# Patient Record
Sex: MALE | Race: BLACK OR AFRICAN AMERICAN | NOT HISPANIC OR LATINO | Employment: OTHER | URBAN - METROPOLITAN AREA
[De-identification: names, ages, dates, MRNs, and addresses within clinical notes are randomized per-mention and may not be internally consistent; named-entity substitution may affect disease eponyms.]

---

## 2017-01-23 ENCOUNTER — DOCUMENTATION ONLY (OUTPATIENT)
Dept: NEPHROLOGY | Facility: CLINIC | Age: 37
End: 2017-01-23

## 2017-01-24 NOTE — PROGRESS NOTES
Home dialysis clinic note  1/10/17    HPI  Mr. Hidalgo has been recently started on PD. He has ESRD due to malignant hypertension and chronic NSAID use. He has hepatitis B carrier state per hepatology clinic. Pt is doing well so far on PD. Denies any cloudy fluid/ fever/ vomiting/ discharge from exit site/ abdominal pain/ dysuria.     Most recently his PD catheter has been working ok. He occasionally feels pressure in suprapubic area. He is not sure he has any dysuria.     ROS  As noted under HPI  Otherwise negative  No nausea/ vomiting/ cloudy PD fluid/ dysuria/ fever/ cough/ shortness of breath/ leg edema     Exam  Vitals noted, /98, HR 76, T 97.4, weight 92.5, dry weight 86 kg  Gen NAD  Well developed, well nourished   Respiration regular  Neck supple  Lungs no crackles/ wheezing  CV S1S2+ regular  abd soft, NT, exit site without any redness/ discharge/ crusting  extr edema trace       PD catheter inserted by Dr. Gillespie 6/15/16    Tidal 5 exchanges  First fill 2.5 L  Tidal fill 2 L  Tidal drain 2.3 L  Total volume 12.5 L    Dec KT/V 2.8  PD vol out 11,640  Urine 1,725     Aug KT/V 1.98  Urine 1 L    Labs   Hb 13.8  Iron sat 15  BUN 48  Creatinine 6.2  Na 141  K 5.0  Bicarbonate 21  Albumin 4.6  Ca CC 8.8  Phos 4.8      HbSAg positive   HBSAB Less than 10      A/P  ESRD  Hypertension  Metabolic acidosis, resolved   Proteinuria  Secondary hyperparathyroidism      - follow KT/V   - continue lasix to 40 twice daily, continue coreg, amlodipine, lisinopril, aldactone   - strict low salt diet  - iron series  - low phos diet  - continue calcitriol dose to 0.25 mcg daily given PTH trend  - SW is sending referral for mental health services through [facility] as he screened positive on depression screen. Pt has agreed to follow there. Still haven't actually visited the clinic.   - discussed with pt about good bowel regimen, will avoid daily use of Dulcolax and instead use Colace twice daily, Miralax and add  Benefiber to be added to at least 3 drinks per day. Counseled about increased fiber intake in his diet and encouraged to bring up any issues with constipation as his PD catheter is somewhat positional. He expressed understanding.   - sodium bicarbonate 325 mg TID  - start Renaplex D  - check urinalysis and urine culture

## 2017-02-03 ENCOUNTER — HOSPITAL ENCOUNTER (OUTPATIENT)
Dept: CARDIOLOGY | Facility: CLINIC | Age: 37
Discharge: HOME | End: 2017-02-03
Payer: MEDICARE

## 2017-02-03 ENCOUNTER — HOSPITAL ENCOUNTER (OUTPATIENT)
Dept: RADIOLOGY | Facility: HOSPITAL | Age: 37
Discharge: HOME | End: 2017-02-03
Attending: NURSE PRACTITIONER
Payer: MEDICARE

## 2017-02-03 DIAGNOSIS — Z76.82 ORGAN TRANSPLANT CANDIDATE: ICD-10-CM

## 2017-02-03 LAB — DIASTOLIC DYSFUNCTION: NO

## 2017-02-03 PROCEDURE — 93018 CV STRESS TEST I&R ONLY: CPT | Mod: S$PBB,,, | Performed by: INTERNAL MEDICINE

## 2017-02-03 PROCEDURE — 78452 HT MUSCLE IMAGE SPECT MULT: CPT | Mod: 26,,, | Performed by: RADIOLOGY

## 2017-02-03 PROCEDURE — 93016 CV STRESS TEST SUPVJ ONLY: CPT | Mod: S$PBB,,, | Performed by: INTERNAL MEDICINE

## 2017-02-13 ENCOUNTER — DOCUMENTATION ONLY (OUTPATIENT)
Dept: NEPHROLOGY | Facility: CLINIC | Age: 37
End: 2017-02-13

## 2017-02-13 NOTE — PROGRESS NOTES
Home dialysis clinic note  2/10/17    HPI  Mr. Hidalgo has been recently started on PD. He has ESRD due to malignant hypertension and chronic NSAID use. He has hepatitis B carrier state per hepatology clinic. Pt is doing well so far on PD. Denies any cloudy fluid/ fever/ vomiting/ discharge from exit site/ abdominal pain/ dysuria.     Most recently his PD catheter has been working ok. Denies constipation/ dysuria.     ROS  As noted under HPI  Otherwise negative  No nausea/ vomiting/ cloudy PD fluid/ dysuria/ fever/ cough/ shortness of breath/ leg edema     Exam  Vitals noted, /92, HR 71, T 96.5, weight 92.4, dry weight 86 kg  Gen NAD  Well developed, well nourished   Respiration regular  Neck supple  Lungs no crackles/ wheezing  CV S1S2+ regular  abd soft, NT, exit site without any redness/ discharge/ crusting  extr edema trace       PD catheter inserted by Dr. Gillespie 6/15/16    Tidal 5 exchanges  First fill 2.5 L  Tidal fill 2 L  Tidal drain 2.3 L  Total volume 12.5 L    Dec KT/V 2.8  PD vol out 11,640  Urine 1,725     Aug KT/V 1.98  Urine 1 L    Labs   Hb 13  Iron sat 18  BUN 55  Creatinine 7.1  Na 141  K 4.8  Bicarbonate 19  Albumin 4.4  Ca CC 9.0  Phos 4.0    KT/V not done for this month    HbSAg positive   HBSAB Less than 10      A/P  ESRD  Hypertension  Metabolic acidosis  Proteinuria  Secondary hyperparathyroidism      - follow KT/V   - continue lasix to 40 twice daily, continue coreg, amlodipine, lisinopril  - change EDW to 90 kg  - strict low salt diet  - iron series  - low phos diet  - continue calcitriol dose to 0.25 mcg daily given PTH trend  - he has an appointment to see psychiatry   - discussed with pt about good bowel regimen, will avoid daily use of Dulcolax and instead use Colace twice daily, Miralax and add Benefiber to be added to at least 3 drinks per day. Counseled about increased fiber intake in his diet and encouraged to bring up any issues with constipation as his PD catheter  is somewhat positional. He expressed understanding.   - continue sodium bicarbonate 325 mg TID  - continue Renaplex D

## 2017-03-09 ENCOUNTER — DOCUMENTATION ONLY (OUTPATIENT)
Dept: PSYCHIATRY | Facility: CLINIC | Age: 37
End: 2017-03-09

## 2017-03-09 ENCOUNTER — SOCIAL WORK (OUTPATIENT)
Dept: TRANSPLANT | Facility: CLINIC | Age: 37
End: 2017-03-09
Payer: MEDICARE

## 2017-03-09 ENCOUNTER — INITIAL CONSULT (OUTPATIENT)
Dept: PSYCHIATRY | Facility: CLINIC | Age: 37
End: 2017-03-09
Payer: MEDICARE

## 2017-03-09 VITALS — HEIGHT: 70 IN

## 2017-03-09 DIAGNOSIS — Z01.818 ENCOUNTER FOR PRE-TRANSPLANT EVALUATION FOR KIDNEY TRANSPLANT: Primary | ICD-10-CM

## 2017-03-09 PROCEDURE — 99212 OFFICE O/P EST SF 10 MIN: CPT | Mod: PBBFAC | Performed by: PSYCHIATRY & NEUROLOGY

## 2017-03-09 PROCEDURE — 99999 PR PBB SHADOW E&M-EST. PATIENT-LVL II: CPT | Mod: PBBFAC,,, | Performed by: PSYCHIATRY & NEUROLOGY

## 2017-03-09 PROCEDURE — 99499 UNLISTED E&M SERVICE: CPT | Mod: S$PBB,,, | Performed by: PSYCHIATRY & NEUROLOGY

## 2017-03-09 NOTE — PROGRESS NOTES
Pt scheduled for psych eval today.  Per note, he was 40 minutes late and had to reschedule for 5/16/17.

## 2017-03-20 DIAGNOSIS — N18.6 ESRD (END STAGE RENAL DISEASE): Primary | ICD-10-CM

## 2017-03-20 RX ORDER — FUROSEMIDE 40 MG/1
40 TABLET ORAL DAILY
Qty: 30 TABLET | Refills: 0
Start: 2017-03-20 | End: 2017-08-11 | Stop reason: SDUPTHER

## 2017-03-23 ENCOUNTER — TELEPHONE (OUTPATIENT)
Dept: TRANSPLANT | Facility: CLINIC | Age: 37
End: 2017-03-23

## 2017-03-23 NOTE — TELEPHONE ENCOUNTER
Called patient and left message. Appt with psych is 5/16/17. Needs to be rescheduled (due to no shows) for Echo and Hepatology. Will rescheduled after completion of psych appointment.

## 2017-04-07 DIAGNOSIS — Z76.82 AWAITING ORGAN TRANSPLANT STATUS: Primary | ICD-10-CM

## 2017-04-09 ENCOUNTER — DOCUMENTATION ONLY (OUTPATIENT)
Dept: NEPHROLOGY | Facility: CLINIC | Age: 37
End: 2017-04-09

## 2017-04-09 NOTE — PROGRESS NOTES
Peritoneal dialysis clinic note  4/6/17    HPI  Mr. Hidalgo has been recently started on PD. He has ESRD due to malignant hypertension and chronic NSAID use. He has hepatitis B carrier state per hepatology clinic. Pt is doing well so far on PD. Denies any cloudy fluid/ fever/ vomiting/ discharge from exit site/ abdominal pain/ dysuria.     Most recently his PD catheter has been working ok. Denies constipation/ dysuria.     ROS  As noted under HPI  Otherwise negative  No nausea/ vomiting/ cloudy PD fluid/ dysuria/ fever/ cough/ shortness of breath/ leg edema     Exam  Vitals noted, /100, HR 82, T 97.9, weight 92.4, dry weight 94.6kg  Gen NAD  Well developed, well nourished   Respiration regular  Neck supple  Lungs no crackles/ wheezing  CV S1S2+ regular  abd soft, NT, exit site without any redness/ discharge/ crusting  extr edema trace       PD catheter inserted by Dr. Gillespie 6/15/16    Tidal 5 exchanges  First fill 2.5 L  Tidal fill 2 L  Tidal drain 2.3 L  Total volume 12.5 L    Dec KT/V 2.8  PD vol out 11,640  Urine 1,725     Aug KT/V 1.98  Urine 1 L    Labs   Labs noted in e cube     HbSAg positive   HBSAB Less than 10      A/P  ESRD  Hypertension  Metabolic acidosis  Proteinuria  Secondary hyperparathyroidism      - follow KT/V   - continue lasix to 40 twice daily, continue coreg, amlodipine, lisinopril  - keep EDW to 90 kg  - strict low salt diet  - strict low K diet  - increase sodium bicarbonate to bid from current daily dose  - iron series  - low phos diet  - continue calcitriol dose to 0.25 mcg daily given PTH trend  - he has an appointment to see psychiatry   - discussed with pt about good bowel regimen, will avoid daily use of Dulcolax and instead use Colace twice daily, Miralax and add Benefiber to be added to at least 3 drinks per day. Counseled about increased fiber intake in his diet and encouraged to bring up any issues with constipation as his PD catheter is somewhat positional. He expressed  understanding.   - continue Renaplex D

## 2017-04-12 ENCOUNTER — TELEPHONE (OUTPATIENT)
Dept: OPTOMETRY | Facility: CLINIC | Age: 37
End: 2017-04-12

## 2017-04-12 NOTE — TELEPHONE ENCOUNTER
----- Message from Montserrat Bravo OD sent at 4/12/2017  9:21 AM CDT -----  Regarding: call pt to schedule  Hello,    This patient is due for his 6-month follow-up on hypertensive retinopathy. Please call him to offer to schedule.    Thank you,  Montserrat Bravo OD

## 2017-05-09 ENCOUNTER — DOCUMENTATION ONLY (OUTPATIENT)
Dept: NEPHROLOGY | Facility: CLINIC | Age: 37
End: 2017-05-09

## 2017-05-12 NOTE — PROGRESS NOTES
Peritoneal dialysis clinic note  5/9/17    HPI  Mr. Hidalgo has been recently started on PD. He has ESRD due to malignant hypertension and chronic NSAID use. He has hepatitis B carrier state. Denies any cloudy fluid/ fever/ vomiting/ discharge from exit site/ abdominal pain/ dysuria.     Most recently his PD catheter has been working ok. Denies constipation/ dysuria.     Last week when pt presented for labs to PD clinic, per staff, he was noted to be edematous. Per recent PET results (average low) and per his edema, he had changes to his PD Rx. Per his flow sheet he appears to have increased net UF from around 800's prior to this to around 1400 to 1700 cc. Pt reports he has been feeling tired as a result and has fatigue. His BP in the clinic was 107/81. Although still he appears to have weight gain above his EDW.     ROS  As noted under HPI  Otherwise negative  No nausea/ vomiting/ cloudy PD fluid/ dysuria/ fever/ cough  Positive for shortness of breath/ leg edema     Exam  Vitals noted, /87, HR 81, T 98.3, weight 97.2 with 500 ml of fluid in, dry weight 90 kg  Gen NAD  Well developed, well nourished   Respiration regular  Neck supple  Lungs no crackles/ wheezing  CV S1S2+ regular  abd soft, NT, exit site without any redness/ discharge/ crusting  extr edema 1+ to 2+      PD catheter inserted by Dr. Gillespie 6/15/16    Tidal 5 exchanges  First fill 3 L  Tidal fill 2.3 L  Tidal drain 2.6 L  Last fill 500 ml  Total volume 12.7 L    Dec KT/V 2.8  PD vol out 11,640  Urine 1,725     Aug KT/V 1.98  Urine 1 L    Labs   Labs noted in e cube     HbSAg positive   HBSAB Less than 10      A/P  ESRD  Hypertension  Metabolic acidosis  Proteinuria  Secondary hyperparathyroidism  Fluid overload, however intolerant of 4.5% solution      - follow KT/V   - increase lasix to 80 mg twice daily, continue coreg, amlodipine, lisinopril  - follow symptoms closely   - strict low salt diet  - strict low K diet  - continue sodium  bicarbonate to bid from current daily dose  - iron series  - low phos diet  - continue calcitriol dose to 0.25 mcg daily given PTH trend  - he has an appointment to see psychiatry   - discussed with pt about good bowel regimen, will avoid daily use of Dulcolax and instead use Colace twice daily, Miralax and add Benefiber to be added to at least 3 drinks per day. Counseled about increased fiber intake in his diet and encouraged to bring up any issues with constipation as his PD catheter is somewhat positional. He expressed understanding.   - continue Renaplex D

## 2017-05-16 ENCOUNTER — INITIAL CONSULT (OUTPATIENT)
Dept: PSYCHIATRY | Facility: CLINIC | Age: 37
End: 2017-05-16
Payer: MEDICARE

## 2017-05-16 VITALS
SYSTOLIC BLOOD PRESSURE: 118 MMHG | HEART RATE: 87 BPM | BODY MASS INDEX: 30.64 KG/M2 | WEIGHT: 214 LBS | HEIGHT: 70 IN | DIASTOLIC BLOOD PRESSURE: 72 MMHG

## 2017-05-16 DIAGNOSIS — F43.21 ADJUSTMENT DISORDER WITH DEPRESSED MOOD: Primary | ICD-10-CM

## 2017-05-16 PROCEDURE — 90792 PSYCH DIAG EVAL W/MED SRVCS: CPT | Mod: S$PBB,NTX,, | Performed by: PSYCHIATRY & NEUROLOGY

## 2017-05-16 PROCEDURE — 99212 OFFICE O/P EST SF 10 MIN: CPT | Mod: PBBFAC,TXP,25 | Performed by: PSYCHIATRY & NEUROLOGY

## 2017-05-16 PROCEDURE — 99999 PR PBB SHADOW E&M-EST. PATIENT-LVL II: CPT | Mod: PBBFAC,TXP,, | Performed by: PSYCHIATRY & NEUROLOGY

## 2017-05-16 PROCEDURE — 90792 PSYCH DIAG EVAL W/MED SRVCS: CPT | Mod: PBBFAC,TXP | Performed by: PSYCHIATRY & NEUROLOGY

## 2017-05-16 NOTE — PROGRESS NOTES
Outpatient Psychiatry Initial Visit (MD/NP)    5/16/2017    Maksim Hidalgo, a 37 y.o. male, presenting for initial evaluation visit. Met with patient.    Reason for Encounter: Consult from renal transplant. Patient complains of No chief complaint on file.  .    History of Present Illness: . The patient is a 37 year old gentleman who is being worked up for a transplant.  His history begins around Thanksgiving 2015 when he developed a severe headached.  He was found to have severe hypertension.  He began peritoneal dialysis in July 2016.  He has been a compliant patient with appointments and medications.  He gets fatigued and and occasionally discouraged because of limitations on his activities.  He is occasionally, transiently depressed about this.  The depression is not severe and has not interfered with compliance.  We discussed the advantages of counseling and he is interested in pursuing this.  There is no history of psychosis, confusion, or suicidal ideation.  There is no history of alcohol or substance abuse.      Review Of Systems:     GENERAL:  No weight gain or loss  SKIN:  No rashes or lacerations  HEAD:  No headaches  EYES:  No exophthalmos, jaundice or blindness  EARS:  No dizziness, tinnitus or hearing loss  NOSE:  No changes in smell  MOUTH & THROAT:  No dyskinetic movements or obvious goiter  CHEST:  No shortness of breath, hyperventilation or cough  CARDIOVASCULAR:  No tachycardia or chest pain  ABDOMEN:  No nausea, vomiting, pain, constipation or diarrhea  URINARY:  No frequency, dysuria or sexual dysfunction  ENDOCRINE:  No polydipsia, polyuria  MUSCULOSKELETAL:  No pain or stiffness of the joints  NEUROLOGIC:  No weakness, sensory changes, seizures, confusion, memory loss, tremor or other abnormal movements    Current Evaluation:     Nutritional Screening: Considering the patient's height and weight, medications, medical history and preferences, should a referral be made to the dietitian?  "no    Constitutional  Vitals:  Most recent vital signs, dated less than 90 days prior to this appointment, were reviewed.    Vitals:    05/16/17 0851   BP: 118/72   Pulse: 87   Weight: 97.1 kg (214 lb)   Height: 5' 10" (1.778 m)        General:  unremarkable, age appropriate     Musculoskeletal  Muscle Strength/Tone:  no tremor   Gait & Station:  non-ataxic     Psychiatric  Speech:  no latency; no press   Mood & Affect:  anxious  congruent and appropriate   Thought Process:  normal and logical   Associations:  intact   Thought Content:  normal, no suicidality, no homicidality, delusions, or paranoia   Insight:  intact   Judgement: behavior is adequate to circumstances   Orientation:  grossly intact   Memory: intact for content of interview   Language: grossly intact   Attention Span & Concentration:  able to focus   Fund of Knowledge:  intact and appropriate to age and level of education       Relevant Elements of Neurological Exam: normal gait    Functioning in Relationships:  Spouse/partner: Not   Peers: active in hid Buddhism  Employers: Worked for [employer] for 20 years until he started dialysis.    Laboratory Data  No visits with results within 1 Month(s) from this visit.  Latest known visit with results is:    Hospital Outpatient Visit on 02/03/2017   Component Date Value Ref Range Status    Diastolic Dysfunction 02/03/2017 No   Final         Medications  Outpatient Encounter Prescriptions as of 5/16/2017   Medication Sig Dispense Refill    amlodipine (NORVASC) 10 MG tablet Take 1 tablet (10 mg total) by mouth once daily. 90 tablet 3    calcitRIOL (ROCALTROL) 0.5 MCG Cap Take 1 capsule (0.5 mcg total) by mouth once daily. 30 capsule 11    carvedilol (COREG) 25 MG tablet Take 1 tablet (25 mg total) by mouth 2 (two) times daily with meals. 180 tablet 3    furosemide (LASIX) 40 MG tablet Take 1 tablet (40 mg total) by mouth once daily. 30 tablet 0    lisinopril (PRINIVIL,ZESTRIL) 40 MG tablet Take 1 " tablet (40 mg total) by mouth once daily. 90 tablet 3    oxycodone-acetaminophen (PERCOCET) 5-325 mg per tablet Take 1 tablet by mouth every 4 (four) hours as needed for Pain. 31 tablet 0    polyethylene glycol (GLYCOLAX) 17 gram/dose powder Take 17 g by mouth 3 (three) times daily.  0    senna-docusate 8.6-50 mg (PERICOLACE) 8.6-50 mg per tablet Take 2 tablets by mouth every evening.       No facility-administered encounter medications on file as of 5/16/2017.            Assessment - Diagnosis - Goals:     Impression: Adjustment Disorder                       Good candidate for transplant    No diagnosis found.    Strengths and Liabilities: Strength: Patient accepts guidance/feedback, Strength: Patient is expressive/articulate., Strength: Patient is intelligent., Strength: Patient has reasonable judgment.    Treatment Goals:  Specify outcomes written in observable, behavioral terms:   Counselling is optional    Treatment Plan/Recommendations:   · Counseliling is optional.  Psych meds not indicated at this time.      Return to Clinic: as needed    Counseling time: 20  Total time: 60  Consulting clinician was informed of the encounter and consult note.

## 2017-05-17 ENCOUNTER — LAB VISIT (OUTPATIENT)
Dept: LAB | Facility: HOSPITAL | Age: 37
End: 2017-05-17
Payer: MEDICARE

## 2017-05-17 DIAGNOSIS — Z76.82 AWAITING ORGAN TRANSPLANT STATUS: ICD-10-CM

## 2017-06-04 ENCOUNTER — OFFICE VISIT (OUTPATIENT)
Dept: INTERNAL MEDICINE | Facility: CLINIC | Age: 37
End: 2017-06-04
Payer: MEDICARE

## 2017-06-04 VITALS
DIASTOLIC BLOOD PRESSURE: 84 MMHG | WEIGHT: 218.69 LBS | HEIGHT: 70 IN | BODY MASS INDEX: 31.31 KG/M2 | HEART RATE: 78 BPM | TEMPERATURE: 98 F | SYSTOLIC BLOOD PRESSURE: 110 MMHG

## 2017-06-04 DIAGNOSIS — J01.90 ACUTE NON-RECURRENT SINUSITIS, UNSPECIFIED LOCATION: Primary | ICD-10-CM

## 2017-06-04 PROCEDURE — 99214 OFFICE O/P EST MOD 30 MIN: CPT | Mod: PBBFAC | Performed by: NURSE PRACTITIONER

## 2017-06-04 PROCEDURE — 99214 OFFICE O/P EST MOD 30 MIN: CPT | Mod: S$PBB,,, | Performed by: NURSE PRACTITIONER

## 2017-06-04 PROCEDURE — 99999 PR PBB SHADOW E&M-EST. PATIENT-LVL IV: CPT | Mod: PBBFAC,,, | Performed by: NURSE PRACTITIONER

## 2017-06-04 RX ORDER — ALBUTEROL SULFATE 90 UG/1
2 AEROSOL, METERED RESPIRATORY (INHALATION) EVERY 4 HOURS PRN
Qty: 1 INHALER | Refills: 0 | Status: SHIPPED | OUTPATIENT
Start: 2017-06-04

## 2017-06-04 RX ORDER — DOXYCYCLINE 100 MG/1
100 CAPSULE ORAL 2 TIMES DAILY
Qty: 14 CAPSULE | Refills: 0 | Status: ON HOLD | OUTPATIENT
Start: 2017-06-04 | End: 2017-06-23 | Stop reason: HOSPADM

## 2017-06-04 RX ORDER — BENZONATATE 200 MG/1
200 CAPSULE ORAL NIGHTLY
Qty: 30 CAPSULE | Refills: 0 | Status: SHIPPED | OUTPATIENT
Start: 2017-06-04 | End: 2017-10-13 | Stop reason: ALTCHOICE

## 2017-06-04 NOTE — PATIENT INSTRUCTIONS
Flonase 1 spray to both nares twice a day or 2 sprays once a day    Sinus wash with neti pot, using sterile water only.      Sinusitis (Antibiotic Treatment)    The sinuses are air-filled spaces within the bones of the face. They connect to the inside of the nose. Sinusitis is an inflammation of the tissue lining the sinus cavity. Sinus inflammation can occur during a cold. It can also be due to allergies to pollens and other particles in the air. Sinusitis can cause symptoms of sinus congestion and fullness. A sinus infection causes fever, headache and facial pain. There is often green or yellow drainage from the nose or into the back of the throat (post-nasal drip). You have been given antibiotics to treat this condition.  Home care:  · Take the full course of antibiotics as instructed. Do not stop taking them, even if you feel better.  · Drink plenty of water, hot tea, and other liquids. This may help thin mucus. It also may promote sinus drainage.  · Heat may help soothe painful areas of the face. Use a towel soaked in hot water. Or,  the shower and direct the hot spray onto your face. Using a vaporizer along with a menthol rub at night may also help.   · An expectorant containing guaifenesin may help thin the mucus and promote drainage from the sinuses.  · Over-the-counter decongestants may be used unless a similar medicine was prescribed. Nasal sprays work the fastest. Use one that contains phenylephrine or oxymetazoline. First blow the nose gently. Then use the spray. Do not use these medicines more often than directed on the label or symptoms may get worse. You may also use tablets containing pseudoephedrine. Avoid products that combine ingredients, because side effects may be increased. Read labels. You can also ask the pharmacist for help. (NOTE: Persons with high blood pressure should not use decongestants. They can raise blood pressure.)  · Over-the-counter antihistamines may help if allergies  contributed to your sinusitis.    · Do not use nasal rinses or irrigation during an acute sinus infection, unless told to by your health care provider. Rinsing may spread the infection to other sinuses.  · Use acetaminophen or ibuprofen to control pain, unless another pain medicine was prescribed. (If you have chronic liver or kidney disease or ever had a stomach ulcer, talk with your doctor before using these medicines. Aspirin should never be used in anyone under 18 years of age who is ill with a fever. It may cause severe liver damage.)  · Don't smoke. This can worsen symptoms.  Follow-up care  Follow up with your healthcare provider or our staff if you are not improving within the next week.  When to seek medical advice  Call your healthcare provider if any of these occur:  · Facial pain or headache becoming more severe  · Stiff neck  · Unusual drowsiness or confusion  · Swelling of the forehead or eyelids  · Vision problems, including blurred or double vision  · Fever of 100.4ºF (38ºC) or higher, or as directed by your healthcare provider  · Seizure  · Breathing problems  · Symptoms not resolving within 10 days  Date Last Reviewed: 4/13/2015  © 2463-1975 [employer] [address] [place] [id number]. All rights reserved. This information is not intended as a substitute for professional medical care. Always follow your healthcare professional's instructions.    Benzocaine/menthol lozenges for viral pharyngitis    Lots of warm fluids and warm salt water gargles
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
## 2017-06-08 ENCOUNTER — HOSPITAL ENCOUNTER (OUTPATIENT)
Dept: CARDIOLOGY | Facility: CLINIC | Age: 37
Discharge: HOME | End: 2017-06-08
Payer: MEDICARE

## 2017-06-08 DIAGNOSIS — N18.6 ESRD (END STAGE RENAL DISEASE): Primary | ICD-10-CM

## 2017-06-08 DIAGNOSIS — Z76.82 ORGAN TRANSPLANT CANDIDATE: ICD-10-CM

## 2017-06-08 DIAGNOSIS — I37.9 NONRHEUMATIC PULMONARY VALVE DISORDER: ICD-10-CM

## 2017-06-08 LAB
DIASTOLIC DYSFUNCTION: NO
ESTIMATED PA SYSTOLIC PRESSURE: 14.16
RETIRED EF AND QEF - SEE NOTES: 60 (ref 55–65)
TRICUSPID VALVE REGURGITATION: NORMAL

## 2017-06-08 PROCEDURE — 93306 TTE W/DOPPLER COMPLETE: CPT | Mod: PBBFAC,TXP | Performed by: INTERNAL MEDICINE

## 2017-06-09 ENCOUNTER — HOSPITAL ENCOUNTER (OUTPATIENT)
Dept: RADIOLOGY | Facility: HOSPITAL | Age: 37
Discharge: HOME | End: 2017-06-09
Attending: INTERNAL MEDICINE
Payer: MEDICARE

## 2017-06-09 DIAGNOSIS — N18.6 ESRD (END STAGE RENAL DISEASE): ICD-10-CM

## 2017-06-09 PROCEDURE — 74000 XR ABDOMEN AP 1 VIEW: CPT | Mod: 26,NTX,, | Performed by: RADIOLOGY

## 2017-06-09 PROCEDURE — 74000 XR ABDOMEN AP 1 VIEW: CPT | Mod: TC,TXP

## 2017-06-13 ENCOUNTER — OFFICE VISIT (OUTPATIENT)
Dept: SURGERY | Facility: CLINIC | Age: 37
DRG: 919 | End: 2017-06-13
Payer: MEDICARE

## 2017-06-13 ENCOUNTER — HOSPITAL ENCOUNTER (INPATIENT)
Facility: HOSPITAL | Age: 37
LOS: 10 days | Discharge: HOME | DRG: 919 | End: 2017-06-23
Attending: HOSPITALIST | Admitting: HOSPITALIST
Payer: MEDICARE

## 2017-06-13 VITALS
WEIGHT: 216 LBS | SYSTOLIC BLOOD PRESSURE: 128 MMHG | HEART RATE: 79 BPM | TEMPERATURE: 99 F | DIASTOLIC BLOOD PRESSURE: 89 MMHG | BODY MASS INDEX: 30.92 KG/M2 | HEIGHT: 70 IN

## 2017-06-13 DIAGNOSIS — K65.9 PERITONITIS: ICD-10-CM

## 2017-06-13 DIAGNOSIS — R10.84 GENERALIZED ABDOMINAL PAIN: ICD-10-CM

## 2017-06-13 DIAGNOSIS — N18.6 ESRD (END STAGE RENAL DISEASE): ICD-10-CM

## 2017-06-13 DIAGNOSIS — Z99.2 PERITONEAL DIALYSIS CATHETER IN PLACE: ICD-10-CM

## 2017-06-13 DIAGNOSIS — I51.89 DIASTOLIC DYSFUNCTION: ICD-10-CM

## 2017-06-13 DIAGNOSIS — B18.1 CHRONIC HEPATITIS B VIRUS INFECTION: ICD-10-CM

## 2017-06-13 DIAGNOSIS — R10.10 PAIN OF UPPER ABDOMEN: ICD-10-CM

## 2017-06-13 DIAGNOSIS — I95.9 HYPOTENSION, UNSPECIFIED HYPOTENSION TYPE: ICD-10-CM

## 2017-06-13 DIAGNOSIS — D63.1 ANEMIA IN ESRD (END-STAGE RENAL DISEASE): Chronic | ICD-10-CM

## 2017-06-13 DIAGNOSIS — N18.6 ANEMIA IN ESRD (END-STAGE RENAL DISEASE): Chronic | ICD-10-CM

## 2017-06-13 DIAGNOSIS — K59.09 CHRONIC CONSTIPATION: ICD-10-CM

## 2017-06-13 DIAGNOSIS — Z99.2 ESRD ON PERITONEAL DIALYSIS: Primary | ICD-10-CM

## 2017-06-13 DIAGNOSIS — Z99.2 DEPENDENCE ON PERITONEAL DIALYSIS: Chronic | ICD-10-CM

## 2017-06-13 DIAGNOSIS — N18.6 ESRD ON PERITONEAL DIALYSIS: Primary | ICD-10-CM

## 2017-06-13 DIAGNOSIS — N25.81 SECONDARY HYPERPARATHYROIDISM: ICD-10-CM

## 2017-06-13 DIAGNOSIS — K65.9: ICD-10-CM

## 2017-06-13 LAB
ALBUMIN SERPL BCP-MCNC: 3.4 G/DL
ALP SERPL-CCNC: 116 U/L
ALT SERPL W/O P-5'-P-CCNC: 14 U/L
ANION GAP SERPL CALC-SCNC: 11 MMOL/L
AST SERPL-CCNC: 13 U/L
BASOPHILS # BLD AUTO: 0.02 K/UL
BASOPHILS NFR BLD: 0.3 %
BILIRUB SERPL-MCNC: 0.3 MG/DL
BUN SERPL-MCNC: 44 MG/DL
CALCIUM SERPL-MCNC: 8.8 MG/DL
CHLORIDE SERPL-SCNC: 108 MMOL/L
CO2 SERPL-SCNC: 23 MMOL/L
CREAT SERPL-MCNC: 6.2 MG/DL
DIFFERENTIAL METHOD: ABNORMAL
EOSINOPHIL # BLD AUTO: 0.4 K/UL
EOSINOPHIL NFR BLD: 5.9 %
ERYTHROCYTE [DISTWIDTH] IN BLOOD BY AUTOMATED COUNT: 12.7 %
EST. GFR  (AFRICAN AMERICAN): 12 ML/MIN/1.73 M^2
EST. GFR  (NON AFRICAN AMERICAN): 10.4 ML/MIN/1.73 M^2
GLUCOSE SERPL-MCNC: 76 MG/DL
HCT VFR BLD AUTO: 37 %
HGB BLD-MCNC: 12.2 G/DL
LYMPHOCYTES # BLD AUTO: 2.2 K/UL
LYMPHOCYTES NFR BLD: 32.4 %
MCH RBC QN AUTO: 29.3 PG
MCHC RBC AUTO-ENTMCNC: 33 %
MCV RBC AUTO: 89 FL
MONOCYTES # BLD AUTO: 0.5 K/UL
MONOCYTES NFR BLD: 6.9 %
NEUTROPHILS # BLD AUTO: 3.8 K/UL
NEUTROPHILS NFR BLD: 54.4 %
PHOSPHATE SERPL-MCNC: 3.8 MG/DL
PLATELET # BLD AUTO: 235 K/UL
PMV BLD AUTO: 9.6 FL
POTASSIUM SERPL-SCNC: 4.4 MMOL/L
PROT SERPL-MCNC: 7.4 G/DL
RBC # BLD AUTO: 4.16 M/UL
SODIUM SERPL-SCNC: 142 MMOL/L
WBC # BLD AUTO: 6.92 K/UL

## 2017-06-13 PROCEDURE — 84100 ASSAY OF PHOSPHORUS: CPT | Mod: NTX

## 2017-06-13 PROCEDURE — 87077 CULTURE AEROBIC IDENTIFY: CPT | Mod: NTX

## 2017-06-13 PROCEDURE — 87205 SMEAR GRAM STAIN: CPT | Mod: NTX

## 2017-06-13 PROCEDURE — 87070 CULTURE OTHR SPECIMN AEROBIC: CPT | Mod: NTX

## 2017-06-13 PROCEDURE — 89051 BODY FLUID CELL COUNT: CPT | Mod: NTX

## 2017-06-13 PROCEDURE — 36415 COLL VENOUS BLD VENIPUNCTURE: CPT | Mod: NTX

## 2017-06-13 PROCEDURE — 80053 COMPREHEN METABOLIC PANEL: CPT | Mod: NTX

## 2017-06-13 PROCEDURE — 99999 PR PBB SHADOW E&M-EST. PATIENT-LVL III: CPT | Mod: PBBFAC,TXP,, | Performed by: SURGERY

## 2017-06-13 PROCEDURE — 90945 DIALYSIS ONE EVALUATION: CPT | Mod: NTX

## 2017-06-13 PROCEDURE — 25000003 PHARM REV CODE 250: Mod: NTX | Performed by: INTERNAL MEDICINE

## 2017-06-13 PROCEDURE — 27200950 HC CAPD SUPPORT: Mod: NTX

## 2017-06-13 PROCEDURE — 99222 1ST HOSP IP/OBS MODERATE 55: CPT | Mod: NTX,,, | Performed by: INTERNAL MEDICINE

## 2017-06-13 PROCEDURE — 99233 SBSQ HOSP IP/OBS HIGH 50: CPT | Mod: NTX,,, | Performed by: INTERNAL MEDICINE

## 2017-06-13 PROCEDURE — 85025 COMPLETE CBC W/AUTO DIFF WBC: CPT | Mod: NTX

## 2017-06-13 PROCEDURE — 99213 OFFICE O/P EST LOW 20 MIN: CPT | Mod: S$PBB,NTX,, | Performed by: SURGERY

## 2017-06-13 PROCEDURE — 87186 SC STD MICRODIL/AGAR DIL: CPT | Mod: NTX

## 2017-06-13 PROCEDURE — 11000001 HC ACUTE MED/SURG PRIVATE ROOM: Mod: NTX

## 2017-06-13 PROCEDURE — 63600175 PHARM REV CODE 636 W HCPCS: Mod: NTX | Performed by: INTERNAL MEDICINE

## 2017-06-13 PROCEDURE — 87070 CULTURE OTHR SPECIMN AEROBIC: CPT | Mod: 59,NTX

## 2017-06-13 PROCEDURE — 87040 BLOOD CULTURE FOR BACTERIA: CPT | Mod: NTX

## 2017-06-13 RX ORDER — FUROSEMIDE 40 MG/1
40 TABLET ORAL DAILY
Status: DISCONTINUED | OUTPATIENT
Start: 2017-06-14 | End: 2017-06-16

## 2017-06-13 RX ORDER — OXYCODONE HYDROCHLORIDE 5 MG/1
5 TABLET ORAL EVERY 6 HOURS PRN
Status: DISCONTINUED | OUTPATIENT
Start: 2017-06-13 | End: 2017-06-23 | Stop reason: HOSPADM

## 2017-06-13 RX ORDER — POLYETHYLENE GLYCOL 3350 17 G/17G
17 POWDER, FOR SOLUTION ORAL 3 TIMES DAILY PRN
Status: DISCONTINUED | OUTPATIENT
Start: 2017-06-13 | End: 2017-06-14

## 2017-06-13 RX ORDER — ACETAMINOPHEN 500 MG
500 TABLET ORAL EVERY 6 HOURS PRN
Status: DISCONTINUED | OUTPATIENT
Start: 2017-06-13 | End: 2017-06-23 | Stop reason: HOSPADM

## 2017-06-13 RX ORDER — AMLODIPINE BESYLATE 10 MG/1
10 TABLET ORAL DAILY
Status: DISCONTINUED | OUTPATIENT
Start: 2017-06-14 | End: 2017-06-16

## 2017-06-13 RX ORDER — ALBUTEROL SULFATE 90 UG/1
2 AEROSOL, METERED RESPIRATORY (INHALATION) EVERY 4 HOURS PRN
Status: DISCONTINUED | OUTPATIENT
Start: 2017-06-13 | End: 2017-06-23 | Stop reason: HOSPADM

## 2017-06-13 RX ORDER — LISINOPRIL 20 MG/1
40 TABLET ORAL DAILY
Status: DISCONTINUED | OUTPATIENT
Start: 2017-06-14 | End: 2017-06-16

## 2017-06-13 RX ORDER — CARVEDILOL 25 MG/1
25 TABLET ORAL 2 TIMES DAILY WITH MEALS
Status: DISCONTINUED | OUTPATIENT
Start: 2017-06-13 | End: 2017-06-19

## 2017-06-13 RX ORDER — AMOXICILLIN 250 MG
2 CAPSULE ORAL NIGHTLY
Status: DISCONTINUED | OUTPATIENT
Start: 2017-06-13 | End: 2017-06-14

## 2017-06-13 RX ORDER — LACTULOSE 10 G/15ML
30 SOLUTION ORAL 3 TIMES DAILY
Status: DISCONTINUED | OUTPATIENT
Start: 2017-06-13 | End: 2017-06-19

## 2017-06-13 RX ORDER — ONDANSETRON 8 MG/1
8 TABLET, ORALLY DISINTEGRATING ORAL EVERY 8 HOURS PRN
Status: DISCONTINUED | OUTPATIENT
Start: 2017-06-13 | End: 2017-06-23 | Stop reason: HOSPADM

## 2017-06-13 RX ORDER — CALCITRIOL 0.25 UG/1
0.5 CAPSULE ORAL DAILY
Status: DISCONTINUED | OUTPATIENT
Start: 2017-06-13 | End: 2017-06-23 | Stop reason: HOSPADM

## 2017-06-13 RX ADMIN — CALCITRIOL 0.5 MCG: 0.25 CAPSULE, LIQUID FILLED ORAL at 02:06

## 2017-06-13 RX ADMIN — LACTULOSE 30 G: 20 SOLUTION ORAL at 09:06

## 2017-06-13 RX ADMIN — CARVEDILOL 25 MG: 25 TABLET, FILM COATED ORAL at 05:06

## 2017-06-13 RX ADMIN — STANDARDIZED SENNA CONCENTRATE AND DOCUSATE SODIUM 2 TABLET: 8.6; 5 TABLET, FILM COATED ORAL at 09:06

## 2017-06-13 RX ADMIN — ACETAMINOPHEN 500 MG: 500 TABLET ORAL at 02:06

## 2017-06-13 RX ADMIN — CEFTAZIDIME: 100 INJECTION, POWDER, FOR SOLUTION INTRAMUSCULAR; INTRAVENOUS at 10:06

## 2017-06-13 NOTE — HPI
37 y.o. male presented from the Surgery clinic with past medical history of ESRD on PD, hypertension, LVH/diastolic dysfunction, Hep B.  He was in his usual state of good health until the acute onset of generalized abdominal pain beginning 3 days prior to admission with gradually worsening course. Pertinent associated symptoms include difficulties with draining dialysate for several days, and recent sinusitis treated with oral doxycycline started 3 days ago. Patient presented to Dr. Gillespie's clinic today with abdominal pain, dysuria, and inability to completely drain dialysate.
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
## 2017-06-13 NOTE — PLAN OF CARE
Direct Admit from Clinic Acceptance Note    Clinic Physician or Mid-Level provider/Clinic giving report: Dr. Isai Gillespie from General Surgery clinic    Accepting Physician for admission to hospital: Stefanie Collins     Date of acceptance: 06/13/2017     Reason for direct admission from clinic or home: PERITONITIS 2/2 PERTIONEAL DIALYSIS     Report from Clinic Physician/Mid-Level Provider: 37 y.o. male with a PMH of HTN and ESRD on PD who presented to gen surg clinic today. He is s/p pd 6/15/16 with repositioning 11/16/16.  He was doing well until 2 days ago and now can only get fluid in but not drain.  He has developed pain with drainage.  He also has pain in lower abdomen and with urination.    Please call extension [phone] upon patient arrival to floor for Hospital Medicine admit team assignment and for additional admit orders for the patient.
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
## 2017-06-13 NOTE — HPI
Mr. Hidalgo is a 36 yo AAM with HTN and ESRD who was directly admitted from surgery clinic today for concern of peritonitis. Patient describes generalized abdominal pain which is exacerbated by urination. He also reports new onset drain pain. He denies any fevers or chills. He admits to constipation and only has BMs a few times per week. He feels he has gained a lot of weight over the last few days 2/2 inefficient dialysis. Otherwise he is without complaint.   Patient performs PD nightly at home; nephrologist is Dr. Garcia.   Home PD prescription:   3L first fill. Tidal fill 2300cc. Tidal drain 2600cc. No last fill.  Dwell time: 1 hour. Drain time: 30 minutes.   Total volume 12.24L. Total time: 8h 20m.

## 2017-06-13 NOTE — LETTER
[facility] - General Surgery  [address]  [place] [address]  Phone: [phone] June 13, 2017      Saurabh Zuleta MD  [address]  [place] [address]    Patient: Maksim Hidalgo   MR Number: [id number]   YOB: 1980   Date of Visit: 6/13/2017     Dear Dr. Zuleta:    Thank you for referring Maksim Hidalgo to me for evaluation. Below are the relevant portions of my assessment and plan of care.    Patient presents with PD with likely peritonitis.     PLAN:   - Admit     If you have questions, please do not hesitate to call me. I look forward to following Maksim along with you.    Sincerely,      Isai Gillespie MD   Section Head - General, Laparoscopic, Bariatric  Acute Care and Oncologic Surgery   - Surgical Weight Loss Program  [facility]    WSR/allison    CC  Juventino Garcia MD

## 2017-06-14 LAB
ALBUMIN SERPL BCP-MCNC: 3.6 G/DL
ANION GAP SERPL CALC-SCNC: 10 MMOL/L
APPEARANCE FLD: CLEAR
BASOPHILS # BLD AUTO: 0.02 K/UL
BASOPHILS NFR BLD: 0.3 %
BODY FLD TYPE: NORMAL
BUN SERPL-MCNC: 42 MG/DL
CALCIUM SERPL-MCNC: 9.2 MG/DL
CHLORIDE SERPL-SCNC: 108 MMOL/L
CO2 SERPL-SCNC: 23 MMOL/L
COLOR FLD: COLORLESS
CREAT SERPL-MCNC: 6 MG/DL
DIFFERENTIAL METHOD: ABNORMAL
EOSINOPHIL # BLD AUTO: 0.4 K/UL
EOSINOPHIL NFR BLD: 6 %
EOSINOPHIL NFR FLD MANUAL: 1 %
ERYTHROCYTE [DISTWIDTH] IN BLOOD BY AUTOMATED COUNT: 12.7 %
EST. GFR  (AFRICAN AMERICAN): 12.5 ML/MIN/1.73 M^2
EST. GFR  (NON AFRICAN AMERICAN): 10.8 ML/MIN/1.73 M^2
GLUCOSE SERPL-MCNC: 92 MG/DL
GRAM STN SPEC: NORMAL
GRAM STN SPEC: NORMAL
HCT VFR BLD AUTO: 39.1 %
HGB BLD-MCNC: 13.1 G/DL
LYMPHOCYTES # BLD AUTO: 2.8 K/UL
LYMPHOCYTES NFR BLD: 39.4 %
LYMPHOCYTES NFR FLD MANUAL: 5 %
MAGNESIUM SERPL-MCNC: 1.9 MG/DL
MCH RBC QN AUTO: 29.5 PG
MCHC RBC AUTO-ENTMCNC: 33.5 %
MCV RBC AUTO: 88 FL
MESOTHL CELL NFR FLD MANUAL: 1 %
MONOCYTES # BLD AUTO: 0.7 K/UL
MONOCYTES NFR BLD: 9.2 %
MONOS+MACROS NFR FLD MANUAL: 51 %
NEUTROPHILS # BLD AUTO: 3.2 K/UL
NEUTROPHILS NFR BLD: 45 %
NEUTROPHILS NFR FLD MANUAL: 42 %
PHOSPHATE SERPL-MCNC: 5 MG/DL
PLATELET # BLD AUTO: 270 K/UL
PMV BLD AUTO: 9.8 FL
POTASSIUM SERPL-SCNC: 3.9 MMOL/L
RBC # BLD AUTO: 4.44 M/UL
SODIUM SERPL-SCNC: 141 MMOL/L
VANCOMYCIN SERPL-MCNC: <1.1 UG/ML
WBC # BLD AUTO: 7.03 K/UL
WBC # FLD: 43 /CU MM

## 2017-06-14 PROCEDURE — 25000003 PHARM REV CODE 250: Mod: NTX | Performed by: INTERNAL MEDICINE

## 2017-06-14 PROCEDURE — 80069 RENAL FUNCTION PANEL: CPT | Mod: NTX

## 2017-06-14 PROCEDURE — 85025 COMPLETE CBC W/AUTO DIFF WBC: CPT | Mod: NTX

## 2017-06-14 PROCEDURE — 90945 DIALYSIS ONE EVALUATION: CPT | Mod: NTX

## 2017-06-14 PROCEDURE — 99231 SBSQ HOSP IP/OBS SF/LOW 25: CPT | Mod: NTX,,, | Performed by: INTERNAL MEDICINE

## 2017-06-14 PROCEDURE — 99232 SBSQ HOSP IP/OBS MODERATE 35: CPT | Mod: GC,NTX,, | Performed by: INTERNAL MEDICINE

## 2017-06-14 PROCEDURE — 80202 ASSAY OF VANCOMYCIN: CPT | Mod: NTX

## 2017-06-14 PROCEDURE — 83735 ASSAY OF MAGNESIUM: CPT | Mod: NTX

## 2017-06-14 PROCEDURE — 11000001 HC ACUTE MED/SURG PRIVATE ROOM: Mod: NTX

## 2017-06-14 RX ORDER — BISACODYL 5 MG
10 TABLET, DELAYED RELEASE (ENTERIC COATED) ORAL 2 TIMES DAILY
Status: DISCONTINUED | OUTPATIENT
Start: 2017-06-14 | End: 2017-06-19

## 2017-06-14 RX ORDER — BISACODYL 5 MG
10 TABLET, DELAYED RELEASE (ENTERIC COATED) ORAL 2 TIMES DAILY
Status: DISCONTINUED | OUTPATIENT
Start: 2017-06-14 | End: 2017-06-14

## 2017-06-14 RX ORDER — AMOXICILLIN 250 MG
2 CAPSULE ORAL 2 TIMES DAILY
Status: DISCONTINUED | OUTPATIENT
Start: 2017-06-14 | End: 2017-06-23 | Stop reason: HOSPADM

## 2017-06-14 RX ORDER — POLYETHYLENE GLYCOL 3350 17 G/17G
17 POWDER, FOR SOLUTION ORAL 3 TIMES DAILY
Status: DISCONTINUED | OUTPATIENT
Start: 2017-06-14 | End: 2017-06-23 | Stop reason: HOSPADM

## 2017-06-14 RX ADMIN — POLYETHYLENE GLYCOL 3350 17 G: 17 POWDER, FOR SOLUTION ORAL at 09:06

## 2017-06-14 RX ADMIN — BISACODYL 10 MG: 5 TABLET, COATED ORAL at 05:06

## 2017-06-14 RX ADMIN — CALCITRIOL 0.5 MCG: 0.25 CAPSULE, LIQUID FILLED ORAL at 10:06

## 2017-06-14 RX ADMIN — LISINOPRIL 40 MG: 20 TABLET ORAL at 10:06

## 2017-06-14 RX ADMIN — LACTULOSE 30 G: 20 SOLUTION ORAL at 09:06

## 2017-06-14 RX ADMIN — AMLODIPINE BESYLATE 10 MG: 10 TABLET ORAL at 10:06

## 2017-06-14 RX ADMIN — LACTULOSE 30 G: 20 SOLUTION ORAL at 02:06

## 2017-06-14 RX ADMIN — CARVEDILOL 25 MG: 25 TABLET, FILM COATED ORAL at 05:06

## 2017-06-14 RX ADMIN — LACTULOSE 30 G: 20 SOLUTION ORAL at 05:06

## 2017-06-14 RX ADMIN — FUROSEMIDE 40 MG: 40 TABLET ORAL at 10:06

## 2017-06-14 RX ADMIN — CARVEDILOL 25 MG: 25 TABLET, FILM COATED ORAL at 10:06

## 2017-06-14 RX ADMIN — STANDARDIZED SENNA CONCENTRATE AND DOCUSATE SODIUM 2 TABLET: 8.6; 5 TABLET, FILM COATED ORAL at 04:06

## 2017-06-14 NOTE — PROGRESS NOTES
CCPD started per orders. 928 ml clear effluent noted in initial drain. Effluent labs drawn and sent to lab.

## 2017-06-14 NOTE — SUBJECTIVE & OBJECTIVE
Interval History:   Last BM two days ago; reports it was small w/pellets. Continues to have abdominal pain. TTP. Tolerated PD last night; UF 1.2L. No BM despite 3 doses of lactulose this admission. KUB revealed appropriate position of PD catheter; however significant amount of stool.     Review of patient's allergies indicates:  No Known Allergies  Current Facility-Administered Medications   Medication Frequency    acetaminophen tablet 500 mg Q6H PRN    albuterol inhaler 2 puff Q4H PRN    amlodipine tablet 10 mg Daily    bisacodyl EC tablet 10 mg BID    calcitRIOL capsule 0.5 mcg Daily    carvedilol tablet 25 mg BID WM    furosemide tablet 40 mg Daily    lactulose 20 gram/30 mL solution Soln 30 g TID    lisinopril tablet 40 mg Daily    ondansetron disintegrating tablet 8 mg Q8H PRN    oxycodone immediate release tablet 5 mg Q6H PRN    polyethylene glycol packet 17 g TID    senna-docusate 8.6-50 mg per tablet 2 tablet BID    vancomycin 1,000 mg, ceftAZIDime 1,000 mg in Soln Dianeal PD fluid 2,000 mL Q24H       Objective:     Vital Signs (Most Recent):  Temp: 98.6 °F (37 °C) (06/14/17 1108)  Pulse: 71 (06/14/17 1108)  Resp: 18 (06/14/17 1108)  BP: 129/60 (06/14/17 1154)  SpO2: 99 % (06/14/17 1108)  O2 Device (Oxygen Therapy): room air (06/14/17 1108) Vital Signs (24h Range):  Temp:  [98 °F (36.7 °C)-98.6 °F (37 °C)] 98.6 °F (37 °C)  Pulse:  [66-82] 71  Resp:  [16-18] 18  SpO2:  [96 %-100 %] 99 %  BP: ()/(55-93) 129/60     Weight: 98 kg (216 lb) (06/13/17 1122)  Body mass index is 30.99 kg/m².  Body surface area is 2.2 meters squared.    I/O last 3 completed shifts:  In: 360 [P.O.:360]  Out: 1204 [Other:1204]    Physical Exam   Constitutional: He is oriented to person, place, and time. He appears well-developed and well-nourished. No distress.   HENT:   Head: Normocephalic and atraumatic.   Eyes: Conjunctivae and EOM are normal.   Cardiovascular: Normal rate and regular rhythm.    Pulmonary/Chest:  Effort normal and breath sounds normal.   Abdominal: Soft. He exhibits distension. There is tenderness.   Musculoskeletal: He exhibits edema. He exhibits no tenderness.   Neurological: He is alert and oriented to person, place, and time.       Significant Labs:  CBC:   Recent Labs  Lab 06/14/17  0513   WBC 7.03   RBC 4.44*   HGB 13.1*   HCT 39.1*      MCV 88   MCH 29.5   MCHC 33.5     CMP:   Recent Labs  Lab 06/13/17  1248 06/14/17  0513   GLU 76 92   CALCIUM 8.8 9.2   ALBUMIN 3.4* 3.6   PROT 7.4  --     141   K 4.4 3.9   CO2 23 23    108   BUN 44* 42*   CREATININE 6.2* 6.0*   ALKPHOS 116  --    ALT 14  --    AST 13  --    BILITOT 0.3  --      All labs within the past 24 hours have been reviewed.     Significant Imaging:  Labs: Reviewed  X-Ray: Reviewed

## 2017-06-14 NOTE — UM SECONDARY REVIEW
Physician Advisor External    Level of Care Issue    Approved Inpatient     Per Dr. Fredo Lassiter @ EHR, agrees with Inpatient.

## 2017-06-14 NOTE — ASSESSMENT & PLAN NOTE
- fluid studies currently negative for peritonitis  - however given degree of abdominal pain and constipation, will continue IP abx to cover for any bacterial translocation until constipation resolved  - will repeat cell counts tomorrow  - discussed laxative regimen with primary team; will continue to follow. Low threshold to advance to GoLytely if needed.

## 2017-06-14 NOTE — PLAN OF CARE
CM met with patient for discharge planning.  Patient states he lives with his cousin and can return home with no problem.  No needs identified at this time.    Pharmacy:    [id number] - [address] [place] AT [facility] & [address] [place] [address]  Phone: [phone] Fax: [phone]    PCP:  Saurabh Zuleta MD    Payor: MEDICARE / Plan: MEDICARE PART A & B / Product Type: [facility] /        06/14/17 1030   Discharge Assessment   Assessment Type Discharge Planning Assessment   Confirmed/corrected address and phone number on facesheet? Yes   Assessment information obtained from? Patient   Expected Length of Stay (days) 3   If Healthcare Directive is received, is it scanned into Epic? yes   Prior to hospitilization cognitive status: Alert/Oriented   Prior to hospitalization functional status: Independent   Current cognitive status: Alert/Oriented   Current Functional Status: Independent   Arrived From home or self-care   Lives With other relative(s)   Able to Return to Prior Arrangements yes   Is patient able to care for self after discharge? Yes   How many people do you have in your home that can help with your care after discharge? 1   Who are your caregiver(s) and their phone number(s)? Pari Butler - relative [phone] or Arelis Kingston - relative [phone]   Patient's perception of discharge disposition home or selfcare   Readmission Within The Last 30 Days no previous admission in last 30 days   Patient currently being followed by outpatient case management? No   Patient currently receives home health services? No   Does the patient currently use HME? Yes   Patient currently receives private duty nursing? No   Patient currently receives any other outside agency services? No   Equipment Currently Used at Home cane, straight;other (see comments)  (Peritoneal Dialysis machine)   Do you have any problems affording any of your prescribed medications? No    Is the patient taking medications as prescribed? yes   Do you have any financial concerns preventing you from receiving the healthcare you need? No   Does the patient have transportation to healthcare appointments? Yes   Transportation Available family or friend will provide   On Dialysis? Yes   If yes, what is the name of the dialysis unit? peritoneal dialysis   Does the patient receive outpatient dialysis? No   Does the patient receive services at the Coumadin Clinic? No   Discharge Plan A Home with family   Discharge Plan B Home Health   Patient/Family In Agreement With Plan yes

## 2017-06-14 NOTE — PROGRESS NOTES
[facility]  Nephrology  Progress Note    Patient Name: Maksim Hidalgo  MRN: [id number]  Admission Date: 6/13/2017  Hospital Length of Stay: 1 days  Attending Provider: Angelic Swanson MD   Primary Care Physician: Saurabh Zuleta MD  Principal Problem:Peritonitis    Subjective:     HPI: Mr. Hidalgo is a 38 yo AAM with HTN and ESRD who was directly admitted from surgery clinic today for concern of peritonitis. Patient describes generalized abdominal pain which is exacerbated by urination. He also reports new onset drain pain. He denies any fevers or chills. He admits to constipation and only has BMs a few times per week. He feels he has gained a lot of weight over the last few days 2/2 inefficient dialysis. Otherwise he is without complaint.   Patient performs PD nightly at home; nephrologist is Dr. Garcia.   Home PD prescription:   3L first fill. Tidal fill 2300cc. Tidal drain 2600cc. No last fill.  Dwell time: 1 hour. Drain time: 30 minutes.   Total volume 12.24L. Total time: 8h 20m.     Interval History:   Last BM two days ago; reports it was small w/pellets. Continues to have abdominal pain. TTP. Tolerated PD last night; UF 1.2L. No BM despite 3 doses of lactulose this admission. KUB revealed appropriate position of PD catheter; however significant amount of stool.     Review of patient's allergies indicates:  No Known Allergies  Current Facility-Administered Medications   Medication Frequency    acetaminophen tablet 500 mg Q6H PRN    albuterol inhaler 2 puff Q4H PRN    amlodipine tablet 10 mg Daily    bisacodyl EC tablet 10 mg BID    calcitRIOL capsule 0.5 mcg Daily    carvedilol tablet 25 mg BID WM    furosemide tablet 40 mg Daily    lactulose 20 gram/30 mL solution Soln 30 g TID    lisinopril tablet 40 mg Daily    ondansetron disintegrating tablet 8 mg Q8H PRN    oxycodone immediate release tablet 5 mg Q6H PRN    polyethylene glycol packet 17 g TID    senna-docusate 8.6-50 mg  per tablet 2 tablet BID    vancomycin 1,000 mg, ceftAZIDime 1,000 mg in Soln Dianeal PD fluid 2,000 mL Q24H       Objective:     Vital Signs (Most Recent):  Temp: 98.6 °F (37 °C) (06/14/17 1108)  Pulse: 71 (06/14/17 1108)  Resp: 18 (06/14/17 1108)  BP: 129/60 (06/14/17 1154)  SpO2: 99 % (06/14/17 1108)  O2 Device (Oxygen Therapy): room air (06/14/17 1108) Vital Signs (24h Range):  Temp:  [98 °F (36.7 °C)-98.6 °F (37 °C)] 98.6 °F (37 °C)  Pulse:  [66-82] 71  Resp:  [16-18] 18  SpO2:  [96 %-100 %] 99 %  BP: ()/(55-93) 129/60     Weight: 98 kg (216 lb) (06/13/17 1122)  Body mass index is 30.99 kg/m².  Body surface area is 2.2 meters squared.    I/O last 3 completed shifts:  In: 360 [P.O.:360]  Out: 1204 [Other:1204]    Physical Exam   Constitutional: He is oriented to person, place, and time. He appears well-developed and well-nourished. No distress.   HENT:   Head: Normocephalic and atraumatic.   Eyes: Conjunctivae and EOM are normal.   Cardiovascular: Normal rate and regular rhythm.    Pulmonary/Chest: Effort normal and breath sounds normal.   Abdominal: Soft. He exhibits distension. There is tenderness.   Musculoskeletal: He exhibits edema. He exhibits no tenderness.   Neurological: He is alert and oriented to person, place, and time.       Significant Labs:  CBC:   Recent Labs  Lab 06/14/17  0513   WBC 7.03   RBC 4.44*   HGB 13.1*   HCT 39.1*      MCV 88   MCH 29.5   MCHC 33.5     CMP:   Recent Labs  Lab 06/13/17  1248 06/14/17  0513   GLU 76 92   CALCIUM 8.8 9.2   ALBUMIN 3.4* 3.6   PROT 7.4  --     141   K 4.4 3.9   CO2 23 23    108   BUN 44* 42*   CREATININE 6.2* 6.0*   ALKPHOS 116  --    ALT 14  --    AST 13  --    BILITOT 0.3  --      All labs within the past 24 hours have been reviewed.     Significant Imaging:  Labs: Reviewed  X-Ray: Reviewed    Assessment/Plan:     ESRD (end stage renal disease)    - will continue home PD prescription while admitted. See HPI        Abdominal pain     - fluid studies currently negative for peritonitis  - however given degree of abdominal pain and constipation, will continue IP abx to cover for any bacterial translocation until constipation resolved  - will repeat cell counts tomorrow  - discussed laxative regimen with primary team; will continue to follow. Low threshold to advance to GoLytely if needed.         Anemia in ESRD (end-stage renal disease)    - hgb at goal; no need for SKY        Secondary hyperparathyroidism    - corrected Ca normal  - if phos remains elevated tomorrow; will start phos binder            Nely Cunningham, PGY-4  Nephrology Fellow  [facility]  Pager: [phone]

## 2017-06-15 LAB
ALBUMIN SERPL BCP-MCNC: 3.5 G/DL
ANION GAP SERPL CALC-SCNC: 12 MMOL/L
APPEARANCE FLD: CLEAR
BASOPHILS # BLD AUTO: 0.01 K/UL
BASOPHILS NFR BLD: 0.1 %
BODY FLD TYPE: NORMAL
BUN SERPL-MCNC: 45 MG/DL
CALCIUM SERPL-MCNC: 9.2 MG/DL
CHLORIDE SERPL-SCNC: 107 MMOL/L
CO2 SERPL-SCNC: 22 MMOL/L
COLOR FLD: COLORLESS
CREAT SERPL-MCNC: 6.5 MG/DL
DIFFERENTIAL METHOD: ABNORMAL
EOSINOPHIL # BLD AUTO: 0.4 K/UL
EOSINOPHIL NFR BLD: 4.5 %
ERYTHROCYTE [DISTWIDTH] IN BLOOD BY AUTOMATED COUNT: 12.7 %
EST. GFR  (AFRICAN AMERICAN): 11.4 ML/MIN/1.73 M^2
EST. GFR  (NON AFRICAN AMERICAN): 9.8 ML/MIN/1.73 M^2
GLUCOSE SERPL-MCNC: 88 MG/DL
GRAM STN SPEC: NORMAL
HCT VFR BLD AUTO: 41.4 %
HGB BLD-MCNC: 13.9 G/DL
LYMPHOCYTES # BLD AUTO: 2.7 K/UL
LYMPHOCYTES NFR BLD: 34.6 %
LYMPHOCYTES NFR FLD MANUAL: 20 %
MAGNESIUM SERPL-MCNC: 2 MG/DL
MCH RBC QN AUTO: 29.8 PG
MCHC RBC AUTO-ENTMCNC: 33.6 %
MCV RBC AUTO: 89 FL
MONOCYTES # BLD AUTO: 0.8 K/UL
MONOCYTES NFR BLD: 10.7 %
MONOS+MACROS NFR FLD MANUAL: 75 %
NEUTROPHILS # BLD AUTO: 3.9 K/UL
NEUTROPHILS NFR BLD: 50 %
NEUTROPHILS NFR FLD MANUAL: 5 %
PHOSPHATE SERPL-MCNC: 4.7 MG/DL
PLATELET # BLD AUTO: 282 K/UL
PMV BLD AUTO: 10.2 FL
POTASSIUM SERPL-SCNC: 4 MMOL/L
RBC # BLD AUTO: 4.66 M/UL
SODIUM SERPL-SCNC: 141 MMOL/L
VANCOMYCIN SERPL-MCNC: 13.1 UG/ML
WBC # BLD AUTO: 7.83 K/UL
WBC # FLD: 16 /CU MM

## 2017-06-15 PROCEDURE — 87070 CULTURE OTHR SPECIMN AEROBIC: CPT | Mod: NTX

## 2017-06-15 PROCEDURE — 90945 DIALYSIS ONE EVALUATION: CPT | Mod: NTX

## 2017-06-15 PROCEDURE — 85025 COMPLETE CBC W/AUTO DIFF WBC: CPT | Mod: NTX

## 2017-06-15 PROCEDURE — 90945 DIALYSIS ONE EVALUATION: CPT | Mod: GC,NTX,, | Performed by: INTERNAL MEDICINE

## 2017-06-15 PROCEDURE — 11000001 HC ACUTE MED/SURG PRIVATE ROOM: Mod: NTX

## 2017-06-15 PROCEDURE — 99231 SBSQ HOSP IP/OBS SF/LOW 25: CPT | Mod: NTX,,, | Performed by: INTERNAL MEDICINE

## 2017-06-15 PROCEDURE — 27200950 HC CAPD SUPPORT: Mod: NTX

## 2017-06-15 PROCEDURE — 83735 ASSAY OF MAGNESIUM: CPT | Mod: NTX

## 2017-06-15 PROCEDURE — 36415 COLL VENOUS BLD VENIPUNCTURE: CPT | Mod: NTX

## 2017-06-15 PROCEDURE — 87205 SMEAR GRAM STAIN: CPT | Mod: NTX

## 2017-06-15 PROCEDURE — 63600175 PHARM REV CODE 636 W HCPCS: Mod: NTX | Performed by: INTERNAL MEDICINE

## 2017-06-15 PROCEDURE — 25000003 PHARM REV CODE 250: Mod: NTX | Performed by: INTERNAL MEDICINE

## 2017-06-15 PROCEDURE — 80069 RENAL FUNCTION PANEL: CPT | Mod: NTX

## 2017-06-15 PROCEDURE — 80202 ASSAY OF VANCOMYCIN: CPT | Mod: NTX

## 2017-06-15 PROCEDURE — 89051 BODY FLUID CELL COUNT: CPT | Mod: NTX

## 2017-06-15 RX ADMIN — CEFTAZIDIME: 100 INJECTION, POWDER, FOR SOLUTION INTRAMUSCULAR; INTRAVENOUS at 06:06

## 2017-06-15 RX ADMIN — STANDARDIZED SENNA CONCENTRATE AND DOCUSATE SODIUM 2 TABLET: 8.6; 5 TABLET, FILM COATED ORAL at 08:06

## 2017-06-15 RX ADMIN — FUROSEMIDE 40 MG: 40 TABLET ORAL at 08:06

## 2017-06-15 RX ADMIN — BISACODYL 10 MG: 5 TABLET, COATED ORAL at 08:06

## 2017-06-15 RX ADMIN — CALCITRIOL 0.5 MCG: 0.25 CAPSULE, LIQUID FILLED ORAL at 08:06

## 2017-06-15 RX ADMIN — LISINOPRIL 40 MG: 20 TABLET ORAL at 08:06

## 2017-06-15 RX ADMIN — POLYETHYLENE GLYCOL 3350 17 G: 17 POWDER, FOR SOLUTION ORAL at 02:06

## 2017-06-15 RX ADMIN — CEFTAZIDIME: 100 INJECTION, POWDER, FOR SOLUTION INTRAMUSCULAR; INTRAVENOUS at 12:06

## 2017-06-15 RX ADMIN — POLYETHYLENE GLYCOL 3350 17 G: 17 POWDER, FOR SOLUTION ORAL at 06:06

## 2017-06-15 RX ADMIN — LACTULOSE 30 G: 20 SOLUTION ORAL at 06:06

## 2017-06-15 RX ADMIN — LACTULOSE 30 G: 20 SOLUTION ORAL at 02:06

## 2017-06-15 RX ADMIN — AMLODIPINE BESYLATE 10 MG: 10 TABLET ORAL at 08:06

## 2017-06-15 RX ADMIN — CARVEDILOL 25 MG: 25 TABLET, FILM COATED ORAL at 08:06

## 2017-06-15 RX ADMIN — CARVEDILOL 25 MG: 25 TABLET, FILM COATED ORAL at 05:06

## 2017-06-15 RX ADMIN — ONDANSETRON 8 MG: 8 TABLET, ORALLY DISINTEGRATING ORAL at 10:06

## 2017-06-15 NOTE — ASSESSMENT & PLAN NOTE
- fluid studies with WBC < 100 however gram stain with possible GPC. Culture NGTD  - abdominal pain improved with relief of constipation  - continue laxatives  - continues to have localized pain surrounding PD insertion site; will need to discuss with Dr. Gillespie now that generalized abdominal pain has resolved.   - will repeat cell counts and cultures today; will continue IP abx for now

## 2017-06-15 NOTE — PROGRESS NOTES
[facility] Hospital Medicine  Progress Note    Patient Name: Maksim Hidalgo  MRN: [id number]  Patient Class: IP- Inpatient   Admission Date: 6/13/2017  Length of Stay: 1 days  Attending Physician: Angelic Swanson MD  Primary Care Provider: Saurabh Zuleta MD    [facility] Medicine Team: [facility] G Angelic Swanson MD    Subjective:     Principal Problem:Peritonitis    HPI:  37 y.o. male presented from the Surgery clinic with past medical history of ESRD on PD, hypertension, LVH/diastolic dysfunction, Hep B.  He was in his usual state of good health until the acute onset of generalized abdominal pain beginning 3 days prior to admission with gradually worsening course. Pertinent associated symptoms include difficulties with draining dialysate for several days, and recent sinusitis treated with oral doxycycline started 3 days ago. Patient presented to Dr. Gillespie's clinic today with abdominal pain, dysuria, and inability to completely drain dialysate.    Hospital Course:  No notes on file    Interval History: Patient with persistent constipation on home regimen of Senna-S, Miralax, and additional lactulose.  Data Review: Results recorded below were reviewed 6/14/2017.      Review of Systems   Constitutional: Negative for fever.   Respiratory: Negative for shortness of breath.    Gastrointestinal: Positive for abdominal distention, abdominal pain and constipation.     Objective:     Vital Signs (Most Recent):  Temp: 98.6 °F (37 °C) (06/14/17 1946)  Pulse: 75 (06/14/17 1946)  Resp: 18 (06/14/17 1946)  BP: 112/68 (06/14/17 1946)  SpO2: 95 % (06/14/17 1946) Vital Signs (24h Range):  Temp:  [98 °F (36.7 °C)-98.6 °F (37 °C)] 98.6 °F (37 °C)  Pulse:  [69-82] 75  Resp:  [16-18] 18  SpO2:  [95 %-99 %] 95 %  BP: ()/(55-93) 112/68     Weight: 98 kg (216 lb)  Body mass index is 30.99 kg/m².    Intake/Output Summary (Last 24 hours) at 06/14/17 7335  Last data filed at 06/14/17 1800   Gross per  24 hour   Intake              800 ml   Output             1204 ml   Net             -404 ml      Physical Exam   Constitutional: He appears well-developed.   HENT:   Head: Normocephalic.   Mouth/Throat: Mucous membranes are not pale and not cyanotic.   Eyes: Conjunctivae and lids are normal. Pupils are equal.   Neck: Neck supple.   Cardiovascular: Normal rate, regular rhythm, S1 normal and S2 normal.    Pulmonary/Chest: Effort normal and breath sounds normal.   Abdominal: Soft. Bowel sounds are increased. There is generalized tenderness.   Musculoskeletal: He exhibits no edema.   Neurological: He is alert. He is not disoriented.   Skin: Skin is warm and dry. No cyanosis. Nails show no clubbing.   Psychiatric: He has a normal mood and affect.       Significant Labs:     CBC:   Recent Labs  Lab 06/13/17  1248 06/14/17  0513   WBC 6.92 7.03   HGB 12.2* 13.1*   HCT 37.0* 39.1*    270     CMP:   Recent Labs  Lab 06/13/17  1248 06/14/17  0513    141   K 4.4 3.9    108   CO2 23 23   GLU 76 92   BUN 44* 42*   CREATININE 6.2* 6.0*   CALCIUM 8.8 9.2   PROT 7.4  --    ALBUMIN 3.4* 3.6   BILITOT 0.3  --    ALKPHOS 116  --    AST 13  --    ALT 14  --    ANIONGAP 11 10   EGFRNONAA 10.4* 10.8*     Magnesium:    Recent Labs  Lab 06/14/17  0513   MG 1.9     Assessment/Plan:      Current Hospital Problem List:    Active Hospital Problems    Diagnosis  POA    *Peritonitis [K65.9]  Yes     Priority: 1 - High    Peritoneal dialysis catheter dysfunction [T85.611A]  Yes     Priority: 1 - High    Chronic constipation [K59.09]  Yes     Priority: 2     Dependence on peritoneal dialysis [Z99.2]  Not Applicable     Priority: 2      Chronic    ESRD (end stage renal disease) [N18.6]  Yes     Priority: 2     Diastolic dysfunction [I51.9]  Yes     Priority: 3     LVH (left ventricular hypertrophy) due to hypertensive disease [I11.9]  Yes     Priority: 3     Chronic hepatitis B virus infection [B18.1]  Yes     Priority: 4      Secondary hyperparathyroidism [N25.81]  Yes     Priority: 5     Anemia in ESRD (end-stage renal disease) [N18.6, D63.1]  Yes     Chronic    Abdominal pain [R10.9]  Yes      Resolved Hospital Problems    Diagnosis Date Resolved POA   No resolved problems to display.       Ordered Medications for management of current problems:    amlodipine  10 mg Oral Daily    bisacodyl  10 mg Oral BID    calcitRIOL  0.5 mcg Oral Daily    carvedilol  25 mg Oral BID WM    furosemide  40 mg Oral Daily    lactulose  30 g Oral TID    lisinopril  40 mg Oral Daily    polyethylene glycol  17 g Oral TID    senna-docusate 8.6-50 mg  2 tablet Oral BID    Dianeal PD with additives   Intraperitoneal Q24H       IV Fluids: IV push only, no IV fluids    Anticipated Disposition: Home or Self Care    Assessment and Plan by Problem:    Peritoneal dialysis catheter dysfunction    Recent positional dysfunction.        * Peritonitis    Likely, cell count and cultures ordered. Nephrology consulted for PD and antibiotics.  Fluid cell count does not confirm peritonitis; cultures are pending. Continuing IP antibiotics for now.        Chronic constipation    Continued home medications.  Lactulose added.  Stimulants increased 6/14/2017; patient had large BM.        Dependence on peritoneal dialysis              ESRD (end stage renal disease)    On PD.        Diastolic dysfunction              LVH (left ventricular hypertrophy) due to hypertensive disease    Continuing home medications.        Chronic hepatitis B virus infection              Secondary hyperparathyroidism              Anemia in ESRD (end-stage renal disease)              Abdominal pain    Likely due to peritonitis or constipation.          VTE Risk Mitigation         Ordered     Low Risk of VTE  Once      06/13/17 1229          Angelic Swanson MD  Department of Hospital Medicine   [facility]

## 2017-06-15 NOTE — PROGRESS NOTES
Patients' peritoneal catheter accessed for labs utilizing strict aseptic technique. 50 ml clear effluent returned and catheter capped with betadine cap. Labs put into appropriate containers and labelled in pt's room. Transported to lab  area in [facility] and  called for .

## 2017-06-15 NOTE — SUBJECTIVE & OBJECTIVE
Interval History: Patient with persistent constipation on home regimen of Senna-S, Miralax, and additional lactulose.  Data Review: Results recorded below were reviewed 6/14/2017.      Review of Systems   Constitutional: Negative for fever.   Respiratory: Negative for shortness of breath.    Gastrointestinal: Positive for abdominal distention, abdominal pain and constipation.     Objective:     Vital Signs (Most Recent):  Temp: 98.6 °F (37 °C) (06/14/17 1946)  Pulse: 75 (06/14/17 1946)  Resp: 18 (06/14/17 1946)  BP: 112/68 (06/14/17 1946)  SpO2: 95 % (06/14/17 1946) Vital Signs (24h Range):  Temp:  [98 °F (36.7 °C)-98.6 °F (37 °C)] 98.6 °F (37 °C)  Pulse:  [69-82] 75  Resp:  [16-18] 18  SpO2:  [95 %-99 %] 95 %  BP: ()/(55-93) 112/68     Weight: 98 kg (216 lb)  Body mass index is 30.99 kg/m².    Intake/Output Summary (Last 24 hours) at 06/14/17 2227  Last data filed at 06/14/17 1800   Gross per 24 hour   Intake              800 ml   Output             1204 ml   Net             -404 ml      Physical Exam   Constitutional: He appears well-developed.   HENT:   Head: Normocephalic.   Mouth/Throat: Mucous membranes are not pale and not cyanotic.   Eyes: Conjunctivae and lids are normal. Pupils are equal.   Neck: Neck supple.   Cardiovascular: Normal rate, regular rhythm, S1 normal and S2 normal.    Pulmonary/Chest: Effort normal and breath sounds normal.   Abdominal: Soft. Bowel sounds are increased. There is generalized tenderness.   Musculoskeletal: He exhibits no edema.   Neurological: He is alert. He is not disoriented.   Skin: Skin is warm and dry. No cyanosis. Nails show no clubbing.   Psychiatric: He has a normal mood and affect.       Significant Labs:     CBC:   Recent Labs  Lab 06/13/17  1248 06/14/17  0513   WBC 6.92 7.03   HGB 12.2* 13.1*   HCT 37.0* 39.1*    270     CMP:   Recent Labs  Lab 06/13/17  1248 06/14/17  0513    141   K 4.4 3.9    108   CO2 23 23   GLU 76 92   BUN 44* 42*    CREATININE 6.2* 6.0*   CALCIUM 8.8 9.2   PROT 7.4  --    ALBUMIN 3.4* 3.6   BILITOT 0.3  --    ALKPHOS 116  --    AST 13  --    ALT 14  --    ANIONGAP 11 10   EGFRNONAA 10.4* 10.8*     Magnesium:    Recent Labs  Lab 06/14/17  0513   MG 1.9

## 2017-06-15 NOTE — ASSESSMENT & PLAN NOTE
Continued home medications.  Lactulose added.  Stimulants increased 6/14/2017; patient had large BM.

## 2017-06-15 NOTE — ASSESSMENT & PLAN NOTE
Likely, cell count and cultures ordered. Nephrology consulted for PD and antibiotics.  Fluid cell count does not confirm peritonitis; cultures are pending. Continuing IP antibiotics for now.

## 2017-06-15 NOTE — SUBJECTIVE & OBJECTIVE
Interval History:   Tolerated PD well. Still with drain pain. Effluent clear.   Large BM around 7pm. Continued to have BMs overnight/this AM. Denies nausea.   Reports abdominal pain is improved. Still with localized tenderness around PD insertion site. Rates pain as 8/10. Pain worse with sitting up.     Review of patient's allergies indicates:  No Known Allergies  Current Facility-Administered Medications   Medication Frequency    acetaminophen tablet 500 mg Q6H PRN    albuterol inhaler 2 puff Q4H PRN    amlodipine tablet 10 mg Daily    bisacodyl EC tablet 10 mg BID    calcitRIOL capsule 0.5 mcg Daily    carvedilol tablet 25 mg BID WM    furosemide tablet 40 mg Daily    lactulose 20 gram/30 mL solution Soln 30 g TID    lisinopril tablet 40 mg Daily    ondansetron disintegrating tablet 8 mg Q8H PRN    oxycodone immediate release tablet 5 mg Q6H PRN    polyethylene glycol packet 17 g TID    senna-docusate 8.6-50 mg per tablet 2 tablet BID    vancomycin 1,000 mg, ceftAZIDime 1,000 mg in Soln Dianeal PD fluid 2,000 mL Q24H       Objective:     Vital Signs (Most Recent):  Temp: 98.3 °F (36.8 °C) (06/15/17 1720)  Pulse: 110 (06/15/17 1720)  Resp: 18 (06/15/17 1720)  BP: 109/66 (06/15/17 1720)  SpO2: 97 % (06/15/17 1720)  O2 Device (Oxygen Therapy): room air (06/15/17 1225) Vital Signs (24h Range):  Temp:  [97.7 °F (36.5 °C)-98.9 °F (37.2 °C)] 98.3 °F (36.8 °C)  Pulse:  [] 110  Resp:  [16-18] 18  SpO2:  [95 %-99 %] 97 %  BP: ()/(60-72) 109/66     Weight: 98 kg (216 lb) (06/13/17 1122)  Body mass index is 30.99 kg/m².  Body surface area is 2.2 meters squared.    I/O last 3 completed shifts:  In: 1280 [P.O.:1280]  Out: 1204 [Other:1204]    Physical Exam   Constitutional: He is oriented to person, place, and time. He appears well-developed and well-nourished. No distress.   HENT:   Head: Normocephalic and atraumatic.   Eyes: Conjunctivae and EOM are normal.   Cardiovascular: Normal rate and regular  rhythm.    Pulmonary/Chest: Effort normal and breath sounds normal.   Abdominal: Soft. He exhibits distension. There is tenderness. There is guarding.   Neurological: He is alert and oriented to person, place, and time.   Skin: Skin is warm and dry.       Significant Labs:  CBC:   Recent Labs  Lab 06/15/17  0354   WBC 7.83   RBC 4.66   HGB 13.9*   HCT 41.4      MCV 89   MCH 29.8   MCHC 33.6     CMP:   Recent Labs  Lab 06/13/17  1248  06/15/17  0354   GLU 76  < > 88   CALCIUM 8.8  < > 9.2   ALBUMIN 3.4*  < > 3.5   PROT 7.4  --   --      < > 141   K 4.4  < > 4.0   CO2 23  < > 22*     < > 107   BUN 44*  < > 45*   CREATININE 6.2*  < > 6.5*   ALKPHOS 116  --   --    ALT 14  --   --    AST 13  --   --    BILITOT 0.3  --   --    < > = values in this interval not displayed.  All labs within the past 24 hours have been reviewed.     Significant Imaging:  Labs: Reviewed

## 2017-06-15 NOTE — PROGRESS NOTES
CCPD initiated as ordered utilizing strict aseptic technique. with last fill of antibiotics. See   Flow sheet for details. Site care with antiseptic scrub of area and new sterile guaze dressing applied. No edema, erythema or drainage noted.

## 2017-06-15 NOTE — PROGRESS NOTES
[facility]  Nephrology  Progress Note    Patient Name: Maksim Hidalgo  MRN: [id number]  Admission Date: 6/13/2017  Hospital Length of Stay: 2 days  Attending Provider: Angelic Swanson MD   Primary Care Physician: Saurabh Zuleta MD  Principal Problem:Peritonitis    Subjective:     HPI: Mr. Hidalgo is a 38 yo AAM with HTN and ESRD who was directly admitted from surgery clinic today for concern of peritonitis. Patient describes generalized abdominal pain which is exacerbated by urination. He also reports new onset drain pain. He denies any fevers or chills. He admits to constipation and only has BMs a few times per week. He feels he has gained a lot of weight over the last few days 2/2 inefficient dialysis. Otherwise he is without complaint.   Patient performs PD nightly at home; nephrologist is Dr. Garcia.   Home PD prescription:   3L first fill. Tidal fill 2300cc. Tidal drain 2600cc. No last fill.  Dwell time: 1 hour. Drain time: 30 minutes.   Total volume 12.24L. Total time: 8h 20m.     Interval History:   Tolerated PD well. Still with drain pain. Effluent clear.   Large BM around 7pm. Continued to have BMs overnight/this AM. Denies nausea.   Reports abdominal pain is improved. Still with localized tenderness around PD insertion site. Rates pain as 8/10. Pain worse with sitting up.     Review of patient's allergies indicates:  No Known Allergies  Current Facility-Administered Medications   Medication Frequency    acetaminophen tablet 500 mg Q6H PRN    albuterol inhaler 2 puff Q4H PRN    amlodipine tablet 10 mg Daily    bisacodyl EC tablet 10 mg BID    calcitRIOL capsule 0.5 mcg Daily    carvedilol tablet 25 mg BID WM    furosemide tablet 40 mg Daily    lactulose 20 gram/30 mL solution Soln 30 g TID    lisinopril tablet 40 mg Daily    ondansetron disintegrating tablet 8 mg Q8H PRN    oxycodone immediate release tablet 5 mg Q6H PRN    polyethylene glycol packet 17 g TID     senna-docusate 8.6-50 mg per tablet 2 tablet BID    vancomycin 1,000 mg, ceftAZIDime 1,000 mg in Soln Dianeal PD fluid 2,000 mL Q24H       Objective:     Vital Signs (Most Recent):  Temp: 98.3 °F (36.8 °C) (06/15/17 1720)  Pulse: 110 (06/15/17 1720)  Resp: 18 (06/15/17 1720)  BP: 109/66 (06/15/17 1720)  SpO2: 97 % (06/15/17 1720)  O2 Device (Oxygen Therapy): room air (06/15/17 1225) Vital Signs (24h Range):  Temp:  [97.7 °F (36.5 °C)-98.9 °F (37.2 °C)] 98.3 °F (36.8 °C)  Pulse:  [] 110  Resp:  [16-18] 18  SpO2:  [95 %-99 %] 97 %  BP: ()/(60-72) 109/66     Weight: 98 kg (216 lb) (06/13/17 1122)  Body mass index is 30.99 kg/m².  Body surface area is 2.2 meters squared.    I/O last 3 completed shifts:  In: 1280 [P.O.:1280]  Out: 1204 [Other:1204]    Physical Exam   Constitutional: He is oriented to person, place, and time. He appears well-developed and well-nourished. No distress.   HENT:   Head: Normocephalic and atraumatic.   Eyes: Conjunctivae and EOM are normal.   Cardiovascular: Normal rate and regular rhythm.    Pulmonary/Chest: Effort normal and breath sounds normal.   Abdominal: Soft. He exhibits distension. There is tenderness. There is guarding.   Neurological: He is alert and oriented to person, place, and time.   Skin: Skin is warm and dry.       Significant Labs:  CBC:   Recent Labs  Lab 06/15/17  0354   WBC 7.83   RBC 4.66   HGB 13.9*   HCT 41.4      MCV 89   MCH 29.8   MCHC 33.6     CMP:   Recent Labs  Lab 06/13/17  1248  06/15/17  0354   GLU 76  < > 88   CALCIUM 8.8  < > 9.2   ALBUMIN 3.4*  < > 3.5   PROT 7.4  --   --      < > 141   K 4.4  < > 4.0   CO2 23  < > 22*     < > 107   BUN 44*  < > 45*   CREATININE 6.2*  < > 6.5*   ALKPHOS 116  --   --    ALT 14  --   --    AST 13  --   --    BILITOT 0.3  --   --    < > = values in this interval not displayed.  All labs within the past 24 hours have been reviewed.     Significant Imaging:  Labs: Reviewed    Assessment/Plan:      ESRD (end stage renal disease)    - will continue home PD prescription while admitted. See HPI        Abdominal pain    - fluid studies with WBC < 100 however gram stain with possible GPC. Culture NGTD  - abdominal pain improved with relief of constipation  - continue laxatives  - continues to have localized pain surrounding PD insertion site; will need to discuss with Dr. Gillespie now that generalized abdominal pain has resolved.   - will repeat cell counts and cultures today; will continue IP abx for now        Anemia in ESRD (end-stage renal disease)    - hgb at goal; no need for SKY        Secondary hyperparathyroidism    - corrected Ca normal  - if phos elevated but still within goal            Nely Cunningham, PGY-4  Nephrology Fellow  [facility]  Pager: [phone]

## 2017-06-15 NOTE — PLAN OF CARE
Problem: Patient Care Overview  Goal: Plan of Care Review  Pt unable to have BM most of this shift after several doses of lactulose and stool softener.  Given laxative and went to administer enema, but pt ended up having large BM prior to enema.  Abd cramping started near end of shift.  AQA&Ox4 denies nausea or vomiting,  ABD soft BS present in all for quadrants.  Updated on plan of care.  No significant events this shift.

## 2017-06-15 NOTE — PROGRESS NOTES
Pt seen and examined this morning.  Sleeping comfortably and didn't want to wake up but reports he's still having abdominal pain but that it's a little better than on admit.      PD was currently on-going, seemingly without issues     VS reviewed, remains afebrile   Labs reviewed, WBC normal   No growth on cultures of peritoneal fluid and no orgs on gram stain   Abdomen is soft but slightly tender throughout; no rebound or guarding     A/P: 36 yo male w PD catheter in place, possible peritonitis   -Abx being managed by primary team, to be tailored based on culture results   -Agree w trying to treat through presumed PD cath related peritonitis if possible to salvage the catheter   -Please call surgery team if you think catheter removal is warranted, otherwise we will sign off

## 2017-06-16 LAB
ALBUMIN SERPL BCP-MCNC: 3.6 G/DL
ALBUMIN SERPL BCP-MCNC: 4.2 G/DL
ALP SERPL-CCNC: 94 U/L
ALT SERPL W/O P-5'-P-CCNC: 17 U/L
ANION GAP SERPL CALC-SCNC: 17 MMOL/L
ANION GAP SERPL CALC-SCNC: 18 MMOL/L
AST SERPL-CCNC: 15 U/L
BASOPHILS # BLD AUTO: 0.01 K/UL
BASOPHILS # BLD AUTO: 0.02 K/UL
BASOPHILS NFR BLD: 0.1 %
BASOPHILS NFR BLD: 0.2 %
BILIRUB SERPL-MCNC: 0.2 MG/DL
BUN SERPL-MCNC: 45 MG/DL
BUN SERPL-MCNC: 47 MG/DL
CALCIUM SERPL-MCNC: 10.1 MG/DL
CALCIUM SERPL-MCNC: 9.4 MG/DL
CHLORIDE SERPL-SCNC: 105 MMOL/L
CHLORIDE SERPL-SCNC: 107 MMOL/L
CO2 SERPL-SCNC: 17 MMOL/L
CO2 SERPL-SCNC: 19 MMOL/L
CREAT SERPL-MCNC: 8.3 MG/DL
CREAT SERPL-MCNC: 9.4 MG/DL
DIFFERENTIAL METHOD: NORMAL
DIFFERENTIAL METHOD: NORMAL
EOSINOPHIL # BLD AUTO: 0.2 K/UL
EOSINOPHIL # BLD AUTO: 0.3 K/UL
EOSINOPHIL NFR BLD: 1.8 %
EOSINOPHIL NFR BLD: 2.7 %
ERYTHROCYTE [DISTWIDTH] IN BLOOD BY AUTOMATED COUNT: 12.7 %
ERYTHROCYTE [DISTWIDTH] IN BLOOD BY AUTOMATED COUNT: 12.8 %
EST. GFR  (AFRICAN AMERICAN): 7.3 ML/MIN/1.73 M^2
EST. GFR  (AFRICAN AMERICAN): 8.4 ML/MIN/1.73 M^2
EST. GFR  (NON AFRICAN AMERICAN): 6.3 ML/MIN/1.73 M^2
EST. GFR  (NON AFRICAN AMERICAN): 7.3 ML/MIN/1.73 M^2
GLUCOSE SERPL-MCNC: 101 MG/DL
GLUCOSE SERPL-MCNC: 76 MG/DL
HCT VFR BLD AUTO: 44.8 %
HCT VFR BLD AUTO: 47.2 %
HGB BLD-MCNC: 14.9 G/DL
HGB BLD-MCNC: 15.9 G/DL
LYMPHOCYTES # BLD AUTO: 2.3 K/UL
LYMPHOCYTES # BLD AUTO: 2.4 K/UL
LYMPHOCYTES NFR BLD: 23.4 %
LYMPHOCYTES NFR BLD: 24.9 %
MAGNESIUM SERPL-MCNC: 2 MG/DL
MAGNESIUM SERPL-MCNC: 2.3 MG/DL
MCH RBC QN AUTO: 29.7 PG
MCH RBC QN AUTO: 29.7 PG
MCHC RBC AUTO-ENTMCNC: 33.3 %
MCHC RBC AUTO-ENTMCNC: 33.7 %
MCV RBC AUTO: 88 FL
MCV RBC AUTO: 89 FL
MONOCYTES # BLD AUTO: 0.9 K/UL
MONOCYTES # BLD AUTO: 0.9 K/UL
MONOCYTES NFR BLD: 9 %
MONOCYTES NFR BLD: 9.2 %
NEUTROPHILS # BLD AUTO: 6 K/UL
NEUTROPHILS # BLD AUTO: 6.4 K/UL
NEUTROPHILS NFR BLD: 62.8 %
NEUTROPHILS NFR BLD: 65.5 %
PHOSPHATE SERPL-MCNC: 5 MG/DL
PHOSPHATE SERPL-MCNC: 5.7 MG/DL
PLATELET # BLD AUTO: 269 K/UL
PLATELET # BLD AUTO: 340 K/UL
PMV BLD AUTO: 10.2 FL
PMV BLD AUTO: 10.3 FL
POTASSIUM SERPL-SCNC: 3.7 MMOL/L
POTASSIUM SERPL-SCNC: 4.1 MMOL/L
PROT SERPL-MCNC: 8.1 G/DL
RBC # BLD AUTO: 5.02 M/UL
RBC # BLD AUTO: 5.35 M/UL
SODIUM SERPL-SCNC: 141 MMOL/L
SODIUM SERPL-SCNC: 142 MMOL/L
TROPONIN I SERPL DL<=0.01 NG/ML-MCNC: 0.01 NG/ML
VANCOMYCIN TROUGH SERPL-MCNC: 30.3 UG/ML
WBC # BLD AUTO: 9.58 K/UL
WBC # BLD AUTO: 9.81 K/UL

## 2017-06-16 PROCEDURE — 11000001 HC ACUTE MED/SURG PRIVATE ROOM: Mod: NTX

## 2017-06-16 PROCEDURE — 80053 COMPREHEN METABOLIC PANEL: CPT | Mod: NTX

## 2017-06-16 PROCEDURE — 80069 RENAL FUNCTION PANEL: CPT | Mod: NTX

## 2017-06-16 PROCEDURE — 25000003 PHARM REV CODE 250: Mod: NTX | Performed by: INTERNAL MEDICINE

## 2017-06-16 PROCEDURE — 90945 DIALYSIS ONE EVALUATION: CPT | Mod: NTX

## 2017-06-16 PROCEDURE — 93010 ELECTROCARDIOGRAM REPORT: CPT | Mod: NTX,,, | Performed by: INTERNAL MEDICINE

## 2017-06-16 PROCEDURE — 85025 COMPLETE CBC W/AUTO DIFF WBC: CPT | Mod: NTX

## 2017-06-16 PROCEDURE — 27200950 HC CAPD SUPPORT: Mod: NTX

## 2017-06-16 PROCEDURE — 83735 ASSAY OF MAGNESIUM: CPT | Mod: NTX

## 2017-06-16 PROCEDURE — 84484 ASSAY OF TROPONIN QUANT: CPT | Mod: NTX

## 2017-06-16 PROCEDURE — 36415 COLL VENOUS BLD VENIPUNCTURE: CPT | Mod: NTX

## 2017-06-16 PROCEDURE — 93005 ELECTROCARDIOGRAM TRACING: CPT | Mod: NTX

## 2017-06-16 PROCEDURE — 84100 ASSAY OF PHOSPHORUS: CPT | Mod: NTX

## 2017-06-16 PROCEDURE — 99232 SBSQ HOSP IP/OBS MODERATE 35: CPT | Mod: NTX,,, | Performed by: INTERNAL MEDICINE

## 2017-06-16 PROCEDURE — 80202 ASSAY OF VANCOMYCIN: CPT | Mod: NTX

## 2017-06-16 PROCEDURE — 99232 SBSQ HOSP IP/OBS MODERATE 35: CPT | Mod: GC,NTX,, | Performed by: INTERNAL MEDICINE

## 2017-06-16 RX ORDER — LISINOPRIL 20 MG/1
40 TABLET ORAL
Status: DISCONTINUED | OUTPATIENT
Start: 2017-06-17 | End: 2017-06-19

## 2017-06-16 RX ORDER — FUROSEMIDE 40 MG/1
40 TABLET ORAL DAILY
Status: DISCONTINUED | OUTPATIENT
Start: 2017-06-18 | End: 2017-06-23 | Stop reason: HOSPADM

## 2017-06-16 RX ORDER — SEVELAMER CARBONATE 800 MG/1
800 TABLET, FILM COATED ORAL
Status: DISCONTINUED | OUTPATIENT
Start: 2017-06-16 | End: 2017-06-23 | Stop reason: HOSPADM

## 2017-06-16 RX ADMIN — SEVELAMER CARBONATE 800 MG: 800 TABLET, FILM COATED ORAL at 05:06

## 2017-06-16 RX ADMIN — BISACODYL 10 MG: 5 TABLET, COATED ORAL at 09:06

## 2017-06-16 RX ADMIN — CARVEDILOL 25 MG: 25 TABLET, FILM COATED ORAL at 08:06

## 2017-06-16 RX ADMIN — POLYETHYLENE GLYCOL 3350 17 G: 17 POWDER, FOR SOLUTION ORAL at 09:06

## 2017-06-16 RX ADMIN — SODIUM CHLORIDE 500 ML: 0.9 INJECTION, SOLUTION INTRAVENOUS at 03:06

## 2017-06-16 RX ADMIN — STANDARDIZED SENNA CONCENTRATE AND DOCUSATE SODIUM 2 TABLET: 8.6; 5 TABLET, FILM COATED ORAL at 09:06

## 2017-06-16 RX ADMIN — LACTULOSE 30 G: 20 SOLUTION ORAL at 09:06

## 2017-06-16 RX ADMIN — LISINOPRIL 40 MG: 20 TABLET ORAL at 09:06

## 2017-06-16 RX ADMIN — CALCITRIOL 0.5 MCG: 0.25 CAPSULE, LIQUID FILLED ORAL at 09:06

## 2017-06-16 RX ADMIN — ONDANSETRON 8 MG: 8 TABLET, ORALLY DISINTEGRATING ORAL at 05:06

## 2017-06-16 RX ADMIN — AMLODIPINE BESYLATE 10 MG: 10 TABLET ORAL at 09:06

## 2017-06-16 RX ADMIN — FUROSEMIDE 40 MG: 40 TABLET ORAL at 09:06

## 2017-06-16 NOTE — PROGRESS NOTES
[facility] Hospital Medicine  Progress Note    Patient Name: Maksim Hidalgo  MRN: [id number]  Patient Class: IP- Inpatient   Admission Date: 6/13/2017  Length of Stay: 2 days  Attending Physician: Angelic Swanson MD  Primary Care Provider: Saurabh Zuleta MD    [facility] Medicine Team: [facility] HOSP MED G Angelic Swanson MD    Subjective:     Principal Problem:Peritonitis    HPI:  37 y.o. male presented from the Surgery clinic with past medical history of ESRD on PD, hypertension, LVH/diastolic dysfunction, Hep B.  He was in his usual state of good health until the acute onset of generalized abdominal pain beginning 3 days prior to admission with gradually worsening course. Pertinent associated symptoms include difficulties with draining dialysate for several days, and recent sinusitis treated with oral doxycycline started 3 days ago. Patient presented to Dr. Gillespie's clinic today with abdominal pain, dysuria, and inability to completely drain dialysate.    Hospital Course:  No notes on file    Interval History: Patient with persistent constipation on home regimen of Senna-S, Miralax, and additional lactulose. BM x 2 with increased stimulant laxatives. Decreased abdominal pain.  Data Review: Results recorded below were reviewed 6/15/2017.      Review of Systems   Constitutional: Negative for fever.   Respiratory: Negative for shortness of breath.    Gastrointestinal: Positive for abdominal distention and abdominal pain.     Objective:     Vital Signs (Most Recent):  Temp: 98.7 °F (37.1 °C) (06/15/17 2002)  Pulse: 81 (06/15/17 2002)  Resp: 16 (06/15/17 2002)  BP: 120/72 (06/15/17 2002)  SpO2: 95 % (06/15/17 2002) Vital Signs (24h Range):  Temp:  [97.7 °F (36.5 °C)-98.9 °F (37.2 °C)] 98.7 °F (37.1 °C)  Pulse:  [] 81  Resp:  [16-18] 16  SpO2:  [95 %-99 %] 95 %  BP: ()/(60-72) 120/72     Weight: 98 kg (216 lb)  Body mass index is 30.99 kg/m².    Intake/Output Summary (Last 24 hours)  at 06/15/17 2011  Last data filed at 06/15/17 0815   Gross per 24 hour   Intake              240 ml   Output             1352 ml   Net            -1112 ml      Physical Exam   Constitutional: He appears well-developed.   HENT:   Head: Normocephalic.   Mouth/Throat: Mucous membranes are not pale and not cyanotic.   Eyes: Conjunctivae and lids are normal. Pupils are equal.   Neck: Neck supple.   Cardiovascular: Normal rate, regular rhythm, S1 normal and S2 normal.    Pulmonary/Chest: Effort normal and breath sounds normal.   Abdominal: Soft. Bowel sounds are increased. There is generalized tenderness.   Musculoskeletal: He exhibits no edema.   Neurological: He is alert. He is not disoriented.   Skin: Skin is warm and dry. No cyanosis. Nails show no clubbing.   Psychiatric: He has a normal mood and affect.       Significant Labs:     CBC:     Recent Labs  Lab 06/14/17  0513 06/15/17  0354   WBC 7.03 7.83   HGB 13.1* 13.9*   HCT 39.1* 41.4    282     CMP:     Recent Labs  Lab 06/14/17  0513 06/15/17  0354    141   K 3.9 4.0    107   CO2 23 22*   GLU 92 88   BUN 42* 45*   CREATININE 6.0* 6.5*   CALCIUM 9.2 9.2   ALBUMIN 3.6 3.5   ANIONGAP 10 12   EGFRNONAA 10.8* 9.8*     Magnesium:    Recent Labs  Lab 06/14/17  0513 06/15/17  0354   MG 1.9 2.0     Assessment/Plan:      Current Hospital Problem List:    Active Hospital Problems    Diagnosis  POA    *Peritonitis [K65.9]  Yes     Priority: 1 - High    Peritoneal dialysis catheter dysfunction [T85.611A]  Yes     Priority: 1 - High    Chronic constipation [K59.09]  Yes     Priority: 2     Dependence on peritoneal dialysis [Z99.2]  Not Applicable     Priority: 2      Chronic    ESRD (end stage renal disease) [N18.6]  Yes     Priority: 2     Diastolic dysfunction [I51.9]  Yes     Priority: 3     LVH (left ventricular hypertrophy) due to hypertensive disease [I11.9]  Yes     Priority: 3     Chronic hepatitis B virus infection [B18.1]  Yes     Priority:  4     Secondary hyperparathyroidism [N25.81]  Yes     Priority: 5     Anemia in ESRD (end-stage renal disease) [N18.6, D63.1]  Yes     Chronic    Abdominal pain [R10.9]  Yes      Resolved Hospital Problems    Diagnosis Date Resolved POA   No resolved problems to display.       Ordered Medications for management of current problems:    amlodipine  10 mg Oral Daily    bisacodyl  10 mg Oral BID    calcitRIOL  0.5 mcg Oral Daily    carvedilol  25 mg Oral BID WM    furosemide  40 mg Oral Daily    lactulose  30 g Oral TID    lisinopril  40 mg Oral Daily    polyethylene glycol  17 g Oral TID    senna-docusate 8.6-50 mg  2 tablet Oral BID    Dianeal PD with additives   Intraperitoneal Q24H       IV Fluids: IV push only, no IV fluids    Anticipated Disposition: Home or Self Care    Assessment and Plan by Problem:    Peritoneal dialysis catheter dysfunction    Recent positional dysfunction. Some persistent pain at PD site per nephrology.        * Peritonitis    Considered likely on admission, cell count and cultures ordered. Nephrology consulted for PD and antibiotics.  Fluid cell counts do not confirm peritonitis; cultures are pending. Continuing IP antibiotics for now.        Chronic constipation    Continued home medications.  Lactulose added.  Stimulants increased 6/14/2017; patient had large BM.  Reports BM x 1 6/15/2017.        ESRD (end stage renal disease),   Dependence on peritoneal dialysis         LVH (left ventricular hypertrophy) due to hypertensive disease, Diastolic dysfunction    Continuing home medications.        Abdominal pain    Likely due to peritonitis or constipation.          VTE Risk Mitigation         Ordered     Low Risk of VTE  Once      06/13/17 1229          Angelic Swanson MD  Department of Hospital Medicine   [facility]

## 2017-06-16 NOTE — SUBJECTIVE & OBJECTIVE
Interval History: Patient with persistent constipation on home regimen of Senna-S, Miralax, and additional lactulose. BM x 2 with increased stimulant laxatives. Decreased abdominal pain.  Data Review: Results recorded below were reviewed 6/15/2017.      Review of Systems   Constitutional: Negative for fever.   Respiratory: Negative for shortness of breath.    Gastrointestinal: Positive for abdominal distention and abdominal pain.     Objective:     Vital Signs (Most Recent):  Temp: 98.7 °F (37.1 °C) (06/15/17 2002)  Pulse: 81 (06/15/17 2002)  Resp: 16 (06/15/17 2002)  BP: 120/72 (06/15/17 2002)  SpO2: 95 % (06/15/17 2002) Vital Signs (24h Range):  Temp:  [97.7 °F (36.5 °C)-98.9 °F (37.2 °C)] 98.7 °F (37.1 °C)  Pulse:  [] 81  Resp:  [16-18] 16  SpO2:  [95 %-99 %] 95 %  BP: ()/(60-72) 120/72     Weight: 98 kg (216 lb)  Body mass index is 30.99 kg/m².    Intake/Output Summary (Last 24 hours) at 06/15/17 2011  Last data filed at 06/15/17 0815   Gross per 24 hour   Intake              240 ml   Output             1352 ml   Net            -1112 ml      Physical Exam   Constitutional: He appears well-developed.   HENT:   Head: Normocephalic.   Mouth/Throat: Mucous membranes are not pale and not cyanotic.   Eyes: Conjunctivae and lids are normal. Pupils are equal.   Neck: Neck supple.   Cardiovascular: Normal rate, regular rhythm, S1 normal and S2 normal.    Pulmonary/Chest: Effort normal and breath sounds normal.   Abdominal: Soft. Bowel sounds are increased. There is generalized tenderness.   Musculoskeletal: He exhibits no edema.   Neurological: He is alert. He is not disoriented.   Skin: Skin is warm and dry. No cyanosis. Nails show no clubbing.   Psychiatric: He has a normal mood and affect.       Significant Labs:     CBC:     Recent Labs  Lab 06/14/17  0513 06/15/17  0354   WBC 7.03 7.83   HGB 13.1* 13.9*   HCT 39.1* 41.4    282     CMP:     Recent Labs  Lab 06/14/17  0513 06/15/17  0354     141   K 3.9 4.0    107   CO2 23 22*   GLU 92 88   BUN 42* 45*   CREATININE 6.0* 6.5*   CALCIUM 9.2 9.2   ALBUMIN 3.6 3.5   ANIONGAP 10 12   EGFRNONAA 10.8* 9.8*     Magnesium:    Recent Labs  Lab 06/14/17  0513 06/15/17  0354   MG 1.9 2.0

## 2017-06-16 NOTE — PROGRESS NOTES
Notified Dr. Swanson BP 91/54. Pt asymptomatic. New orders received to give 250ml NS bolus. Will continue to monitor pt.       BP 98/57 after 250ml NS bolus. Notified Dr. Swanson and she stated we are waiting to hear from nephrology. Will continue to monitor pt.

## 2017-06-16 NOTE — ASSESSMENT & PLAN NOTE
- fluid studies with WBC < 100 however gram stain with possible GPC. Culture NGTD. Repeat fluid studies with WBC < 100; culture results pending  - IP vanc and ceftazidine started 6/13.   - will discontinue ceftazidine today  - continue empiric vancomycin until cultures finalize  - will hold IP vancomycin tonight given elevated vanc trough this AM. Will repeat trough in AM. If subtherapeutic, recommend IV dosing over the weekend.   - continue laxatives

## 2017-06-16 NOTE — PROGRESS NOTES
"Called by Dr. Swanson pt more lethargic. BP 79/47. New orders received to bolus 500cc NS. Pt states" I am tired and weak" Will continue to monitor pt.         1610: /68 after 500cc blous of NS. Will continue to monitor pt           "

## 2017-06-16 NOTE — SUBJECTIVE & OBJECTIVE
Interval History:   No events overnight. Minimal UF with PD (only 284cc). Stool x3 yesterday; none since yesterday morning. Some vomiting late last night; now resolved. Reports improvement in drain pain; still present but mild. No abdominal pain. Denies SOB.     Later this morning patient became hypotensive despite 250cc NS bolus. Reports N/V/somnolence. Receiving another bolus.     Review of patient's allergies indicates:  No Known Allergies  Current Facility-Administered Medications   Medication Frequency    acetaminophen tablet 500 mg Q6H PRN    albuterol inhaler 2 puff Q4H PRN    bisacodyl EC tablet 10 mg BID    calcitRIOL capsule 0.5 mcg Daily    carvedilol tablet 25 mg BID WM    [START ON 6/18/2017] furosemide tablet 40 mg Daily    lactulose 20 gram/30 mL solution Soln 30 g TID    [START ON 6/17/2017] lisinopril tablet 40 mg with lunch    ondansetron disintegrating tablet 8 mg Q8H PRN    oxycodone immediate release tablet 5 mg Q6H PRN    polyethylene glycol packet 17 g TID    senna-docusate 8.6-50 mg per tablet 2 tablet BID    sodium chloride 0.9% bolus 500 mL Once       Objective:     Vital Signs (Most Recent):  Temp: 97.9 °F (36.6 °C) (06/16/17 1520)  Pulse: 75 (06/16/17 1550)  Resp: 18 (06/16/17 1520)  BP: 100/61 (06/16/17 1550)  SpO2: 99 % (06/16/17 1520)  O2 Device (Oxygen Therapy): room air (06/16/17 1520) Vital Signs (24h Range):  Temp:  [97.4 °F (36.3 °C)-98.7 °F (37.1 °C)] 97.9 °F (36.6 °C)  Pulse:  [] 75  Resp:  [16-18] 18  SpO2:  [95 %-99 %] 99 %  BP: ()/(47-72) 100/61     Weight: 98 kg (216 lb) (06/13/17 1122)  Body mass index is 30.99 kg/m².  Body surface area is 2.2 meters squared.    I/O last 3 completed shifts:  In: 420 [P.O.:420]  Out: 1352 [Other:1352]    Physical Exam   Constitutional: He appears well-developed and well-nourished. No distress.   HENT:   Head: Normocephalic and atraumatic.   Eyes: Conjunctivae are normal.   Cardiovascular: Normal rate and regular  rhythm.    Pulmonary/Chest: Effort normal and breath sounds normal.   Abdominal: Soft. Bowel sounds are normal. He exhibits distension.   Musculoskeletal: He exhibits no edema or tenderness.   Skin: Skin is warm and dry.       Significant Labs:  CBC:   Recent Labs  Lab 06/16/17  0509   WBC 9.58   RBC 5.35   HGB 15.9   HCT 47.2      MCV 88   MCH 29.7   MCHC 33.7     CMP:   Recent Labs  Lab 06/13/17  1248  06/16/17  0509   GLU 76  < > 101   CALCIUM 8.8  < > 10.1   ALBUMIN 3.4*  < > 4.2   PROT 7.4  --   --      < > 142   K 4.4  < > 4.1   CO2 23  < > 19*     < > 105   BUN 44*  < > 45*   CREATININE 6.2*  < > 8.3*   ALKPHOS 116  --   --    ALT 14  --   --    AST 13  --   --    BILITOT 0.3  --   --    < > = values in this interval not displayed.  All labs within the past 24 hours have been reviewed.     Significant Imaging:  Labs: Reviewed

## 2017-06-16 NOTE — PROGRESS NOTES
[facility]  Nephrology  Progress Note    Patient Name: Maksim Hidalgo  MRN: [id number]  Admission Date: 6/13/2017  Hospital Length of Stay: 3 days  Attending Provider: Angelic Swanson MD   Primary Care Physician: Saurabh Zuleta MD  Principal Problem:Peritonitis    Subjective:     HPI: Mr. Hidalgo is a 38 yo AAM with HTN and ESRD who was directly admitted from surgery clinic today for concern of peritonitis. Patient describes generalized abdominal pain which is exacerbated by urination. He also reports new onset drain pain. He denies any fevers or chills. He admits to constipation and only has BMs a few times per week. He feels he has gained a lot of weight over the last few days 2/2 inefficient dialysis. Otherwise he is without complaint.   Patient performs PD nightly at home; nephrologist is Dr. Garcia.   Home PD prescription:   3L first fill. Tidal fill 2300cc. Tidal drain 2600cc. No last fill.  Dwell time: 1 hour. Drain time: 30 minutes.   Total volume 12.24L. Total time: 8h 20m.     Interval History:   No events overnight. Minimal UF with PD (only 284cc). Stool x3 yesterday; none since yesterday morning. Some vomiting late last night; now resolved. Reports improvement in drain pain; still present but mild. No abdominal pain. Denies SOB.     Later this morning patient became hypotensive despite 250cc NS bolus. Reports N/V/somnolence. Receiving another bolus.     Review of patient's allergies indicates:  No Known Allergies  Current Facility-Administered Medications   Medication Frequency    acetaminophen tablet 500 mg Q6H PRN    albuterol inhaler 2 puff Q4H PRN    bisacodyl EC tablet 10 mg BID    calcitRIOL capsule 0.5 mcg Daily    carvedilol tablet 25 mg BID WM    [START ON 6/18/2017] furosemide tablet 40 mg Daily    lactulose 20 gram/30 mL solution Soln 30 g TID    [START ON 6/17/2017] lisinopril tablet 40 mg with lunch    ondansetron disintegrating tablet 8 mg Q8H PRN     oxycodone immediate release tablet 5 mg Q6H PRN    polyethylene glycol packet 17 g TID    senna-docusate 8.6-50 mg per tablet 2 tablet BID    sodium chloride 0.9% bolus 500 mL Once       Objective:     Vital Signs (Most Recent):  Temp: 97.9 °F (36.6 °C) (06/16/17 1520)  Pulse: 75 (06/16/17 1550)  Resp: 18 (06/16/17 1520)  BP: 100/61 (06/16/17 1550)  SpO2: 99 % (06/16/17 1520)  O2 Device (Oxygen Therapy): room air (06/16/17 1520) Vital Signs (24h Range):  Temp:  [97.4 °F (36.3 °C)-98.7 °F (37.1 °C)] 97.9 °F (36.6 °C)  Pulse:  [] 75  Resp:  [16-18] 18  SpO2:  [95 %-99 %] 99 %  BP: ()/(47-72) 100/61     Weight: 98 kg (216 lb) (06/13/17 1122)  Body mass index is 30.99 kg/m².  Body surface area is 2.2 meters squared.    I/O last 3 completed shifts:  In: 420 [P.O.:420]  Out: 1352 [Other:1352]    Physical Exam   Constitutional: He appears well-developed and well-nourished. No distress.   HENT:   Head: Normocephalic and atraumatic.   Eyes: Conjunctivae are normal.   Cardiovascular: Normal rate and regular rhythm.    Pulmonary/Chest: Effort normal and breath sounds normal.   Abdominal: Soft. Bowel sounds are normal. He exhibits distension.   Musculoskeletal: He exhibits no edema or tenderness.   Skin: Skin is warm and dry.       Significant Labs:  CBC:   Recent Labs  Lab 06/16/17  0509   WBC 9.58   RBC 5.35   HGB 15.9   HCT 47.2      MCV 88   MCH 29.7   MCHC 33.7     CMP:   Recent Labs  Lab 06/13/17  1248  06/16/17  0509   GLU 76  < > 101   CALCIUM 8.8  < > 10.1   ALBUMIN 3.4*  < > 4.2   PROT 7.4  --   --      < > 142   K 4.4  < > 4.1   CO2 23  < > 19*     < > 105   BUN 44*  < > 45*   CREATININE 6.2*  < > 8.3*   ALKPHOS 116  --   --    ALT 14  --   --    AST 13  --   --    BILITOT 0.3  --   --    < > = values in this interval not displayed.  All labs within the past 24 hours have been reviewed.     Significant Imaging:  Labs: Reviewed    Assessment/Plan:     ESRD (end stage renal disease)     - hypotensive and hemoconcentrated today; possibly related to decreased PO intake +/- BMs/vomiting last night. Afebrile, no leukocytosis.   - agree with fluid resuscitation  - discontinued amlodipine. Recommend hold parameters on BP medications  - recommend repeating CBC, RFP, and ordering EKG this afternoon  - will continue home PD prescription; however will decrease glucose solution to 1.5% tonight to avoid further volume removal        Abdominal pain    - fluid studies with WBC < 100 however gram stain with possible GPC. Culture NGTD. Repeat fluid studies with WBC < 100; culture results pending  - IP vanc and ceftazidine started 6/13.   - will discontinue ceftazidine today  - continue empiric vancomycin until cultures finalize  - will hold IP vancomycin tonight given elevated vanc trough this AM. Will repeat trough in AM. If subtherapeutic, recommend IV dosing over the weekend.   - continue laxatives        Anemia in ESRD (end-stage renal disease)    - hgb at goal; no need for SKY        Secondary hyperparathyroidism    - corrected Ca normal  - phos above goal; will start sevelamer 800mg TID w/meals            Nely Cunningham, PGY-4  Nephrology Fellow  [facility]  Pager: [phone]

## 2017-06-16 NOTE — ASSESSMENT & PLAN NOTE
- hypotensive and hemoconcentrated today; possibly related to decreased PO intake +/- BMs/vomiting last night. Afebrile, no leukocytosis.   - agree with fluid resuscitation  - discontinued amlodipine. Recommend hold parameters on BP medications  - recommend repeating CBC, RFP, and ordering EKG this afternoon  - will continue home PD prescription; however will decrease glucose solution to 1.5% tonight to avoid further volume removal

## 2017-06-16 NOTE — ASSESSMENT & PLAN NOTE
Considered likely on admission, cell count and cultures ordered. Nephrology consulted for PD and antibiotics.  Fluid cell counts do not confirm peritonitis; cultures are pending. Continuing IP antibiotics for now.

## 2017-06-16 NOTE — ASSESSMENT & PLAN NOTE
Continued home medications.  Lactulose added.  Stimulants increased 6/14/2017; patient had large BM.  Reports BM x 1 6/15/2017.

## 2017-06-16 NOTE — PROGRESS NOTES
Vancomycin lab trough resulted as elevated so patients' peritoneal catheter was accessed per aseptic technique to drain last fill from previous CCPD (was dwelling with added vancomycin). Returned 2400 ml clear effluent and catheter capped with sterile betadine cap. UF of 400 ml as last fill from CCPD was 2000 ml and drained 2400ml.

## 2017-06-17 LAB
ALBUMIN SERPL BCP-MCNC: 3.3 G/DL
ANION GAP SERPL CALC-SCNC: 14 MMOL/L
BACTERIA SPEC AEROBE CULT: NO GROWTH
BASOPHILS # BLD AUTO: 0.02 K/UL
BASOPHILS NFR BLD: 0.2 %
BUN SERPL-MCNC: 46 MG/DL
CALCIUM SERPL-MCNC: 9.2 MG/DL
CHLORIDE SERPL-SCNC: 105 MMOL/L
CO2 SERPL-SCNC: 22 MMOL/L
CREAT SERPL-MCNC: 9.6 MG/DL
DIFFERENTIAL METHOD: NORMAL
EOSINOPHIL # BLD AUTO: 0.3 K/UL
EOSINOPHIL NFR BLD: 2.7 %
ERYTHROCYTE [DISTWIDTH] IN BLOOD BY AUTOMATED COUNT: 12.6 %
EST. GFR  (AFRICAN AMERICAN): 7.1 ML/MIN/1.73 M^2
EST. GFR  (NON AFRICAN AMERICAN): 6.1 ML/MIN/1.73 M^2
GLUCOSE SERPL-MCNC: 94 MG/DL
HCT VFR BLD AUTO: 42.4 %
HGB BLD-MCNC: 14.5 G/DL
LYMPHOCYTES # BLD AUTO: 3.4 K/UL
LYMPHOCYTES NFR BLD: 34.2 %
MAGNESIUM SERPL-MCNC: 1.9 MG/DL
MCH RBC QN AUTO: 29.9 PG
MCHC RBC AUTO-ENTMCNC: 34.2 %
MCV RBC AUTO: 87 FL
MONOCYTES # BLD AUTO: 0.9 K/UL
MONOCYTES NFR BLD: 8.8 %
NEUTROPHILS # BLD AUTO: 5.4 K/UL
NEUTROPHILS NFR BLD: 53.9 %
PHOSPHATE SERPL-MCNC: 5.7 MG/DL
PLATELET # BLD AUTO: 291 K/UL
PMV BLD AUTO: 9.8 FL
POTASSIUM SERPL-SCNC: 3.5 MMOL/L
RBC # BLD AUTO: 4.85 M/UL
SODIUM SERPL-SCNC: 141 MMOL/L
VANCOMYCIN SERPL-MCNC: 27.3 UG/ML
WBC # BLD AUTO: 9.98 K/UL

## 2017-06-17 PROCEDURE — 90945 DIALYSIS ONE EVALUATION: CPT | Mod: NTX

## 2017-06-17 PROCEDURE — 25000003 PHARM REV CODE 250: Mod: NTX | Performed by: INTERNAL MEDICINE

## 2017-06-17 PROCEDURE — 85025 COMPLETE CBC W/AUTO DIFF WBC: CPT | Mod: NTX

## 2017-06-17 PROCEDURE — 99231 SBSQ HOSP IP/OBS SF/LOW 25: CPT | Mod: NTX,,, | Performed by: INTERNAL MEDICINE

## 2017-06-17 PROCEDURE — 36415 COLL VENOUS BLD VENIPUNCTURE: CPT | Mod: NTX

## 2017-06-17 PROCEDURE — 83735 ASSAY OF MAGNESIUM: CPT | Mod: NTX

## 2017-06-17 PROCEDURE — 80069 RENAL FUNCTION PANEL: CPT | Mod: NTX

## 2017-06-17 PROCEDURE — 80202 ASSAY OF VANCOMYCIN: CPT | Mod: NTX

## 2017-06-17 PROCEDURE — 11000001 HC ACUTE MED/SURG PRIVATE ROOM: Mod: NTX

## 2017-06-17 RX ADMIN — SEVELAMER CARBONATE 800 MG: 800 TABLET, FILM COATED ORAL at 05:06

## 2017-06-17 RX ADMIN — SEVELAMER CARBONATE 800 MG: 800 TABLET, FILM COATED ORAL at 09:06

## 2017-06-17 RX ADMIN — STANDARDIZED SENNA CONCENTRATE AND DOCUSATE SODIUM 2 TABLET: 8.6; 5 TABLET, FILM COATED ORAL at 09:06

## 2017-06-17 RX ADMIN — BISACODYL 10 MG: 5 TABLET, COATED ORAL at 09:06

## 2017-06-17 RX ADMIN — POLYETHYLENE GLYCOL 3350 17 G: 17 POWDER, FOR SOLUTION ORAL at 05:06

## 2017-06-17 RX ADMIN — CALCITRIOL 0.5 MCG: 0.25 CAPSULE, LIQUID FILLED ORAL at 09:06

## 2017-06-17 NOTE — ASSESSMENT & PLAN NOTE
Considered likely on admission, cell count and cultures ordered. Nephrology consulted for PD and antibiotics.  Fluid cell counts do not confirm peritonitis; cultures are pending. Continuing IP antibiotics for now.  Vancomycin level elevated; dosing monitored by Nephrology and doses adjusted.

## 2017-06-17 NOTE — PROGRESS NOTES
Dialysis    PD completed tx d/c'ed. Pt had multiple machine alarms during tx., all check pt line. 2 hours of lost dwell and 386 ml positive fluid balance for tx.

## 2017-06-17 NOTE — PROGRESS NOTES
CCPD initiated as ordered with Tidal program. See flow sheet for details. Patient did not have initial drain as he had been manually  drained earlier this morning. Site care done with antiseptic swab of catheter insertion site and new sterile guaze dressing applied. There was no edema, erythema or drainage noted.

## 2017-06-17 NOTE — PROGRESS NOTES
[facility] Hospital Medicine  Progress Note    Patient Name: Maksim Hidalgo  MRN: [id number]  Patient Class: IP- Inpatient   Admission Date: 6/13/2017  Length of Stay: 3 days  Attending Physician: Angelic Swanson MD  Primary Care Provider: Saurabh Zuleta MD    [facility] Medicine Team: [facility] MED G Angelic Swanson MD    Subjective:     Principal Problem:Peritonitis    HPI:  37 y.o. male presented from the Surgery clinic with past medical history of ESRD on PD, hypertension, LVH/diastolic dysfunction, Hep B.  He was in his usual state of good health until the acute onset of generalized abdominal pain beginning 3 days prior to admission with gradually worsening course. Pertinent associated symptoms include difficulties with draining dialysate for several days, and recent sinusitis treated with oral doxycycline started 3 days ago. Patient presented to Dr. Gillespie's clinic today with abdominal pain, dysuria, and inability to completely drain dialysate.    Hospital Course:  No notes on file    Interval History: Patient with persistent abdominal pain at PD catheter site. Became hypotensive today.  Data Review: Results recorded below were reviewed 6/16/2017.    Review of Systems   Constitutional: Negative for fever.   Respiratory: Negative for shortness of breath.    Gastrointestinal: Positive for abdominal distention and abdominal pain.     Objective:     Vital Signs (Most Recent):  Temp: 98.9 °F (37.2 °C) (06/16/17 1936)  Pulse: 90 (06/16/17 1936)  Resp: 16 (06/16/17 1936)  BP: 120/66 (06/16/17 1936)  SpO2: 98 % (06/16/17 1936) Vital Signs (24h Range):  Temp:  [97.4 °F (36.3 °C)-98.9 °F (37.2 °C)] 98.9 °F (37.2 °C)  Pulse:  [75-93] 90  Resp:  [16-18] 16  SpO2:  [96 %-99 %] 98 %  BP: ()/(47-68) 120/66     Weight: 98 kg (216 lb)  Body mass index is 30.99 kg/m².    Intake/Output Summary (Last 24 hours) at 06/16/17 2123  Last data filed at 06/16/17 0800   Gross per 24 hour   Intake                 0 ml   Output              284 ml   Net             -284 ml      Physical Exam   Constitutional: He appears well-developed. He appears listless.   HENT:   Head: Normocephalic.   Mouth/Throat: Mucous membranes are not pale and not cyanotic.   Eyes: Conjunctivae and lids are normal. Pupils are equal.   Neck: Neck supple.   Cardiovascular: Normal rate, regular rhythm, S1 normal and S2 normal.    Pulmonary/Chest: Effort normal and breath sounds normal.   Abdominal: Soft. Bowel sounds are increased. There is generalized tenderness.   Musculoskeletal: He exhibits no edema.   Neurological: He appears listless. He is not disoriented.   Skin: Skin is warm and dry. No cyanosis. Nails show no clubbing.   Psychiatric: His affect is blunt. He is slowed.       Significant Labs:     CBC:     Recent Labs  Lab 06/15/17  0354 06/16/17  0509 06/16/17  1621   WBC 7.83 9.58 9.81   HGB 13.9* 15.9 14.9   HCT 41.4 47.2 44.8    340 269     CMP:     Recent Labs  Lab 06/15/17  0354 06/16/17  0509 06/16/17  1621    142 141   K 4.0 4.1 3.7    105 107   CO2 22* 19* 17*   GLU 88 101 76   BUN 45* 45* 47*   CREATININE 6.5* 8.3* 9.4*   CALCIUM 9.2 10.1 9.4   PROT  --   --  8.1   ALBUMIN 3.5 4.2 3.6   BILITOT  --   --  0.2   ALKPHOS  --   --  94   AST  --   --  15   ALT  --   --  17   ANIONGAP 12 18* 17*   EGFRNONAA 9.8* 7.3* 6.3*     Magnesium:    Recent Labs  Lab 06/15/17  0354 06/16/17  0509 06/16/17  1621   MG 2.0 2.3 2.0     EKG 6/16/2017: NSR, non-ichemic    Assessment/Plan:      Current Hospital Problem List:    Active Hospital Problems    Diagnosis  POA    *Peritonitis [K65.9]  Yes     Priority: 1 - High    Peritoneal dialysis catheter dysfunction [T85.611A]  Yes     Priority: 1 - High    Chronic constipation [K59.09]  Yes     Priority: 2     Dependence on peritoneal dialysis [Z99.2]  Not Applicable     Priority: 2      Chronic    ESRD (end stage renal disease) [N18.6]  Yes     Priority: 2     Diastolic  dysfunction [I51.9]  Yes     Priority: 3     LVH (left ventricular hypertrophy) due to hypertensive disease [I11.9]  Yes     Priority: 3     Chronic hepatitis B virus infection [B18.1]  Yes     Priority: 4     Secondary hyperparathyroidism [N25.81]  Yes     Priority: 5     Anemia in ESRD (end-stage renal disease) [N18.6, D63.1]  Yes     Chronic    Abdominal pain [R10.9]  Yes      Resolved Hospital Problems    Diagnosis Date Resolved POA   No resolved problems to display.       Ordered Medications for management of current problems:    bisacodyl  10 mg Oral BID    calcitRIOL  0.5 mcg Oral Daily    carvedilol  25 mg Oral BID WM    [START ON 6/18/2017] furosemide  40 mg Oral Daily    lactulose  30 g Oral TID    [START ON 6/17/2017] lisinopril  40 mg Oral with lunch    polyethylene glycol  17 g Oral TID    senna-docusate 8.6-50 mg  2 tablet Oral BID    sevelamer carbonate  800 mg Oral TID WM       IV Fluids: 0.9  mL    Risk  Patient is currently on drug therapy requiring intensive monitoring for toxicity: Vancomycin    Anticipated Disposition: Home or Self Care    Assessment and Plan by Problem:    Peritoneal dialysis catheter dysfunction    Recent positional dysfunction. Some persistent pain at PD site.        * Peritonitis    Considered likely on admission, cell count and cultures ordered. Nephrology consulted for PD and antibiotics.  Fluid cell counts do not confirm peritonitis; cultures are pending. Continuing IP antibiotics for now.  Vancomycin level elevated; dosing monitored by Nephrology and doses adjusted.        Chronic constipation    Continued home medications.  Lactulose added.  Stimulants increased 6/14/2017; patient had large BM.  Reports BM x 1 6/15.        Dependence on peritoneal dialysis    Patient with hypotension 6/16/2017. Received  mL with resolution.        ESRD (end stage renal disease)    On PD.        Diastolic dysfunction              LVH (left ventricular  hypertrophy) due to hypertensive disease    Continuing home medications.        Chronic hepatitis B virus infection              Secondary hyperparathyroidism              Anemia in ESRD (end-stage renal disease)              Abdominal pain    Likely due to peritonitis or constipation.          VTE Risk Mitigation         Ordered     Low Risk of VTE  Once      06/13/17 4913          Angelic Swanson MD  Department of Hospital Medicine   [facility]

## 2017-06-17 NOTE — SUBJECTIVE & OBJECTIVE
Interval History: Patient with persistent abdominal pain at PD catheter site. Became hypotensive today.  Data Review: Results recorded below were reviewed 6/16/2017.    Review of Systems   Constitutional: Negative for fever.   Respiratory: Negative for shortness of breath.    Gastrointestinal: Positive for abdominal distention and abdominal pain.     Objective:     Vital Signs (Most Recent):  Temp: 98.9 °F (37.2 °C) (06/16/17 1936)  Pulse: 90 (06/16/17 1936)  Resp: 16 (06/16/17 1936)  BP: 120/66 (06/16/17 1936)  SpO2: 98 % (06/16/17 1936) Vital Signs (24h Range):  Temp:  [97.4 °F (36.3 °C)-98.9 °F (37.2 °C)] 98.9 °F (37.2 °C)  Pulse:  [75-93] 90  Resp:  [16-18] 16  SpO2:  [96 %-99 %] 98 %  BP: ()/(47-68) 120/66     Weight: 98 kg (216 lb)  Body mass index is 30.99 kg/m².    Intake/Output Summary (Last 24 hours) at 06/16/17 2129  Last data filed at 06/16/17 0800   Gross per 24 hour   Intake                0 ml   Output              284 ml   Net             -284 ml      Physical Exam   Constitutional: He appears well-developed. He appears listless.   HENT:   Head: Normocephalic.   Mouth/Throat: Mucous membranes are not pale and not cyanotic.   Eyes: Conjunctivae and lids are normal. Pupils are equal.   Neck: Neck supple.   Cardiovascular: Normal rate, regular rhythm, S1 normal and S2 normal.    Pulmonary/Chest: Effort normal and breath sounds normal.   Abdominal: Soft. Bowel sounds are increased. There is generalized tenderness.   Musculoskeletal: He exhibits no edema.   Neurological: He appears listless. He is not disoriented.   Skin: Skin is warm and dry. No cyanosis. Nails show no clubbing.   Psychiatric: His affect is blunt. He is slowed.       Significant Labs:     CBC:     Recent Labs  Lab 06/15/17  0354 06/16/17  0509 06/16/17  1621   WBC 7.83 9.58 9.81   HGB 13.9* 15.9 14.9   HCT 41.4 47.2 44.8    340 269     CMP:     Recent Labs  Lab 06/15/17  0354 06/16/17  0509 06/16/17  1621    142 141    K 4.0 4.1 3.7    105 107   CO2 22* 19* 17*   GLU 88 101 76   BUN 45* 45* 47*   CREATININE 6.5* 8.3* 9.4*   CALCIUM 9.2 10.1 9.4   PROT  --   --  8.1   ALBUMIN 3.5 4.2 3.6   BILITOT  --   --  0.2   ALKPHOS  --   --  94   AST  --   --  15   ALT  --   --  17   ANIONGAP 12 18* 17*   EGFRNONAA 9.8* 7.3* 6.3*     Magnesium:    Recent Labs  Lab 06/15/17  0354 06/16/17  0509 06/16/17  1621   MG 2.0 2.3 2.0     EKG 6/16/2017: NSR, non-ichemic

## 2017-06-17 NOTE — PROGRESS NOTES
Spoke with dialysis nurse in regards to peritoneal dialysis machine. Will come look to make sure it is well connected.

## 2017-06-17 NOTE — ASSESSMENT & PLAN NOTE
Continued home medications.  Lactulose added.  Stimulants increased 6/14/2017; patient had large BM.  Reports BM x 1 6/15.

## 2017-06-17 NOTE — PLAN OF CARE
Vanco trouph still elevated at >20. Will not redose abx today. Will continue CCPD tonight with 1.5% dialysate.     Ayala Winchester MD  Nephrology Fellow  Pager [phone]

## 2017-06-18 LAB
ALBUMIN SERPL BCP-MCNC: 3.6 G/DL
ALLENS TEST: ABNORMAL
ANION GAP SERPL CALC-SCNC: 19 MMOL/L
BACTERIA BLD CULT: NORMAL
BASOPHILS # BLD AUTO: 0.01 K/UL
BASOPHILS NFR BLD: 0.1 %
BUN SERPL-MCNC: 49 MG/DL
CALCIUM SERPL-MCNC: 9.5 MG/DL
CHLORIDE SERPL-SCNC: 106 MMOL/L
CO2 SERPL-SCNC: 14 MMOL/L
CREAT SERPL-MCNC: 9.6 MG/DL
DELSYS: ABNORMAL
DIFFERENTIAL METHOD: ABNORMAL
EOSINOPHIL # BLD AUTO: 0.4 K/UL
EOSINOPHIL NFR BLD: 5 %
ERYTHROCYTE [DISTWIDTH] IN BLOOD BY AUTOMATED COUNT: 12.4 %
EST. GFR  (AFRICAN AMERICAN): 7.1 ML/MIN/1.73 M^2
EST. GFR  (NON AFRICAN AMERICAN): 6.1 ML/MIN/1.73 M^2
FIO2: 21
GLUCOSE SERPL-MCNC: 86 MG/DL
HCO3 UR-SCNC: 20.5 MMOL/L (ref 24–28)
HCT VFR BLD AUTO: 40 %
HGB BLD-MCNC: 13.8 G/DL
LACTATE SERPL-SCNC: 0.9 MMOL/L
LDH SERPL L TO P-CCNC: 0.67 MMOL/L (ref 0.36–1.25)
LYMPHOCYTES # BLD AUTO: 2 K/UL
LYMPHOCYTES NFR BLD: 23.4 %
MAGNESIUM SERPL-MCNC: 2.1 MG/DL
MCH RBC QN AUTO: 30.1 PG
MCHC RBC AUTO-ENTMCNC: 34.5 %
MCV RBC AUTO: 87 FL
MODE: ABNORMAL
MONOCYTES # BLD AUTO: 1 K/UL
MONOCYTES NFR BLD: 11.7 %
NEUTROPHILS # BLD AUTO: 5 K/UL
NEUTROPHILS NFR BLD: 59.7 %
PCO2 BLDA: 38.9 MMHG (ref 35–45)
PH SMN: 7.33 [PH] (ref 7.35–7.45)
PHOSPHATE SERPL-MCNC: 5.7 MG/DL
PLATELET # BLD AUTO: 281 K/UL
PMV BLD AUTO: 10.2 FL
PO2 BLDA: 87 MMHG (ref 80–100)
POC BE: -5 MMOL/L
POC SATURATED O2: 96 % (ref 95–100)
POC TCO2: 22 MMOL/L (ref 23–27)
POTASSIUM SERPL-SCNC: 3.7 MMOL/L
RBC # BLD AUTO: 4.59 M/UL
SAMPLE: ABNORMAL
SITE: ABNORMAL
SODIUM SERPL-SCNC: 139 MMOL/L
SP02: 98
VANCOMYCIN SERPL-MCNC: 21.9 UG/ML
WBC # BLD AUTO: 8.45 K/UL

## 2017-06-18 PROCEDURE — 83735 ASSAY OF MAGNESIUM: CPT | Mod: NTX

## 2017-06-18 PROCEDURE — 82803 BLOOD GASES ANY COMBINATION: CPT | Mod: NTX

## 2017-06-18 PROCEDURE — 36600 WITHDRAWAL OF ARTERIAL BLOOD: CPT | Mod: NTX

## 2017-06-18 PROCEDURE — 99231 SBSQ HOSP IP/OBS SF/LOW 25: CPT | Mod: NTX,,, | Performed by: INTERNAL MEDICINE

## 2017-06-18 PROCEDURE — 63600175 PHARM REV CODE 636 W HCPCS: Mod: NTX | Performed by: NURSE PRACTITIONER

## 2017-06-18 PROCEDURE — 80069 RENAL FUNCTION PANEL: CPT | Mod: NTX

## 2017-06-18 PROCEDURE — 90945 DIALYSIS ONE EVALUATION: CPT | Mod: NTX

## 2017-06-18 PROCEDURE — 25000003 PHARM REV CODE 250: Mod: NTX | Performed by: INTERNAL MEDICINE

## 2017-06-18 PROCEDURE — 83605 ASSAY OF LACTIC ACID: CPT | Mod: NTX

## 2017-06-18 PROCEDURE — 11000001 HC ACUTE MED/SURG PRIVATE ROOM: Mod: NTX

## 2017-06-18 PROCEDURE — 80202 ASSAY OF VANCOMYCIN: CPT | Mod: NTX

## 2017-06-18 PROCEDURE — 36415 COLL VENOUS BLD VENIPUNCTURE: CPT | Mod: NTX

## 2017-06-18 PROCEDURE — 85025 COMPLETE CBC W/AUTO DIFF WBC: CPT | Mod: NTX

## 2017-06-18 RX ORDER — ONDANSETRON 2 MG/ML
4 INJECTION INTRAMUSCULAR; INTRAVENOUS EVERY 6 HOURS PRN
Status: DISCONTINUED | OUTPATIENT
Start: 2017-06-18 | End: 2017-06-23 | Stop reason: HOSPADM

## 2017-06-18 RX ADMIN — POLYETHYLENE GLYCOL 3350 17 G: 17 POWDER, FOR SOLUTION ORAL at 08:06

## 2017-06-18 RX ADMIN — SEVELAMER CARBONATE 800 MG: 800 TABLET, FILM COATED ORAL at 08:06

## 2017-06-18 RX ADMIN — ONDANSETRON 8 MG: 8 TABLET, ORALLY DISINTEGRATING ORAL at 09:06

## 2017-06-18 RX ADMIN — ONDANSETRON 4 MG: 2 INJECTION INTRAMUSCULAR; INTRAVENOUS at 11:06

## 2017-06-18 RX ADMIN — POLYETHYLENE GLYCOL 3350 17 G: 17 POWDER, FOR SOLUTION ORAL at 05:06

## 2017-06-18 RX ADMIN — STANDARDIZED SENNA CONCENTRATE AND DOCUSATE SODIUM 2 TABLET: 8.6; 5 TABLET, FILM COATED ORAL at 08:06

## 2017-06-18 RX ADMIN — BISACODYL 10 MG: 5 TABLET, COATED ORAL at 08:06

## 2017-06-18 RX ADMIN — SEVELAMER CARBONATE 800 MG: 800 TABLET, FILM COATED ORAL at 12:06

## 2017-06-18 RX ADMIN — FUROSEMIDE 40 MG: 40 TABLET ORAL at 08:06

## 2017-06-18 RX ADMIN — CALCITRIOL 0.5 MCG: 0.25 CAPSULE, LIQUID FILLED ORAL at 08:06

## 2017-06-18 RX ADMIN — LACTULOSE 30 G: 20 SOLUTION ORAL at 05:06

## 2017-06-18 RX ADMIN — SEVELAMER CARBONATE 800 MG: 800 TABLET, FILM COATED ORAL at 05:06

## 2017-06-18 NOTE — SUBJECTIVE & OBJECTIVE
Interval History: Patient feels better; BP and pain improved.  Data Review: Results recorded below were reviewed 6/18/2017.    Review of Systems   Constitutional: Negative for fever.   Respiratory: Negative for shortness of breath.      Objective:     Vital Signs (Most Recent):  Temp: 98.5 °F (36.9 °C) (06/18/17 1600)  Pulse: 97 (06/18/17 1600)  Resp: 16 (06/18/17 1600)  BP: 112/71 (06/18/17 1600)  SpO2: 97 % (06/18/17 1600) Vital Signs (24h Range):  Temp:  [97 °F (36.1 °C)-98.9 °F (37.2 °C)] 98.5 °F (36.9 °C)  Pulse:  [85-97] 97  Resp:  [16-17] 16  SpO2:  [95 %-100 %] 97 %  BP: ()/(63-71) 112/71     Weight: 98 kg (216 lb)  Body mass index is 30.99 kg/m².    Intake/Output Summary (Last 24 hours) at 06/18/17 1810  Last data filed at 06/18/17 1348   Gross per 24 hour   Intake              580 ml   Output                5 ml   Net              575 ml      Physical Exam   Constitutional: He appears well-developed.   HENT:   Head: Normocephalic.   Mouth/Throat: Mucous membranes are not pale and not cyanotic.   Eyes: Conjunctivae and lids are normal. Pupils are equal.   Neck: Neck supple.   Cardiovascular: Normal rate, regular rhythm, S1 normal and S2 normal.    Pulmonary/Chest: Effort normal and breath sounds normal.   Abdominal: Soft. Bowel sounds are normal. There is generalized tenderness.   Musculoskeletal: He exhibits no edema.   Neurological: He is alert. He is not disoriented.   Skin: Skin is warm and dry. No cyanosis. Nails show no clubbing.       Significant Labs:     CBC:     Recent Labs  Lab 06/17/17  0412 06/18/17 0416   WBC 9.98 8.45   HGB 14.5 13.8*   HCT 42.4 40.0    281     CMP:     Recent Labs  Lab 06/17/17  0412 06/18/17 0416    139   K 3.5 3.7    106   CO2 22* 14*   GLU 94 86   BUN 46* 49*   CREATININE 9.6* 9.6*   CALCIUM 9.2 9.5   ALBUMIN 3.3* 3.6   ANIONGAP 14 19*   EGFRNONAA 6.1* 6.1*     Magnesium:    Recent Labs  Lab 06/17/17  0412 06/18/17  0416   MG 1.9 2.1

## 2017-06-18 NOTE — SUBJECTIVE & OBJECTIVE
Interval History: Patient feels better; BP improved.  Data Review: Results recorded below were reviewed 6/17/2017.    Review of Systems   Constitutional: Negative for fever.   Respiratory: Negative for shortness of breath.    Gastrointestinal: Positive for abdominal pain.     Objective:     Vital Signs (Most Recent):  Temp: 99 °F (37.2 °C) (06/17/17 1600)  Pulse: 77 (06/17/17 1600)  Resp: 16 (06/17/17 1600)  BP: 104/66 (06/17/17 1600)  SpO2: 97 % (06/17/17 1600) Vital Signs (24h Range):  Temp:  [97.9 °F (36.6 °C)-99 °F (37.2 °C)] 99 °F (37.2 °C)  Pulse:  [76-86] 77  Resp:  [16] 16  SpO2:  [95 %-99 %] 97 %  BP: ()/(59-67) 104/66     Weight: 98 kg (216 lb)  Body mass index is 30.99 kg/m².    Intake/Output Summary (Last 24 hours) at 06/17/17 2052  Last data filed at 06/17/17 1759   Gross per 24 hour   Intake              480 ml   Output                0 ml   Net              480 ml      Physical Exam   Constitutional: He appears well-developed.   HENT:   Head: Normocephalic.   Mouth/Throat: Mucous membranes are not pale and not cyanotic.   Eyes: Conjunctivae and lids are normal. Pupils are equal.   Neck: Neck supple.   Cardiovascular: Normal rate, regular rhythm, S1 normal and S2 normal.    Pulmonary/Chest: Effort normal and breath sounds normal.   Abdominal: Soft. Bowel sounds are normal. There is generalized tenderness.   Musculoskeletal: He exhibits no edema.   Neurological: He is alert. He is not disoriented.   Skin: Skin is warm and dry. No cyanosis. Nails show no clubbing.       Significant Labs:     CBC:     Recent Labs  Lab 06/16/17  0509 06/16/17  1621 06/17/17  0412   WBC 9.58 9.81 9.98   HGB 15.9 14.9 14.5   HCT 47.2 44.8 42.4    269 291     CMP:     Recent Labs  Lab 06/16/17  0509 06/16/17  1621 06/17/17  0412    141 141   K 4.1 3.7 3.5    107 105   CO2 19* 17* 22*    76 94   BUN 45* 47* 46*   CREATININE 8.3* 9.4* 9.6*   CALCIUM 10.1 9.4 9.2   PROT  --  8.1  --    ALBUMIN 4.2  3.6 3.3*   BILITOT  --  0.2  --    ALKPHOS  --  94  --    AST  --  15  --    ALT  --  17  --    ANIONGAP 18* 17* 14   EGFRNONAA 7.3* 6.3* 6.1*     Magnesium:    Recent Labs  Lab 06/16/17  0509 06/16/17  1621 06/17/17  0412   MG 2.3 2.0 1.9

## 2017-06-18 NOTE — ASSESSMENT & PLAN NOTE
Considered likely on admission, cell count and cultures ordered. Nephrology consulted for PD and antibiotics.  Fluid cell counts do not confirm peritonitis; cultures are pending. Continuing IP antibiotics for now.  Vancomycin level elevated; dosing monitored by Nephrology and doses adjusted as needed.

## 2017-06-18 NOTE — ASSESSMENT & PLAN NOTE
Continued home medications.  Lactulose added.  Stimulants increased 6/14/2017; patient had large BM.  Reports regular BMs.

## 2017-06-18 NOTE — PROGRESS NOTES
[facility] Hospital Medicine  Progress Note    Patient Name: Maksim Hidalgo  MRN: [id number]  Patient Class: IP- Inpatient   Admission Date: 6/13/2017  Length of Stay: 4 days  Attending Physician: Angelic Swanson MD  Primary Care Provider: Saurabh Zuleta MD    [facility] Medicine Team: [facility] HOSP MED G Angelic Swanson MD    Subjective:     Principal Problem:Peritonitis    HPI:  37 y.o. male presented from the Surgery clinic with past medical history of ESRD on PD, hypertension, LVH/diastolic dysfunction, Hep B.  He was in his usual state of good health until the acute onset of generalized abdominal pain beginning 3 days prior to admission with gradually worsening course. Pertinent associated symptoms include difficulties with draining dialysate for several days, and recent sinusitis treated with oral doxycycline started 3 days ago. Patient presented to Dr. Gillespie's clinic today with abdominal pain, dysuria, and inability to completely drain dialysate.    Hospital Course:  No notes on file    Interval History: Patient feels better; BP improved.  Data Review: Results recorded below were reviewed 6/17/2017.    Review of Systems   Constitutional: Negative for fever.   Respiratory: Negative for shortness of breath.    Gastrointestinal: Positive for abdominal pain.     Objective:     Vital Signs (Most Recent):  Temp: 99 °F (37.2 °C) (06/17/17 1600)  Pulse: 77 (06/17/17 1600)  Resp: 16 (06/17/17 1600)  BP: 104/66 (06/17/17 1600)  SpO2: 97 % (06/17/17 1600) Vital Signs (24h Range):  Temp:  [97.9 °F (36.6 °C)-99 °F (37.2 °C)] 99 °F (37.2 °C)  Pulse:  [76-86] 77  Resp:  [16] 16  SpO2:  [95 %-99 %] 97 %  BP: ()/(59-67) 104/66     Weight: 98 kg (216 lb)  Body mass index is 30.99 kg/m².    Intake/Output Summary (Last 24 hours) at 06/17/17 2052  Last data filed at 06/17/17 5499   Gross per 24 hour   Intake              480 ml   Output                0 ml   Net              480 ml      Physical  Exam   Constitutional: He appears well-developed.   HENT:   Head: Normocephalic.   Mouth/Throat: Mucous membranes are not pale and not cyanotic.   Eyes: Conjunctivae and lids are normal. Pupils are equal.   Neck: Neck supple.   Cardiovascular: Normal rate, regular rhythm, S1 normal and S2 normal.    Pulmonary/Chest: Effort normal and breath sounds normal.   Abdominal: Soft. Bowel sounds are normal. There is generalized tenderness.   Musculoskeletal: He exhibits no edema.   Neurological: He is alert. He is not disoriented.   Skin: Skin is warm and dry. No cyanosis. Nails show no clubbing.       Significant Labs:     CBC:     Recent Labs  Lab 06/16/17  0509 06/16/17  1621 06/17/17  0412   WBC 9.58 9.81 9.98   HGB 15.9 14.9 14.5   HCT 47.2 44.8 42.4    269 291     CMP:     Recent Labs  Lab 06/16/17  0509 06/16/17  1621 06/17/17  0412    141 141   K 4.1 3.7 3.5    107 105   CO2 19* 17* 22*    76 94   BUN 45* 47* 46*   CREATININE 8.3* 9.4* 9.6*   CALCIUM 10.1 9.4 9.2   PROT  --  8.1  --    ALBUMIN 4.2 3.6 3.3*   BILITOT  --  0.2  --    ALKPHOS  --  94  --    AST  --  15  --    ALT  --  17  --    ANIONGAP 18* 17* 14   EGFRNONAA 7.3* 6.3* 6.1*     Magnesium:    Recent Labs  Lab 06/16/17  0509 06/16/17  1621 06/17/17  0412   MG 2.3 2.0 1.9     Assessment/Plan:      Current Hospital Problem List:    Active Hospital Problems    Diagnosis  POA    *Peritonitis [K65.9]  Yes     Priority: 1 - High    Peritoneal dialysis catheter dysfunction [T85.611A]  Yes     Priority: 1 - High    Chronic constipation [K59.09]  Yes     Priority: 2     Dependence on peritoneal dialysis [Z99.2]  Not Applicable     Priority: 2      Chronic    ESRD (end stage renal disease) [N18.6]  Yes     Priority: 2     Diastolic dysfunction [I51.9]  Yes     Priority: 3     LVH (left ventricular hypertrophy) due to hypertensive disease [I11.9]  Yes     Priority: 3     Chronic hepatitis B virus infection [B18.1]  Yes     Priority:  4     Secondary hyperparathyroidism [N25.81]  Yes     Priority: 5     Anemia in ESRD (end-stage renal disease) [N18.6, D63.1]  Yes     Chronic    Abdominal pain [R10.9]  Yes      Resolved Hospital Problems    Diagnosis Date Resolved POA   No resolved problems to display.       Ordered Medications for management of current problems:    bisacodyl  10 mg Oral BID    calcitRIOL  0.5 mcg Oral Daily    carvedilol  25 mg Oral BID WM    [START ON 6/18/2017] furosemide  40 mg Oral Daily    lactulose  30 g Oral TID    lisinopril  40 mg Oral with lunch    polyethylene glycol  17 g Oral TID    senna-docusate 8.6-50 mg  2 tablet Oral BID    sevelamer carbonate  800 mg Oral TID WM       IV Fluids: IV push only, no IV fluids    Anticipated Disposition: Home or Self Care    Assessment and Plan by Problem:      Peritoneal dialysis catheter dysfunction    Recent positional dysfunction. Some persistent pain at PD site.        * Peritonitis    Considered likely on admission, cell count and cultures ordered. Nephrology consulted for PD and antibiotics.  Fluid cell counts do not confirm peritonitis; cultures are pending. Continuing IP antibiotics for now.  Vancomycin level elevated; dosing monitored by Nephrology and doses adjusted as needed.        Chronic constipation    Continued home medications.  Lactulose added.  Stimulants increased 6/14/2017; patient had large BM.  Reports regular BMs.        Dependence on peritoneal dialysis    Patient with hypotension 6/16/2017. Received  mL with resolution.        ESRD (end stage renal disease)    On PD.        Diastolic dysfunction              LVH (left ventricular hypertrophy) due to hypertensive disease    Continuing home medications.        Chronic hepatitis B virus infection              Secondary hyperparathyroidism              Anemia in ESRD (end-stage renal disease)              Abdominal pain    Likely due to peritonitis or constipation.          VTE Risk  Mitigation         Ordered     Low Risk of VTE  Once      06/13/17 1229          Angelic Swanson MD  Department of Hospital Medicine   [facility]

## 2017-06-18 NOTE — PROGRESS NOTES
CCPD completed. 5 ml total UF. Clear yellow effluent noted. PD cath capped. Patient tolerated treatment.

## 2017-06-18 NOTE — PROGRESS NOTES
CCPD started per orders. 24 ml clear effluent initial drain noted. Patient had some discomfort during initial drain.

## 2017-06-18 NOTE — ASSESSMENT & PLAN NOTE
Considered likely on admission, cell count and cultures ordered. Nephrology consulted for PD and antibiotics.  Fluid cell counts do not confirm peritonitis; cultures are pending. Continuing IP antibiotics for now.  Vancomycin level > 20; dosing monitored by Nephrology and doses adjusted as needed.

## 2017-06-18 NOTE — PROGRESS NOTES
[facility] Hospital Medicine  Progress Note    Patient Name: Maksim Hidalgo  MRN: [id number]  Patient Class: IP- Inpatient   Admission Date: 6/13/2017  Length of Stay: 5 days  Attending Physician: Angelic Swanson MD  Primary Care Provider: Saurabh Zuleta MD    [facility] Medicine Team: [facility] MED G Angelic Swanson MD    Subjective:     Principal Problem:Peritonitis    HPI:  37 y.o. male presented from the Surgery clinic with past medical history of ESRD on PD, hypertension, LVH/diastolic dysfunction, Hep B.  He was in his usual state of good health until the acute onset of generalized abdominal pain beginning 3 days prior to admission with gradually worsening course. Pertinent associated symptoms include difficulties with draining dialysate for several days, and recent sinusitis treated with oral doxycycline started 3 days ago. Patient presented to Dr. Gillespie's clinic today with abdominal pain, dysuria, and inability to completely drain dialysate.    Hospital Course:  No notes on file    Interval History: Patient feels better; BP and pain improved.  Data Review: Results recorded below were reviewed 6/18/2017.    Review of Systems   Constitutional: Negative for fever.   Respiratory: Negative for shortness of breath.      Objective:     Vital Signs (Most Recent):  Temp: 98.5 °F (36.9 °C) (06/18/17 1600)  Pulse: 97 (06/18/17 1600)  Resp: 16 (06/18/17 1600)  BP: 112/71 (06/18/17 1600)  SpO2: 97 % (06/18/17 1600) Vital Signs (24h Range):  Temp:  [97 °F (36.1 °C)-98.9 °F (37.2 °C)] 98.5 °F (36.9 °C)  Pulse:  [85-97] 97  Resp:  [16-17] 16  SpO2:  [95 %-100 %] 97 %  BP: ()/(63-71) 112/71     Weight: 98 kg (216 lb)  Body mass index is 30.99 kg/m².    Intake/Output Summary (Last 24 hours) at 06/18/17 1810  Last data filed at 06/18/17 1348   Gross per 24 hour   Intake              580 ml   Output                5 ml   Net              575 ml      Physical Exam   Constitutional: He appears  well-developed.   HENT:   Head: Normocephalic.   Mouth/Throat: Mucous membranes are not pale and not cyanotic.   Eyes: Conjunctivae and lids are normal. Pupils are equal.   Neck: Neck supple.   Cardiovascular: Normal rate, regular rhythm, S1 normal and S2 normal.    Pulmonary/Chest: Effort normal and breath sounds normal.   Abdominal: Soft. Bowel sounds are normal. There is generalized tenderness.   Musculoskeletal: He exhibits no edema.   Neurological: He is alert. He is not disoriented.   Skin: Skin is warm and dry. No cyanosis. Nails show no clubbing.       Significant Labs:     CBC:     Recent Labs  Lab 06/17/17  0412 06/18/17 0416   WBC 9.98 8.45   HGB 14.5 13.8*   HCT 42.4 40.0    281     CMP:     Recent Labs  Lab 06/17/17  0412 06/18/17 0416    139   K 3.5 3.7    106   CO2 22* 14*   GLU 94 86   BUN 46* 49*   CREATININE 9.6* 9.6*   CALCIUM 9.2 9.5   ALBUMIN 3.3* 3.6   ANIONGAP 14 19*   EGFRNONAA 6.1* 6.1*     Magnesium:    Recent Labs  Lab 06/17/17  0412 06/18/17 0416   MG 1.9 2.1     Assessment/Plan:      Current Hospital Problem List:    Active Hospital Problems    Diagnosis  POA    *Peritonitis [K65.9]  Yes     Priority: 1 - High    Peritoneal dialysis catheter dysfunction [T85.611A]  Yes     Priority: 1 - High    Chronic constipation [K59.09]  Yes     Priority: 2     Dependence on peritoneal dialysis [Z99.2]  Not Applicable     Priority: 2      Chronic    ESRD (end stage renal disease) [N18.6]  Yes     Priority: 2     Diastolic dysfunction [I51.9]  Yes     Priority: 3     LVH (left ventricular hypertrophy) due to hypertensive disease [I11.9]  Yes     Priority: 3     Chronic hepatitis B virus infection [B18.1]  Yes     Priority: 4     Secondary hyperparathyroidism [N25.81]  Yes     Priority: 5     Anemia in ESRD (end-stage renal disease) [N18.6, D63.1]  Yes     Chronic    Abdominal pain [R10.9]  Yes      Resolved Hospital Problems    Diagnosis Date Resolved POA   No resolved  problems to display.       Ordered Medications for management of current problems:    bisacodyl  10 mg Oral BID    calcitRIOL  0.5 mcg Oral Daily    carvedilol  25 mg Oral BID WM    furosemide  40 mg Oral Daily    lactulose  30 g Oral TID    lisinopril  40 mg Oral with lunch    polyethylene glycol  17 g Oral TID    senna-docusate 8.6-50 mg  2 tablet Oral BID    sevelamer carbonate  800 mg Oral TID WM       IV Fluids: IV push only, no IV fluids    Risk  Patient is currently on drug therapy requiring intensive monitoring for toxicity: Vancomycin    Anticipated Disposition: Home or Self Care    Assessment and Plan by Problem:    Peritoneal dialysis catheter dysfunction    Recent positional dysfunction. Some persistent pain at PD site.        * Peritonitis    Considered likely on admission, cell count and cultures ordered. Nephrology consulted for PD and antibiotics.  Fluid cell counts do not confirm peritonitis; cultures are pending. Continuing IP antibiotics for now.  Vancomycin level > 20; dosing monitored by Nephrology and doses adjusted as needed.        Chronic constipation    Continued home medications.  Lactulose added.  Stimulants increased 6/14/2017; patient had large BM.  Reports regular BMs.        Dependence on peritoneal dialysis    Patient with hypotension 6/16/2017. Received  mL with resolution.        ESRD (end stage renal disease)    On PD.        Diastolic dysfunction              LVH (left ventricular hypertrophy) due to hypertensive disease    Continuing home medications.        Chronic hepatitis B virus infection              Secondary hyperparathyroidism              Anemia in ESRD (end-stage renal disease)              Abdominal pain    Likely due to peritonitis or constipation.          VTE Risk Mitigation         Ordered     Low Risk of VTE  Once      06/13/17 1229          Angelic Swanson MD  Department of Hospital Medicine   [facility]

## 2017-06-19 LAB
ALBUMIN SERPL BCP-MCNC: 3.7 G/DL
AMYLASE SERPL-CCNC: 136 U/L
ANION GAP SERPL CALC-SCNC: 17 MMOL/L
BACTERIA SPEC AEROBE CULT: NO GROWTH
BASOPHILS # BLD AUTO: 0.02 K/UL
BASOPHILS NFR BLD: 0.2 %
BUN SERPL-MCNC: 50 MG/DL
CALCIUM SERPL-MCNC: 9.5 MG/DL
CHLORIDE SERPL-SCNC: 106 MMOL/L
CO2 SERPL-SCNC: 16 MMOL/L
CREAT SERPL-MCNC: 9.2 MG/DL
DIFFERENTIAL METHOD: ABNORMAL
EOSINOPHIL # BLD AUTO: 0.3 K/UL
EOSINOPHIL NFR BLD: 3.4 %
ERYTHROCYTE [DISTWIDTH] IN BLOOD BY AUTOMATED COUNT: 12.3 %
EST. GFR  (AFRICAN AMERICAN): 7.5 ML/MIN/1.73 M^2
EST. GFR  (NON AFRICAN AMERICAN): 6.5 ML/MIN/1.73 M^2
GLUCOSE SERPL-MCNC: 106 MG/DL
HCT VFR BLD AUTO: 40.1 %
HGB BLD-MCNC: 13.8 G/DL
LIPASE SERPL-CCNC: 43 U/L
LYMPHOCYTES # BLD AUTO: 2.3 K/UL
LYMPHOCYTES NFR BLD: 25.2 %
MAGNESIUM SERPL-MCNC: 1.9 MG/DL
MCH RBC QN AUTO: 29.6 PG
MCHC RBC AUTO-ENTMCNC: 34.4 %
MCV RBC AUTO: 86 FL
MONOCYTES # BLD AUTO: 0.9 K/UL
MONOCYTES NFR BLD: 9.8 %
NEUTROPHILS # BLD AUTO: 5.7 K/UL
NEUTROPHILS NFR BLD: 61.2 %
PHOSPHATE SERPL-MCNC: 5.3 MG/DL
PLATELET # BLD AUTO: 270 K/UL
PMV BLD AUTO: 10.3 FL
POTASSIUM SERPL-SCNC: 3.4 MMOL/L
RBC # BLD AUTO: 4.66 M/UL
SODIUM SERPL-SCNC: 139 MMOL/L
VANCOMYCIN SERPL-MCNC: 16.5 UG/ML
WBC # BLD AUTO: 9.24 K/UL

## 2017-06-19 PROCEDURE — 85025 COMPLETE CBC W/AUTO DIFF WBC: CPT | Mod: NTX

## 2017-06-19 PROCEDURE — 90945 DIALYSIS ONE EVALUATION: CPT | Mod: GC,NTX,, | Performed by: INTERNAL MEDICINE

## 2017-06-19 PROCEDURE — 25000003 PHARM REV CODE 250: Mod: NTX | Performed by: INTERNAL MEDICINE

## 2017-06-19 PROCEDURE — 63600175 PHARM REV CODE 636 W HCPCS: Mod: NTX | Performed by: NURSE PRACTITIONER

## 2017-06-19 PROCEDURE — 80069 RENAL FUNCTION PANEL: CPT | Mod: NTX

## 2017-06-19 PROCEDURE — 83735 ASSAY OF MAGNESIUM: CPT | Mod: NTX

## 2017-06-19 PROCEDURE — 63600175 PHARM REV CODE 636 W HCPCS: Mod: NTX | Performed by: INTERNAL MEDICINE

## 2017-06-19 PROCEDURE — 82150 ASSAY OF AMYLASE: CPT | Mod: NTX

## 2017-06-19 PROCEDURE — 36415 COLL VENOUS BLD VENIPUNCTURE: CPT | Mod: NTX

## 2017-06-19 PROCEDURE — 99232 SBSQ HOSP IP/OBS MODERATE 35: CPT | Mod: NTX,,, | Performed by: INTERNAL MEDICINE

## 2017-06-19 PROCEDURE — 11000001 HC ACUTE MED/SURG PRIVATE ROOM: Mod: NTX

## 2017-06-19 PROCEDURE — 83690 ASSAY OF LIPASE: CPT | Mod: NTX

## 2017-06-19 PROCEDURE — 80202 ASSAY OF VANCOMYCIN: CPT | Mod: NTX

## 2017-06-19 PROCEDURE — 90945 DIALYSIS ONE EVALUATION: CPT | Mod: NTX

## 2017-06-19 RX ORDER — LISINOPRIL 10 MG/1
10 TABLET ORAL
Status: DISCONTINUED | OUTPATIENT
Start: 2017-06-20 | End: 2017-06-23 | Stop reason: HOSPADM

## 2017-06-19 RX ORDER — BISACODYL 5 MG
10 TABLET, DELAYED RELEASE (ENTERIC COATED) ORAL DAILY
Status: DISCONTINUED | OUTPATIENT
Start: 2017-06-20 | End: 2017-06-23 | Stop reason: HOSPADM

## 2017-06-19 RX ADMIN — ONDANSETRON 4 MG: 2 INJECTION INTRAMUSCULAR; INTRAVENOUS at 11:06

## 2017-06-19 RX ADMIN — POLYETHYLENE GLYCOL 3350 17 G: 17 POWDER, FOR SOLUTION ORAL at 02:06

## 2017-06-19 RX ADMIN — FUROSEMIDE 40 MG: 40 TABLET ORAL at 08:06

## 2017-06-19 RX ADMIN — VANCOMYCIN HYDROCHLORIDE: 1 INJECTION, POWDER, LYOPHILIZED, FOR SOLUTION INTRAVENOUS at 09:06

## 2017-06-19 RX ADMIN — POLYETHYLENE GLYCOL 3350 17 G: 17 POWDER, FOR SOLUTION ORAL at 05:06

## 2017-06-19 RX ADMIN — ONDANSETRON 4 MG: 2 INJECTION INTRAMUSCULAR; INTRAVENOUS at 07:06

## 2017-06-19 RX ADMIN — ONDANSETRON 4 MG: 2 INJECTION INTRAMUSCULAR; INTRAVENOUS at 05:06

## 2017-06-19 RX ADMIN — BISACODYL 10 MG: 5 TABLET, COATED ORAL at 08:06

## 2017-06-19 RX ADMIN — CALCITRIOL 0.5 MCG: 0.25 CAPSULE, LIQUID FILLED ORAL at 08:06

## 2017-06-19 RX ADMIN — SEVELAMER CARBONATE 800 MG: 800 TABLET, FILM COATED ORAL at 05:06

## 2017-06-19 RX ADMIN — STANDARDIZED SENNA CONCENTRATE AND DOCUSATE SODIUM 2 TABLET: 8.6; 5 TABLET, FILM COATED ORAL at 08:06

## 2017-06-19 RX ADMIN — SEVELAMER CARBONATE 800 MG: 800 TABLET, FILM COATED ORAL at 11:06

## 2017-06-19 RX ADMIN — SEVELAMER CARBONATE 800 MG: 800 TABLET, FILM COATED ORAL at 08:06

## 2017-06-19 RX ADMIN — ONDANSETRON 8 MG: 8 TABLET, ORALLY DISINTEGRATING ORAL at 10:06

## 2017-06-19 NOTE — PLAN OF CARE
Problem: Patient Care Overview  Goal: Plan of Care Review  Outcome: Ongoing (interventions implemented as appropriate)  Pt c/o nausea and 5/10 L abdominal pain throughout shift.  Emesis x 2 during shift. PRN zofran given.  Pt refused PRN pain meds. BM x 1.  Large, watery BM per pt. Peritoneal dialysis maintained.  Drainage clear yellow.  No cloudiness, red color.    VS stable on room air. Pt up ad jesenia. A&O x 4.  Will continue to monitor pt.    Problem: Fall Risk (Adult)  Goal: Identify Related Risk Factors and Signs and Symptoms  Related risk factors and signs and symptoms are identified upon initiation of Human [employer] Clinical Practice Guideline (CPG)   Outcome: Ongoing (interventions implemented as appropriate)  Non slip socks when OOB.  Personal items and call bell within reach.  Freq checks maintained. Fall risk reviewed with pt.

## 2017-06-19 NOTE — PROGRESS NOTES
[facility]  Nephrology  Progress Note    Patient Name: Maksim Hidalgo  MRN: [id number]  Admission Date: 6/13/2017  Hospital Length of Stay: 6 days  Attending Provider: Angelic Swanson MD   Primary Care Physician: Saurabh Zuleta MD  Principal Problem:Peritonitis    Subjective:     HPI: Mr. Hidalgo is a 36 yo AAM with HTN and ESRD who was directly admitted from surgery clinic today for concern of peritonitis. Patient describes generalized abdominal pain which is exacerbated by urination. He also reports new onset drain pain. He denies any fevers or chills. He admits to constipation and only has BMs a few times per week. He feels he has gained a lot of weight over the last few days 2/2 inefficient dialysis. Otherwise he is without complaint.   Patient performs PD nightly at home; nephrologist is Dr. Garcia.   Home PD prescription:   3L first fill. Tidal fill 2300cc. Tidal drain 2600cc. No last fill.  Dwell time: 1 hour. Drain time: 30 minutes.   Total volume 12.24L. Total time: 8h 20m.     Interval History:   Received 750cc NS on Friday. BP subsequently improved. UF 5cc on Saturday and 262cc on Sunday. Last night had a few episodes of vomiting. Patient admits to feeling poorly this AM. Hasn't been eating 2/2 N/V. Also having watery BMs. No SOB; mild light-headedness. Upon rising to sit on side of bed, patient grimaced from pain and grabbed his abdomen. Area TTP. Fluid cultures from 6/13 with staph species.     Review of patient's allergies indicates:  No Known Allergies  Current Facility-Administered Medications   Medication Frequency    acetaminophen tablet 500 mg Q6H PRN    albuterol inhaler 2 puff Q4H PRN    bisacodyl EC tablet 10 mg BID    calcitRIOL capsule 0.5 mcg Daily    carvedilol tablet 25 mg BID WM    furosemide tablet 40 mg Daily    lisinopril tablet 40 mg with lunch    ondansetron disintegrating tablet 8 mg Q8H PRN    ondansetron injection 4 mg Q6H PRN    oxycodone  immediate release tablet 5 mg Q6H PRN    polyethylene glycol packet 17 g TID    senna-docusate 8.6-50 mg per tablet 2 tablet BID    sevelamer carbonate tablet 800 mg TID WM       Objective:     Vital Signs (Most Recent):  Temp: 97.9 °F (36.6 °C) (06/19/17 1213)  Pulse: 80 (06/19/17 1213)  Resp: 18 (06/19/17 1213)  BP: 114/71 (06/19/17 1213)  SpO2: 100 % (06/19/17 1213)  O2 Device (Oxygen Therapy): room air (06/19/17 1213) Vital Signs (24h Range):  Temp:  [97.9 °F (36.6 °C)-99.1 °F (37.3 °C)] 97.9 °F (36.6 °C)  Pulse:  [80-99] 80  Resp:  [16-20] 18  SpO2:  [93 %-100 %] 100 %  BP: ()/(55-77) 114/71     Weight: 98 kg (216 lb) (06/13/17 1122)  Body mass index is 30.99 kg/m².  Body surface area is 2.2 meters squared.    I/O last 3 completed shifts:  In: 820 [P.O.:820]  Out: 667 [Emesis/NG output:400; Other:267]    Physical Exam   Constitutional: He appears well-developed and well-nourished. No distress.   HENT:   Head: Normocephalic and atraumatic.   Eyes: Conjunctivae and EOM are normal.   Cardiovascular: Normal rate and regular rhythm.    Pulmonary/Chest: Effort normal and breath sounds normal.   Abdominal: Soft. There is tenderness. There is guarding.   Musculoskeletal: He exhibits no edema or tenderness.   Skin: Skin is warm and dry.       Significant Labs:  CBC:   Recent Labs  Lab 06/19/17  0509   WBC 9.24   RBC 4.66   HGB 13.8*   HCT 40.1      MCV 86   MCH 29.6   MCHC 34.4     CMP:   Recent Labs  Lab 06/16/17  1621  06/19/17  0509   GLU 76  < > 106   CALCIUM 9.4  < > 9.5   ALBUMIN 3.6  < > 3.7   PROT 8.1  --   --      < > 139   K 3.7  < > 3.4*   CO2 17*  < > 16*     < > 106   BUN 47*  < > 50*   CREATININE 9.4*  < > 9.2*   ALKPHOS 94  --   --    ALT 17  --   --    AST 15  --   --    BILITOT 0.2  --   --    < > = values in this interval not displayed.  All labs within the past 24 hours have been reviewed.     Significant Imaging:  Labs: Reviewed  X-Ray: Reviewed    Assessment/Plan:  "    ESRD (end stage renal disease)    - continue home CCPD prescription; will continue using 1.5% solution in setting of lower BPs and euvolemia  - patient hasn't received doses of coreg or lisinopril in 3 days; consider discontinuing coreg and decreasing dose of lisinopril to 10mg daily (ACEi prevents peritoneal sclerosis). Continue holding amlodipine.          Abdominal pain    - fluid studies on 6/13 with WBC < 100 however culture with "staph species". Speciation/sensitivities pending  - repeat fluid studies on 6/15 without leukocytosis; negative cultures  - IP vanc and ceftazidine started 6/13; vanc held since 6/17 due to supratherapeutic levels  - will redose IP vancomycin tonight  - patient continues to have severe abdominal pain; unable to ambulate. Associated with nausea/vomiting and inability to tolerate food. Recommend surgical or GI consult for further evaluation. Consider ordering amylase and lipase.           Anemia in ESRD (end-stage renal disease)    - hgb at goal; no need for SKY        Secondary hyperparathyroidism    - corrected Ca normal  - hyperphosphatemia improving with sevelamer            Nely Cunningham, PGY-4  Nephrology Fellow  [facility]  Pager: [phone]      "

## 2017-06-19 NOTE — PROGRESS NOTES
CCPD inititiated as ordered with Tidal program. Patient did not have any initital drain. Site care done using aseptic technique. No evidence of edema, erythema or drainage noted. Report given to primary RN.

## 2017-06-19 NOTE — SUBJECTIVE & OBJECTIVE
Interval History: Patient with vomiting overnight, worsening abdominal pain. Patient attributes nausea and vomiting to Lactulose.  Data Review: Results recorded below were reviewed 6/19/2017.    Review of Systems   Constitutional: Negative for fever.   Respiratory: Negative for shortness of breath.    Gastrointestinal: Positive for abdominal pain, diarrhea, nausea and vomiting.     Objective:     Vital Signs (Most Recent):  Temp: 97.9 °F (36.6 °C) (06/19/17 1213)  Pulse: 80 (06/19/17 1213)  Resp: 18 (06/19/17 1213)  BP: 114/71 (06/19/17 1213)  SpO2: 100 % (06/19/17 1213) Vital Signs (24h Range):  Temp:  [97.9 °F (36.6 °C)-99.1 °F (37.3 °C)] 97.9 °F (36.6 °C)  Pulse:  [80-99] 80  Resp:  [16-20] 18  SpO2:  [93 %-100 %] 100 %  BP: ()/(55-77) 114/71     Weight: 98 kg (216 lb)  Body mass index is 30.99 kg/m².    Intake/Output Summary (Last 24 hours) at 06/19/17 1707  Last data filed at 06/19/17 0642   Gross per 24 hour   Intake              240 ml   Output              662 ml   Net             -422 ml      Physical Exam   Constitutional: He appears well-developed.   HENT:   Head: Normocephalic.   Mouth/Throat: Mucous membranes are not pale and not cyanotic.   Eyes: Conjunctivae and lids are normal. Pupils are equal.   Neck: Neck supple.   Cardiovascular: Normal rate, regular rhythm, S1 normal and S2 normal.    Pulmonary/Chest: Effort normal and breath sounds normal.   Abdominal: Soft. Bowel sounds are normal. There is generalized tenderness.   Musculoskeletal: He exhibits no edema.   Neurological: He is alert. He is not disoriented.   Skin: Skin is warm and dry. No cyanosis. Nails show no clubbing.       Significant Labs:     CBC:     Recent Labs  Lab 06/18/17  0416 06/19/17  0509   WBC 8.45 9.24   HGB 13.8* 13.8*   HCT 40.0 40.1    270     CMP:     Recent Labs  Lab 06/18/17  0416 06/19/17  0509    139   K 3.7 3.4*    106   CO2 14* 16*   GLU 86 106   BUN 49* 50*   CREATININE 9.6* 9.2*   CALCIUM  9.5 9.5   ALBUMIN 3.6 3.7   ANIONGAP 19* 17*   EGFRNONAA 6.1* 6.5*     Magnesium:    Recent Labs  Lab 06/18/17  0416 06/19/17  0509   MG 2.1 1.9

## 2017-06-19 NOTE — SUBJECTIVE & OBJECTIVE
Interval History:   Received 750cc NS on Friday. BP subsequently improved. UF 5cc on Saturday and 262cc on Sunday. Last night had a few episodes of vomiting. Patient admits to feeling poorly this AM. Hasn't been eating 2/2 N/V. Also having watery BMs. No SOB; mild light-headedness. Upon rising to sit on side of bed, patient grimaced from pain and grabbed his abdomen. Area TTP. Fluid cultures from 6/13 with staph species.     Review of patient's allergies indicates:  No Known Allergies  Current Facility-Administered Medications   Medication Frequency    acetaminophen tablet 500 mg Q6H PRN    albuterol inhaler 2 puff Q4H PRN    bisacodyl EC tablet 10 mg BID    calcitRIOL capsule 0.5 mcg Daily    carvedilol tablet 25 mg BID WM    furosemide tablet 40 mg Daily    lisinopril tablet 40 mg with lunch    ondansetron disintegrating tablet 8 mg Q8H PRN    ondansetron injection 4 mg Q6H PRN    oxycodone immediate release tablet 5 mg Q6H PRN    polyethylene glycol packet 17 g TID    senna-docusate 8.6-50 mg per tablet 2 tablet BID    sevelamer carbonate tablet 800 mg TID WM       Objective:     Vital Signs (Most Recent):  Temp: 97.9 °F (36.6 °C) (06/19/17 1213)  Pulse: 80 (06/19/17 1213)  Resp: 18 (06/19/17 1213)  BP: 114/71 (06/19/17 1213)  SpO2: 100 % (06/19/17 1213)  O2 Device (Oxygen Therapy): room air (06/19/17 1213) Vital Signs (24h Range):  Temp:  [97.9 °F (36.6 °C)-99.1 °F (37.3 °C)] 97.9 °F (36.6 °C)  Pulse:  [80-99] 80  Resp:  [16-20] 18  SpO2:  [93 %-100 %] 100 %  BP: ()/(55-77) 114/71     Weight: 98 kg (216 lb) (06/13/17 1122)  Body mass index is 30.99 kg/m².  Body surface area is 2.2 meters squared.    I/O last 3 completed shifts:  In: 820 [P.O.:820]  Out: 667 [Emesis/NG output:400; Other:267]    Physical Exam   Constitutional: He appears well-developed and well-nourished. No distress.   HENT:   Head: Normocephalic and atraumatic.   Eyes: Conjunctivae and EOM are normal.   Cardiovascular: Normal  rate and regular rhythm.    Pulmonary/Chest: Effort normal and breath sounds normal.   Abdominal: Soft. There is tenderness. There is guarding.   Musculoskeletal: He exhibits no edema or tenderness.   Skin: Skin is warm and dry.       Significant Labs:  CBC:   Recent Labs  Lab 06/19/17  0509   WBC 9.24   RBC 4.66   HGB 13.8*   HCT 40.1      MCV 86   MCH 29.6   MCHC 34.4     CMP:   Recent Labs  Lab 06/16/17  1621  06/19/17  0509   GLU 76  < > 106   CALCIUM 9.4  < > 9.5   ALBUMIN 3.6  < > 3.7   PROT 8.1  --   --      < > 139   K 3.7  < > 3.4*   CO2 17*  < > 16*     < > 106   BUN 47*  < > 50*   CREATININE 9.4*  < > 9.2*   ALKPHOS 94  --   --    ALT 17  --   --    AST 15  --   --    BILITOT 0.2  --   --    < > = values in this interval not displayed.  All labs within the past 24 hours have been reviewed.     Significant Imaging:  Labs: Reviewed  X-Ray: Reviewed

## 2017-06-19 NOTE — ASSESSMENT & PLAN NOTE
"- fluid studies on 6/13 with WBC < 100 however culture with "staph species". Speciation/sensitivities pending  - repeat fluid studies on 6/15 without leukocytosis; negative cultures  - IP vanc and ceftazidine started 6/13; vanc held since 6/17 due to supratherapeutic levels  - will redose IP vancomycin tonight  - patient continues to have severe abdominal pain; unable to ambulate. Associated with nausea/vomiting and inability to tolerate food. Recommend surgical or GI consult for further evaluation. Consider ordering amylase and lipase.     "

## 2017-06-19 NOTE — ASSESSMENT & PLAN NOTE
Recent positional dysfunction. Some persistent pain at PD site.  Appears to be draining adequately now.

## 2017-06-19 NOTE — ASSESSMENT & PLAN NOTE
Patient with hypotension 6/16/2017. Received  mL with resolution.  BP remains soft. Home amlodipine and carvedilol have been discontinued and lisinopril dose decreased.

## 2017-06-19 NOTE — ASSESSMENT & PLAN NOTE
- continue home CCPD prescription; will continue using 1.5% solution in setting of lower BPs and euvolemia  - patient hasn't received doses of coreg or lisinopril in 3 days; consider discontinuing coreg and decreasing dose of lisinopril to 10mg daily (ACEi prevents peritoneal sclerosis). Continue holding amlodipine.

## 2017-06-19 NOTE — ASSESSMENT & PLAN NOTE
Continued home medications as tolerated.  Amlodipine and Coreg have been discontinued due to hypotension.

## 2017-06-19 NOTE — ASSESSMENT & PLAN NOTE
Considered likely on admission, cell count and cultures ordered. Nephrology consulted for PD and antibiotics.  Fluid cell counts do not confirm peritonitis; cultures are growing Staph sp. Continuing IP antibiotics for now per Nephrology.  Vancomycin level > 20; dosing monitored by Nephrology and doses adjusted as needed.  6/19/2017 -- vancomycin level 16.5.

## 2017-06-19 NOTE — ASSESSMENT & PLAN NOTE
Continued home medications.  Lactulose added.  Stimulants increased 6/14/2017; patient had large BM.  Reports regular BMs.  Now reports liquid BMs.

## 2017-06-19 NOTE — ASSESSMENT & PLAN NOTE
Initially presumed due to peritonitis or constipation.  Pain has worsened despite adequate treatment for peritonitis for several days.  Consulted Gen Surgery.

## 2017-06-19 NOTE — NURSING
Contacted IM-G re: pt's abdominal pain to LUQ and LLQ, 5/10. Pt c/o nausea.  No emesis.  Next dose PRN zofran due 05:40.    Pt refused provider's offer promethazine rectal or morphine IV.  Pt chose to wait for next dose of PRN zofran.

## 2017-06-19 NOTE — PROGRESS NOTES
9-hr CCPD treatment completed. 262 total UF with clear effluent. PD catheter capped using aseptic technique. Treatment tolerated.

## 2017-06-19 NOTE — NURSING
Contacted IM-G re: pt's episode of emesis.  Vomited x 1.  Clear, undigested food pieces.  First episode since admission.  Pt denied abdominal pain.  Pt had 3 BM today.  Alerted provider.  Will give sublingual ondansetron per pt request.

## 2017-06-19 NOTE — PROGRESS NOTES
[facility] Hospital Medicine  Progress Note    Patient Name: Maksim Hidalgo  MRN: [id number]  Patient Class: IP- Inpatient   Admission Date: 6/13/2017  Length of Stay: 6 days  Attending Physician: Angelic Swanson MD  Primary Care Provider: Saurabh Zuleta MD    [facility] Hospital Medicine Team: [facility] HOSP MED  Angelic Swanson MD    Subjective:     Principal Problem:Peritonitis    HPI:  37 y.o. male presented from the Surgery clinic with past medical history of ESRD on PD, hypertension, LVH/diastolic dysfunction, Hep B.  He was in his usual state of good health until the acute onset of generalized abdominal pain beginning 3 days prior to admission with gradually worsening course. Pertinent associated symptoms include difficulties with draining dialysate for several days, and recent sinusitis treated with oral doxycycline started 3 days ago. Patient presented to Dr. Gillespie's clinic today with abdominal pain, dysuria, and inability to completely drain dialysate.    Hospital Course:  Patient presented with probable PD-associated peritonitis and constipation. Cell counts did not confirm peritonitis but dialysate culture is growing Staph sp. Patient has been adequately treated with IP vancomycin but abdominal pain has not resolved or improved. Patient having loose stools with laxatives. Patient's BP has been too low for him to tolerate his home antihypertensive regimen.    Interval History: Patient with vomiting overnight, worsening abdominal pain. Patient attributes nausea and vomiting to Lactulose.  Data Review: Results recorded below were reviewed 6/19/2017.    Review of Systems   Constitutional: Negative for fever.   Respiratory: Negative for shortness of breath.    Gastrointestinal: Positive for abdominal pain, diarrhea, nausea and vomiting.     Objective:     Vital Signs (Most Recent):  Temp: 97.9 °F (36.6 °C) (06/19/17 1213)  Pulse: 80 (06/19/17 1213)  Resp: 18 (06/19/17 1213)  BP: 114/71  (06/19/17 1213)  SpO2: 100 % (06/19/17 1213) Vital Signs (24h Range):  Temp:  [97.9 °F (36.6 °C)-99.1 °F (37.3 °C)] 97.9 °F (36.6 °C)  Pulse:  [80-99] 80  Resp:  [16-20] 18  SpO2:  [93 %-100 %] 100 %  BP: ()/(55-77) 114/71     Weight: 98 kg (216 lb)  Body mass index is 30.99 kg/m².    Intake/Output Summary (Last 24 hours) at 06/19/17 1707  Last data filed at 06/19/17 0642   Gross per 24 hour   Intake              240 ml   Output              662 ml   Net             -422 ml      Physical Exam   Constitutional: He appears well-developed.   HENT:   Head: Normocephalic.   Mouth/Throat: Mucous membranes are not pale and not cyanotic.   Eyes: Conjunctivae and lids are normal. Pupils are equal.   Neck: Neck supple.   Cardiovascular: Normal rate, regular rhythm, S1 normal and S2 normal.    Pulmonary/Chest: Effort normal and breath sounds normal.   Abdominal: Soft. Bowel sounds are normal. There is generalized tenderness.   Musculoskeletal: He exhibits no edema.   Neurological: He is alert. He is not disoriented.   Skin: Skin is warm and dry. No cyanosis. Nails show no clubbing.       Significant Labs:     CBC:     Recent Labs  Lab 06/18/17  0416 06/19/17  0509   WBC 8.45 9.24   HGB 13.8* 13.8*   HCT 40.0 40.1    270     CMP:     Recent Labs  Lab 06/18/17  0416 06/19/17  0509    139   K 3.7 3.4*    106   CO2 14* 16*   GLU 86 106   BUN 49* 50*   CREATININE 9.6* 9.2*   CALCIUM 9.5 9.5   ALBUMIN 3.6 3.7   ANIONGAP 19* 17*   EGFRNONAA 6.1* 6.5*     Magnesium:    Recent Labs  Lab 06/18/17  0416 06/19/17  0509   MG 2.1 1.9     Assessment/Plan:      Current Hospital Problem List:    Active Hospital Problems    Diagnosis  POA    *Peritonitis [K65.9]  Yes     Priority: 1 - High    Peritoneal dialysis catheter dysfunction [T85.611A]  Yes     Priority: 1 - High    Chronic constipation [K59.09]  Yes     Priority: 2     Dependence on peritoneal dialysis [Z99.2]  Not Applicable     Priority: 2      Chronic     Abdominal pain [R10.9]  Yes     Priority: 2     ESRD (end stage renal disease) [N18.6]  Yes     Priority: 2     Diastolic dysfunction [I51.9]  Yes     Priority: 3     LVH (left ventricular hypertrophy) due to hypertensive disease [I11.9]  Yes     Priority: 3     Chronic hepatitis B virus infection [B18.1]  Yes     Priority: 4     Secondary hyperparathyroidism [N25.81]  Yes     Priority: 5     Anemia in ESRD (end-stage renal disease) [N18.6, D63.1]  Yes     Chronic      Resolved Hospital Problems    Diagnosis Date Resolved POA   No resolved problems to display.       Ordered Medications for management of current problems:    [START ON 6/20/2017] bisacodyl  10 mg Oral Daily    calcitRIOL  0.5 mcg Oral Daily    furosemide  40 mg Oral Daily    [START ON 6/20/2017] lisinopril  10 mg Oral with lunch    polyethylene glycol  17 g Oral TID    senna-docusate 8.6-50 mg  2 tablet Oral BID    sevelamer carbonate  800 mg Oral TID WM    Dianeal PD with additives   Intraperitoneal Once       IV Fluids: IV push only, no IV fluids    Risk  Patient is currently on drug therapy requiring intensive monitoring for toxicity: Vancomycin    Anticipated Disposition: Home or Self Care    Assessment and Plan by Problem:    Peritoneal dialysis catheter dysfunction    Recent positional dysfunction. Some persistent pain at PD site.  Appears to be draining adequately now.        * Peritonitis    Considered likely on admission, cell count and cultures ordered. Nephrology consulted for PD and antibiotics.  Fluid cell counts do not confirm peritonitis; cultures are growing Staph sp. Continuing IP antibiotics for now per Nephrology.  Vancomycin level > 20; dosing monitored by Nephrology and doses adjusted as needed.  6/19/2017 -- vancomycin level 16.5.        Chronic constipation    Continued home medications.  Lactulose added.  Stimulants increased 6/14/2017; patient had large BM.  Reports regular BMs.  Now reports liquid BMs.         Dependence on peritoneal dialysis    Patient with hypotension 6/16/2017. Received  mL with resolution.  BP remains soft. Home amlodipine and carvedilol have been discontinued and lisinopril dose decreased.        Abdominal pain    Initially presumed due to peritonitis or constipation.  Pain has worsened despite adequate treatment for peritonitis for several days.  Consulted Gen Surgery. Abdominal CT ordered.        ESRD (end stage renal disease)    On PD. Nephrology following.        Diastolic dysfunction              LVH (left ventricular hypertrophy) due to hypertensive disease    Continued home medications as tolerated.  Amlodipine and Coreg have been discontinued due to hypotension.        Chronic hepatitis B virus infection              Secondary hyperparathyroidism              Anemia in ESRD (end-stage renal disease)                VTE Risk Mitigation         Ordered     Low Risk of VTE  Once      06/13/17 1229          Angelic Swanson MD  Department of Hospital Medicine   [facility]

## 2017-06-20 PROBLEM — I95.9 HYPOTENSION: Status: ACTIVE | Noted: 2017-06-20

## 2017-06-20 LAB
ALBUMIN SERPL BCP-MCNC: 3.4 G/DL
ANION GAP SERPL CALC-SCNC: 16 MMOL/L
BACTERIA FLD CULT: NORMAL
BASOPHILS # BLD AUTO: 0.03 K/UL
BASOPHILS NFR BLD: 0.4 %
BUN SERPL-MCNC: 48 MG/DL
CALCIUM SERPL-MCNC: 9.1 MG/DL
CHLORIDE SERPL-SCNC: 107 MMOL/L
CO2 SERPL-SCNC: 17 MMOL/L
CREAT SERPL-MCNC: 9.2 MG/DL
DIFFERENTIAL METHOD: ABNORMAL
EOSINOPHIL # BLD AUTO: 0.3 K/UL
EOSINOPHIL NFR BLD: 3.8 %
ERYTHROCYTE [DISTWIDTH] IN BLOOD BY AUTOMATED COUNT: 12.2 %
EST. GFR  (AFRICAN AMERICAN): 7.5 ML/MIN/1.73 M^2
EST. GFR  (NON AFRICAN AMERICAN): 6.5 ML/MIN/1.73 M^2
GLUCOSE SERPL-MCNC: 101 MG/DL
HCT VFR BLD AUTO: 38.3 %
HGB BLD-MCNC: 13.3 G/DL
LYMPHOCYTES # BLD AUTO: 2.4 K/UL
LYMPHOCYTES NFR BLD: 29.7 %
MAGNESIUM SERPL-MCNC: 1.7 MG/DL
MCH RBC QN AUTO: 29.7 PG
MCHC RBC AUTO-ENTMCNC: 34.7 %
MCV RBC AUTO: 86 FL
MONOCYTES # BLD AUTO: 1 K/UL
MONOCYTES NFR BLD: 11.9 %
NEUTROPHILS # BLD AUTO: 4.4 K/UL
NEUTROPHILS NFR BLD: 54.1 %
PHOSPHATE SERPL-MCNC: 5.3 MG/DL
PLATELET # BLD AUTO: 277 K/UL
PMV BLD AUTO: 10 FL
POTASSIUM SERPL-SCNC: 3.2 MMOL/L
RBC # BLD AUTO: 4.48 M/UL
SODIUM SERPL-SCNC: 140 MMOL/L
WBC # BLD AUTO: 8.18 K/UL

## 2017-06-20 PROCEDURE — 90945 DIALYSIS ONE EVALUATION: CPT | Mod: NTX

## 2017-06-20 PROCEDURE — 25000003 PHARM REV CODE 250: Mod: NTX | Performed by: INTERNAL MEDICINE

## 2017-06-20 PROCEDURE — 80069 RENAL FUNCTION PANEL: CPT | Mod: NTX

## 2017-06-20 PROCEDURE — 85025 COMPLETE CBC W/AUTO DIFF WBC: CPT | Mod: NTX

## 2017-06-20 PROCEDURE — 11000001 HC ACUTE MED/SURG PRIVATE ROOM: Mod: NTX

## 2017-06-20 PROCEDURE — 99232 SBSQ HOSP IP/OBS MODERATE 35: CPT | Mod: GC,NTX,, | Performed by: INTERNAL MEDICINE

## 2017-06-20 PROCEDURE — 25500020 PHARM REV CODE 255: Mod: NTX | Performed by: STUDENT IN AN ORGANIZED HEALTH CARE EDUCATION/TRAINING PROGRAM

## 2017-06-20 PROCEDURE — 63600175 PHARM REV CODE 636 W HCPCS: Mod: NTX | Performed by: NURSE PRACTITIONER

## 2017-06-20 PROCEDURE — 36415 COLL VENOUS BLD VENIPUNCTURE: CPT | Mod: NTX

## 2017-06-20 PROCEDURE — 25500020 PHARM REV CODE 255: Mod: NTX | Performed by: INTERNAL MEDICINE

## 2017-06-20 PROCEDURE — 99232 SBSQ HOSP IP/OBS MODERATE 35: CPT | Mod: NTX,,, | Performed by: INTERNAL MEDICINE

## 2017-06-20 PROCEDURE — 83735 ASSAY OF MAGNESIUM: CPT | Mod: NTX

## 2017-06-20 RX ADMIN — IOHEXOL 15 ML: 350 INJECTION, SOLUTION INTRAVENOUS at 07:06

## 2017-06-20 RX ADMIN — POLYETHYLENE GLYCOL 3350 17 G: 17 POWDER, FOR SOLUTION ORAL at 04:06

## 2017-06-20 RX ADMIN — FUROSEMIDE 40 MG: 40 TABLET ORAL at 12:06

## 2017-06-20 RX ADMIN — STANDARDIZED SENNA CONCENTRATE AND DOCUSATE SODIUM 2 TABLET: 8.6; 5 TABLET, FILM COATED ORAL at 12:06

## 2017-06-20 RX ADMIN — BISACODYL 10 MG: 5 TABLET, COATED ORAL at 06:06

## 2017-06-20 RX ADMIN — SEVELAMER CARBONATE 800 MG: 800 TABLET, FILM COATED ORAL at 12:06

## 2017-06-20 RX ADMIN — SEVELAMER CARBONATE 800 MG: 800 TABLET, FILM COATED ORAL at 04:06

## 2017-06-20 RX ADMIN — CALCITRIOL 0.5 MCG: 0.25 CAPSULE, LIQUID FILLED ORAL at 12:06

## 2017-06-20 RX ADMIN — STANDARDIZED SENNA CONCENTRATE AND DOCUSATE SODIUM 2 TABLET: 8.6; 5 TABLET, FILM COATED ORAL at 08:06

## 2017-06-20 RX ADMIN — ONDANSETRON 4 MG: 2 INJECTION INTRAMUSCULAR; INTRAVENOUS at 03:06

## 2017-06-20 RX ADMIN — IOHEXOL 15 ML: 350 INJECTION, SOLUTION INTRAVENOUS at 08:06

## 2017-06-20 RX ADMIN — POLYETHYLENE GLYCOL 3350 17 G: 17 POWDER, FOR SOLUTION ORAL at 08:06

## 2017-06-20 RX ADMIN — POLYETHYLENE GLYCOL 3350 17 G: 17 POWDER, FOR SOLUTION ORAL at 06:06

## 2017-06-20 RX ADMIN — ONDANSETRON 8 MG: 8 TABLET, ORALLY DISINTEGRATING ORAL at 07:06

## 2017-06-20 RX ADMIN — LISINOPRIL 10 MG: 10 TABLET ORAL at 12:06

## 2017-06-20 NOTE — HOSPITAL COURSE
Patient presented with probable PD-associated peritonitis and constipation. Cell counts did not confirm peritonitis but dialysate culture is growing Staph sp. Patient has been adequately treated with IP vancomycin but abdominal pain has not resolved or improved. Patient having loose stools with laxatives. Patient's BP has been too low for him to tolerate his home antihypertensive regimen.

## 2017-06-20 NOTE — ASSESSMENT & PLAN NOTE
- continue home CCPD prescription. Used 1.5% solution the last few days given hypotension.   - hypotension resolved. Continue lisinopril 10mg (to prevent peritoneal sclerosis) and lasix daily. Will increase solution back to 2.5% tonight.

## 2017-06-20 NOTE — SUBJECTIVE & OBJECTIVE
Interval History:   Didn't tolerate PO contrast last night 2/2 nausea/vomiting. Underwent CT today. Patient seen this afternoon; reports feeling better than yesterday. Continues to grimace upon sitting up. Pain in same location. No appetite; not eating or drinking. Last TTE was two days prior to admission for part of restrictive cardiomyopathy evaluation. Received IP vanc with last fill this AM.     Review of patient's allergies indicates:  No Known Allergies  Current Facility-Administered Medications   Medication Frequency    acetaminophen tablet 500 mg Q6H PRN    albuterol inhaler 2 puff Q4H PRN    bisacodyl EC tablet 10 mg Daily    calcitRIOL capsule 0.5 mcg Daily    furosemide tablet 40 mg Daily    lisinopril tablet 10 mg with lunch    omnipaque oral solution 15 mL PRN    ondansetron disintegrating tablet 8 mg Q8H PRN    ondansetron injection 4 mg Q6H PRN    oxycodone immediate release tablet 5 mg Q6H PRN    polyethylene glycol packet 17 g TID    senna-docusate 8.6-50 mg per tablet 2 tablet BID    sevelamer carbonate tablet 800 mg TID WM       Objective:     Vital Signs (Most Recent):  Temp: 98.8 °F (37.1 °C) (06/20/17 1616)  Pulse: 95 (06/20/17 1616)  Resp: 18 (06/20/17 1616)  BP: 108/61 (06/20/17 1616)  SpO2: (!) 94 % (06/20/17 1616)  O2 Device (Oxygen Therapy): room air (06/20/17 0739) Vital Signs (24h Range):  Temp:  [98.2 °F (36.8 °C)-98.8 °F (37.1 °C)] 98.8 °F (37.1 °C)  Pulse:  [] 95  Resp:  [16-20] 18  SpO2:  [94 %-95 %] 94 %  BP: (108-126)/(61-79) 108/61     Weight: 98 kg (216 lb) (06/13/17 1122)  Body mass index is 30.99 kg/m².  Body surface area is 2.2 meters squared.    I/O last 3 completed shifts:  In: 360 [P.O.:360]  Out: 495 [Emesis/NG output:400; Other:95]    Physical Exam   Constitutional: He appears well-developed and well-nourished. No distress.   HENT:   Head: Normocephalic and atraumatic.   Eyes: Conjunctivae and EOM are normal.   Cardiovascular: Normal rate and regular  rhythm.    Pulmonary/Chest: Effort normal and breath sounds normal.   Musculoskeletal: He exhibits no edema or tenderness.       Significant Labs:  CBC:   Recent Labs  Lab 06/20/17  0420   WBC 8.18   RBC 4.48*   HGB 13.3*   HCT 38.3*      MCV 86   MCH 29.7   MCHC 34.7     CMP:   Recent Labs  Lab 06/16/17  1621  06/20/17  0420   GLU 76  < > 101   CALCIUM 9.4  < > 9.1   ALBUMIN 3.6  < > 3.4*   PROT 8.1  --   --      < > 140   K 3.7  < > 3.2*   CO2 17*  < > 17*     < > 107   BUN 47*  < > 48*   CREATININE 9.4*  < > 9.2*   ALKPHOS 94  --   --    ALT 17  --   --    AST 15  --   --    BILITOT 0.2  --   --    < > = values in this interval not displayed.  All labs within the past 24 hours have been reviewed.     Significant Imaging:  Labs: Reviewed  Echo: Reviewed

## 2017-06-20 NOTE — PROGRESS NOTES
"Patient seen and examined again at request of primary for continued abdominal pain.    Catheter-drawn peritoneal fluid cell counts have not been consistent w bacterial peritonitis but he has one culture growing staph.  May be contaminant.    Continues on vancomycin     This morning on rounds patient reports pain is a little better than yesterday.  He threw up clear spit once yesterday morning but nothing since; no current nausea.  Has been having multiple loose "watery" BMs over the last three days but less in frequency yesterday than previous days.      Temp:  [97.9 °F (36.6 °C)-98.8 °F (37.1 °C)] 98.2 °F (36.8 °C)  Pulse:  [] 97  Resp:  [16-20] 18  SpO2:  [94 %-100 %] 94 %  BP: (103-118)/(64-78) 118/77    I/O last 3 completed shifts:  In: 240 [P.O.:240]  Out: 495 [Emesis/NG output:400; Other:95]    NAD, sleepy and answering questions slowly but appropriately   Resp nonlabored   RRR  Abdomen soft and only tender around catheter site.  No rebound or guarding     Lab Results   Component Value Date    WBC 8.18 06/20/2017    HGB 13.3 (L) 06/20/2017    HCT 38.3 (L) 06/20/2017    MCV 86 06/20/2017     06/20/2017       A/p: 36 yo male w ESRD on PD with possible catheter infection     -Given the multiple loose watery stools would send stool studies, including C diff since he has been on antibiotics recently.   -Will Fu CT scan results with you. Still would like to salvage catheter if possible     "

## 2017-06-20 NOTE — PLAN OF CARE
Problem: Fall Risk (Adult)  Goal: Absence of Falls  Patient will demonstrate the desired outcomes by discharge/transition of care.   Outcome: Ongoing (interventions implemented as appropriate)  Fall reduction program maintained, no signs or reports of falls this shift.

## 2017-06-20 NOTE — PROGRESS NOTES
[facility] Hospital Medicine  Progress Note    Patient Name: Maksim Hidalgo  MRN: [id number]  Patient Class: IP- Inpatient   Admission Date: 6/13/2017  Length of Stay: 7 days  Attending Physician: Saurabh Maldonado MD  Primary Care Provider: Saurabh Zuleta MD    [place] [facility] Medicine Team: [facility] HOSP MED G Saurabh Maldonado MD    Subjective:     Principal Problem:Peritonitis of undetermined cause    HPI:  37 y.o. male presented from the Surgery clinic with past medical history of ESRD on PD, hypertension, LVH/diastolic dysfunction, Hep B.  He was in his usual state of good health until the acute onset of generalized abdominal pain beginning 3 days prior to admission with gradually worsening course. Pertinent associated symptoms include difficulties with draining dialysate for several days, and recent sinusitis treated with oral doxycycline started 3 days ago. Patient presented to Dr. Gillespie's clinic today with abdominal pain, dysuria, and inability to completely drain dialysate.    Hospital Course:  Patient presented with probable PD-associated peritonitis and constipation. Cell counts did not confirm peritonitis but dialysate culture is growing Staph sp. Patient has been adequately treated with IP vancomycin but abdominal pain has not resolved or improved. Patient having loose stools with laxatives. Patient's BP has been too low for him to tolerate his home antihypertensive regimen.    Interval History:     Thinks pain is a little better.  Awaiting CT and general surgery consult.      Review of Systems   Gastrointestinal: Positive for abdominal distention and abdominal pain.   All other systems reviewed and are negative.    Objective:     Vital Signs (Most Recent):  Temp: 98.2 °F (36.8 °C) (06/20/17 0739)  Pulse: 97 (06/20/17 0739)  Resp: 18 (06/20/17 0739)  BP: 118/77 (06/20/17 0739)  SpO2: (!) 94 % (06/20/17 0739) Vital Signs (24h Range):  Temp:  [98.2 °F (36.8 °C)-98.8 °F (37.1 °C)] 98.2 °F  (36.8 °C)  Pulse:  [] 97  Resp:  [16-20] 18  SpO2:  [94 %-96 %] 94 %  BP: (110-118)/(67-78) 118/77     Weight: 98 kg (216 lb)  Body mass index is 30.99 kg/m².    Intake/Output Summary (Last 24 hours) at 06/20/17 1230  Last data filed at 06/20/17 0650   Gross per 24 hour   Intake              120 ml   Output             -167 ml   Net              287 ml      Physical Exam   Constitutional: He appears well-nourished.   Cardiovascular: Normal rate.    Pulmonary/Chest: Effort normal.   Abdominal: Soft. He exhibits distension. There is no tenderness.   Neurological: He is alert.   Skin: Skin is warm.   Psychiatric: He has a normal mood and affect.   Nursing note and vitals reviewed.      Significant Labs:   BMP:   Recent Labs  Lab 06/20/17  0420         K 3.2*      CO2 17*   BUN 48*   CREATININE 9.2*   CALCIUM 9.1   MG 1.7     CBC:   Recent Labs  Lab 06/19/17  0509 06/20/17  0420   WBC 9.24 8.18   HGB 13.8* 13.3*   HCT 40.1 38.3*    277       Significant Imaging: None    Assessment/Plan:      Anemia in ESRD (end-stage renal disease)              Peritoneal dialysis catheter dysfunction    Recent positional dysfunction. Some persistent pain at PD site.  Appears to be draining adequately now.        Chronic constipation    Continued home medications.  Lactulose added.  Stimulants increased 6/14/2017; patient had large BM.  Reports regular BMs.  Now reports liquid BMs.        Dependence on peritoneal dialysis    Patient with hypotension 6/16/2017. Received  mL with resolution.  BP remains soft. Home amlodipine and carvedilol have been discontinued and lisinopril dose decreased.        Pain of upper abdomen    Initially presumed due to peritonitis or constipation.  Pain has worsened despite adequate treatment for peritonitis for several days.  Consulted Gen Surgery.        Secondary hyperparathyroidism              Diastolic dysfunction              ESRD (end stage renal disease)    On  PD. Nephrology following.        Chronic hepatitis B virus infection              LVH (left ventricular hypertrophy) due to hypertensive disease    Continued home medications as tolerated.  Amlodipine and Coreg have been discontinued due to hypotension.        * Peritonitis of undetermined cause    Considered likely on admission, cell count and cultures ordered. Nephrology consulted for PD and antibiotics.  Fluid cell counts do not confirm peritonitis; cultures are growing Staph sp. Continuing IP antibiotics for now per Nephrology.  Vancomycin level > 20; dosing monitored by Nephrology and doses adjusted as needed.  6/19/2017 -- vancomycin level 16.5.  CT A/P pending  General Surgery consult pending          VTE Risk Mitigation         Ordered     Low Risk of VTE  Once      06/13/17 3669          Saurabh Maldonado MD  Department of Hospital Medicine   [facility]

## 2017-06-20 NOTE — PROGRESS NOTES
CCPD treatment completed. -167 total UF. Cycler disconnected using aseptic technique. Treatment tolerated.

## 2017-06-20 NOTE — ASSESSMENT & PLAN NOTE
Considered likely on admission, cell count and cultures ordered. Nephrology consulted for PD and antibiotics.  Fluid cell counts do not confirm peritonitis; cultures are growing Staph sp. Continuing IP antibiotics for now per Nephrology.  Vancomycin level > 20; dosing monitored by Nephrology and doses adjusted as needed.  6/19/2017 -- vancomycin level 16.5.  CT A/P pending  General Surgery consult pending

## 2017-06-20 NOTE — NURSING
Contacted Dr. Swanson re: pt's CT ab w/o IV contrast.  Pt c/o nausea.  Given PRN ondansetron at 19:45.  Pt unable to drink oral contrast.  Peritoneal dialysis started at 21:00.  Pt unable to have CT this evening.  Will call CT to reschedule imaging for tomorrow when PD is complete.

## 2017-06-20 NOTE — PLAN OF CARE
Problem: Patient Care Overview  Goal: Plan of Care Review  Outcome: Ongoing (interventions implemented as appropriate)  Pt c/o nausea and abdominal pain 3-5/10 throughout shift.  No emesis this shift. PRN ondansetron IV and SL provided.  Mild relief of symptoms.  Pt declined PRN pain meds.  Pt refused evening polyethylene glycol and senna meds r/t nausea.  CT abdomen postponed to 08:00 after PD is complete.    Pt A&O x 4. VS stable on room air.  Dialysate output clear yellow, no cloudy or red color.  Pt oliguric, no urinary output.  No BM this shift.  Will continue to monitor pt.    Problem: Fall Risk (Adult)  Goal: Identify Related Risk Factors and Signs and Symptoms  Related risk factors and signs and symptoms are identified upon initiation of Human Response Clinical Practice Guideline (CPG)   Outcome: Ongoing (interventions implemented as appropriate)  Personal items and call bell within reach. Non slip socks on. Frequent checks maintained.  Goal: Absence of Falls  Patient will demonstrate the desired outcomes by discharge/transition of care.   Outcome: Ongoing (interventions implemented as appropriate)  No pt falls this shift.

## 2017-06-20 NOTE — PROGRESS NOTES
[facility]  Nephrology  Progress Note    Patient Name: Maksim Hidalgo  MRN: [id number]  Admission Date: 6/13/2017  Hospital Length of Stay: 7 days  Attending Provider: Saurabh Maldonado MD   Primary Care Physician: Saurabh Zuleta MD  Principal Problem:Peritonitis of undetermined cause    Subjective:     HPI: Mr. Hidalgo is a 38 yo AAM with HTN and ESRD who was directly admitted from surgery clinic today for concern of peritonitis. Patient describes generalized abdominal pain which is exacerbated by urination. He also reports new onset drain pain. He denies any fevers or chills. He admits to constipation and only has BMs a few times per week. He feels he has gained a lot of weight over the last few days 2/2 inefficient dialysis. Otherwise he is without complaint.   Patient performs PD nightly at home; nephrologist is Dr. Garcia.   Home PD prescription:   3L first fill. Tidal fill 2300cc. Tidal drain 2600cc. No last fill.  Dwell time: 1 hour. Drain time: 30 minutes.   Total volume 12.24L. Total time: 8h 20m.     Interval History:   Didn't tolerate PO contrast last night 2/2 nausea/vomiting. Underwent CT today. Patient seen this afternoon; reports feeling better than yesterday. Continues to grimace upon sitting up. Pain in same location. No appetite; not eating or drinking. Last TTE was two days prior to admission for part of restrictive cardiomyopathy evaluation. Received IP vanc with last fill this AM.     Review of patient's allergies indicates:  No Known Allergies  Current Facility-Administered Medications   Medication Frequency    acetaminophen tablet 500 mg Q6H PRN    albuterol inhaler 2 puff Q4H PRN    bisacodyl EC tablet 10 mg Daily    calcitRIOL capsule 0.5 mcg Daily    furosemide tablet 40 mg Daily    lisinopril tablet 10 mg with lunch    omnipaque oral solution 15 mL PRN    ondansetron disintegrating tablet 8 mg Q8H PRN    ondansetron injection 4 mg Q6H PRN    oxycodone  immediate release tablet 5 mg Q6H PRN    polyethylene glycol packet 17 g TID    senna-docusate 8.6-50 mg per tablet 2 tablet BID    sevelamer carbonate tablet 800 mg TID WM       Objective:     Vital Signs (Most Recent):  Temp: 98.8 °F (37.1 °C) (06/20/17 1616)  Pulse: 95 (06/20/17 1616)  Resp: 18 (06/20/17 1616)  BP: 108/61 (06/20/17 1616)  SpO2: (!) 94 % (06/20/17 1616)  O2 Device (Oxygen Therapy): room air (06/20/17 0739) Vital Signs (24h Range):  Temp:  [98.2 °F (36.8 °C)-98.8 °F (37.1 °C)] 98.8 °F (37.1 °C)  Pulse:  [] 95  Resp:  [16-20] 18  SpO2:  [94 %-95 %] 94 %  BP: (108-126)/(61-79) 108/61     Weight: 98 kg (216 lb) (06/13/17 1122)  Body mass index is 30.99 kg/m².  Body surface area is 2.2 meters squared.    I/O last 3 completed shifts:  In: 360 [P.O.:360]  Out: 495 [Emesis/NG output:400; Other:95]    Physical Exam   Constitutional: He appears well-developed and well-nourished. No distress.   HENT:   Head: Normocephalic and atraumatic.   Eyes: Conjunctivae and EOM are normal.   Cardiovascular: Normal rate and regular rhythm.    Pulmonary/Chest: Effort normal and breath sounds normal.   Musculoskeletal: He exhibits no edema or tenderness.       Significant Labs:  CBC:   Recent Labs  Lab 06/20/17  0420   WBC 8.18   RBC 4.48*   HGB 13.3*   HCT 38.3*      MCV 86   MCH 29.7   MCHC 34.7     CMP:   Recent Labs  Lab 06/16/17  1621  06/20/17  0420   GLU 76  < > 101   CALCIUM 9.4  < > 9.1   ALBUMIN 3.6  < > 3.4*   PROT 8.1  --   --      < > 140   K 3.7  < > 3.2*   CO2 17*  < > 17*     < > 107   BUN 47*  < > 48*   CREATININE 9.4*  < > 9.2*   ALKPHOS 94  --   --    ALT 17  --   --    AST 15  --   --    BILITOT 0.2  --   --    < > = values in this interval not displayed.  All labs within the past 24 hours have been reviewed.     Significant Imaging:  Labs: Reviewed  Echo: Reviewed    Assessment/Plan:     ESRD (end stage renal disease)    - continue home CCPD prescription. Used 1.5% solution  "the last few days given hypotension.   - hypotension resolved. Continue lisinopril 10mg (to prevent peritoneal sclerosis) and lasix daily. Will increase solution back to 2.5% tonight.           Pain of upper abdomen    - fluid studies on 6/13 with WBC < 100 however culture with "staph species". Speciation/sensitivities pending  - repeat fluid studies on 6/15 without leukocytosis; negative cultures  - IP vanc and ceftazidine started 6/13; remains on IP vanc to complete a 10 day course  - last dose of IP vanc on 6/20; will f/u trough in AM  - agree with surgical consult given severe pain with mobility and inability to tolerate PO intake 2/2 N/V  - amylase and lipase ordered but not collected; consider repeating  - consider starting daily antacid?        Anemia in ESRD (end-stage renal disease)    - hgb at goal; no need for SKY        Secondary hyperparathyroidism    - corrected Ca normal  - hyperphosphatemia improving with sevelamer            Nely Cunningham, PGY-4  Nephrology Fellow  [facility]  Pager: [phone]      "

## 2017-06-20 NOTE — PROGRESS NOTES
CCPD started per orders. No initial drain noted. 2000 ml last fill with Vancomycin added to system.

## 2017-06-20 NOTE — PLAN OF CARE
"Problem: Pain, Acute (Adult)  Intervention: Mutually Develop/Implement Acute Pain Management Plan  Pt reports abdominal pain, pain medication offered but pt refused because " he doesn't feel like they work."      Problem: Nausea/Vomiting (Adult)  Goal: Identify Related Risk Factors and Signs and Symptoms  Related risk factors and signs and symptoms are identified upon initiation of Human Response Clinical Practice Guideline (CPG)  Outcome: Ongoing (interventions implemented as appropriate)    Pt reports persistent nausea, suffers from chronic constipation and given laxatives to treat. Prn nausea medicine administered as ordered, pt reports some relief. WCTM.       "

## 2017-06-20 NOTE — SUBJECTIVE & OBJECTIVE
Interval History:     Thinks pain is a little better.  Awaiting CT and general surgery consult.      Review of Systems   Gastrointestinal: Positive for abdominal distention and abdominal pain.   All other systems reviewed and are negative.    Objective:     Vital Signs (Most Recent):  Temp: 98.2 °F (36.8 °C) (06/20/17 0739)  Pulse: 97 (06/20/17 0739)  Resp: 18 (06/20/17 0739)  BP: 118/77 (06/20/17 0739)  SpO2: (!) 94 % (06/20/17 0739) Vital Signs (24h Range):  Temp:  [98.2 °F (36.8 °C)-98.8 °F (37.1 °C)] 98.2 °F (36.8 °C)  Pulse:  [] 97  Resp:  [16-20] 18  SpO2:  [94 %-96 %] 94 %  BP: (110-118)/(67-78) 118/77     Weight: 98 kg (216 lb)  Body mass index is 30.99 kg/m².    Intake/Output Summary (Last 24 hours) at 06/20/17 1230  Last data filed at 06/20/17 0650   Gross per 24 hour   Intake              120 ml   Output             -167 ml   Net              287 ml      Physical Exam   Constitutional: He appears well-nourished.   Cardiovascular: Normal rate.    Pulmonary/Chest: Effort normal.   Abdominal: Soft. He exhibits distension. There is no tenderness.   Neurological: He is alert.   Skin: Skin is warm.   Psychiatric: He has a normal mood and affect.   Nursing note and vitals reviewed.      Significant Labs:   BMP:   Recent Labs  Lab 06/20/17  0420         K 3.2*      CO2 17*   BUN 48*   CREATININE 9.2*   CALCIUM 9.1   MG 1.7     CBC:   Recent Labs  Lab 06/19/17  0509 06/20/17  0420   WBC 9.24 8.18   HGB 13.8* 13.3*   HCT 40.1 38.3*    277       Significant Imaging: None

## 2017-06-20 NOTE — ASSESSMENT & PLAN NOTE
"- fluid studies on 6/13 with WBC < 100 however culture with "staph species". Speciation/sensitivities pending  - repeat fluid studies on 6/15 without leukocytosis; negative cultures  - IP vanc and ceftazidine started 6/13; remains on IP vanc to complete a 10 day course  - last dose of IP vanc on 6/20; will f/u trough in AM  - agree with surgical consult given severe pain with mobility and inability to tolerate PO intake 2/2 N/V  - amylase and lipase ordered but not collected; consider repeating  - consider starting daily antacid?  "

## 2017-06-21 LAB
ALBUMIN SERPL BCP-MCNC: 3.3 G/DL
ANION GAP SERPL CALC-SCNC: 14 MMOL/L
BASOPHILS # BLD AUTO: 0.03 K/UL
BASOPHILS NFR BLD: 0.4 %
BUN SERPL-MCNC: 48 MG/DL
C DIFF GDH STL QL: NEGATIVE
C DIFF TOX A+B STL QL IA: NEGATIVE
CALCIUM SERPL-MCNC: 9 MG/DL
CHLORIDE SERPL-SCNC: 104 MMOL/L
CO2 SERPL-SCNC: 21 MMOL/L
CREAT SERPL-MCNC: 9.4 MG/DL
DIFFERENTIAL METHOD: ABNORMAL
EOSINOPHIL # BLD AUTO: 0.3 K/UL
EOSINOPHIL NFR BLD: 4.4 %
ERYTHROCYTE [DISTWIDTH] IN BLOOD BY AUTOMATED COUNT: 12.2 %
EST. GFR  (AFRICAN AMERICAN): 7.3 ML/MIN/1.73 M^2
EST. GFR  (NON AFRICAN AMERICAN): 6.3 ML/MIN/1.73 M^2
GLUCOSE SERPL-MCNC: 95 MG/DL
HCT VFR BLD AUTO: 37.6 %
HGB BLD-MCNC: 12.8 G/DL
LYMPHOCYTES # BLD AUTO: 2.3 K/UL
LYMPHOCYTES NFR BLD: 31.2 %
MAGNESIUM SERPL-MCNC: 1.9 MG/DL
MCH RBC QN AUTO: 29.1 PG
MCHC RBC AUTO-ENTMCNC: 34 %
MCV RBC AUTO: 86 FL
MONOCYTES # BLD AUTO: 0.9 K/UL
MONOCYTES NFR BLD: 11.8 %
NEUTROPHILS # BLD AUTO: 3.8 K/UL
NEUTROPHILS NFR BLD: 51.9 %
PHOSPHATE SERPL-MCNC: 5.1 MG/DL
PLATELET # BLD AUTO: 258 K/UL
PMV BLD AUTO: 9.9 FL
POTASSIUM SERPL-SCNC: 3.2 MMOL/L
RBC # BLD AUTO: 4.4 M/UL
SODIUM SERPL-SCNC: 139 MMOL/L
VANCOMYCIN SERPL-MCNC: 22.5 UG/ML
WBC # BLD AUTO: 7.27 K/UL

## 2017-06-21 PROCEDURE — 80202 ASSAY OF VANCOMYCIN: CPT | Mod: NTX

## 2017-06-21 PROCEDURE — 99232 SBSQ HOSP IP/OBS MODERATE 35: CPT | Mod: NTX,,, | Performed by: INTERNAL MEDICINE

## 2017-06-21 PROCEDURE — 80069 RENAL FUNCTION PANEL: CPT | Mod: NTX

## 2017-06-21 PROCEDURE — 25000003 PHARM REV CODE 250: Mod: NTX | Performed by: INTERNAL MEDICINE

## 2017-06-21 PROCEDURE — 99232 SBSQ HOSP IP/OBS MODERATE 35: CPT | Mod: GC,NTX,, | Performed by: SURGERY

## 2017-06-21 PROCEDURE — 83735 ASSAY OF MAGNESIUM: CPT | Mod: NTX

## 2017-06-21 PROCEDURE — 99232 SBSQ HOSP IP/OBS MODERATE 35: CPT | Mod: GC,NTX,, | Performed by: INTERNAL MEDICINE

## 2017-06-21 PROCEDURE — 90945 DIALYSIS ONE EVALUATION: CPT | Mod: NTX

## 2017-06-21 PROCEDURE — 11000001 HC ACUTE MED/SURG PRIVATE ROOM: Mod: NTX

## 2017-06-21 PROCEDURE — 87449 NOS EACH ORGANISM AG IA: CPT | Mod: NTX

## 2017-06-21 PROCEDURE — 85025 COMPLETE CBC W/AUTO DIFF WBC: CPT | Mod: NTX

## 2017-06-21 RX ORDER — POTASSIUM CHLORIDE 20 MEQ/15ML
20 SOLUTION ORAL ONCE
Status: COMPLETED | OUTPATIENT
Start: 2017-06-21 | End: 2017-06-21

## 2017-06-21 RX ORDER — FLUCONAZOLE 200 MG/1
200 TABLET ORAL EVERY OTHER DAY
Status: DISCONTINUED | OUTPATIENT
Start: 2017-06-21 | End: 2017-06-23 | Stop reason: HOSPADM

## 2017-06-21 RX ORDER — HEPARIN SODIUM 5000 [USP'U]/ML
5000 INJECTION, SOLUTION INTRAVENOUS; SUBCUTANEOUS EVERY 12 HOURS
Status: DISCONTINUED | OUTPATIENT
Start: 2017-06-21 | End: 2017-06-23 | Stop reason: HOSPADM

## 2017-06-21 RX ORDER — PANTOPRAZOLE SODIUM 40 MG/1
40 TABLET, DELAYED RELEASE ORAL DAILY
Status: DISCONTINUED | OUTPATIENT
Start: 2017-06-21 | End: 2017-06-23 | Stop reason: HOSPADM

## 2017-06-21 RX ADMIN — FLUCONAZOLE 200 MG: 200 TABLET ORAL at 08:06

## 2017-06-21 RX ADMIN — HEPARIN SODIUM 5000 UNITS: 5000 INJECTION, SOLUTION INTRAVENOUS; SUBCUTANEOUS at 01:06

## 2017-06-21 RX ADMIN — SEVELAMER CARBONATE 800 MG: 800 TABLET, FILM COATED ORAL at 08:06

## 2017-06-21 RX ADMIN — FUROSEMIDE 40 MG: 40 TABLET ORAL at 08:06

## 2017-06-21 RX ADMIN — SEVELAMER CARBONATE 800 MG: 800 TABLET, FILM COATED ORAL at 11:06

## 2017-06-21 RX ADMIN — BISACODYL 10 MG: 5 TABLET, COATED ORAL at 05:06

## 2017-06-21 RX ADMIN — POLYETHYLENE GLYCOL 3350 17 G: 17 POWDER, FOR SOLUTION ORAL at 05:06

## 2017-06-21 RX ADMIN — CALCITRIOL 0.5 MCG: 0.25 CAPSULE, LIQUID FILLED ORAL at 08:06

## 2017-06-21 RX ADMIN — PANTOPRAZOLE SODIUM 40 MG: 40 TABLET, DELAYED RELEASE ORAL at 08:06

## 2017-06-21 RX ADMIN — POTASSIUM CHLORIDE 20 MEQ: 20 SOLUTION ORAL at 01:06

## 2017-06-21 RX ADMIN — SEVELAMER CARBONATE 800 MG: 800 TABLET, FILM COATED ORAL at 05:06

## 2017-06-21 RX ADMIN — STANDARDIZED SENNA CONCENTRATE AND DOCUSATE SODIUM 2 TABLET: 8.6; 5 TABLET, FILM COATED ORAL at 08:06

## 2017-06-21 RX ADMIN — HEPARIN SODIUM 5000 UNITS: 5000 INJECTION, SOLUTION INTRAVENOUS; SUBCUTANEOUS at 08:06

## 2017-06-21 NOTE — PROGRESS NOTES
[facility]  General Surgery  Progress Note    Subjective:     History of Present Illness:  No notes on file    Post-Op Info:  * No surgery found *         Interval History: No acute events overnight.  Afebrile and HD stable.  Reports pain improved.  No nausea or vomiting.  Little appetite.  Still with watery stools.    Medications:  Continuous Infusions:   Scheduled Meds:   bisacodyl  10 mg Oral Daily    calcitRIOL  0.5 mcg Oral Daily    furosemide  40 mg Oral Daily    heparin (porcine)  5,000 Units Subcutaneous Q12H    lisinopril  10 mg Oral with lunch    polyethylene glycol  17 g Oral TID    senna-docusate 8.6-50 mg  2 tablet Oral BID    sevelamer carbonate  800 mg Oral TID WM     PRN Meds:acetaminophen, albuterol, omnipaque, ondansetron, ondansetron, oxycodone     Review of patient's allergies indicates:  No Known Allergies  Objective:     Vital Signs (Most Recent):  Temp: 98.7 °F (37.1 °C) (06/21/17 0800)  Pulse: 99 (06/21/17 0800)  Resp: 18 (06/21/17 0800)  BP: 106/62 (06/21/17 0800)  SpO2: 97 % (06/21/17 0405) Vital Signs (24h Range):  Temp:  [98.3 °F (36.8 °C)-98.8 °F (37.1 °C)] 98.7 °F (37.1 °C)  Pulse:  [90-99] 99  Resp:  [18-19] 18  SpO2:  [94 %-98 %] 97 %  BP: ()/(55-79) 106/62     Weight: 95.6 kg (210 lb 12.2 oz)  Body mass index is 30.24 kg/m².    Intake/Output - Last 3 Shifts       06/19 0700 - 06/20 0659 06/20 0700 - 06/21 0659 06/21 0700 - 06/22 0659    P.O. 120 180     Total Intake(mL/kg) 120 (1.2) 180 (1.9)     Urine (mL/kg/hr) 0 (0)      Emesis/NG output       Other -167 (-0.1)      Stool 0 (0)      Total Output -167        Net +287 +180             Urine Occurrence 0 x 0 x     Stool Occurrence 1 x 1 x           Physical Exam   NAD, sleepy and answering questions slowly but appropriately   Resp nonlabored   RRR  Abdomen soft and only tender around catheter site without drainage, erythema, induration.  No rebound or guarding     Significant Labs:  CBC:   Recent  Labs  Lab 06/21/17  0402   WBC 7.27   RBC 4.40*   HGB 12.8*   HCT 37.6*      MCV 86   MCH 29.1   MCHC 34.0     CMP:   Recent Labs  Lab 06/16/17  1621  06/21/17  0402   GLU 76  < > 95   CALCIUM 9.4  < > 9.0   ALBUMIN 3.6  < > 3.3*   PROT 8.1  --   --      < > 139   K 3.7  < > 3.2*   CO2 17*  < > 21*     < > 104   BUN 47*  < > 48*   CREATININE 9.4*  < > 9.4*   ALKPHOS 94  --   --    ALT 17  --   --    AST 15  --   --    BILITOT 0.2  --   --    < > = values in this interval not displayed.    Significant Diagnostics:  CT reviewed: unremarkable for source     Assessment/Plan:     * Peritonitis of undetermined cause    -WBC count/diff of peritoneal fluid on 6/13 and 6/15 non-consistent with bacterial peritonitis, though culture from 6/13 did grow Staph pettenkoferi  -CT abdomen/pelvis unremarkable for cause of abdominal complaints  -Will discuss with staff and primary team today.            Ulysses Benitez Jr., MD  General Surgery  [facility]

## 2017-06-21 NOTE — PROGRESS NOTES
[facility] Hospital Medicine  Progress Note    Patient Name: Maksim Hidalgo  MRN: [id number]  Patient Class: IP- Inpatient   Admission Date: 6/13/2017  Length of Stay: 8 days  Attending Physician: Saurabh Maldonado MD  Primary Care Provider: Saurabh Zuleta MD    [facility] Medicine Team: [facility] Saurabh Maldonado MD    Subjective:     Principal Problem:Peritonitis of undetermined cause    HPI:  37 y.o. male presented from the Surgery clinic with past medical history of ESRD on PD, hypertension, LVH/diastolic dysfunction, Hep B.  He was in his usual state of good health until the acute onset of generalized abdominal pain beginning 3 days prior to admission with gradually worsening course. Pertinent associated symptoms include difficulties with draining dialysate for several days, and recent sinusitis treated with oral doxycycline started 3 days ago. Patient presented to Dr. Gillespie's clinic today with abdominal pain, dysuria, and inability to completely drain dialysate.    Hospital Course:  Patient presented with probable PD-associated peritonitis and constipation. Cell counts did not confirm peritonitis but dialysate culture is growing Staph sp. Patient has been adequately treated with IP vancomycin but abdominal pain has not resolved or improved. Patient having loose stools with laxatives. Patient's BP has been too low for him to tolerate his home antihypertensive regimen.    Interval History:     Continues to complain of pain and decreased PO intake.  Has some shortness of breath getting to the couch this am.  Otherwise doing well but not eating a lot.    Review of Systems   Gastrointestinal: Positive for abdominal pain.   All other systems reviewed and are negative.    Objective:     Vital Signs (Most Recent):  Temp: 98 °F (36.7 °C) (06/21/17 1208)  Pulse: 97 (06/21/17 1208)  Resp: 18 (06/21/17 1208)  BP: 106/61 (06/21/17 1208)  SpO2: 97 % (06/21/17 0405) Vital Signs (24h  Range):  Temp:  [98 °F (36.7 °C)-98.8 °F (37.1 °C)] 98 °F (36.7 °C)  Pulse:  [90-99] 97  Resp:  [18-19] 18  SpO2:  [94 %-98 %] 97 %  BP: ()/(55-73) 106/61     Weight: 95.6 kg (210 lb 12.2 oz)  Body mass index is 30.24 kg/m².    Intake/Output Summary (Last 24 hours) at 06/21/17 1501  Last data filed at 06/21/17 0400   Gross per 24 hour   Intake              180 ml   Output                0 ml   Net              180 ml      Physical Exam   Constitutional: He appears well-developed and well-nourished.   HENT:   Head: Atraumatic.   Cardiovascular: Normal rate.    Pulmonary/Chest: Effort normal.   Abdominal: Soft.   Neurological: He is alert.   Skin: Skin is warm.   Psychiatric: He has a normal mood and affect.   Nursing note and vitals reviewed.      Significant Labs:   BMP:   Recent Labs  Lab 06/21/17  0402   GLU 95      K 3.2*      CO2 21*   BUN 48*   CREATININE 9.4*   CALCIUM 9.0   MG 1.9     CBC:   Recent Labs  Lab 06/20/17  0420 06/21/17  0402   WBC 8.18 7.27   HGB 13.3* 12.8*   HCT 38.3* 37.6*    258       Significant Imaging: None    Assessment/Plan:      Anemia in ESRD (end-stage renal disease)              Peritoneal dialysis catheter dysfunction    Recent positional dysfunction. Some persistent pain at PD site.  Appears to be draining adequately now.        Chronic constipation    Continued home medications.  Lactulose added.  Stimulants increased 6/14/2017; patient had large BM.  Reports regular BMs.  Now reports liquid BMs.        Dependence on peritoneal dialysis    Patient with hypotension 6/16/2017. Received  mL with resolution.  BP remains soft. Home amlodipine and carvedilol have been discontinued and lisinopril dose decreased.        Pain of upper abdomen    Initially presumed due to peritonitis or constipation.  Pain has worsened despite adequate treatment for peritonitis for several days.  Consulted Gen Surgery.        Secondary hyperparathyroidism               Diastolic dysfunction              ESRD (end stage renal disease)    On PD. Nephrology following.        Chronic hepatitis B virus infection              LVH (left ventricular hypertrophy) due to hypertensive disease    Continued home medications as tolerated.  Amlodipine and Coreg have been discontinued due to hypotension.        * Peritonitis of undetermined cause    Considered likely on admission, cell count and cultures ordered. Nephrology consulted for PD and antibiotics.  Fluid cell counts do not confirm peritonitis; cultures are growing Staph sp. Continuing IP antibiotics for now per Nephrology.  Vancomycin level > 20; dosing monitored by Nephrology and doses adjusted as needed.  6/19/2017 -- vancomycin level 16.5.  CT Negative  Plan to treat for a course of abx and leave catheter and monitor clinically          VTE Risk Mitigation         Ordered     heparin (porcine) injection 5,000 Units  Every 12 hours     Route:  Subcutaneous        06/21/17 0850     Low Risk of VTE  Once      06/13/17 1229          Saurabh Maldonado MD  Department of Hospital Medicine   [facility]

## 2017-06-21 NOTE — SUBJECTIVE & OBJECTIVE
Interval History: No acute events overnight.  Afebrile and HD stable.  Reports pain improved.  No nausea or vomiting.  Little appetite.  Still with watery stools.    Medications:  Continuous Infusions:   Scheduled Meds:   bisacodyl  10 mg Oral Daily    calcitRIOL  0.5 mcg Oral Daily    furosemide  40 mg Oral Daily    heparin (porcine)  5,000 Units Subcutaneous Q12H    lisinopril  10 mg Oral with lunch    polyethylene glycol  17 g Oral TID    senna-docusate 8.6-50 mg  2 tablet Oral BID    sevelamer carbonate  800 mg Oral TID WM     PRN Meds:acetaminophen, albuterol, omnipaque, ondansetron, ondansetron, oxycodone     Review of patient's allergies indicates:  No Known Allergies  Objective:     Vital Signs (Most Recent):  Temp: 98.7 °F (37.1 °C) (06/21/17 0800)  Pulse: 99 (06/21/17 0800)  Resp: 18 (06/21/17 0800)  BP: 106/62 (06/21/17 0800)  SpO2: 97 % (06/21/17 0405) Vital Signs (24h Range):  Temp:  [98.3 °F (36.8 °C)-98.8 °F (37.1 °C)] 98.7 °F (37.1 °C)  Pulse:  [90-99] 99  Resp:  [18-19] 18  SpO2:  [94 %-98 %] 97 %  BP: ()/(55-79) 106/62     Weight: 95.6 kg (210 lb 12.2 oz)  Body mass index is 30.24 kg/m².    Intake/Output - Last 3 Shifts       06/19 0700 - 06/20 0659 06/20 0700 - 06/21 0659 06/21 0700 - 06/22 0659    P.O. 120 180     Total Intake(mL/kg) 120 (1.2) 180 (1.9)     Urine (mL/kg/hr) 0 (0)      Emesis/NG output       Other -167 (-0.1)      Stool 0 (0)      Total Output -167        Net +287 +180             Urine Occurrence 0 x 0 x     Stool Occurrence 1 x 1 x           Physical Exam   NAD, sleepy and answering questions slowly but appropriately   Resp nonlabored   RRR  Abdomen soft and only tender around catheter site without drainage, erythema, induration.  No rebound or guarding     Significant Labs:  CBC:   Recent Labs  Lab 06/21/17  0402   WBC 7.27   RBC 4.40*   HGB 12.8*   HCT 37.6*      MCV 86   MCH 29.1   MCHC 34.0     CMP:   Recent Labs  Lab 06/16/17  1621  06/21/17  0402   GLU  76  < > 95   CALCIUM 9.4  < > 9.0   ALBUMIN 3.6  < > 3.3*   PROT 8.1  --   --      < > 139   K 3.7  < > 3.2*   CO2 17*  < > 21*     < > 104   BUN 47*  < > 48*   CREATININE 9.4*  < > 9.4*   ALKPHOS 94  --   --    ALT 17  --   --    AST 15  --   --    BILITOT 0.2  --   --    < > = values in this interval not displayed.    Significant Diagnostics:  CT reviewed: unremarkable for source

## 2017-06-21 NOTE — ASSESSMENT & PLAN NOTE
-WBC count/diff of peritoneal fluid on 6/13 and 6/15 non-consistent with bacterial peritonitis, though culture from 6/13 did grow Staph pettenkoferi  -CT abdomen/pelvis unremarkable for cause of abdominal complaints  -Will discuss with staff and primary team today.

## 2017-06-21 NOTE — SUBJECTIVE & OBJECTIVE
Interval History:   No events overnight. PD last night with ~1L UF. Patient appears improved this morning. Reports abdominal pain is still present. No appetite; says this started prior to admit. Just the smell of most foods makes him nauseous. Tolerated soup for lunch, so far without nausea or vomiting. Doesn't quite feel ready go home yet because he knows he won't be able to eat.     Review of patient's allergies indicates:  No Known Allergies  Current Facility-Administered Medications   Medication Frequency    acetaminophen tablet 500 mg Q6H PRN    albuterol inhaler 2 puff Q4H PRN    bisacodyl EC tablet 10 mg Daily    calcitRIOL capsule 0.5 mcg Daily    furosemide tablet 40 mg Daily    heparin (porcine) injection 5,000 Units Q12H    lisinopril tablet 10 mg with lunch    omnipaque oral solution 15 mL PRN    ondansetron disintegrating tablet 8 mg Q8H PRN    ondansetron injection 4 mg Q6H PRN    oxycodone immediate release tablet 5 mg Q6H PRN    polyethylene glycol packet 17 g TID    senna-docusate 8.6-50 mg per tablet 2 tablet BID    sevelamer carbonate tablet 800 mg TID WM       Objective:     Vital Signs (Most Recent):  Temp: 98 °F (36.7 °C) (06/21/17 1208)  Pulse: 97 (06/21/17 1208)  Resp: 18 (06/21/17 1208)  BP: 106/61 (06/21/17 1208)  SpO2: 97 % (06/21/17 0405)  O2 Device (Oxygen Therapy): room air (06/21/17 0405) Vital Signs (24h Range):  Temp:  [98 °F (36.7 °C)-98.8 °F (37.1 °C)] 98 °F (36.7 °C)  Pulse:  [90-99] 97  Resp:  [18-19] 18  SpO2:  [97 %-98 %] 97 %  BP: ()/(55-73) 106/61     Weight: 95.6 kg (210 lb 12.2 oz) (06/21/17 0415)  Body mass index is 30.24 kg/m².  Body surface area is 2.17 meters squared.    I/O last 3 completed shifts:  In: 300 [P.O.:300]  Out: -167     Physical Exam   Constitutional: He appears well-developed and well-nourished. No distress.   HENT:   Head: Normocephalic and atraumatic.   Eyes: Conjunctivae and EOM are normal.   Cardiovascular: Normal rate and regular  rhythm.    Pulmonary/Chest: Effort normal and breath sounds normal.   Musculoskeletal: He exhibits no edema or tenderness.       Significant Labs:  CBC:   Recent Labs  Lab 06/21/17  0402   WBC 7.27   RBC 4.40*   HGB 12.8*   HCT 37.6*      MCV 86   MCH 29.1   MCHC 34.0     CMP:   Recent Labs  Lab 06/16/17  1621  06/21/17  0402   GLU 76  < > 95   CALCIUM 9.4  < > 9.0   ALBUMIN 3.6  < > 3.3*   PROT 8.1  --   --      < > 139   K 3.7  < > 3.2*   CO2 17*  < > 21*     < > 104   BUN 47*  < > 48*   CREATININE 9.4*  < > 9.4*   ALKPHOS 94  --   --    ALT 17  --   --    AST 15  --   --    BILITOT 0.2  --   --    < > = values in this interval not displayed.  All labs within the past 24 hours have been reviewed.     Significant Imaging:  Labs: Reviewed

## 2017-06-21 NOTE — PLAN OF CARE
06/21/17 1528   Discharge Reassessment   Assessment Type Discharge Planning Reassessment   Can the patient answer the patient profile reliably? Yes, cognitively intact   How does the patient rate their overall health at the present time? Fair   Describe the patient's ability to walk at the present time. No restrictions   How often would a person be available to care for the patient? Whenever needed   Number of comorbid conditions (as recorded on the chart) Four   During the past month, has the patient often been bothered by feeling down, depressed or hopeless? No   During the past month, has the patient often been bothered by little interest or pleasure in doing things? No   Discharge plan remains the same: Yes   Provided patient/caregiver education on the expected discharge date and the discharge plan Yes   Discharge Plan A Home with family   Discharge Plan B Home Health   Change in patient condition or support system No   Patient choice form signed by patient/caregiver N/A

## 2017-06-21 NOTE — PLAN OF CARE
Problem: Fall Risk (Adult)  Goal: Identify Related Risk Factors and Signs and Symptoms  Related risk factors and signs and symptoms are identified upon initiation of Human Response Clinical Practice Guideline (CPG)   Outcome: Ongoing (interventions implemented as appropriate)  Pt remained free from injury this shift. Hourly rounds completed to ensure safet. Pt personal belongings within reach.    Problem: Pain, Acute (Adult)  Goal: Identify Related Risk Factors and Signs and Symptoms  Related risk factors and signs and symptoms are identified upon initiation of Human Response Clinical Practice Guideline (CPG)   Pain assessed hourly. Pt denies any discomfort at this time.

## 2017-06-21 NOTE — PLAN OF CARE
Problem: Patient Care Overview  Goal: Plan of Care Review  Outcome: Ongoing (interventions implemented as appropriate)  Pt aao x 4. VSS. Bed in low locked pos, call light within reach. Peritoneal Dialysis done at bedside. Cdiff cx pending. Pt ambulates and voids independently. No c/o pain. Pt has AM labs ordered.

## 2017-06-21 NOTE — SUBJECTIVE & OBJECTIVE
Interval History:     Continues to complain of pain and decreased PO intake.  Has some shortness of breath getting to the couch this am.  Otherwise doing well but not eating a lot.    Review of Systems   Gastrointestinal: Positive for abdominal pain.   All other systems reviewed and are negative.    Objective:     Vital Signs (Most Recent):  Temp: 98 °F (36.7 °C) (06/21/17 1208)  Pulse: 97 (06/21/17 1208)  Resp: 18 (06/21/17 1208)  BP: 106/61 (06/21/17 1208)  SpO2: 97 % (06/21/17 0405) Vital Signs (24h Range):  Temp:  [98 °F (36.7 °C)-98.8 °F (37.1 °C)] 98 °F (36.7 °C)  Pulse:  [90-99] 97  Resp:  [18-19] 18  SpO2:  [94 %-98 %] 97 %  BP: ()/(55-73) 106/61     Weight: 95.6 kg (210 lb 12.2 oz)  Body mass index is 30.24 kg/m².    Intake/Output Summary (Last 24 hours) at 06/21/17 1501  Last data filed at 06/21/17 0400   Gross per 24 hour   Intake              180 ml   Output                0 ml   Net              180 ml      Physical Exam   Constitutional: He appears well-developed and well-nourished.   HENT:   Head: Atraumatic.   Cardiovascular: Normal rate.    Pulmonary/Chest: Effort normal.   Abdominal: Soft.   Neurological: He is alert.   Skin: Skin is warm.   Psychiatric: He has a normal mood and affect.   Nursing note and vitals reviewed.      Significant Labs:   BMP:   Recent Labs  Lab 06/21/17  0402   GLU 95      K 3.2*      CO2 21*   BUN 48*   CREATININE 9.4*   CALCIUM 9.0   MG 1.9     CBC:   Recent Labs  Lab 06/20/17  0420 06/21/17  0402   WBC 8.18 7.27   HGB 13.3* 12.8*   HCT 38.3* 37.6*    258       Significant Imaging: None

## 2017-06-21 NOTE — ASSESSMENT & PLAN NOTE
Considered likely on admission, cell count and cultures ordered. Nephrology consulted for PD and antibiotics.  Fluid cell counts do not confirm peritonitis; cultures are growing Staph sp. Continuing IP antibiotics for now per Nephrology.  Vancomycin level > 20; dosing monitored by Nephrology and doses adjusted as needed.  6/19/2017 -- vancomycin level 16.5.  CT Negative  Plan to treat for a course of abx and leave catheter and monitor clinically

## 2017-06-22 PROBLEM — R10.84 GENERALIZED ABDOMINAL PAIN: Status: ACTIVE | Noted: 2017-06-22

## 2017-06-22 LAB
ALBUMIN SERPL BCP-MCNC: 3.3 G/DL
AMYLASE SERPL-CCNC: 147 U/L
ANION GAP SERPL CALC-SCNC: 16 MMOL/L
BASOPHILS # BLD AUTO: 0.03 K/UL
BASOPHILS NFR BLD: 0.4 %
BUN SERPL-MCNC: 47 MG/DL
CALCIUM SERPL-MCNC: 9 MG/DL
CHLORIDE SERPL-SCNC: 103 MMOL/L
CO2 SERPL-SCNC: 20 MMOL/L
CREAT SERPL-MCNC: 9 MG/DL
DIFFERENTIAL METHOD: ABNORMAL
EOSINOPHIL # BLD AUTO: 0.4 K/UL
EOSINOPHIL NFR BLD: 4.7 %
ERYTHROCYTE [DISTWIDTH] IN BLOOD BY AUTOMATED COUNT: 12.2 %
EST. GFR  (AFRICAN AMERICAN): 7.7 ML/MIN/1.73 M^2
EST. GFR  (NON AFRICAN AMERICAN): 6.6 ML/MIN/1.73 M^2
GLUCOSE SERPL-MCNC: 103 MG/DL
HCT VFR BLD AUTO: 37.6 %
HGB BLD-MCNC: 12.6 G/DL
LIPASE SERPL-CCNC: 63 U/L
LYMPHOCYTES # BLD AUTO: 2.9 K/UL
LYMPHOCYTES NFR BLD: 38.5 %
MAGNESIUM SERPL-MCNC: 1.8 MG/DL
MCH RBC QN AUTO: 28.6 PG
MCHC RBC AUTO-ENTMCNC: 33.5 %
MCV RBC AUTO: 86 FL
MONOCYTES # BLD AUTO: 0.7 K/UL
MONOCYTES NFR BLD: 9.8 %
NEUTROPHILS # BLD AUTO: 3.4 K/UL
NEUTROPHILS NFR BLD: 46.3 %
PHOSPHATE SERPL-MCNC: 4.8 MG/DL
PLATELET # BLD AUTO: 268 K/UL
PMV BLD AUTO: 10.1 FL
POTASSIUM SERPL-SCNC: 2.9 MMOL/L
RBC # BLD AUTO: 4.4 M/UL
SODIUM SERPL-SCNC: 139 MMOL/L
VANCOMYCIN SERPL-MCNC: 17.2 UG/ML
WBC # BLD AUTO: 7.43 K/UL

## 2017-06-22 PROCEDURE — 80202 ASSAY OF VANCOMYCIN: CPT | Mod: NTX

## 2017-06-22 PROCEDURE — 99232 SBSQ HOSP IP/OBS MODERATE 35: CPT | Mod: NTX,,, | Performed by: INTERNAL MEDICINE

## 2017-06-22 PROCEDURE — 25000003 PHARM REV CODE 250: Mod: NTX | Performed by: INTERNAL MEDICINE

## 2017-06-22 PROCEDURE — 86677 HELICOBACTER PYLORI ANTIBODY: CPT | Mod: NTX

## 2017-06-22 PROCEDURE — 90945 DIALYSIS ONE EVALUATION: CPT | Mod: NTX

## 2017-06-22 PROCEDURE — 11000001 HC ACUTE MED/SURG PRIVATE ROOM: Mod: NTX

## 2017-06-22 PROCEDURE — 80069 RENAL FUNCTION PANEL: CPT | Mod: NTX

## 2017-06-22 PROCEDURE — 82150 ASSAY OF AMYLASE: CPT | Mod: NTX

## 2017-06-22 PROCEDURE — 36415 COLL VENOUS BLD VENIPUNCTURE: CPT | Mod: NTX

## 2017-06-22 PROCEDURE — 83690 ASSAY OF LIPASE: CPT | Mod: NTX

## 2017-06-22 PROCEDURE — 90945 DIALYSIS ONE EVALUATION: CPT | Mod: NTX,,, | Performed by: INTERNAL MEDICINE

## 2017-06-22 PROCEDURE — 85025 COMPLETE CBC W/AUTO DIFF WBC: CPT | Mod: NTX

## 2017-06-22 PROCEDURE — 83735 ASSAY OF MAGNESIUM: CPT | Mod: NTX

## 2017-06-22 PROCEDURE — 63600175 PHARM REV CODE 636 W HCPCS: Mod: NTX | Performed by: INTERNAL MEDICINE

## 2017-06-22 RX ORDER — POTASSIUM CHLORIDE 20 MEQ/15ML
40 SOLUTION ORAL ONCE
Status: COMPLETED | OUTPATIENT
Start: 2017-06-22 | End: 2017-06-22

## 2017-06-22 RX ORDER — POTASSIUM CHLORIDE 20 MEQ/15ML
20 SOLUTION ORAL ONCE
Status: COMPLETED | OUTPATIENT
Start: 2017-06-22 | End: 2017-06-22

## 2017-06-22 RX ADMIN — SEVELAMER CARBONATE 800 MG: 800 TABLET, FILM COATED ORAL at 09:06

## 2017-06-22 RX ADMIN — POTASSIUM CHLORIDE 40 MEQ: 20 SOLUTION ORAL at 09:06

## 2017-06-22 RX ADMIN — PANTOPRAZOLE SODIUM 40 MG: 40 TABLET, DELAYED RELEASE ORAL at 09:06

## 2017-06-22 RX ADMIN — POTASSIUM CHLORIDE 20 MEQ: 20 SOLUTION ORAL at 02:06

## 2017-06-22 RX ADMIN — HEPARIN SODIUM 5000 UNITS: 5000 INJECTION, SOLUTION INTRAVENOUS; SUBCUTANEOUS at 09:06

## 2017-06-22 RX ADMIN — CALCITRIOL 0.5 MCG: 0.25 CAPSULE, LIQUID FILLED ORAL at 09:06

## 2017-06-22 RX ADMIN — SODIUM CHLORIDE, SODIUM LACTATE, CALCIUM CHLORIDE, MAGNESIUM CHLORIDE AND DEXTROSE: 2.5; 538; 448; 18.3; 5.08 INJECTION, SOLUTION INTRAPERITONEAL at 11:06

## 2017-06-22 RX ADMIN — LISINOPRIL 10 MG: 10 TABLET ORAL at 12:06

## 2017-06-22 RX ADMIN — FUROSEMIDE 40 MG: 40 TABLET ORAL at 09:06

## 2017-06-22 RX ADMIN — SEVELAMER CARBONATE 800 MG: 800 TABLET, FILM COATED ORAL at 05:06

## 2017-06-22 NOTE — HPI
Maksim Hidalgo is a 37 y.o. male with  ESRD on PD, hypertension, LVH/diastolic dysfunction, Hep B. He was admitted from Surgery clinic on 6/13/2017 with past medical history of  He was in his usual state of good health until the acute onset of generalized abdominal pain beginning 3 days prior to admission with gradually worsening course associated with difficulties with draining dialysate for several days dysuria. He had peritoneal fluid cell count and culture, cell count were unremarkable but culture grew staph pettenkoferi concerning for peritonitis. He was started on antibiotics. He also had CT abdomen pelvis on 6/19 which showed moderate amount of ascites, no other findings to explain his abdominal pain. Repeat cultures negative. Since initiation of antibiotics, he reports improvement of his abdominal pain (10/10 to 4/10 now) and appetite (only able to eat 25% of the meals). Denies nausea, vomiting, blood in the stool. No fever, chills. He was started on PPI as well which he reports helping his symptoms. No prior EGD/colonoscopy.

## 2017-06-22 NOTE — PROGRESS NOTES
[facility] Hospital Medicine  Progress Note    Patient Name: Maksim Hidalgo  MRN: [id number]  Patient Class: IP- Inpatient   Admission Date: 6/13/2017  Length of Stay: 9 days  Attending Physician: Saurabh Maldonado MD  Primary Care Provider: Saurabh Zuleta MD    [facility] Medicine Team: [facility]OSP MED G Saurabh Maldonado MD    Subjective:     Principal Problem:Peritonitis of undetermined cause    HPI:  37 y.o. male presented from the Surgery clinic with past medical history of ESRD on PD, hypertension, LVH/diastolic dysfunction, Hep B.  He was in his usual state of good health until the acute onset of generalized abdominal pain beginning 3 days prior to admission with gradually worsening course. Pertinent associated symptoms include difficulties with draining dialysate for several days, and recent sinusitis treated with oral doxycycline started 3 days ago. Patient presented to Dr. Gillespie's clinic today with abdominal pain, dysuria, and inability to completely drain dialysate.    Hospital Course:  Patient presented with probable PD-associated peritonitis and constipation. Cell counts did not confirm peritonitis but dialysate culture is growing Staph sp. Patient has been adequately treated with IP vancomycin but abdominal pain has not resolved or improved. Patient having loose stools with laxatives. Patient's BP has been too low for him to tolerate his home antihypertensive regimen.    Interval History:     Persistent discomfort says he ate very little.      Review of Systems   All other systems reviewed and are negative.    Objective:     Vital Signs (Most Recent):  Temp: 98.3 °F (36.8 °C) (06/22/17 1214)  Pulse: 83 (06/22/17 1214)  Resp: 18 (06/22/17 1214)  BP: 101/65 (06/22/17 1214)  SpO2: 99 % (06/22/17 1214) Vital Signs (24h Range):  Temp:  [98.2 °F (36.8 °C)-99.2 °F (37.3 °C)] 98.3 °F (36.8 °C)  Pulse:  [82-89] 83  Resp:  [16-18] 18  SpO2:  [94 %-99 %] 99 %  BP: (101-111)/(59-73) 101/65      Weight: 95.6 kg (210 lb 12.2 oz)  Body mass index is 30.24 kg/m².  No intake or output data in the 24 hours ending 06/22/17 1444   Physical Exam   Constitutional: He appears well-developed and well-nourished.   HENT:   Head: Atraumatic.   Cardiovascular: Normal rate.    Pulmonary/Chest: Effort normal.   Abdominal: Soft.   Neurological: He is alert.   Skin: Skin is warm.   Psychiatric: He has a normal mood and affect.   Nursing note and vitals reviewed.      Significant Labs:   BMP:   Recent Labs  Lab 06/22/17  0348         K 2.9*      CO2 20*   BUN 47*   CREATININE 9.0*   CALCIUM 9.0   MG 1.8     CBC:   Recent Labs  Lab 06/21/17  0402 06/22/17  0348   WBC 7.27 7.43   HGB 12.8* 12.6*   HCT 37.6* 37.6*    268       Significant Imaging: None    Assessment/Plan:      Anemia in ESRD (end-stage renal disease)              Peritoneal dialysis catheter dysfunction    Recent positional dysfunction. Some persistent pain at PD site.  Appears to be draining adequately now.        Chronic constipation    Continued home medications.  Lactulose added.  Stimulants increased 6/14/2017; patient had large BM.  Reports regular BMs.  Now reports liquid BMs.        Dependence on peritoneal dialysis    Patient with hypotension 6/16/2017. Received  mL with resolution.  BP remains soft. Home amlodipine and carvedilol have been discontinued and lisinopril dose decreased.        Pain of upper abdomen    Initially presumed due to peritonitis or constipation.  Pain has worsened despite adequate treatment for peritonitis for several days.  Consulted Gen Surgery.        Secondary hyperparathyroidism              Diastolic dysfunction              ESRD (end stage renal disease)    On PD. Nephrology following.        Chronic hepatitis B virus infection              LVH (left ventricular hypertrophy) due to hypertensive disease    Continued home medications as tolerated.  Amlodipine and Coreg have been  discontinued due to hypotension.        * Peritonitis of undetermined cause    Considered likely on admission, cell count and cultures ordered. Nephrology consulted for PD and antibiotics.  Fluid cell counts do not confirm peritonitis; cultures are growing Staph sp. Continuing IP antibiotics for now per Nephrology.  Vancomycin level > 20; dosing monitored by Nephrology and doses adjusted as needed.  6/19/2017 -- vancomycin level 16.5.  CT Negative  Plan to treat for a course of abx and leave catheter and monitor clinically          VTE Risk Mitigation         Ordered     heparin (porcine) injection 5,000 Units  Every 12 hours     Route:  Subcutaneous        06/21/17 0850     Low Risk of VTE  Once      06/13/17 1229          Saurabh Maldonado MD  Department of Hospital Medicine   [facility]

## 2017-06-22 NOTE — ASSESSMENT & PLAN NOTE
His pain is likely related to peritonitis which has improved after antibiotic therapy.  CT A/P is unremarkable.   No other risk factors for PUD.   No evidence of bleeding and Hgb stable.    Plan:  Recommend completion of antibiotics  Supportive care with PPI daily  Check H.pylori serology and treat if positive.   EGD is not indicated at this moment.

## 2017-06-22 NOTE — PROGRESS NOTES
[facility]  Nephrology  Progress Note    Patient Name: Maksim Hidalgo  MRN: [id number]  Admission Date: 6/13/2017  Hospital Length of Stay: 8 days  Attending Provider: Saurabh Maldonado MD   Primary Care Physician: Saurabh Zuleta MD  Principal Problem:Peritonitis of undetermined cause    Subjective:     HPI: Mr. Hidalgo is a 36 yo AAM with HTN and ESRD who was directly admitted from surgery clinic today for concern of peritonitis. Patient describes generalized abdominal pain which is exacerbated by urination. He also reports new onset drain pain. He denies any fevers or chills. He admits to constipation and only has BMs a few times per week. He feels he has gained a lot of weight over the last few days 2/2 inefficient dialysis. Otherwise he is without complaint.   Patient performs PD nightly at home; nephrologist is Dr. Garcia.   Home PD prescription:   3L first fill. Tidal fill 2300cc. Tidal drain 2600cc. No last fill.  Dwell time: 1 hour. Drain time: 30 minutes.   Total volume 12.24L. Total time: 8h 20m.     Interval History:   No events overnight. PD last night with ~1L UF. Patient appears improved this morning. Reports abdominal pain is still present. No appetite; says this started prior to admit. Just the smell of most foods makes him nauseous. Tolerated soup for lunch, so far without nausea or vomiting. Doesn't quite feel ready go home yet because he knows he won't be able to eat.     Review of patient's allergies indicates:  No Known Allergies  Current Facility-Administered Medications   Medication Frequency    acetaminophen tablet 500 mg Q6H PRN    albuterol inhaler 2 puff Q4H PRN    bisacodyl EC tablet 10 mg Daily    calcitRIOL capsule 0.5 mcg Daily    furosemide tablet 40 mg Daily    heparin (porcine) injection 5,000 Units Q12H    lisinopril tablet 10 mg with lunch    omnipaque oral solution 15 mL PRN    ondansetron disintegrating tablet 8 mg Q8H PRN    ondansetron injection  4 mg Q6H PRN    oxycodone immediate release tablet 5 mg Q6H PRN    polyethylene glycol packet 17 g TID    senna-docusate 8.6-50 mg per tablet 2 tablet BID    sevelamer carbonate tablet 800 mg TID WM       Objective:     Vital Signs (Most Recent):  Temp: 98 °F (36.7 °C) (06/21/17 1208)  Pulse: 97 (06/21/17 1208)  Resp: 18 (06/21/17 1208)  BP: 106/61 (06/21/17 1208)  SpO2: 97 % (06/21/17 0405)  O2 Device (Oxygen Therapy): room air (06/21/17 0405) Vital Signs (24h Range):  Temp:  [98 °F (36.7 °C)-98.8 °F (37.1 °C)] 98 °F (36.7 °C)  Pulse:  [90-99] 97  Resp:  [18-19] 18  SpO2:  [97 %-98 %] 97 %  BP: ()/(55-73) 106/61     Weight: 95.6 kg (210 lb 12.2 oz) (06/21/17 0415)  Body mass index is 30.24 kg/m².  Body surface area is 2.17 meters squared.    I/O last 3 completed shifts:  In: 300 [P.O.:300]  Out: -167     Physical Exam   Constitutional: He appears well-developed and well-nourished. No distress.   HENT:   Head: Normocephalic and atraumatic.   Eyes: Conjunctivae and EOM are normal.   Cardiovascular: Normal rate and regular rhythm.    Pulmonary/Chest: Effort normal and breath sounds normal.   Musculoskeletal: He exhibits no edema or tenderness.       Significant Labs:  CBC:   Recent Labs  Lab 06/21/17  0402   WBC 7.27   RBC 4.40*   HGB 12.8*   HCT 37.6*      MCV 86   MCH 29.1   MCHC 34.0     CMP:   Recent Labs  Lab 06/16/17  1621  06/21/17  0402   GLU 76  < > 95   CALCIUM 9.4  < > 9.0   ALBUMIN 3.6  < > 3.3*   PROT 8.1  --   --      < > 139   K 3.7  < > 3.2*   CO2 17*  < > 21*     < > 104   BUN 47*  < > 48*   CREATININE 9.4*  < > 9.4*   ALKPHOS 94  --   --    ALT 17  --   --    AST 15  --   --    BILITOT 0.2  --   --    < > = values in this interval not displayed.  All labs within the past 24 hours have been reviewed.     Significant Imaging:  Labs: Reviewed    Assessment/Plan:     ESRD (end stage renal disease)    - continue home CCPD prescription. 2.5% solution.   - hypotension improved.  Continue lisinopril 10mg (to prevent peritoneal sclerosis) and lasix daily.           Pain of upper abdomen    - IP antibiotics started 6/13 for peritonitis with staph pettenkoferi. Will need to complete a 14 day course. Will start PO diflucan for fungal peritonitis prophylaxis.   - vancomycin not listed in sensitivities. Will discuss with ID in AM.   - constipation alleviated  - no improvement in abdominal pain/N/V/inability to tolerate food despite alleviation of constipation and treatment of peritonitis. CT A/P negative. Appreciate surgery consult.  - if PO intake not improved by tomorrow, pt may need a GI evaluation?   - will order amylase and lipase with AM labs  - will start PPI today            Nely Cunningham, PGY-4  Nephrology Fellow  [facility]  Pager: [phone]
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
## 2017-06-22 NOTE — ASSESSMENT & PLAN NOTE
- continue home CCPD prescription. 2.5% solution.   - hypotension improved. Continue lisinopril 10mg (to prevent peritoneal sclerosis) and lasix daily.
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
## 2017-06-22 NOTE — ASSESSMENT & PLAN NOTE
- IP antibiotics started 6/13 for peritonitis with staph pettenkoferi. Will need to complete a 14 day course. Will start PO diflucan for fungal peritonitis prophylaxis.   - vancomycin not listed in sensitivities. Will discuss with ID in AM.   - constipation alleviated  - no improvement in abdominal pain/N/V/inability to tolerate food despite alleviation of constipation and treatment of peritonitis. CT A/P negative. Appreciate surgery consult.  - if PO intake not improved by tomorrow, pt may need a GI evaluation?   - will order amylase and lipase with AM labs  - will start PPI today

## 2017-06-22 NOTE — SUBJECTIVE & OBJECTIVE
Interval History:     Persistent discomfort says he ate very little.      Review of Systems   All other systems reviewed and are negative.    Objective:     Vital Signs (Most Recent):  Temp: 98.3 °F (36.8 °C) (06/22/17 1214)  Pulse: 83 (06/22/17 1214)  Resp: 18 (06/22/17 1214)  BP: 101/65 (06/22/17 1214)  SpO2: 99 % (06/22/17 1214) Vital Signs (24h Range):  Temp:  [98.2 °F (36.8 °C)-99.2 °F (37.3 °C)] 98.3 °F (36.8 °C)  Pulse:  [82-89] 83  Resp:  [16-18] 18  SpO2:  [94 %-99 %] 99 %  BP: (101-111)/(59-73) 101/65     Weight: 95.6 kg (210 lb 12.2 oz)  Body mass index is 30.24 kg/m².  No intake or output data in the 24 hours ending 06/22/17 1444   Physical Exam   Constitutional: He appears well-developed and well-nourished.   HENT:   Head: Atraumatic.   Cardiovascular: Normal rate.    Pulmonary/Chest: Effort normal.   Abdominal: Soft.   Neurological: He is alert.   Skin: Skin is warm.   Psychiatric: He has a normal mood and affect.   Nursing note and vitals reviewed.      Significant Labs:   BMP:   Recent Labs  Lab 06/22/17  0348         K 2.9*      CO2 20*   BUN 47*   CREATININE 9.0*   CALCIUM 9.0   MG 1.8     CBC:   Recent Labs  Lab 06/21/17  0402 06/22/17  0348   WBC 7.27 7.43   HGB 12.8* 12.6*   HCT 37.6* 37.6*    268       Significant Imaging: None

## 2017-06-23 ENCOUNTER — LAB VISIT (OUTPATIENT)
Dept: LAB | Facility: HOSPITAL | Age: 37
End: 2017-06-23
Attending: INTERNAL MEDICINE
Payer: MEDICARE

## 2017-06-23 ENCOUNTER — OFFICE VISIT (OUTPATIENT)
Dept: HEPATOLOGY | Facility: CLINIC | Age: 37
End: 2017-06-23
Payer: MEDICARE

## 2017-06-23 VITALS
WEIGHT: 210.75 LBS | HEIGHT: 70 IN | HEART RATE: 84 BPM | SYSTOLIC BLOOD PRESSURE: 118 MMHG | DIASTOLIC BLOOD PRESSURE: 80 MMHG | RESPIRATION RATE: 20 BRPM | OXYGEN SATURATION: 96 % | TEMPERATURE: 99 F | BODY MASS INDEX: 30.17 KG/M2

## 2017-06-23 VITALS
DIASTOLIC BLOOD PRESSURE: 75 MMHG | WEIGHT: 211.63 LBS | OXYGEN SATURATION: 98 % | HEART RATE: 91 BPM | SYSTOLIC BLOOD PRESSURE: 117 MMHG | HEIGHT: 70 IN | RESPIRATION RATE: 18 BRPM | TEMPERATURE: 98 F | BODY MASS INDEX: 30.3 KG/M2

## 2017-06-23 DIAGNOSIS — B18.1 CHRONIC HEPATITIS B VIRUS INFECTION: ICD-10-CM

## 2017-06-23 DIAGNOSIS — B19.10 HEPATITIS B VIRUS INFECTION, UNSPECIFIED CHRONICITY: Primary | ICD-10-CM

## 2017-06-23 DIAGNOSIS — B19.10 HEPATITIS B VIRUS INFECTION, UNSPECIFIED CHRONICITY: ICD-10-CM

## 2017-06-23 DIAGNOSIS — K76.9 LIVER LESION: ICD-10-CM

## 2017-06-23 LAB
ALBUMIN SERPL BCP-MCNC: 3.1 G/DL
ANION GAP SERPL CALC-SCNC: 11 MMOL/L
BASOPHILS # BLD AUTO: 0.03 K/UL
BASOPHILS NFR BLD: 0.4 %
BUN SERPL-MCNC: 45 MG/DL
CALCIUM SERPL-MCNC: 8.9 MG/DL
CHLORIDE SERPL-SCNC: 105 MMOL/L
CO2 SERPL-SCNC: 23 MMOL/L
CREAT SERPL-MCNC: 8.4 MG/DL
DIFFERENTIAL METHOD: ABNORMAL
EOSINOPHIL # BLD AUTO: 0.3 K/UL
EOSINOPHIL NFR BLD: 4.4 %
ERYTHROCYTE [DISTWIDTH] IN BLOOD BY AUTOMATED COUNT: 11.9 %
EST. GFR  (AFRICAN AMERICAN): 8.3 ML/MIN/1.73 M^2
EST. GFR  (NON AFRICAN AMERICAN): 7.2 ML/MIN/1.73 M^2
GLUCOSE SERPL-MCNC: 107 MG/DL
HCT VFR BLD AUTO: 34.7 %
HGB BLD-MCNC: 11.9 G/DL
LYMPHOCYTES # BLD AUTO: 2.8 K/UL
LYMPHOCYTES NFR BLD: 39.9 %
MAGNESIUM SERPL-MCNC: 1.8 MG/DL
MCH RBC QN AUTO: 29.2 PG
MCHC RBC AUTO-ENTMCNC: 34.3 %
MCV RBC AUTO: 85 FL
MONOCYTES # BLD AUTO: 0.5 K/UL
MONOCYTES NFR BLD: 7.3 %
NEUTROPHILS # BLD AUTO: 3.4 K/UL
NEUTROPHILS NFR BLD: 47.9 %
PHOSPHATE SERPL-MCNC: 3.7 MG/DL
PLATELET # BLD AUTO: 254 K/UL
PMV BLD AUTO: 10.2 FL
POTASSIUM SERPL-SCNC: 3 MMOL/L
RBC # BLD AUTO: 4.07 M/UL
SODIUM SERPL-SCNC: 139 MMOL/L
VANCOMYCIN SERPL-MCNC: 12.3 UG/ML
WBC # BLD AUTO: 7.01 K/UL

## 2017-06-23 PROCEDURE — 99214 OFFICE O/P EST MOD 30 MIN: CPT | Mod: S$PBB,TXP,, | Performed by: INTERNAL MEDICINE

## 2017-06-23 PROCEDURE — 87517 HEPATITIS B DNA QUANT: CPT | Mod: TXP

## 2017-06-23 PROCEDURE — 90945 DIALYSIS ONE EVALUATION: CPT | Mod: NTX,,, | Performed by: INTERNAL MEDICINE

## 2017-06-23 PROCEDURE — 85025 COMPLETE CBC W/AUTO DIFF WBC: CPT | Mod: NTX

## 2017-06-23 PROCEDURE — 99239 HOSP IP/OBS DSCHRG MGMT >30: CPT | Mod: NTX,,, | Performed by: INTERNAL MEDICINE

## 2017-06-23 PROCEDURE — 25000003 PHARM REV CODE 250: Mod: NTX | Performed by: INTERNAL MEDICINE

## 2017-06-23 PROCEDURE — 80202 ASSAY OF VANCOMYCIN: CPT | Mod: NTX

## 2017-06-23 PROCEDURE — 36415 COLL VENOUS BLD VENIPUNCTURE: CPT | Mod: NTX

## 2017-06-23 PROCEDURE — 83735 ASSAY OF MAGNESIUM: CPT | Mod: NTX

## 2017-06-23 PROCEDURE — 80069 RENAL FUNCTION PANEL: CPT | Mod: NTX

## 2017-06-23 PROCEDURE — 36415 COLL VENOUS BLD VENIPUNCTURE: CPT | Mod: TXP

## 2017-06-23 PROCEDURE — 99999 PR PBB SHADOW E&M-EST. PATIENT-LVL IV: CPT | Mod: PBBFAC,TXP,, | Performed by: INTERNAL MEDICINE

## 2017-06-23 RX ORDER — FLUCONAZOLE 200 MG/1
200 TABLET ORAL EVERY OTHER DAY
Qty: 3 TABLET | Refills: 0 | Status: ON HOLD | OUTPATIENT
Start: 2017-06-23 | End: 2017-10-26 | Stop reason: CLARIF

## 2017-06-23 RX ORDER — SEVELAMER CARBONATE 800 MG/1
800 TABLET, FILM COATED ORAL
Qty: 90 TABLET | Refills: 11 | Status: SHIPPED | OUTPATIENT
Start: 2017-06-23 | End: 2023-05-28

## 2017-06-23 RX ORDER — POTASSIUM CHLORIDE 20 MEQ/15ML
30 SOLUTION ORAL
Status: DISCONTINUED | OUTPATIENT
Start: 2017-06-23 | End: 2017-06-23

## 2017-06-23 RX ORDER — PANTOPRAZOLE SODIUM 40 MG/1
40 TABLET, DELAYED RELEASE ORAL DAILY
Qty: 30 TABLET | Refills: 11 | Status: SHIPPED | OUTPATIENT
Start: 2017-06-23 | End: 2023-05-28

## 2017-06-23 RX ADMIN — CALCITRIOL 0.5 MCG: 0.25 CAPSULE, LIQUID FILLED ORAL at 08:06

## 2017-06-23 RX ADMIN — POTASSIUM CHLORIDE 30 MEQ: 20 SOLUTION ORAL at 09:06

## 2017-06-23 RX ADMIN — SEVELAMER CARBONATE 800 MG: 800 TABLET, FILM COATED ORAL at 12:06

## 2017-06-23 RX ADMIN — FLUCONAZOLE 200 MG: 200 TABLET ORAL at 08:06

## 2017-06-23 RX ADMIN — SEVELAMER CARBONATE 800 MG: 800 TABLET, FILM COATED ORAL at 07:06

## 2017-06-23 RX ADMIN — HEPARIN SODIUM 5000 UNITS: 5000 INJECTION, SOLUTION INTRAVENOUS; SUBCUTANEOUS at 08:06

## 2017-06-23 RX ADMIN — FUROSEMIDE 40 MG: 40 TABLET ORAL at 08:06

## 2017-06-23 RX ADMIN — PANTOPRAZOLE SODIUM 40 MG: 40 TABLET, DELAYED RELEASE ORAL at 08:06

## 2017-06-23 RX ADMIN — LISINOPRIL 10 MG: 10 TABLET ORAL at 12:06
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
## 2017-06-23 NOTE — PROGRESS NOTES
Initiated PD per cycler, using strict aseptic technique. Site care given. Cath exit site without redness or drainage. Initial drain, zero.

## 2017-06-23 NOTE — SUBJECTIVE & OBJECTIVE
Interval History:   Patient still has some discomfort midepigastric but better, Serum k 3 this am. Vanco instilling now with day dwell.    Review of patient's allergies indicates:  No Known Allergies  Current Facility-Administered Medications   Medication Frequency    acetaminophen tablet 500 mg Q6H PRN    albuterol inhaler 2 puff Q4H PRN    bisacodyl EC tablet 10 mg Daily    calcitRIOL capsule 0.5 mcg Daily    fluconazole tablet 200 mg Every other day    furosemide tablet 40 mg Daily    heparin (porcine) injection 5,000 Units Q12H    lisinopril tablet 10 mg with lunch    omnipaque oral solution 15 mL PRN    ondansetron disintegrating tablet 8 mg Q8H PRN    ondansetron injection 4 mg Q6H PRN    oxycodone immediate release tablet 5 mg Q6H PRN    pantoprazole EC tablet 40 mg Daily    polyethylene glycol packet 17 g TID    senna-docusate 8.6-50 mg per tablet 2 tablet BID    sevelamer carbonate tablet 800 mg TID WM       Objective:     Vital Signs (Most Recent):  Temp: 98.5 °F (36.9 °C) (06/23/17 0758)  Pulse: 84 (06/23/17 0758)  Resp: 20 (06/23/17 0758)  BP: 118/80 (06/23/17 0758)  SpO2: 96 % (06/23/17 0758)  O2 Device (Oxygen Therapy): room air (06/23/17 0758) Vital Signs (24h Range):  Temp:  [98.3 °F (36.8 °C)-99 °F (37.2 °C)] 98.5 °F (36.9 °C)  Pulse:  [79-88] 84  Resp:  [18-20] 20  SpO2:  [96 %-99 %] 96 %  BP: (101-137)/(61-82) 118/80     Weight: 95.6 kg (210 lb 12.2 oz) (06/21/17 0415)  Body mass index is 30.24 kg/m².  Body surface area is 2.17 meters squared.    I/O last 3 completed shifts:  In: 240 [P.O.:240]  Out: -     Physical Exam    Significant Labs:  BMP:   Recent Labs  Lab 06/23/17  0400         CO2 23   BUN 45*   CREATININE 8.4*   CALCIUM 8.9   MG 1.8     CBC:   Recent Labs  Lab 06/23/17  0400   WBC 7.01   RBC 4.07*   HGB 11.9*   HCT 34.7*      MCV 85   MCH 29.2   MCHC 34.3     All labs within the past 24 hours have been reviewed.     Significant Imaging:  Labs:  Reviewed  a

## 2017-06-23 NOTE — SUBJECTIVE & OBJECTIVE
Interval History:Sridhar did uneventful PD overnight, UF not documented, BP improving off antihypertensives,Abdomianl discomfort midepigatric, nausea and decreased appetite persist.      Review of patient's allergies indicates:  No Known Allergies  Current Facility-Administered Medications   Medication Frequency    acetaminophen tablet 500 mg Q6H PRN    albuterol inhaler 2 puff Q4H PRN    bisacodyl EC tablet 10 mg Daily    calcitRIOL capsule 0.5 mcg Daily    fluconazole tablet 200 mg Every other day    furosemide tablet 40 mg Daily    heparin (porcine) injection 5,000 Units Q12H    lisinopril tablet 10 mg with lunch    omnipaque oral solution 15 mL PRN    ondansetron disintegrating tablet 8 mg Q8H PRN    ondansetron injection 4 mg Q6H PRN    oxycodone immediate release tablet 5 mg Q6H PRN    pantoprazole EC tablet 40 mg Daily    polyethylene glycol packet 17 g TID    senna-docusate 8.6-50 mg per tablet 2 tablet BID    sevelamer carbonate tablet 800 mg TID WM    vancomycin 1,000 mg in Soln Dianeal PD fluid 3,000 mL Once       Objective:     Vital Signs (Most Recent):  Temp: 99 °F (37.2 °C) (06/22/17 2007)  Pulse: 88 (06/22/17 2007)  Resp: 18 (06/22/17 2007)  BP: 113/70 (06/22/17 2007)  SpO2: 96 % (06/22/17 2007)  O2 Device (Oxygen Therapy): room air (06/22/17 2007) Vital Signs (24h Range):  Temp:  [98.2 °F (36.8 °C)-99 °F (37.2 °C)] 99 °F (37.2 °C)  Pulse:  [79-89] 88  Resp:  [16-18] 18  SpO2:  [95 %-99 %] 96 %  BP: (101-113)/(59-70) 113/70     Weight: 95.6 kg (210 lb 12.2 oz) (06/21/17 0415)  Body mass index is 30.24 kg/m².  Body surface area is 2.17 meters squared.    I/O last 3 completed shifts:  In: 240 [P.O.:240]  Out: -  UF not documented    Physical Exam   Constitutional: He is oriented to person, place, and time. He appears well-developed and well-nourished. No distress.   HENT:   Head: Normocephalic and atraumatic.   Mouth/Throat: Oropharynx is clear and moist. No oropharyngeal exudate.    Eyes: Conjunctivae and EOM are normal. Pupils are equal, round, and reactive to light. Right eye exhibits no discharge. Left eye exhibits no discharge. No scleral icterus.   Neck: Normal range of motion. Neck supple. No JVD present. No tracheal deviation present. No thyromegaly present.   Cardiovascular: Normal rate, regular rhythm and normal heart sounds.  Exam reveals no gallop and no friction rub.    No murmur heard.  No peiripheral edema   Pulmonary/Chest: Effort normal and breath sounds normal. No stridor. No respiratory distress. He has no wheezes. He has no rales. He exhibits no tenderness.   Abdominal: Soft. Bowel sounds are normal. He exhibits no distension and no mass. There is tenderness. There is no rebound and no guarding. No hernia.   Mid epigastric fullness and tender to moderate palpation   Musculoskeletal: Normal range of motion. He exhibits no edema or tenderness.   Lymphadenopathy:     He has no cervical adenopathy.   Neurological: He is alert and oriented to person, place, and time. No cranial nerve deficit. He exhibits normal muscle tone. Coordination normal.   Skin: Skin is warm and dry. No rash noted. He is not diaphoretic. No erythema. No pallor.   Psychiatric: He has a normal mood and affect. His behavior is normal. Judgment and thought content normal.   Nursing note and vitals reviewed.      Significant Labs:  CBC:   Recent Labs  Lab 06/22/17  0348   WBC 7.43   RBC 4.40*   HGB 12.6*   HCT 37.6*      MCV 86   MCH 28.6   MCHC 33.5     CMP:   Recent Labs  Lab 06/16/17  1621  06/22/17  0348   GLU 76  < > 103   CALCIUM 9.4  < > 9.0   ALBUMIN 3.6  < > 3.3*   PROT 8.1  --   --      < > 139   K 3.7  < > 2.9*   CO2 17*  < > 20*     < > 103   BUN 47*  < > 47*   CREATININE 9.4*  < > 9.0*   ALKPHOS 94  --   --    ALT 17  --   --    AST 15  --   --    BILITOT 0.2  --   --    < > = values in this interval not displayed.  All labs within the past 24 hours have been reviewed.      Significant Imaging:  Labs: Reviewed  aa

## 2017-06-23 NOTE — PATIENT INSTRUCTIONS
1. Do a hepatitis b test for me today and every 6 months  2. I will find out when you will get listed and how long you will wait for a kidney. This will determine when you should start hepatitis b medciine  3. Return 6 month  4. There is a small spot in the liver on your ct scan. This should be repeated with contrast (triphasic)

## 2017-06-23 NOTE — ASSESSMENT & PLAN NOTE
- continue home CCPD prescription. 2.5% solution.  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  Day # 9/14.    I spoke with ID( no consult) to verify that Vancomycin was appropriate for treatment.  He will next need a dose  Monday 6/26, as he will instill tomorrow with a day dwell of vanco  Outpatient vanco x1 dose will need to be set up with outpatient pd prior to d/c  Abdominal pain is troubling. It appears localized midepigastric, and unrelated to catheter. Lipase normal, amylase appropriate with ESRD.  Patient is on protonix, but would still have GI evaluate, If he appears stable may consider prompt outpatient evaluation..  Potassiumahs been low likely related to poor PO intake,Po replacements ordered  Repeat cell count and c/s ordered off tonight's first drain before antibiotics.     - hypotension improved. Continue lisinopril 10mg (to prevent peritoneal sclerosis) and lasix daily.

## 2017-06-23 NOTE — ASSESSMENT & PLAN NOTE
- continue home CCPD prescription. 2.5% solution.  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  Day # 10/14.    I spoke with ID( no consult) to verify that Vancomycin was appropriate for treatment.  He will next need a dose  Monday 6/26, as he will instill tomorrow with a day dwell of vanco  [facility] I discussed this with his Nephrologist and PD nurse is awre and will take care. Patient is aware that he is to contact his PD nurse today upon discharge and go for instillation of vanco at unit on Monday.  Patient should be discharged with 3 more dioses of fluconazole 200 mg every other day   Abdominal pain: Appreciate GI eval. Please make patient an outpatient GI followup appointment for H  Pylori test result and possible gastric emptying study.   Would continue protonix.    Potassium has been low likely related to poor PO intake,Po replacement  40 meq x 1 dose today and then e will have liberalized diet at discharge and followup serum K level  at PD next week  Repeat cell count and c/s ordered off tonight's first drain before antibiotics.     - hypotension improved. Continue lisinopril 10mg (to prevent peritoneal sclerosis) and lasix daily.     Summary: vanco  Outpatient Monday  Fluconazole 200 mg qod x 3 doses  protonix 40 qd  GI outpatient appointment  Continue lisinopril and lasix only for now for BP

## 2017-06-23 NOTE — LETTER
June 25, 2017      Symone Lester, NP  [address] [place] [address]           [facility] - Hepatology  [address]  [place] [address]  Phone: [phone]  Fax: [phone]          Patient: Maksim Hidalgo   MR Number: [id number]   YOB: 1980   Date of Visit: 6/23/2017       Dear Symone Lester:    Thank you for referring Maksim Hidalgo to me for evaluation. Attached you will find relevant portions of my assessment and plan of care.    If you have questions, please do not hesitate to call me. I look forward to following Maksim Hidalgo along with you.    Sincerely,    Betty Mckeon MD    Enclosure  CC:  No Recipients    If you would like to receive this communication electronically, please contact [email or web address] or [phone] to request more information on [employer] access.    For providers and/or their staff who would like to refer a patient to [facility], please contact us through our one-stop-shop provider referral line, [facility] [place], at [phone].    If you feel you have received this communication in error or would no longer like to receive these types of communications, please e-mail [email or web address]

## 2017-06-23 NOTE — PROGRESS NOTES
[facility]  Nephrology  Progress Note    Patient Name: Maksim Hidalgo  MRN: [id number]  Admission Date: 6/13/2017  Hospital Length of Stay: 10 days  Attending Provider: Saurabh Maldonado MD   Primary Care Physician: Saurabh Zuleta MD  Principal Problem:Peritonitis of undetermined cause    Subjective:     HPI: Mr. Hidalgo is a 38 yo AAM with HTN and ESRD who was directly admitted from surgery clinic today for concern of peritonitis. Patient describes generalized abdominal pain which is exacerbated by urination. He also reports new onset drain pain. He denies any fevers or chills. He admits to constipation and only has BMs a few times per week. He feels he has gained a lot of weight over the last few days 2/2 inefficient dialysis. Otherwise he is without complaint.   Patient performs PD nightly at home; nephrologist is Dr. Garcia.   Home PD prescription:   3L first fill. Tidal fill 2300cc. Tidal drain 2600cc. No last fill.  Dwell time: 1 hour. Drain time: 30 minutes.   Total volume 12.24L. Total time: 8h 20m.     Interval History:   Patient still has some discomfort midepigastric but better, Serum k 3 this am. Vanco instilling now with day dwell.    Review of patient's allergies indicates:  No Known Allergies  Current Facility-Administered Medications   Medication Frequency    acetaminophen tablet 500 mg Q6H PRN    albuterol inhaler 2 puff Q4H PRN    bisacodyl EC tablet 10 mg Daily    calcitRIOL capsule 0.5 mcg Daily    fluconazole tablet 200 mg Every other day    furosemide tablet 40 mg Daily    heparin (porcine) injection 5,000 Units Q12H    lisinopril tablet 10 mg with lunch    omnipaque oral solution 15 mL PRN    ondansetron disintegrating tablet 8 mg Q8H PRN    ondansetron injection 4 mg Q6H PRN    oxycodone immediate release tablet 5 mg Q6H PRN    pantoprazole EC tablet 40 mg Daily    polyethylene glycol packet 17 g TID    senna-docusate 8.6-50 mg per tablet 2 tablet BID     sevelamer carbonate tablet 800 mg TID WM       Objective:     Vital Signs (Most Recent):  Temp: 98.5 °F (36.9 °C) (06/23/17 0758)  Pulse: 84 (06/23/17 0758)  Resp: 20 (06/23/17 0758)  BP: 118/80 (06/23/17 0758)  SpO2: 96 % (06/23/17 0758)  O2 Device (Oxygen Therapy): room air (06/23/17 0758) Vital Signs (24h Range):  Temp:  [98.3 °F (36.8 °C)-99 °F (37.2 °C)] 98.5 °F (36.9 °C)  Pulse:  [79-88] 84  Resp:  [18-20] 20  SpO2:  [96 %-99 %] 96 %  BP: (101-137)/(61-82) 118/80     Weight: 95.6 kg (210 lb 12.2 oz) (06/21/17 0415)  Body mass index is 30.24 kg/m².  Body surface area is 2.17 meters squared.    I/O last 3 completed shifts:  In: 240 [P.O.:240]  Out: -     Physical Exam    Significant Labs:  BMP:   Recent Labs  Lab 06/23/17  0400         CO2 23   BUN 45*   CREATININE 8.4*   CALCIUM 8.9   MG 1.8     CBC:   Recent Labs  Lab 06/23/17  0400   WBC 7.01   RBC 4.07*   HGB 11.9*   HCT 34.7*      MCV 85   MCH 29.2   MCHC 34.3     All labs within the past 24 hours have been reviewed.     Significant Imaging:  Labs: Reviewed  a    Assessment/Plan:     Anemia in ESRD (end-stage renal disease)    - hgb at goal; no need for SKY        ESRD (end stage renal disease)    - continue home CCPD prescription. 2.5% solution.  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  Day # 10/14.    I spoke with ID( no consult) to verify that Vancomycin was appropriate for treatment.  He will next need a dose  Monday 6/26, as he will instill tomorrow with a day dwell of vanco  [facility] I discussed this with his Nephrologist and PD nurse is awre and will take care. Patient is aware that he is to contact his PD nurse today upon discharge and go for instillation of vanco at unit on Monday.  Patient should be discharged with 3 more dioses of fluconazole 200 mg every other day   Abdominal pain: Appreciate  GI eval. Please make patient an outpatient GI followup appointment for H  Pylori test result and possible gastric emptying study.   Would continue protonix.    Potassium has been low likely related to poor PO intake,Po replacement  40 meq x 1 dose today and then e will have liberalized diet at discharge and followup serum K level  at PD next week  Repeat cell count and c/s ordered off tonight's first drain before antibiotics.     - hypotension improved. Continue lisinopril 10mg (to prevent peritoneal sclerosis) and lasix daily.     Summary: vanco  Outpatient Monday  Fluconazole 200 mg qod x 3 doses  protonix 40 qd  GI outpatient appointment  Continue lisinopril and lasix only for now for BP               Blanca Guerrero MD  Nephrology  [facility]

## 2017-06-23 NOTE — PROGRESS NOTES
[facility]  Nephrology  Progress Note    Patient Name: Maksim Hidalgo  MRN: [id number]  Admission Date: 6/13/2017  Hospital Length of Stay: 9 days  Attending Provider: Saurabh Maldonado MD   Primary Care Physician: Saurabh Zuleta MD  Principal Problem:Peritonitis of undetermined cause    Subjective:     HPI: Mr. Hidalgo is a 36 yo AAM with HTN and ESRD who was directly admitted from surgery clinic today for concern of peritonitis. Patient describes generalized abdominal pain which is exacerbated by urination. He also reports new onset drain pain. He denies any fevers or chills. He admits to constipation and only has BMs a few times per week. He feels he has gained a lot of weight over the last few days 2/2 inefficient dialysis. Otherwise he is without complaint.   Patient performs PD nightly at home; nephrologist is Dr. Garcia.   Home PD prescription:   3L first fill. Tidal fill 2300cc. Tidal drain 2600cc. No last fill.  Dwell time: 1 hour. Drain time: 30 minutes.   Total volume 12.24L. Total time: 8h 20m.     Interval History:Pateitn did uneventful PD overnight, UF not documented, BP improving off antihypertensives,Abdomianl discomfort midepigatric, nausea and decreased appetite persist.      Review of patient's allergies indicates:  No Known Allergies  Current Facility-Administered Medications   Medication Frequency    acetaminophen tablet 500 mg Q6H PRN    albuterol inhaler 2 puff Q4H PRN    bisacodyl EC tablet 10 mg Daily    calcitRIOL capsule 0.5 mcg Daily    fluconazole tablet 200 mg Every other day    furosemide tablet 40 mg Daily    heparin (porcine) injection 5,000 Units Q12H    lisinopril tablet 10 mg with lunch    omnipaque oral solution 15 mL PRN    ondansetron disintegrating tablet 8 mg Q8H PRN    ondansetron injection 4 mg Q6H PRN    oxycodone immediate release tablet 5 mg Q6H PRN    pantoprazole EC tablet 40 mg Daily    polyethylene glycol packet 17 g TID     senna-docusate 8.6-50 mg per tablet 2 tablet BID    sevelamer carbonate tablet 800 mg TID WM    vancomycin 1,000 mg in Soln Dianeal PD fluid 3,000 mL Once       Objective:     Vital Signs (Most Recent):  Temp: 99 °F (37.2 °C) (06/22/17 2007)  Pulse: 88 (06/22/17 2007)  Resp: 18 (06/22/17 2007)  BP: 113/70 (06/22/17 2007)  SpO2: 96 % (06/22/17 2007)  O2 Device (Oxygen Therapy): room air (06/22/17 2007) Vital Signs (24h Range):  Temp:  [98.2 °F (36.8 °C)-99 °F (37.2 °C)] 99 °F (37.2 °C)  Pulse:  [79-89] 88  Resp:  [16-18] 18  SpO2:  [95 %-99 %] 96 %  BP: (101-113)/(59-70) 113/70     Weight: 95.6 kg (210 lb 12.2 oz) (06/21/17 0415)  Body mass index is 30.24 kg/m².  Body surface area is 2.17 meters squared.    I/O last 3 completed shifts:  In: 240 [P.O.:240]  Out: -  UF not documented    Physical Exam   Constitutional: He is oriented to person, place, and time. He appears well-developed and well-nourished. No distress.   HENT:   Head: Normocephalic and atraumatic.   Mouth/Throat: Oropharynx is clear and moist. No oropharyngeal exudate.   Eyes: Conjunctivae and EOM are normal. Pupils are equal, round, and reactive to light. Right eye exhibits no discharge. Left eye exhibits no discharge. No scleral icterus.   Neck: Normal range of motion. Neck supple. No JVD present. No tracheal deviation present. No thyromegaly present.   Cardiovascular: Normal rate, regular rhythm and normal heart sounds.  Exam reveals no gallop and no friction rub.    No murmur heard.  No peiripheral edema   Pulmonary/Chest: Effort normal and breath sounds normal. No stridor. No respiratory distress. He has no wheezes. He has no rales. He exhibits no tenderness.   Abdominal: Soft. Bowel sounds are normal. He exhibits no distension and no mass. There is tenderness. There is no rebound and no guarding. No hernia.   Mid epigastric fullness and tender to moderate palpation   Musculoskeletal: Normal range of motion. He exhibits no edema or tenderness.    Lymphadenopathy:     He has no cervical adenopathy.   Neurological: He is alert and oriented to person, place, and time. No cranial nerve deficit. He exhibits normal muscle tone. Coordination normal.   Skin: Skin is warm and dry. No rash noted. He is not diaphoretic. No erythema. No pallor.   Psychiatric: He has a normal mood and affect. His behavior is normal. Judgment and thought content normal.   Nursing note and vitals reviewed.      Significant Labs:  CBC:   Recent Labs  Lab 06/22/17  0348   WBC 7.43   RBC 4.40*   HGB 12.6*   HCT 37.6*      MCV 86   MCH 28.6   MCHC 33.5     CMP:   Recent Labs  Lab 06/16/17  1621  06/22/17  0348   GLU 76  < > 103   CALCIUM 9.4  < > 9.0   ALBUMIN 3.6  < > 3.3*   PROT 8.1  --   --      < > 139   K 3.7  < > 2.9*   CO2 17*  < > 20*     < > 103   BUN 47*  < > 47*   CREATININE 9.4*  < > 9.0*   ALKPHOS 94  --   --    ALT 17  --   --    AST 15  --   --    BILITOT 0.2  --   --    < > = values in this interval not displayed.  All labs within the past 24 hours have been reviewed.     Significant Imaging:  Labs: Reviewed  aa    Assessment/Plan:     Anemia in ESRD (end-stage renal disease)    - hgb at goal; no need for SKY        Secondary hyperparathyroidism    - corrected Ca normal  - hyperphosphatemia improving with sevelamer        ESRD (end stage renal disease)    - continue home CCPD prescription. 2.5% solution.  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  Day # 9/14.    I spoke with ID( no consult) to verify that Vancomycin was appropriate for treatment.  He will next need a dose  Monday 6/26, as he will instill tomorrow with a day dwell of vanco  Outpatient vanco x1 dose will need to be set up with outpatient pd prior to d/c  Abdominal pain is troubling. It appears localized midepigastric, and unrelated to catheter. Lipase normal, amylase appropriate with ESRD.  Patient is on protonix, but would still  have GI evaluate, If he appears stable may consider prompt outpatient evaluation..  Potassiumahs been low likely related to poor PO intake,Po replacements ordered  Repeat cell count and c/s ordered off tonight's first drain before antibiotics.     - hypotension improved. Continue lisinopril 10mg (to prevent peritoneal sclerosis) and lasix daily.                   Blanca Guerrero MD  Nephrology  [facility]

## 2017-06-24 ENCOUNTER — PATIENT OUTREACH (OUTPATIENT)
Dept: ADMINISTRATIVE | Facility: CLINIC | Age: 37
End: 2017-06-24

## 2017-06-24 NOTE — PATIENT INSTRUCTIONS
"  Peritoneal Dialysis (PD)  Peritoneal dialysis (PD) is a way to cleanse the blood to treat kidney failure. It uses a natural membrane inside your body and a special solution (dialysate). The solution needs to be changed several times a day. This can be done as part of your home or work routine. Or it can be done at night by a machine. Dialysate is put inside your belly (abdomen) through a plastic tube (catheter). This catheter is surgically placed into your abdomen. It takes the catheter about 3 to 4 weeks to "mature" and be available for use. Your doctor may tell you to have this catheter put in place before your kidneys fail completely.   How PD works  PD uses the natural lining inside your abdomen called the peritoneal membrane. The abdomen is filled with dialysate through the catheter and allowed to remain (dwell) for 3 to 5 hours. The membrane and dialysate then work to clean the blood. The dialysate needs to be changed every few hours. This is called an exchange.    Your experience  · PD is done at home.  · A nurse or technician will teach you how to do PD exchanges and care for the PD tube.  · Your risk for infection in your abdomen (peritonitis) is high with this treatment unless you take much care.   · You will need to weigh yourself every day.   · You will need to follow a special diet.   There are 2 ways to do exchanges:  · Continuous ambulatory peritoneal dialysis (CAPD). With this, you do your own exchanges 3 to 5 times per day , each with a dwell time of about 4 to 6 hours (you can have a longer dwell time overnight). Each exchange takes about 30 to 40 minutes each.  · Cyclic peritoneal dialysis (CCPD). This uses a machine called a cycler. The cycler does most of your exchanges at night while you sleep. You have a long dwell time during the day. You may also do manual daytime exchanges, which take 30 to 40 minutes each.  Call your health care provider   Contact your health care provider right away if " you have any of the following:  · Fever of 100.4°F (38°C) or higher  · Chills  · Dialysate that is cloudy or bloody when it drains from your body  · Pain in your abdomen or around your catheter  · Warm, red, or draining skin around your catheter  · Blocked flow into or out of your catheter  · Part of your belly appears to be bulging out. This is called a hernia.    Date Last Reviewed: 12/1/2014 © 2000-2016 [employer]. [address] [place] [address], [place] [address]. All rights reserved. This information is not intended as a substitute for professional medical care. Always follow your healthcare professional's instructions.

## 2017-06-25 PROBLEM — K76.9 LIVER LESION: Status: ACTIVE | Noted: 2017-06-25

## 2017-06-26 LAB — H PYLORI IGG SERPL QL IA: POSITIVE

## 2017-06-27 ENCOUNTER — OFFICE VISIT (OUTPATIENT)
Dept: PRIMARY CARE CLINIC | Facility: CLINIC | Age: 37
End: 2017-06-27
Payer: MEDICARE

## 2017-06-27 VITALS
HEART RATE: 78 BPM | HEIGHT: 70 IN | BODY MASS INDEX: 30.27 KG/M2 | OXYGEN SATURATION: 98 % | WEIGHT: 211.44 LBS | SYSTOLIC BLOOD PRESSURE: 114 MMHG | DIASTOLIC BLOOD PRESSURE: 64 MMHG

## 2017-06-27 DIAGNOSIS — B18.1 CHRONIC HEPATITIS B VIRUS INFECTION: ICD-10-CM

## 2017-06-27 DIAGNOSIS — F51.02 ADJUSTMENT INSOMNIA: ICD-10-CM

## 2017-06-27 DIAGNOSIS — Z99.2 ESRD (END STAGE RENAL DISEASE) ON DIALYSIS: ICD-10-CM

## 2017-06-27 DIAGNOSIS — I11.9 LVH (LEFT VENTRICULAR HYPERTROPHY) DUE TO HYPERTENSIVE DISEASE, WITHOUT HEART FAILURE: ICD-10-CM

## 2017-06-27 DIAGNOSIS — Z76.82 ORGAN TRANSPLANT CANDIDATE: ICD-10-CM

## 2017-06-27 DIAGNOSIS — Z99.2 PERITONEAL DIALYSIS CATHETER IN PLACE: ICD-10-CM

## 2017-06-27 DIAGNOSIS — N18.6 ANEMIA IN ESRD (END-STAGE RENAL DISEASE): Chronic | ICD-10-CM

## 2017-06-27 DIAGNOSIS — I15.0 RENOVASCULAR HYPERTENSION: Chronic | ICD-10-CM

## 2017-06-27 DIAGNOSIS — N18.6 ESRD (END STAGE RENAL DISEASE) ON DIALYSIS: ICD-10-CM

## 2017-06-27 DIAGNOSIS — K65.9: Primary | ICD-10-CM

## 2017-06-27 DIAGNOSIS — E55.9 VITAMIN D DEFICIENCY: ICD-10-CM

## 2017-06-27 DIAGNOSIS — D63.1 ANEMIA IN ESRD (END-STAGE RENAL DISEASE): Chronic | ICD-10-CM

## 2017-06-27 DIAGNOSIS — Z99.2 PERITONEAL DIALYSIS STATUS: ICD-10-CM

## 2017-06-27 DIAGNOSIS — K59.09 CHRONIC CONSTIPATION: ICD-10-CM

## 2017-06-27 DIAGNOSIS — N25.81 SECONDARY HYPERPARATHYROIDISM: ICD-10-CM

## 2017-06-27 PROBLEM — I95.9 HYPOTENSION: Status: RESOLVED | Noted: 2017-06-20 | Resolved: 2017-06-27

## 2017-06-27 PROBLEM — R10.84 GENERALIZED ABDOMINAL PAIN: Status: RESOLVED | Noted: 2017-06-22 | Resolved: 2017-06-27

## 2017-06-27 PROCEDURE — 99999 PR PBB SHADOW E&M-EST. PATIENT-LVL III: CPT | Mod: PBBFAC,,,

## 2017-06-27 PROCEDURE — 99496 TRANSJ CARE MGMT HIGH F2F 7D: CPT | Mod: S$PBB,,, | Performed by: INTERNAL MEDICINE

## 2017-06-27 PROCEDURE — 99213 OFFICE O/P EST LOW 20 MIN: CPT | Mod: PBBFAC

## 2017-06-27 PROCEDURE — 99496 TRANSJ CARE MGMT HIGH F2F 7D: CPT | Mod: PBBFAC

## 2017-06-27 RX ORDER — ACETAMINOPHEN 500 MG
1 TABLET ORAL NIGHTLY
COMMUNITY
Start: 2017-06-27 | End: 2018-09-11
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
## 2017-06-27 NOTE — Clinical Note
[facility] Visit (Post Discharge Follow-up) Today:  - Our clinic physicians and nurses plan to follow the patient up for any medical issues in the [facility] for 30 days post discharge.  Future Appointments: 8/24/2017  3:00 PM    Saurabh Zuleta MD          [facility]        [facility]

## 2017-06-28 LAB
HBV DNA SERPL NAA+PROBE-ACNC: 1.98 LOGIU/ML
HBV DNA SERPL NAA+PROBE-LOG IU: 96 IU/ML

## 2017-06-29 NOTE — DISCHARGE SUMMARY
[facility] Hospital Medicine  Discharge Summary      Patient Name: Maksim Hidalgo  MRN: [id number]  Admission Date: 6/13/2017  Hospital Length of Stay: 10 days  Discharge Date and Time: 6/23/2017 12:54 PM  Attending Physician: No att. providers found   Discharging Provider: Joya Maldonado MD  Primary Care Provider: Joya Zuleta MD    [facility] Medicine Team: [facility]  Joya Maldonado MD    HPI:     37 y.o. male presented from the Surgery clinic with past medical history of ESRD on PD, hypertension, LVH/diastolic dysfunction, Hep B.  He was in his usual state of good health until the acute onset of generalized abdominal pain beginning 3 days prior to admission with gradually worsening course. Pertinent associated symptoms include difficulties with draining dialysate for several days, and recent sinusitis treated with oral doxycycline started 3 days ago. Patient presented to Dr. Gillespie's clinic today with abdominal pain, dysuria, and inability to completely drain dialysate.    * No surgery found *      Indwelling Lines/Drains at time of discharge:   Lines/Drains/Airways          No matching active lines, drains, or airways        Hospital Course:     He was treated for peritonitis with vancomycin infusions during PD.  He was seen by general surgery, GI, and nephrology.  His cath was left in place and he was discharged home and will complete a course of abx with nephrology as an outpatient.  Serology for H pylori is pending at the time of discharge.  He will follow with his PCP.      Consults:   Consults         Status Ordering Provider     Inpatient consult to Gastroenterology  Once     Provider:  (Not yet assigned)    Completed JOYA MALDONADO     Inpatient consult to Nephrology  Once     Provider:  (Not yet assigned)    Completed LINSEY ENGLE            Pending Diagnostic Studies:     None        Final Active Diagnoses:    Diagnosis Date Noted POA    PRINCIPAL PROBLEM:   Chronic hepatitis B virus infection [B18.1] 12/03/2015 Yes    Peritoneal dialysis catheter in place [Z99.2]  Not Applicable    Anemia in ESRD (end-stage renal disease) [N18.6, D63.1]  Yes     Chronic    Chronic constipation [K59.09] 10/04/2016 Yes    Peritonitis of undetermined cause [K65.9] 09/20/2016 Yes    Secondary hyperparathyroidism [N25.81] 05/27/2016 Yes    LVH (left ventricular hypertrophy) due to hypertensive disease [I11.9] 11/30/2015 Yes      Problems Resolved During this Admission:    Diagnosis Date Noted Date Resolved POA    Generalized abdominal pain [R10.84] 06/22/2017 06/27/2017 Yes    Hypotension [I95.9] 06/20/2017 06/27/2017 Yes    Peritoneal dialysis catheter dysfunction [T85.611A]  06/27/2017 Yes    Pain of upper abdomen [R10.10] 09/19/2016 06/27/2017 Yes     Chronic    Diastolic dysfunction [I51.9] 02/25/2016 06/27/2017 Yes        Disposition: Home or Self Care    Follow Up:    Patient Instructions:     Diet general       Medications:  Reconciled Home Medications:   Discharge Medication List as of 6/23/2017 12:33 PM      START taking these medications    Details   fluconazole (DIFLUCAN) 200 MG Tab Take 1 tablet (200 mg total) by mouth every other day., Starting Fri 6/23/2017, Normal      pantoprazole (PROTONIX) 40 MG tablet Take 1 tablet (40 mg total) by mouth once daily., Starting Fri 6/23/2017, Until Sat 6/23/2018, Normal      sevelamer carbonate (RENVELA) 800 mg Tab Take 1 tablet (800 mg total) by mouth 3 (three) times daily with meals., Starting Fri 6/23/2017, Until Sat 6/23/2018, Normal         CONTINUE these medications which have CHANGED    Details   INV lisinopril 10 MG Tab Take 1 tablet (10 mg total) by mouth once daily., Starting Fri 6/23/2017, Until Sat 6/23/2018, Normal         CONTINUE these medications which have NOT CHANGED    Details   albuterol 90 mcg/actuation inhaler Inhale 2 puffs into the lungs every 4 (four) hours as needed for Wheezing., Starting Sun 6/4/2017,  Normal      calcitRIOL (ROCALTROL) 0.5 MCG Cap Take 1 capsule (0.5 mcg total) by mouth once daily., Starting 4/13/2016, Until Discontinued, Normal      furosemide (LASIX) 40 MG tablet Take 1 tablet (40 mg total) by mouth once daily., Starting 3/20/2017, Until Tue 3/20/18, No Print      oxycodone-acetaminophen (PERCOCET) 5-325 mg per tablet Take 1 tablet by mouth every 4 (four) hours as needed for Pain., Starting 6/15/2016, Until Discontinued, Print      senna-docusate 8.6-50 mg (PERICOLACE) 8.6-50 mg per tablet Take 2 tablets by mouth every evening., Starting 9/29/2016, Until Discontinued, OTC      polyethylene glycol (GLYCOLAX) 17 gram/dose powder Take 17 g by mouth 3 (three) times daily., Starting 11/23/2016, Until Discontinued, No Print      benzonatate (TESSALON) 200 MG capsule Take 1 capsule (200 mg total) by mouth every evening., Starting Sun 6/4/2017, Normal         STOP taking these medications       amlodipine (NORVASC) 10 MG tablet Comments:   Reason for Stopping:         carvedilol (COREG) 25 MG tablet Comments:   Reason for Stopping:         doxycycline (MONODOX) 100 MG capsule Comments:   Reason for Stopping:             Time spent on the discharge of patient: 35 minutes    Saurabh Maldonado MD  Department of Hospital Medicine  [facility]

## 2017-07-06 ENCOUNTER — TELEPHONE (OUTPATIENT)
Dept: SURGERY | Facility: CLINIC | Age: 37
End: 2017-07-06

## 2017-07-06 ENCOUNTER — ANESTHESIA EVENT (OUTPATIENT)
Dept: SURGERY | Facility: HOSPITAL | Age: 37
End: 2017-07-06
Payer: MEDICARE

## 2017-07-06 ENCOUNTER — HOSPITAL ENCOUNTER (OUTPATIENT)
Dept: RADIOLOGY | Facility: HOSPITAL | Age: 37
Discharge: HOME | End: 2017-07-06
Attending: SURGERY
Payer: MEDICARE

## 2017-07-06 ENCOUNTER — OFFICE VISIT (OUTPATIENT)
Dept: SURGERY | Facility: CLINIC | Age: 37
End: 2017-07-06
Payer: MEDICARE

## 2017-07-06 VITALS
HEIGHT: 70 IN | DIASTOLIC BLOOD PRESSURE: 107 MMHG | WEIGHT: 220 LBS | SYSTOLIC BLOOD PRESSURE: 155 MMHG | HEART RATE: 94 BPM | BODY MASS INDEX: 31.5 KG/M2

## 2017-07-06 DIAGNOSIS — Z99.2 ESRD (END STAGE RENAL DISEASE) ON DIALYSIS: Primary | ICD-10-CM

## 2017-07-06 DIAGNOSIS — T85.611A PERITONEAL DIALYSIS CATHETER DYSFUNCTION, INITIAL ENCOUNTER: ICD-10-CM

## 2017-07-06 DIAGNOSIS — N18.6 ESRD (END STAGE RENAL DISEASE) ON DIALYSIS: ICD-10-CM

## 2017-07-06 DIAGNOSIS — N18.6 ESRD (END STAGE RENAL DISEASE) ON DIALYSIS: Primary | ICD-10-CM

## 2017-07-06 DIAGNOSIS — R30.0 DYSURIA: ICD-10-CM

## 2017-07-06 DIAGNOSIS — Z99.2 ESRD (END STAGE RENAL DISEASE) ON DIALYSIS: ICD-10-CM

## 2017-07-06 PROCEDURE — 86829 HLA CLASS I/II ANTIBODY QUAL: CPT | Mod: 91,PO,TXP

## 2017-07-06 PROCEDURE — 74020 XR ABDOMEN FLAT AND ERECT: CPT | Mod: TC,TXP

## 2017-07-06 PROCEDURE — 74020 XR ABDOMEN FLAT AND ERECT: CPT | Mod: 26,NTX,, | Performed by: RADIOLOGY

## 2017-07-06 PROCEDURE — 99213 OFFICE O/P EST LOW 20 MIN: CPT | Mod: S$PBB,NTX,, | Performed by: SURGERY

## 2017-07-06 PROCEDURE — 99999 PR PBB SHADOW E&M-EST. PATIENT-LVL IV: CPT | Mod: PBBFAC,TXP,, | Performed by: SURGERY

## 2017-07-06 PROCEDURE — 86829 HLA CLASS I/II ANTIBODY QUAL: CPT | Mod: PO,TXP

## 2017-07-06 NOTE — PROGRESS NOTES
Subjective:       Patient ID: Maksim Hidalgo is a 37 y.o. male.    Chief Complaint: Pain (pd cath site)    HPI s/p pd for esrd 6/16.  He was hospitalized for pain last month and that resolved with antibiotics and was well until last week.  He developed pain in left groin and buttocks.  The pain is about every other day or so and is spontaneous but worsens with urinating.  He lays down on his side to try to improve the pain.  He has been doing pd without difficulty until yesterday when he was not able to drain.  Review of Systems   Constitutional: Negative for fever.        Gets sweats    Respiratory: Negative for chest tightness.    Cardiovascular: Negative for chest pain.   Gastrointestinal: Negative for constipation, diarrhea, nausea and vomiting.   Genitourinary: Negative for difficulty urinating.   Hematological: Does not bruise/bleed easily.       Objective:      Physical Exam   Constitutional: He is oriented to person, place, and time. He appears well-developed and well-nourished.   Cardiovascular: Normal rate, regular rhythm and normal heart sounds.    Pulmonary/Chest: Effort normal and breath sounds normal.   Abdominal: Soft. Bowel sounds are normal. He exhibits distension.   Neurological: He is alert and oriented to person, place, and time.   Skin: Skin is warm and dry.   Psychiatric: He has a normal mood and affect. His behavior is normal. Judgment and thought content normal.   Vitals reviewed.      Assessment:       1. ESRD (end stage renal disease) on dialysis      Possible uti  Plan:       Obtain ua, c and s.  Obtain kub.   Will take to the OR for revision, removal, replacement of pd.

## 2017-07-06 NOTE — LETTER
[facility] - General Surgery  [address]  [place] [address]  Phone: [phone] July 6, 2017      Saurabh Zuleta MD  [address]  [place] [address]    Patient: Maksim Hidalgo   MR Number: [id number]   YOB: 1980   Date of Visit: 7/6/2017     Dear Dr. Zuleta:    Thank you for referring Maksim Hidalgo to me for evaluation. Below are the relevant portions of my assessment and plan of care.    ASSESSMENT:  1. ESRD (end stage renal disease) on dialysis &  possible UTI.         PLAN:   - Obtain UA  - C & S  - Obtain KUB  - Will take to the OR for revision, removal, replacement of PD    If you have questions, please do not hesitate to call me. I look forward to following Maksim along with you.    Sincerely,      Isai Gillespie MD   Section Head - General, Laparoscopic, Bariatric  Acute Care and Oncologic Surgery   - Surgical Weight Loss Program  [facility]    WSR/allison Garcia MD
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
## 2017-07-07 ENCOUNTER — HOSPITAL ENCOUNTER (OUTPATIENT)
Facility: HOSPITAL | Age: 37
Discharge: HOME | End: 2017-07-08
Attending: SURGERY | Admitting: SURGERY
Payer: MEDICARE

## 2017-07-07 ENCOUNTER — ANESTHESIA (OUTPATIENT)
Dept: SURGERY | Facility: HOSPITAL | Age: 37
End: 2017-07-07
Payer: MEDICARE

## 2017-07-07 ENCOUNTER — SURGERY (OUTPATIENT)
Age: 37
End: 2017-07-07

## 2017-07-07 DIAGNOSIS — Z99.2 ESRD (END STAGE RENAL DISEASE) ON DIALYSIS: ICD-10-CM

## 2017-07-07 DIAGNOSIS — N18.6 ESRD (END STAGE RENAL DISEASE) ON DIALYSIS: ICD-10-CM

## 2017-07-07 DIAGNOSIS — N18.6 ESRD (END STAGE RENAL DISEASE): Primary | ICD-10-CM

## 2017-07-07 DIAGNOSIS — Z99.2 PERITONEAL DIALYSIS STATUS: ICD-10-CM

## 2017-07-07 LAB
ANION GAP SERPL CALC-SCNC: 13 MMOL/L
BUN SERPL-MCNC: 41 MG/DL
CALCIUM SERPL-MCNC: 8.5 MG/DL
CHLORIDE SERPL-SCNC: 109 MMOL/L
CO2 SERPL-SCNC: 21 MMOL/L
CREAT SERPL-MCNC: 6.9 MG/DL
EST. GFR  (AFRICAN AMERICAN): 10.6 ML/MIN/1.73 M^2
EST. GFR  (NON AFRICAN AMERICAN): 9.1 ML/MIN/1.73 M^2
GLUCOSE SERPL-MCNC: 85 MG/DL (ref 70–110)
GLUCOSE SERPL-MCNC: 94 MG/DL
HCO3 UR-SCNC: 25.2 MMOL/L (ref 24–28)
HCT VFR BLD CALC: 32 %PCV (ref 36–54)
HPRA INTERPRETATION: NORMAL
MAGNESIUM SERPL-MCNC: 1.7 MG/DL
PCO2 BLDA: 45.1 MMHG (ref 35–45)
PH SMN: 7.35 [PH] (ref 7.35–7.45)
PHOSPHATE SERPL-MCNC: 3.4 MG/DL
PO2 BLDA: 37 MMHG (ref 40–60)
POC BE: 0 MMOL/L
POC IONIZED CALCIUM: 1.15 MMOL/L (ref 1.06–1.42)
POC SATURATED O2: 67 % (ref 95–100)
POC TCO2: 27 MMOL/L (ref 24–29)
POTASSIUM BLD-SCNC: 3.7 MMOL/L (ref 3.5–5.1)
POTASSIUM SERPL-SCNC: 4 MMOL/L
SAMPLE: ABNORMAL
SODIUM BLD-SCNC: 143 MMOL/L (ref 136–145)
SODIUM SERPL-SCNC: 143 MMOL/L

## 2017-07-07 PROCEDURE — 27000221 HC OXYGEN, UP TO 24 HOURS: Mod: TXP

## 2017-07-07 PROCEDURE — 77001 FLUOROGUIDE FOR VEIN DEVICE: CPT | Mod: 26,NTX,, | Performed by: INTERNAL MEDICINE

## 2017-07-07 PROCEDURE — D9220A PRA ANESTHESIA: Mod: NTX,,, | Performed by: ANESTHESIOLOGY

## 2017-07-07 PROCEDURE — 63600175 PHARM REV CODE 636 W HCPCS: Mod: TXP | Performed by: SURGERY

## 2017-07-07 PROCEDURE — 94761 N-INVAS EAR/PLS OXIMETRY MLT: CPT | Mod: TXP

## 2017-07-07 PROCEDURE — 27200950 HC CAPD SUPPORT: Mod: NTX

## 2017-07-07 PROCEDURE — 49325 LAP REVISION PERM IP CATH: CPT | Mod: GC,NTX,, | Performed by: SURGERY

## 2017-07-07 PROCEDURE — 71000039 HC RECOVERY, EACH ADD'L HOUR: Mod: TXP | Performed by: SURGERY

## 2017-07-07 PROCEDURE — 99214 OFFICE O/P EST MOD 30 MIN: CPT | Mod: 24,NTX,, | Performed by: NURSE PRACTITIONER

## 2017-07-07 PROCEDURE — 63600175 PHARM REV CODE 636 W HCPCS: Mod: TXP | Performed by: NURSE PRACTITIONER

## 2017-07-07 PROCEDURE — 37000009 HC ANESTHESIA EA ADD 15 MINS: Mod: TXP | Performed by: SURGERY

## 2017-07-07 PROCEDURE — 25000003 PHARM REV CODE 250: Mod: TXP | Performed by: NURSE PRACTITIONER

## 2017-07-07 PROCEDURE — 25000003 PHARM REV CODE 250: Mod: TXP | Performed by: STUDENT IN AN ORGANIZED HEALTH CARE EDUCATION/TRAINING PROGRAM

## 2017-07-07 PROCEDURE — 80048 BASIC METABOLIC PNL TOTAL CA: CPT | Mod: TXP

## 2017-07-07 PROCEDURE — 25000003 PHARM REV CODE 250: Mod: TXP | Performed by: SURGERY

## 2017-07-07 PROCEDURE — 76937 US GUIDE VASCULAR ACCESS: CPT | Mod: 26,NTX,, | Performed by: INTERNAL MEDICINE

## 2017-07-07 PROCEDURE — 63600175 PHARM REV CODE 636 W HCPCS: Mod: TXP | Performed by: STUDENT IN AN ORGANIZED HEALTH CARE EDUCATION/TRAINING PROGRAM

## 2017-07-07 PROCEDURE — 84100 ASSAY OF PHOSPHORUS: CPT | Mod: TXP

## 2017-07-07 PROCEDURE — 83735 ASSAY OF MAGNESIUM: CPT | Mod: TXP

## 2017-07-07 PROCEDURE — 71000033 HC RECOVERY, INTIAL HOUR: Mod: NTX | Performed by: SURGERY

## 2017-07-07 PROCEDURE — 36558 INSERT TUNNELED CV CATH: CPT | Mod: NTX,,, | Performed by: INTERNAL MEDICINE

## 2017-07-07 PROCEDURE — 36000709 HC OR TIME LEV III EA ADD 15 MIN: Mod: TXP | Performed by: SURGERY

## 2017-07-07 PROCEDURE — 27201423 OPTIME MED/SURG SUP & DEVICES STERILE SUPPLY: Mod: TXP | Performed by: SURGERY

## 2017-07-07 PROCEDURE — 77001 FLUOROGUIDE FOR VEIN DEVICE: CPT | Mod: TXP

## 2017-07-07 PROCEDURE — 37000008 HC ANESTHESIA 1ST 15 MINUTES: Mod: TXP | Performed by: SURGERY

## 2017-07-07 PROCEDURE — 63600175 PHARM REV CODE 636 W HCPCS: Mod: TXP

## 2017-07-07 PROCEDURE — 25000003 PHARM REV CODE 250: Mod: TXP

## 2017-07-07 PROCEDURE — 36000708 HC OR TIME LEV III 1ST 15 MIN: Mod: TXP | Performed by: SURGERY

## 2017-07-07 PROCEDURE — C1769 GUIDE WIRE: HCPCS | Mod: TXP

## 2017-07-07 PROCEDURE — 94760 N-INVAS EAR/PLS OXIMETRY 1: CPT | Mod: TXP

## 2017-07-07 RX ORDER — HYDRALAZINE HYDROCHLORIDE 25 MG/1
25 TABLET, FILM COATED ORAL EVERY 8 HOURS PRN
Status: DISCONTINUED | OUTPATIENT
Start: 2017-07-07 | End: 2017-07-08 | Stop reason: HOSPADM

## 2017-07-07 RX ORDER — HEPARIN SODIUM 1000 [USP'U]/ML
INJECTION, SOLUTION INTRAVENOUS; SUBCUTANEOUS
Status: DISCONTINUED | OUTPATIENT
Start: 2017-07-07 | End: 2017-07-07 | Stop reason: HOSPADM

## 2017-07-07 RX ORDER — AMOXICILLIN 250 MG
2 CAPSULE ORAL NIGHTLY
Status: DISCONTINUED | OUTPATIENT
Start: 2017-07-07 | End: 2017-07-08 | Stop reason: HOSPADM

## 2017-07-07 RX ORDER — LIDOCAINE HCL/PF 100 MG/5ML
SYRINGE (ML) INTRAVENOUS
Status: DISCONTINUED | OUTPATIENT
Start: 2017-07-07 | End: 2017-07-07

## 2017-07-07 RX ORDER — ONDANSETRON 2 MG/ML
INJECTION INTRAMUSCULAR; INTRAVENOUS
Status: DISCONTINUED | OUTPATIENT
Start: 2017-07-07 | End: 2017-07-07

## 2017-07-07 RX ORDER — BUPIVACAINE HYDROCHLORIDE 2.5 MG/ML
INJECTION, SOLUTION EPIDURAL; INFILTRATION; INTRACAUDAL
Status: DISCONTINUED | OUTPATIENT
Start: 2017-07-07 | End: 2017-07-07 | Stop reason: HOSPADM

## 2017-07-07 RX ORDER — LISINOPRIL 10 MG/1
10 TABLET ORAL DAILY
Status: DISCONTINUED | OUTPATIENT
Start: 2017-07-08 | End: 2017-07-08 | Stop reason: HOSPADM

## 2017-07-07 RX ORDER — GLYCOPYRROLATE 0.2 MG/ML
INJECTION INTRAMUSCULAR; INTRAVENOUS
Status: DISCONTINUED | OUTPATIENT
Start: 2017-07-07 | End: 2017-07-07

## 2017-07-07 RX ORDER — SEVELAMER CARBONATE 800 MG/1
800 TABLET, FILM COATED ORAL
Status: DISCONTINUED | OUTPATIENT
Start: 2017-07-07 | End: 2017-07-08 | Stop reason: HOSPADM

## 2017-07-07 RX ORDER — SODIUM CHLORIDE 9 MG/ML
INJECTION, SOLUTION INTRAVENOUS CONTINUOUS
Status: DISCONTINUED | OUTPATIENT
Start: 2017-07-07 | End: 2017-07-07

## 2017-07-07 RX ORDER — ONDANSETRON 2 MG/ML
4 INJECTION INTRAMUSCULAR; INTRAVENOUS DAILY PRN
Status: DISCONTINUED | OUTPATIENT
Start: 2017-07-07 | End: 2017-07-07 | Stop reason: HOSPADM

## 2017-07-07 RX ORDER — LABETALOL HYDROCHLORIDE 5 MG/ML
INJECTION, SOLUTION INTRAVENOUS
Status: DISCONTINUED | OUTPATIENT
Start: 2017-07-07 | End: 2017-07-07

## 2017-07-07 RX ORDER — NEOSTIGMINE METHYLSULFATE 1 MG/ML
INJECTION, SOLUTION INTRAVENOUS
Status: DISCONTINUED | OUTPATIENT
Start: 2017-07-07 | End: 2017-07-07

## 2017-07-07 RX ORDER — PROPOFOL 10 MG/ML
VIAL (ML) INTRAVENOUS
Status: DISCONTINUED | OUTPATIENT
Start: 2017-07-07 | End: 2017-07-07

## 2017-07-07 RX ORDER — PANTOPRAZOLE SODIUM 40 MG/1
40 TABLET, DELAYED RELEASE ORAL DAILY
Status: DISCONTINUED | OUTPATIENT
Start: 2017-07-08 | End: 2017-07-08 | Stop reason: HOSPADM

## 2017-07-07 RX ORDER — LIDOCAINE HYDROCHLORIDE 10 MG/ML
1 INJECTION, SOLUTION EPIDURAL; INFILTRATION; INTRACAUDAL; PERINEURAL ONCE
Status: COMPLETED | OUTPATIENT
Start: 2017-07-07 | End: 2017-07-07

## 2017-07-07 RX ORDER — FENTANYL CITRATE 50 UG/ML
INJECTION, SOLUTION INTRAMUSCULAR; INTRAVENOUS
Status: DISCONTINUED | OUTPATIENT
Start: 2017-07-07 | End: 2017-07-07

## 2017-07-07 RX ORDER — CEFAZOLIN SODIUM 1 G/3ML
INJECTION, POWDER, FOR SOLUTION INTRAMUSCULAR; INTRAVENOUS
Status: DISCONTINUED | OUTPATIENT
Start: 2017-07-07 | End: 2017-07-07

## 2017-07-07 RX ORDER — MIDAZOLAM HYDROCHLORIDE 1 MG/ML
INJECTION, SOLUTION INTRAMUSCULAR; INTRAVENOUS
Status: DISCONTINUED | OUTPATIENT
Start: 2017-07-07 | End: 2017-07-07

## 2017-07-07 RX ORDER — CISATRACURIUM BESYLATE 10 MG/ML
INJECTION, SOLUTION INTRAVENOUS
Status: DISCONTINUED | OUTPATIENT
Start: 2017-07-07 | End: 2017-07-07

## 2017-07-07 RX ORDER — HYDROMORPHONE HYDROCHLORIDE 1 MG/ML
0.2 INJECTION, SOLUTION INTRAMUSCULAR; INTRAVENOUS; SUBCUTANEOUS EVERY 5 MIN PRN
Status: DISCONTINUED | OUTPATIENT
Start: 2017-07-07 | End: 2017-07-07 | Stop reason: HOSPADM

## 2017-07-07 RX ORDER — OXYCODONE AND ACETAMINOPHEN 5; 325 MG/1; MG/1
1 TABLET ORAL EVERY 4 HOURS PRN
Qty: 15 TABLET | Refills: 0 | Status: ON HOLD | OUTPATIENT
Start: 2017-07-07 | End: 2017-08-03 | Stop reason: HOSPADM

## 2017-07-07 RX ORDER — FUROSEMIDE 40 MG/1
40 TABLET ORAL DAILY
Status: DISCONTINUED | OUTPATIENT
Start: 2017-07-08 | End: 2017-07-08 | Stop reason: HOSPADM

## 2017-07-07 RX ORDER — CALCITRIOL 0.25 UG/1
0.5 CAPSULE ORAL DAILY
Status: DISCONTINUED | OUTPATIENT
Start: 2017-07-08 | End: 2017-07-08 | Stop reason: HOSPADM

## 2017-07-07 RX ORDER — OXYCODONE AND ACETAMINOPHEN 5; 325 MG/1; MG/1
1 TABLET ORAL EVERY 4 HOURS PRN
Status: DISCONTINUED | OUTPATIENT
Start: 2017-07-07 | End: 2017-07-08

## 2017-07-07 RX ORDER — ACETAMINOPHEN 10 MG/ML
INJECTION, SOLUTION INTRAVENOUS
Status: DISCONTINUED | OUTPATIENT
Start: 2017-07-07 | End: 2017-07-07

## 2017-07-07 RX ADMIN — BUPIVACAINE HYDROCHLORIDE 3 ML: 2.5 INJECTION, SOLUTION EPIDURAL; INFILTRATION; INTRACAUDAL; PERINEURAL at 10:07

## 2017-07-07 RX ADMIN — HYDROMORPHONE HYDROCHLORIDE 0.2 MG: 1 INJECTION, SOLUTION INTRAMUSCULAR; INTRAVENOUS; SUBCUTANEOUS at 12:07

## 2017-07-07 RX ADMIN — LABETALOL HYDROCHLORIDE 5 MG: 5 INJECTION, SOLUTION INTRAVENOUS at 10:07

## 2017-07-07 RX ADMIN — SODIUM CHLORIDE, SODIUM LACTATE, CALCIUM CHLORIDE, MAGNESIUM CHLORIDE AND DEXTROSE: 1.5; 538; 448; 18.3; 5.08 INJECTION, SOLUTION INTRAPERITONEAL at 06:07

## 2017-07-07 RX ADMIN — HYDRALAZINE HYDROCHLORIDE 25 MG: 25 TABLET, FILM COATED ORAL at 07:07

## 2017-07-07 RX ADMIN — MIDAZOLAM HYDROCHLORIDE 1 MG: 1 INJECTION, SOLUTION INTRAMUSCULAR; INTRAVENOUS at 09:07

## 2017-07-07 RX ADMIN — CEFAZOLIN 3 G: 1 INJECTION, POWDER, FOR SOLUTION INTRAVENOUS at 09:07

## 2017-07-07 RX ADMIN — CISATRACURIUM BESYLATE 14 MG: 10 INJECTION INTRAVENOUS at 09:07

## 2017-07-07 RX ADMIN — ONDANSETRON 4 MG: 2 INJECTION INTRAMUSCULAR; INTRAVENOUS at 10:07

## 2017-07-07 RX ADMIN — NEOSTIGMINE METHYLSULFATE 5 MG: 1 INJECTION INTRAVENOUS at 10:07

## 2017-07-07 RX ADMIN — SEVELAMER CARBONATE 800 MG: 800 TABLET, FILM COATED ORAL at 07:07

## 2017-07-07 RX ADMIN — LIDOCAINE HYDROCHLORIDE 10 MG: 10 INJECTION, SOLUTION EPIDURAL; INFILTRATION; INTRACAUDAL; PERINEURAL at 08:07

## 2017-07-07 RX ADMIN — ACETAMINOPHEN 1000 MG: 10 INJECTION, SOLUTION INTRAVENOUS at 10:07

## 2017-07-07 RX ADMIN — FENTANYL CITRATE 100 MCG: 50 INJECTION, SOLUTION INTRAMUSCULAR; INTRAVENOUS at 09:07

## 2017-07-07 RX ADMIN — PROPOFOL 100 MG: 10 INJECTION, EMULSION INTRAVENOUS at 09:07

## 2017-07-07 RX ADMIN — HEPARIN SODIUM 5000 UNITS: 1000 INJECTION, SOLUTION INTRAVENOUS; SUBCUTANEOUS at 10:07

## 2017-07-07 RX ADMIN — FENTANYL CITRATE 50 MCG: 50 INJECTION, SOLUTION INTRAMUSCULAR; INTRAVENOUS at 09:07

## 2017-07-07 RX ADMIN — SODIUM CHLORIDE: 0.9 INJECTION, SOLUTION INTRAVENOUS at 08:07

## 2017-07-07 RX ADMIN — LIDOCAINE HYDROCHLORIDE 80 MG: 20 INJECTION, SOLUTION INTRAVENOUS at 09:07

## 2017-07-07 RX ADMIN — STANDARDIZED SENNA CONCENTRATE AND DOCUSATE SODIUM 2 TABLET: 8.6; 5 TABLET, FILM COATED ORAL at 08:07

## 2017-07-07 RX ADMIN — GLYCOPYRROLATE 0.6 MG: 0.2 INJECTION, SOLUTION INTRAMUSCULAR; INTRAVENOUS at 10:07

## 2017-07-07 NOTE — TRANSFER OF CARE
"Anesthesia Transfer of Care Note    Patient: Maksim Hidalgo    Procedure(s) Performed: Procedure(s) (LRB):  REVISION-CATHETER-DIALYSIS-PERITONEAL-LAPAROSCOPIC (N/A)    Patient location: PACU    Anesthesia Type: general    Transport from OR: Transported from OR on 6-10 L/min O2 by face mask with adequate spontaneous ventilation    Post pain: adequate analgesia    Post assessment: no apparent anesthetic complications    Post vital signs: stable    Level of consciousness: awake and alert    Nausea/Vomiting: no nausea/vomiting    Complications: none    Transfer of care protocol was followed      Last vitals:   Visit Vitals  BP (!) 144/94 (BP Location: Right arm, Patient Position: Lying, BP Method: Automatic)   Pulse 74   Temp 36.7 °C (98 °F) (Oral)   Resp 18   Ht 5' 9" (1.753 m)   SpO2 100%   BMI 32.49 kg/m²     "

## 2017-07-07 NOTE — PROGRESS NOTES
Patient returned to OBS unit via stretcher. Vitals taken. Patient is AAO. Pain assessment performed. Pt states he is having pain but he refuses pain medication at this time. Dressing to right chest wall has a scant amount of drainage. Abdominal dressing CDI and steri strips are intact.

## 2017-07-07 NOTE — ANESTHESIA RELEASE NOTE
"Anesthesia Release from PACU Note    Patient: Maksim Hidalgo    Procedure(s) Performed: Procedure(s) (LRB):  REVISION-CATHETER-DIALYSIS-PERITONEAL-LAPAROSCOPIC (N/A)    Anesthesia type: general    Post pain: Adequate analgesia    Post assessment: no apparent anesthetic complications, tolerated procedure well and no evidence of recall    Last Vitals:   Visit Vitals  BP (!) 166/94   Pulse 60   Temp 36.8 °C (98.3 °F) (Temporal)   Resp 15   Ht 5' 9" (1.753 m)   SpO2 100%   BMI 32.49 kg/m²       Post vital signs: stable    Level of consciousness: awake    Nausea/Vomiting: no nausea/no vomiting    Complications: none    Airway Patency: patent    Respiratory: unassisted, spontaneous ventilation, room air    Cardiovascular: stable and blood pressure at baseline    Hydration: euvolemic  "

## 2017-07-07 NOTE — PROGRESS NOTES
Rec'd call from chem lab, green top tube is grossly hemolyzed. BMP, Mag, Phos to be re-ordered and re-drawn. Will carry out and cont. To monitor.

## 2017-07-07 NOTE — NURSING TRANSFER
Nursing Transfer Note      7/7/2017     Transfer OBS 5    Transfer via bed    Transfer with IV fluids TKO, belongings bag x1 and blue backpack    Transported by tech    Medicines sent: na    Chart send with patient: yes    Notified: Sharon in interventional nephrologist    Patient reassessed at: 7/7 @ 5533

## 2017-07-07 NOTE — PROGRESS NOTES
"Clarified with Britt MCCULLOUGH RN charge nurse if dialysis consent needed for peritoneal dialysis; no confirmation if patient being admitted currently. "No consent needed at this time" per Britt MCCULLOUGH charge nurse.  "
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
## 2017-07-07 NOTE — PROGRESS NOTES
"Sharon RN with IR department spoken with regarding next step for patients recovery. Sharon states "Patient will be seen down here for a catheter placement after admit orders are placed." Dr Parker paged for orders. Will continue to monitor patient in stable condition.      1245: Admit orders in. Attempted to reach Sharon at [phone]. Unable to reach. Will continue to monitor patient in stable condition  "

## 2017-07-07 NOTE — HPI
Mr. Hidalgo is a 38 yo AAM with HTN, Hep. B, ESRD on PD who was admitted to hospital today for revision of his peritoneal dialysis catheter.  Over the past month, patient has been having problems with draining of his effluent.  He has been able to instill fluid without problems, but unable to drain.  Decision was made by his outpatient Nephrologist and Dr. Gillespie to have PD catheter revised and to transition to iHD until peritoneum is healed.  Tunneled dialysis catheter was placed by ERIN after surgery.  We were consulted for initiation of HD.

## 2017-07-07 NOTE — PLAN OF CARE
Problem: Patient Care Overview  Goal: Plan of Care Review  Outcome: Ongoing (interventions implemented as appropriate)  Pt on fall precautions. Yellow fall risk band and socks on pt. Instructed pt to call for assistance as needed and pt voiced understanding. Complains of a 2/10 soreness to the abdomen. Afebrile.

## 2017-07-07 NOTE — PROGRESS NOTES
Rec'd call from [facility] [id number] for Dr. Calderón, in interventional. Kaitlynn is to place a dialysis access today. Pt is still in PACU, sedated from PD cath revision. Will give info to primary RN, Adam, and inform IR when pt is recovered. VSS.

## 2017-07-07 NOTE — BRIEF OP NOTE
Operative Note       Surgery Date: 7/7/2017     Surgeon(s) and Role:     * Isai Gillespie MD - Primary     * Shahbaz Parker MD - Resident - Assisting    Pre-op Diagnosis:  ESRD (end stage renal disease) on dialysis [N18.6, Z99.2]  Peritoneal dialysis catheter dysfunction, initial encounter [T85.171A]    Post-op Diagnosis:  ESRD (end stage renal disease) on dialysis [N18.6, Z99.2]  Peritoneal dialysis catheter dysfunction, initial encounter [T85.611A]    Procedure(s) (LRB):  REVISION-CATHETER-DIALYSIS-PERITONEAL-LAPAROSCOPIC (N/A)    Anesthesia: General    Procedure in Detail/Findings:  Adhesions around catheter in pelvis.  Repositioned cephalad and checked.    Estimated Blood Loss: Minimal           Specimens     None        Implants:   Implant Name Type Inv. Item Serial No.  Lot No. LRB No. Used                               Disposition: PACU - hemodynamically stable.           Condition: Good    Attestation:  I was present and scrubbed for the entire procedure.

## 2017-07-07 NOTE — PROGRESS NOTES
Received pt to floor from PACU via stretcher accompanied by escort. Sleepy but easily aroused. Oriented x 4. Complains of a 2/10 soreness to the abdomen. Respirations even and unlabored. Dressing to the abdomen clean, dry and intact. No distress noted. Pt stable. Pt oriented to room and call bell placed within reach.
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
## 2017-07-07 NOTE — ASSESSMENT & PLAN NOTE
ESRD on PD  -Difficulty with CCPD over the past month with effluent drain problems.  -had PD catheter revision done this morning.  Tunneled dialysis catheter placed this afternoon.  -Will transition to iHD until peritoneum is healed and OK to use given by surgery.  -will provide 3 hour HD treatment tomorrow for metabolic clearance/volume management.  -UF1-2L as tolerated.    -Surgery is requesting to have vancomycin instilled into peritoneum tonight.   -will instill Vancomycin 1 gm IP in 500 ml of dianeal fluid tonight.   (4 gm in 2000 ml bag)    -Patient has already been setup as outpatient at [facility] on MWF scheduled at 1700.  Ok from renal standpoint for discharge tomorrow after dialysis treatment.

## 2017-07-07 NOTE — PROGRESS NOTES
Dr. Siddiqui at bedside discussing anesthesia, consent with patient; notified pt's /102 on the left and 144/94 on the right. No further instructions given at this time.     Pt denied hemodialysis in the past when asked by Dr. Siddiqui.

## 2017-07-07 NOTE — INTERVAL H&P NOTE
The patient has been examined and the H&P has been reviewed:    I concur with the findings and no changes have occurred since H&P was written.   Potassium WNL will proceed with surgery    Anesthesia/Surgery risks, benefits and alternative options discussed and understood by patient/family.          Active Hospital Problems    Diagnosis  POA    ESRD (end stage renal disease) [N18.6]  Yes      Resolved Hospital Problems    Diagnosis Date Resolved POA   No resolved problems to display.

## 2017-07-07 NOTE — PROCEDURES
DATE OF PROCEDURE: 7/7/17    PROCEDURE TYPE: Placement of right internal jugular tunneled dialysis catheter.     INDICATIONS: Hemodialysis    PROCEDURE NOTE: After informed consent was obtained, the area of Mr. Hidalgo  right internal jugular vein and right chest was sterilely prepped and draped in   the usual fashion. Ultrasound was used to interrogate the right internal   jugular vein, which was found to be patent. Photodocumentation was obtained.   Anesthesia was then obtained with 2% lidocaine with epinephrine and using   ultrasound guidance, the right internal jugular vein was successfully   cannulated. A 4-Cypriot sheath was then established and a J-wire was advanced to   the inferior vena cava using fluoroscopic guidance. Manometry pressures were consistent with venous system. A suitable area on the right chest was then selected; anesthesia again obtained with 2% lidocaine with epinephrine and an exit site and tunnel was then constructed. A  23  cm glide path  catheter was then tunneled in place. The intravascular portion was placed via a tear-away sheath. The tip of the catheter was confirmed to be in the center of the right atrium via fluoroscopy. There was excellent aspiration and flush through both ports. The venotomy site was then closed with 3-0 Vicryl in a subcuticular stitch and the exit site was closed with 3-0 Prolene in a wrapped   stitch. He tolerated the procedure well. There were no immediate   complications. Estimated blood loss was less than 5 mL. Total sedation given   was 1mg of Versed and 50 mcg of fentanyl. He was then discharged   from the Nephrology Access Suite in stable condition.
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
## 2017-07-08 VITALS
RESPIRATION RATE: 18 BRPM | OXYGEN SATURATION: 95 % | HEART RATE: 74 BPM | TEMPERATURE: 99 F | WEIGHT: 206 LBS | DIASTOLIC BLOOD PRESSURE: 98 MMHG | BODY MASS INDEX: 29.49 KG/M2 | SYSTOLIC BLOOD PRESSURE: 144 MMHG | HEIGHT: 70 IN

## 2017-07-08 LAB
ANION GAP SERPL CALC-SCNC: 9 MMOL/L
BASOPHILS # BLD AUTO: 0.01 K/UL
BASOPHILS NFR BLD: 0.2 %
BUN SERPL-MCNC: 32 MG/DL
CALCIUM SERPL-MCNC: 8.1 MG/DL
CHLORIDE SERPL-SCNC: 108 MMOL/L
CO2 SERPL-SCNC: 25 MMOL/L
CREAT SERPL-MCNC: 5.3 MG/DL
DIFFERENTIAL METHOD: ABNORMAL
EOSINOPHIL # BLD AUTO: 0.3 K/UL
EOSINOPHIL NFR BLD: 4.4 %
ERYTHROCYTE [DISTWIDTH] IN BLOOD BY AUTOMATED COUNT: 12.3 %
EST. GFR  (AFRICAN AMERICAN): 14.5 ML/MIN/1.73 M^2
EST. GFR  (NON AFRICAN AMERICAN): 12.6 ML/MIN/1.73 M^2
GLUCOSE SERPL-MCNC: 97 MG/DL
HCT VFR BLD AUTO: 30.8 %
HGB BLD-MCNC: 10.5 G/DL
LYMPHOCYTES # BLD AUTO: 1.3 K/UL
LYMPHOCYTES NFR BLD: 20.2 %
MAGNESIUM SERPL-MCNC: 1.4 MG/DL
MCH RBC QN AUTO: 29.5 PG
MCHC RBC AUTO-ENTMCNC: 34.1 %
MCV RBC AUTO: 87 FL
MONOCYTES # BLD AUTO: 0.3 K/UL
MONOCYTES NFR BLD: 4.4 %
NEUTROPHILS # BLD AUTO: 4.5 K/UL
NEUTROPHILS NFR BLD: 70.6 %
PHOSPHATE SERPL-MCNC: 2 MG/DL
PLATELET # BLD AUTO: 229 K/UL
PMV BLD AUTO: 9.4 FL
POTASSIUM SERPL-SCNC: 3.7 MMOL/L
RBC # BLD AUTO: 3.56 M/UL
SODIUM SERPL-SCNC: 142 MMOL/L
WBC # BLD AUTO: 6.35 K/UL

## 2017-07-08 PROCEDURE — 80100016 HC MAINTENANCE HEMODIALYSIS: Mod: TXP

## 2017-07-08 PROCEDURE — 83735 ASSAY OF MAGNESIUM: CPT | Mod: TXP

## 2017-07-08 PROCEDURE — 25000003 PHARM REV CODE 250: Mod: TXP | Performed by: STUDENT IN AN ORGANIZED HEALTH CARE EDUCATION/TRAINING PROGRAM

## 2017-07-08 PROCEDURE — 25000003 PHARM REV CODE 250: Mod: TXP | Performed by: SURGERY

## 2017-07-08 PROCEDURE — 36415 COLL VENOUS BLD VENIPUNCTURE: CPT | Mod: TXP

## 2017-07-08 PROCEDURE — 84100 ASSAY OF PHOSPHORUS: CPT | Mod: TXP

## 2017-07-08 PROCEDURE — 80048 BASIC METABOLIC PNL TOTAL CA: CPT | Mod: TXP

## 2017-07-08 PROCEDURE — 27000207 HC ISOLATION: Mod: TXP

## 2017-07-08 PROCEDURE — G0257 UNSCHED DIALYSIS ESRD PT HOS: HCPCS | Mod: NTX

## 2017-07-08 PROCEDURE — 63600175 PHARM REV CODE 636 W HCPCS: Mod: TXP | Performed by: INTERNAL MEDICINE

## 2017-07-08 PROCEDURE — 85025 COMPLETE CBC W/AUTO DIFF WBC: CPT | Mod: TXP

## 2017-07-08 RX ORDER — GENTAMICIN SULFATE 40 MG/ML
80 INJECTION, SOLUTION INTRAMUSCULAR; INTRAVENOUS
Status: DISCONTINUED | OUTPATIENT
Start: 2017-07-08 | End: 2017-07-08 | Stop reason: HOSPADM

## 2017-07-08 RX ORDER — OXYCODONE AND ACETAMINOPHEN 5; 325 MG/1; MG/1
2 TABLET ORAL EVERY 4 HOURS PRN
Status: DISCONTINUED | OUTPATIENT
Start: 2017-07-08 | End: 2017-07-08 | Stop reason: HOSPADM

## 2017-07-08 RX ORDER — OXYCODONE AND ACETAMINOPHEN 5; 325 MG/1; MG/1
1 TABLET ORAL EVERY 4 HOURS PRN
Status: DISCONTINUED | OUTPATIENT
Start: 2017-07-08 | End: 2017-07-08 | Stop reason: HOSPADM

## 2017-07-08 RX ADMIN — OXYCODONE HYDROCHLORIDE AND ACETAMINOPHEN 1 TABLET: 5; 325 TABLET ORAL at 12:07

## 2017-07-08 RX ADMIN — HYDRALAZINE HYDROCHLORIDE 25 MG: 25 TABLET, FILM COATED ORAL at 04:07

## 2017-07-08 RX ADMIN — CALCITRIOL 0.5 MCG: 0.25 CAPSULE, LIQUID FILLED ORAL at 12:07

## 2017-07-08 RX ADMIN — FUROSEMIDE 40 MG: 40 TABLET ORAL at 12:07

## 2017-07-08 RX ADMIN — LISINOPRIL 10 MG: 10 TABLET ORAL at 12:07

## 2017-07-08 RX ADMIN — GENTAMICIN SULFATE 80 MG: 40 INJECTION, SOLUTION INTRAMUSCULAR; INTRAVENOUS at 10:07

## 2017-07-08 RX ADMIN — SEVELAMER CARBONATE 800 MG: 800 TABLET, FILM COATED ORAL at 12:07

## 2017-07-08 RX ADMIN — OXYCODONE HYDROCHLORIDE AND ACETAMINOPHEN 1 TABLET: 5; 325 TABLET ORAL at 07:07

## 2017-07-08 RX ADMIN — PANTOPRAZOLE SODIUM 40 MG: 40 TABLET, DELAYED RELEASE ORAL at 12:07

## 2017-07-08 NOTE — NURSING
Report given to Mireya CROOK in dialysis. Pt to dialysis via w/c. Pt accompanied by transport associate. No distress noted.

## 2017-07-08 NOTE — NURSING
Pt returned from dialysis via w/c. Pt accompanied by transport associate. No distress noted. Received report from Mireya CROOK.

## 2017-07-08 NOTE — NURSING
Order to d/c home today. Saline lock d/c. Discharge instructions given to pt. Pt verbalized understanding. Pt discharged from OBS unit ambulating accompanied by RN. All personal belongings taken home by pt.

## 2017-07-08 NOTE — DISCHARGE SUMMARY
[facility]  General Surgery  Discharge Summary      Patient Name: Maksim Hidalgo  MRN: [id number]  Admission Date: 7/7/2017  Hospital Length of Stay: 0 days  Discharge Date and Time:  07/08/2017 12:58 PM  Attending Physician: Isai Gillespie MD   Discharging Provider: Efe Mendieta MD  Primary Care Provider: Saurabh Zuleta MD    HPI:   HPI s/p pd for esrd 6/16.  He was hospitalized for pain last month and that resolved with antibiotics and was well until last week.  He developed pain in left groin and buttocks.  The pain is about every other day or so and is spontaneous but worsens with urinating.  He lays down on his side to try to improve the pain.  He has been doing pd without difficulty until recently when he was not able to drain. Elected to undergo revision of PD catheter.    Procedure(s) (LRB):  REVISION-CATHETER-DIALYSIS-PERITONEAL-LAPAROSCOPIC (N/A)      Indwelling Lines/Drains at time of discharge:   Lines/Drains/Airways     Central Venous Catheter Line                 Percutaneous Central Line Insertion/Assessment - double lumen  07/07/17 1600 right internal jugular less than 1 day              Hospital Course: PD cath revision 7/7/17  vancomycin instilled in pd cath on 7/7/17  Perm cath placement on 7/7/17  HD on 7/8/17    This patient was admitted to the hospital following a peritoneal dialysis catheter revision, and a perm cath placement. He tolerated this procedure with no apparent complications. He was transferred to the floor where he underwent a normal post operative recovery. He was advanced to a regular diet, and he tolerated this without any difficulties. His pain was controlled with po medications, he was tolerating ambulation, and he had regular bowel and bladder function. He was discharged home in good condition, with follow up in 2 weeks.       Consults:   Consults         Status Ordering Provider     Inpatient consult to Nephrology  Once     Provider:  Agustín RIVERA  MD Kaitlynn    Completed JOVANNI LYN          Significant Diagnostic Studies: see epic      Pending Diagnostic Studies:     Procedure Component Value Units Date/Time    WBC & Diff,Body Fluid Dialysate [[id number]] Collected:  07/07/17 1021    Order Status:  Sent Lab Status:  In process Updated:  07/07/17 1022    Specimen:  Body Fluid         Final Active Diagnoses:    Diagnosis Date Noted POA    PRINCIPAL PROBLEM:  ESRD (end stage renal disease) [N18.6] 07/07/2017 Yes      Problems Resolved During this Admission:    Diagnosis Date Noted Date Resolved POA      Discharged Condition: good    Disposition: Home or Self Care    Follow Up:  Follow-up Information     Isai Gillespie MD In 2 weeks.    Specialties:  General Surgery, Bariatrics  [facility] [address] [phone]                 Patient Instructions:     Diet general     Activity as tolerated     Lifting restrictions   Order Comments: Not more than 10 pounds for 6 weeks     Call MD for:  temperature >100.4     Call MD for:  persistent nausea and vomiting or diarrhea     Call MD for:  severe uncontrolled pain     Call MD for:  difficulty breathing or increased cough     Call MD for:  redness, tenderness, or signs of infection (pain, swelling, redness, odor or green/yellow discharge around incision site)     No dressing needed   Order Comments: Ok to shower in 24 hours, wash wound with soap and water, keep incision clean and dry at all times, no swimming, no tub bathing.   Steristrips will fall off on their own       Medications:  Reconciled Home Medications:   Current Discharge Medication List      CONTINUE these medications which have CHANGED    Details   oxycodone-acetaminophen (PERCOCET) 5-325 mg per tablet Take 1 tablet by mouth every 4 (four) hours as needed.  Qty: 15 tablet, Refills: 0         CONTINUE these medications which have NOT CHANGED    Details   calcitRIOL (ROCALTROL) 0.5 MCG Cap Take 1 capsule  (0.5 mcg total) by mouth once daily.  Qty: 30 capsule, Refills: 11      fluconazole (DIFLUCAN) 200 MG Tab Take 1 tablet (200 mg total) by mouth every other day.  Qty: 3 tablet, Refills: 0      furosemide (LASIX) 40 MG tablet Take 1 tablet (40 mg total) by mouth once daily.  Qty: 30 tablet, Refills: 0    Associated Diagnoses: ESRD (end stage renal disease)      INV lisinopril 10 MG Tab Take 1 tablet (10 mg total) by mouth once daily.  Qty: 30 tablet, Refills: 11      melatonin 5 mg Tab Take 1 tablet (5 mg total) by mouth every evening.    Associated Diagnoses: Adjustment insomnia      pantoprazole (PROTONIX) 40 MG tablet Take 1 tablet (40 mg total) by mouth once daily.  Qty: 30 tablet, Refills: 11      senna-docusate 8.6-50 mg (PERICOLACE) 8.6-50 mg per tablet Take 2 tablets by mouth every evening.      sevelamer carbonate (RENVELA) 800 mg Tab Take 1 tablet (800 mg total) by mouth 3 (three) times daily with meals.  Qty: 90 tablet, Refills: 11      albuterol 90 mcg/actuation inhaler Inhale 2 puffs into the lungs every 4 (four) hours as needed for Wheezing.  Qty: 1 Inhaler, Refills: 0    Associated Diagnoses: Acute non-recurrent sinusitis, unspecified location      benzonatate (TESSALON) 200 MG capsule Take 1 capsule (200 mg total) by mouth every evening.  Qty: 30 capsule, Refills: 0    Associated Diagnoses: Acute non-recurrent sinusitis, unspecified location           Time spent on the discharge of patient: 20 minutes    Efe Mendieta MD  General Surgery  [facility]

## 2017-07-08 NOTE — ASSESSMENT & PLAN NOTE
Now sp pd catheter revision, and sp HD catheter placement  Undergoing hd this am  Regular diet as tolerated  Increase pain control  encourage out of bed and ambulation   encourage IS  Will d/c home later today after dialysis

## 2017-07-08 NOTE — PLAN OF CARE
Problem: Patient Care Overview  Goal: Plan of Care Review  Pt reports 0 pain post pain med. Pt with no falls or injuries this hospital stay. Pt afebrile, surgical sites benign. No s/s infection. hemodialysis site intact to right chest. Tele monitoring, NSR.

## 2017-07-08 NOTE — PROGRESS NOTES
3hr HD treatment completed 2L of fluid removed pt tolerated well. Both lumens of a R chestwall permcath instilled with Gentamycin and NS to fill 1.8cc lumens, capped and taped. Report given to AMBREEN Reynolds.

## 2017-07-08 NOTE — HPI
HPI s/p pd for esrd 6/16.  He was hospitalized for pain last month and that resolved with antibiotics and was well until last week.  He developed pain in left groin and buttocks.  The pain is about every other day or so and is spontaneous but worsens with urinating.  He lays down on his side to try to improve the pain.  He has been doing pd without difficulty until recently when he was not able to drain. Elected to undergo revision of PD catheter.

## 2017-07-08 NOTE — HOSPITAL COURSE
PD cath revision 7/7/17  vancomycin instilled in pd cath on 7/7/17  Perm cath placement on 7/7/17  HD on 7/8/17    This patient was admitted to the hospital following a peritoneal dialysis catheter revision, and a perm cath placement. He tolerated this procedure with no apparent complications. He was transferred to the floor where he underwent a normal post operative recovery. He was advanced to a regular diet, and he tolerated this without any difficulties. His pain was controlled with po medications, he was tolerating ambulation, and he had regular bowel and bladder function. He was discharged home in good condition, with follow up in 2 weeks.

## 2017-07-08 NOTE — PROGRESS NOTES
[facility]    Maksim Hidalgo is a 37 y.o. male currently on hemodialysis for removal of uremic toxins and volume.    Pt was seen in [facility] during hemodialysis. He had PD catheter revision done by surgery. Per surgery he will need to transition to in center HD transiently until his PD catheter can be used again. He had a tunneled CVC placed yesterday.     Pt is tolerating HD well. He is drowsy due to the effect of pain medicines.    Labs have been reviewed and the dialysate bath has been adjusted.    There are no symptoms of hypotension, chest pain, dyspnea, nausea or vomiting.    Exam  Vitals noted  Wakes up easily  Respiration unlabored  Lungs no crackles  R chest wall tunneled CVC  CV S1S2+  abd tender due to recent procedure  extr edema+    Labs:        Recent Labs  Lab 07/07/17  1130 07/08/17  0730    142   K 4.0 3.7    108   CO2 21* 25   BUN 41* 32*   CREATININE 6.9* 5.3*   CALCIUM 8.5* 8.1*   PHOS 3.4 2.0*         Recent Labs  Lab 07/07/17  0746 07/08/17  0730   WBC  --  6.35   HGB  --  10.5*   HCT 32* 30.8*   PLT  --  229       Assessment/Plan:    ESRD  HD today for removal of uremic toxins and volume.    S/p revision of PD catheter  Continue to follow with surgery  Continue to follow with home therapy clinic for dressing changes and catheter flush when ok with surgery    Hypertension  Continue current regimen    Pt has chronically problems with constipation and in the past he has been counseled multiple times to use bowel regimen. I feel his lack of understanding it or not adhering to it has caused most of his PD catheter related problems historically.    Continue outpatient HD at [facility] in [place] on MWF until we get ok from surgery to use PD catheter. Continue tunneled catheter interim.

## 2017-07-08 NOTE — PROGRESS NOTES
Report received from AMBREEN Reynolds. Pt arrived from floor AAOx4. Maintenance dialysis initiated via R chestwall permcath without difficulty.

## 2017-07-08 NOTE — SUBJECTIVE & OBJECTIVE
Interval History: had some pain overnight, no nausea, did ambulate, catheter accessed last night, tolerating hd this am    Medications:  Continuous Infusions:   Scheduled Meds:   calcitRIOL  0.5 mcg Oral Daily    furosemide  40 mg Oral Daily    INV lisinopril  10 mg Oral Daily    pantoprazole  40 mg Oral Daily    senna-docusate 8.6-50 mg  2 tablet Oral QHS    sevelamer carbonate  800 mg Oral TID WM     PRN Meds:gentamicin, hydrALAZINE, oxycodone-acetaminophen, oxycodone-acetaminophen     Review of patient's allergies indicates:  No Known Allergies  Objective:     Vital Signs (Most Recent):  Temp: 98.9 °F (37.2 °C) (07/08/17 0740)  Pulse: 85 (07/08/17 1015)  Resp: 18 (07/08/17 0740)  BP: (!) 135/90 (07/08/17 1015)  SpO2: 97 % (07/08/17 0428) Vital Signs (24h Range):  Temp:  [97.5 °F (36.4 °C)-99 °F (37.2 °C)] 98.9 °F (37.2 °C)  Pulse:  [59-93] 85  Resp:  [10-20] 18  SpO2:  [94 %-100 %] 97 %  BP: (132-183)/() 135/90     Weight: 93.4 kg (206 lb)  Body mass index is 29.56 kg/m².    Intake/Output - Last 3 Shifts       07/06 0700 - 07/07 0659 07/07 0700 - 07/08 0659 07/08 0700 - 07/09 0659    P.O.  240     I.V. (mL/kg)  100 (1.1)     Total Intake(mL/kg)  340 (3.6)     Urine (mL/kg/hr)  225 (0.1)     Total Output   225      Net   +115                   Physical Exam   Constitutional: He is oriented to person, place, and time. He appears well-developed and well-nourished. No distress.   HENT:   Head: Normocephalic and atraumatic.   Eyes: Conjunctivae are normal. Right eye exhibits no discharge. Left eye exhibits no discharge. No scleral icterus.   Neck: Normal range of motion. Neck supple. No JVD present. No tracheal deviation present.   Cardiovascular: Normal rate and regular rhythm.    Pulmonary/Chest: Effort normal. No respiratory distress.   Abdominal: Soft. He exhibits no distension. There is tenderness (appropriate).   Neurological: He is alert and oriented to person, place, and time.   Skin: Skin is warm  and dry. No rash noted. He is not diaphoretic. No erythema.       Significant Labs:  CBC:   Recent Labs  Lab 07/08/17  0730   WBC 6.35   RBC 3.56*   HGB 10.5*   HCT 30.8*      MCV 87   MCH 29.5   MCHC 34.1     CMP:   Recent Labs  Lab 07/08/17  0730   GLU 97   CALCIUM 8.1*      K 3.7   CO2 25      BUN 32*   CREATININE 5.3*       Significant Diagnostics:  I have reviewed and interpreted all pertinent imaging results/findings within the past 24 hours.

## 2017-07-08 NOTE — PLAN OF CARE
Problem: Patient Care Overview  Goal: Plan of Care Review  Outcome: Ongoing (interventions implemented as appropriate)  Pt progressing towards goals.peritoneal dialysis cath intact and clamped. RIJ Permacath intact.schedulaed for dialysis this am.elevated bp responded to po hydralazine.reports adequate pain relief.remains on telemetry,NSR.safety precautions maintained.pt free of falls or injuries.continue plan of care.

## 2017-07-08 NOTE — PROGRESS NOTES
500 ml 1.5% dextrose with 1 gram vancomycin infused into patients peritoneal catheter per aseptic technique and catheter capped with betadine cap. Pt tolerated without c/o of pain.

## 2017-07-08 NOTE — PROGRESS NOTES
[facility]  General Surgery  Progress Note    Subjective:     History of Present Illness:  HPI s/p pd for esrd 6/16.  He was hospitalized for pain last month and that resolved with antibiotics and was well until last week.  He developed pain in left groin and buttocks.  The pain is about every other day or so and is spontaneous but worsens with urinating.  He lays down on his side to try to improve the pain.  He has been doing pd without difficulty until recently when he was not able to drain. Elected to undergo revision of PD catheter.    Post-Op Info:  Procedure(s) (LRB):  REVISION-CATHETER-DIALYSIS-PERITONEAL-LAPAROSCOPIC (N/A)   1 Day Post-Op     Interval History: had some pain overnight, no nausea, did ambulate, catheter accessed last night, tolerating hd this am    Medications:  Continuous Infusions:   Scheduled Meds:   calcitRIOL  0.5 mcg Oral Daily    furosemide  40 mg Oral Daily    INV lisinopril  10 mg Oral Daily    pantoprazole  40 mg Oral Daily    senna-docusate 8.6-50 mg  2 tablet Oral QHS    sevelamer carbonate  800 mg Oral TID WM     PRN Meds:gentamicin, hydrALAZINE, oxycodone-acetaminophen, oxycodone-acetaminophen     Review of patient's allergies indicates:  No Known Allergies  Objective:     Vital Signs (Most Recent):  Temp: 98.9 °F (37.2 °C) (07/08/17 0740)  Pulse: 85 (07/08/17 1015)  Resp: 18 (07/08/17 0740)  BP: (!) 135/90 (07/08/17 1015)  SpO2: 97 % (07/08/17 0428) Vital Signs (24h Range):  Temp:  [97.5 °F (36.4 °C)-99 °F (37.2 °C)] 98.9 °F (37.2 °C)  Pulse:  [59-93] 85  Resp:  [10-20] 18  SpO2:  [94 %-100 %] 97 %  BP: (132-183)/() 135/90     Weight: 93.4 kg (206 lb)  Body mass index is 29.56 kg/m².    Intake/Output - Last 3 Shifts       07/06 0700 - 07/07 0659 07/07 0700 - 07/08 0659 07/08 0700 - 07/09 0659    P.O.  240     I.V. (mL/kg)  100 (1.1)     Total Intake(mL/kg)  340 (3.6)     Urine (mL/kg/hr)  225 (0.1)     Total Output   225      Net   +115                    Physical Exam   Constitutional: He is oriented to person, place, and time. He appears well-developed and well-nourished. No distress.   HENT:   Head: Normocephalic and atraumatic.   Eyes: Conjunctivae are normal. Right eye exhibits no discharge. Left eye exhibits no discharge. No scleral icterus.   Neck: Normal range of motion. Neck supple. No JVD present. No tracheal deviation present.   Cardiovascular: Normal rate and regular rhythm.    Pulmonary/Chest: Effort normal. No respiratory distress.   Abdominal: Soft. He exhibits no distension. There is tenderness (appropriate).   Neurological: He is alert and oriented to person, place, and time.   Skin: Skin is warm and dry. No rash noted. He is not diaphoretic. No erythema.       Significant Labs:  CBC:   Recent Labs  Lab 07/08/17  0730   WBC 6.35   RBC 3.56*   HGB 10.5*   HCT 30.8*      MCV 87   MCH 29.5   MCHC 34.1     CMP:   Recent Labs  Lab 07/08/17  0730   GLU 97   CALCIUM 8.1*      K 3.7   CO2 25      BUN 32*   CREATININE 5.3*       Significant Diagnostics:  I have reviewed and interpreted all pertinent imaging results/findings within the past 24 hours.    Assessment/Plan:     ESRD (end stage renal disease)    Now sp pd catheter revision, and sp HD catheter placement  Undergoing hd this am  Regular diet as tolerated  Increase pain control  encourage out of bed and ambulation   encourage IS  Will d/c home later today after dialysis            Efe Mendieta MD  General Surgery  [facility]

## 2017-07-08 NOTE — OP NOTE
Surgery Date: 7/7/2017      Surgeon(s) and Role:     * Isai Gillespie MD - Primary     * Shahbaz Parker MD - Resident - Assisting     Pre-op Diagnosis:  ESRD (end stage renal disease) on dialysis [N18.6, Z99.2]  Peritoneal dialysis catheter dysfunction, initial encounter [T85.611A]     Post-op Diagnosis:  ESRD (end stage renal disease) on dialysis [N18.6, Z99.2]  Peritoneal dialysis catheter dysfunction, initial encounter [T85.611A]    Procedure: revision PD catheter    Procedure:  The abdomen was prepped and draped in the standard fashion.  An incision was made in the ruq and the abdomen entered with the 5mm optiview under direct vision.  Pneumoperitoneum to 15mm hg was attained.  A second 5mm trocar was placed in the rlq.  There were adhesions surrounding the catheter in the pelvis.  The catheter was pulled out of the adhesions and placed cephalad to the adhesions and tacked in place with a whip stitch.  The catheter irrigated easily and a heparin solution was instilled.  The trocars were removed under direct vision.  Prior to removing the last trocar pneumoperitoneum was release.  The skin incisions were infiltrated with marcaine, closed with 4-0 plain catgut and reinforced with mastisol, steri-strips and band aids.  The patient went to the recovery room in stable condition.  Sponge and needle counts were correct at the end of the case.  Blood loss was minimal, path none, comps none.

## 2017-07-11 ENCOUNTER — TELEPHONE (OUTPATIENT)
Dept: TRANSPLANT | Facility: CLINIC | Age: 37
End: 2017-07-11

## 2017-07-11 NOTE — TELEPHONE ENCOUNTER
----- Message from Jacqueline Gates RN sent at 7/10/2017  1:56 PM CDT -----  Ok. I've forwarded it to Hepatology.   Jacqueline  ----- Message -----  From: Mary Braxton  Sent: 7/10/2017  12:12 PM  To: Betty Mckeon MD, Jacqueline Gates, RN    I'm not sure why I'm getting this message back in pre-kidney but can someone from Dr. Mckeon staff please arrange since she is the ordering doctor?    Mary    ----- Message -----  From: Jacqueline Gates RN  Sent: 7/5/2017   3:45 PM  To: [facility] Pre-Kidney Transplant Clinical        ----- Message -----  From: Cristian Baig MA  Sent: 7/5/2017   3:41 PM  To: [facility] Pre-Liver Transplant Clinical        ----- Message -----  From: Mary Braxton  Sent: 7/5/2017   3:33 PM  To: Mike Herndon    Hi,  This patient is being considered for kidney transplant. Was seen by Dr. Mckeon on 6/25/17 and triple phase CT and AFP was ordered and RTC afterwards for results. Can someone from your office please arrange appointment for patient?  Mary

## 2017-07-17 ENCOUNTER — TELEPHONE (OUTPATIENT)
Dept: HEPATOLOGY | Facility: CLINIC | Age: 37
End: 2017-07-17

## 2017-07-17 NOTE — PROGRESS NOTES
This pt needs HBV treatment as he heads into kidney transplant. How long before he would be transplanted? If it is a few years, I would defer tx for now. If it could be at any time, then he needs treatment now.

## 2017-07-18 ENCOUNTER — TELEPHONE (OUTPATIENT)
Dept: TRANSPLANT | Facility: CLINIC | Age: 37
End: 2017-07-18

## 2017-07-18 DIAGNOSIS — T85.611D PERITONEAL DIALYSIS CATHETER DYSFUNCTION, SUBSEQUENT ENCOUNTER: Primary | ICD-10-CM

## 2017-07-18 NOTE — TELEPHONE ENCOUNTER
----- Message from Betty Mckeon MD sent at 7/17/2017  7:09 PM CDT -----  Indefinite therapy. Please speak to transplant pharmacy re dosing of entecavir. Has to be dose for renal failure. Then check hbv dna at 1, 3 and 6 months on therapy. I should see him after 3 months  ----- Message -----  From: Mary Braxton  Sent: 7/17/2017   4:10 PM  To: Gabriel Chappell MD, Betty Mckeon MD    Thanks for clearing that up.   Does he have to complete treatment before he is cleared from your standpoint and if so, how long is the treatment?   Sorry, I'm not to familiar with patients that has active HBV.   Mary   ----- Message -----  From: Betty Mckeon MD  Sent: 7/17/2017   3:00 PM  To: Gabriel Chappell MD, Mary Braxton    The fact that he has to take a medication that we would not normally given him.  Given the uncertainty, will treat  ----- Message -----  From: Mary Braxton  Sent: 7/17/2017   2:33 PM  To: Gabriel Chappell MD, MD Dr. Mike Musa,    This patient is currently in kidney eval, not listed yet. If listed for kidney transplant, his waitlisted date will go back to his 1st day of dialysis (07/2016). He is blood type B and has no antibodies. The projected waitlist time is around 5-7 years. He doesn't have any donor in evaluation. We also do A2 typing on blood type B recipients. This will be done once patient is listed, he may qualify for that which would decrease his waitlist time. So it's kind of hard to estimate when he will be transplanted.     Just so I would understand, what would be the reason to not treat this patient now?    Mary  ----- Message -----  From: Mary Braxton  Sent: 7/17/2017   1:26 PM  To:         ----- Message -----  From: Gabriel Chappell MD  Sent: 7/17/2017   9:21 AM  To: [facility] Pre-Kidney Transplant Clinical    Please see this message for this patient from Dr Mike Rios  ----- Message -----  From: Betty Mckeon MD  Sent: 7/17/2017   6:51  AM  To: Gabriel Chappell MD    This pt needs HBV treatment as he heads into kidney transplant. How long before he would be transplanted? If it is a few years, I would defer tx for now. If it could be at any time, then he needs treatment now.

## 2017-07-20 ENCOUNTER — OFFICE VISIT (OUTPATIENT)
Dept: SURGERY | Facility: CLINIC | Age: 37
End: 2017-07-20
Payer: MEDICARE

## 2017-07-20 ENCOUNTER — HOSPITAL ENCOUNTER (OUTPATIENT)
Dept: RADIOLOGY | Facility: HOSPITAL | Age: 37
Discharge: HOME | End: 2017-07-20
Attending: SURGERY
Payer: MEDICARE

## 2017-07-20 ENCOUNTER — HOSPITAL ENCOUNTER (OUTPATIENT)
Facility: HOSPITAL | Age: 37
Discharge: HOME | End: 2017-07-22
Attending: EMERGENCY MEDICINE | Admitting: EMERGENCY MEDICINE
Payer: MEDICARE

## 2017-07-20 VITALS
DIASTOLIC BLOOD PRESSURE: 69 MMHG | TEMPERATURE: 98 F | HEART RATE: 93 BPM | HEIGHT: 70 IN | SYSTOLIC BLOOD PRESSURE: 102 MMHG | BODY MASS INDEX: 29.2 KG/M2 | WEIGHT: 204 LBS

## 2017-07-20 DIAGNOSIS — Z01.818 PREOP EXAMINATION: ICD-10-CM

## 2017-07-20 DIAGNOSIS — E86.9 VOLUME DEPLETION: ICD-10-CM

## 2017-07-20 DIAGNOSIS — R42 POSTURAL DIZZINESS WITH NEAR SYNCOPE: ICD-10-CM

## 2017-07-20 DIAGNOSIS — T85.611D PERITONEAL DIALYSIS CATHETER DYSFUNCTION, SUBSEQUENT ENCOUNTER: ICD-10-CM

## 2017-07-20 DIAGNOSIS — R55 POSTURAL DIZZINESS WITH NEAR SYNCOPE: ICD-10-CM

## 2017-07-20 DIAGNOSIS — R53.83 FATIGUE: ICD-10-CM

## 2017-07-20 DIAGNOSIS — Z99.2 ESRD (END STAGE RENAL DISEASE) ON DIALYSIS: Primary | ICD-10-CM

## 2017-07-20 DIAGNOSIS — E87.20 LACTIC ACIDOSIS: ICD-10-CM

## 2017-07-20 DIAGNOSIS — N18.6 ESRD (END STAGE RENAL DISEASE) ON DIALYSIS: Primary | ICD-10-CM

## 2017-07-20 DIAGNOSIS — Z99.2 PERITONEAL DIALYSIS CATHETER IN PLACE: Primary | ICD-10-CM

## 2017-07-20 DIAGNOSIS — Z01.818 PREOP EXAMINATION: Primary | ICD-10-CM

## 2017-07-20 DIAGNOSIS — R42 DIZZINESS: ICD-10-CM

## 2017-07-20 LAB
ALBUMIN SERPL BCP-MCNC: 3.3 G/DL
ALP SERPL-CCNC: 94 U/L
ALT SERPL W/O P-5'-P-CCNC: 15 U/L
ANION GAP SERPL CALC-SCNC: 13 MMOL/L
AST SERPL-CCNC: 17 U/L
BASOPHILS # BLD AUTO: 0.04 K/UL
BASOPHILS NFR BLD: 0.5 %
BILIRUB SERPL-MCNC: 0.4 MG/DL
BUN SERPL-MCNC: 34 MG/DL
CALCIUM SERPL-MCNC: 9.1 MG/DL
CHLORIDE SERPL-SCNC: 94 MMOL/L
CO2 SERPL-SCNC: 34 MMOL/L
CREAT SERPL-MCNC: 7.8 MG/DL
DIFFERENTIAL METHOD: ABNORMAL
EOSINOPHIL # BLD AUTO: 0.3 K/UL
EOSINOPHIL NFR BLD: 4 %
ERYTHROCYTE [DISTWIDTH] IN BLOOD BY AUTOMATED COUNT: 11.9 %
EST. GFR  (AFRICAN AMERICAN): 9.1 ML/MIN/1.73 M^2
EST. GFR  (NON AFRICAN AMERICAN): 7.9 ML/MIN/1.73 M^2
GLUCOSE SERPL-MCNC: 87 MG/DL
HCT VFR BLD AUTO: 36.2 %
HGB BLD-MCNC: 12 G/DL
INR PPP: 1
LACTATE SERPL-SCNC: 2.3 MMOL/L
LIPASE SERPL-CCNC: 45 U/L
LYMPHOCYTES # BLD AUTO: 2.5 K/UL
LYMPHOCYTES NFR BLD: 33.2 %
MAGNESIUM SERPL-MCNC: 2.2 MG/DL
MCH RBC QN AUTO: 29.9 PG
MCHC RBC AUTO-ENTMCNC: 33.1 G/DL
MCV RBC AUTO: 90 FL
MONOCYTES # BLD AUTO: 0.7 K/UL
MONOCYTES NFR BLD: 9.7 %
NEUTROPHILS # BLD AUTO: 3.9 K/UL
NEUTROPHILS NFR BLD: 52.5 %
PHOSPHATE SERPL-MCNC: 4.7 MG/DL
PLATELET # BLD AUTO: 269 K/UL
PMV BLD AUTO: 9.7 FL
POCT GLUCOSE: 120 MG/DL (ref 70–110)
POTASSIUM SERPL-SCNC: 3.8 MMOL/L
PROT SERPL-MCNC: 7.6 G/DL
PROTHROMBIN TIME: 10.8 SEC
RBC # BLD AUTO: 4.02 M/UL
SODIUM SERPL-SCNC: 141 MMOL/L
WBC # BLD AUTO: 7.44 K/UL

## 2017-07-20 PROCEDURE — 83690 ASSAY OF LIPASE: CPT | Mod: NTX

## 2017-07-20 PROCEDURE — 25000003 PHARM REV CODE 250: Mod: NTX | Performed by: STUDENT IN AN ORGANIZED HEALTH CARE EDUCATION/TRAINING PROGRAM

## 2017-07-20 PROCEDURE — 83605 ASSAY OF LACTIC ACID: CPT | Mod: NTX

## 2017-07-20 PROCEDURE — G0378 HOSPITAL OBSERVATION PER HR: HCPCS | Mod: NTX

## 2017-07-20 PROCEDURE — 84100 ASSAY OF PHOSPHORUS: CPT | Mod: NTX

## 2017-07-20 PROCEDURE — 82962 GLUCOSE BLOOD TEST: CPT | Mod: NTX

## 2017-07-20 PROCEDURE — 99284 EMERGENCY DEPT VISIT MOD MDM: CPT | Mod: NTX,,, | Performed by: EMERGENCY MEDICINE

## 2017-07-20 PROCEDURE — 99999 PR PBB SHADOW E&M-EST. PATIENT-LVL III: CPT | Mod: PBBFAC,TXP,, | Performed by: SURGERY

## 2017-07-20 PROCEDURE — 99220 PR INITIAL OBSERVATION CARE,LEVL III: CPT | Mod: NTX,,, | Performed by: HOSPITALIST

## 2017-07-20 PROCEDURE — 85610 PROTHROMBIN TIME: CPT | Mod: NTX

## 2017-07-20 PROCEDURE — 99024 POSTOP FOLLOW-UP VISIT: CPT | Mod: NTX,,, | Performed by: SURGERY

## 2017-07-20 PROCEDURE — 74020 XR ABDOMEN FLAT AND ERECT: CPT | Mod: 26,NTX,, | Performed by: RADIOLOGY

## 2017-07-20 PROCEDURE — 80053 COMPREHEN METABOLIC PANEL: CPT | Mod: NTX

## 2017-07-20 PROCEDURE — 63600175 PHARM REV CODE 636 W HCPCS: Mod: NTX | Performed by: HOSPITALIST

## 2017-07-20 PROCEDURE — 99285 EMERGENCY DEPT VISIT HI MDM: CPT | Mod: 25,27,NTX

## 2017-07-20 PROCEDURE — 83735 ASSAY OF MAGNESIUM: CPT | Mod: NTX

## 2017-07-20 PROCEDURE — 25000003 PHARM REV CODE 250: Mod: NTX | Performed by: HOSPITALIST

## 2017-07-20 PROCEDURE — 96360 HYDRATION IV INFUSION INIT: CPT | Mod: NTX

## 2017-07-20 PROCEDURE — 93010 ELECTROCARDIOGRAM REPORT: CPT | Mod: NTX,,, | Performed by: INTERNAL MEDICINE

## 2017-07-20 PROCEDURE — 87040 BLOOD CULTURE FOR BACTERIA: CPT | Mod: NTX

## 2017-07-20 PROCEDURE — 93005 ELECTROCARDIOGRAM TRACING: CPT | Mod: NTX

## 2017-07-20 PROCEDURE — 85025 COMPLETE CBC W/AUTO DIFF WBC: CPT | Mod: NTX

## 2017-07-20 PROCEDURE — 96361 HYDRATE IV INFUSION ADD-ON: CPT | Mod: NTX

## 2017-07-20 RX ORDER — ONDANSETRON 2 MG/ML
4 INJECTION INTRAMUSCULAR; INTRAVENOUS EVERY 6 HOURS PRN
Status: DISCONTINUED | OUTPATIENT
Start: 2017-07-20 | End: 2017-07-22 | Stop reason: HOSPADM

## 2017-07-20 RX ORDER — PANTOPRAZOLE SODIUM 40 MG/1
40 TABLET, DELAYED RELEASE ORAL DAILY
Status: DISCONTINUED | OUTPATIENT
Start: 2017-07-21 | End: 2017-07-22 | Stop reason: HOSPADM

## 2017-07-20 RX ORDER — BENZONATATE 100 MG/1
200 CAPSULE ORAL NIGHTLY
Status: DISCONTINUED | OUTPATIENT
Start: 2017-07-20 | End: 2017-07-22 | Stop reason: HOSPADM

## 2017-07-20 RX ORDER — IPRATROPIUM BROMIDE AND ALBUTEROL SULFATE 2.5; .5 MG/3ML; MG/3ML
3 SOLUTION RESPIRATORY (INHALATION) EVERY 4 HOURS PRN
Status: DISCONTINUED | OUTPATIENT
Start: 2017-07-20 | End: 2017-07-22 | Stop reason: HOSPADM

## 2017-07-20 RX ORDER — CALCITRIOL 0.25 UG/1
0.5 CAPSULE ORAL DAILY
Status: DISCONTINUED | OUTPATIENT
Start: 2017-07-21 | End: 2017-07-22 | Stop reason: HOSPADM

## 2017-07-20 RX ORDER — SODIUM CHLORIDE 9 MG/ML
500 INJECTION, SOLUTION INTRAVENOUS
Status: COMPLETED | OUTPATIENT
Start: 2017-07-20 | End: 2017-07-20

## 2017-07-20 RX ORDER — IBUPROFEN 200 MG
24 TABLET ORAL
Status: DISCONTINUED | OUTPATIENT
Start: 2017-07-20 | End: 2017-07-22 | Stop reason: HOSPADM

## 2017-07-20 RX ORDER — IBUPROFEN 200 MG
16 TABLET ORAL
Status: DISCONTINUED | OUTPATIENT
Start: 2017-07-20 | End: 2017-07-22 | Stop reason: HOSPADM

## 2017-07-20 RX ORDER — ACETAMINOPHEN 325 MG/1
650 TABLET ORAL EVERY 6 HOURS PRN
Status: DISCONTINUED | OUTPATIENT
Start: 2017-07-20 | End: 2017-07-22 | Stop reason: HOSPADM

## 2017-07-20 RX ORDER — GLUCAGON 1 MG
1 KIT INJECTION
Status: DISCONTINUED | OUTPATIENT
Start: 2017-07-20 | End: 2017-07-22 | Stop reason: HOSPADM

## 2017-07-20 RX ORDER — POLYETHYLENE GLYCOL 3350 17 G/17G
17 POWDER, FOR SOLUTION ORAL DAILY
Status: DISCONTINUED | OUTPATIENT
Start: 2017-07-21 | End: 2017-07-22 | Stop reason: HOSPADM

## 2017-07-20 RX ORDER — AMOXICILLIN 250 MG
2 CAPSULE ORAL 2 TIMES DAILY PRN
Status: DISCONTINUED | OUTPATIENT
Start: 2017-07-20 | End: 2017-07-22 | Stop reason: HOSPADM

## 2017-07-20 RX ORDER — SEVELAMER CARBONATE 800 MG/1
800 TABLET, FILM COATED ORAL
Status: DISCONTINUED | OUTPATIENT
Start: 2017-07-21 | End: 2017-07-22 | Stop reason: HOSPADM

## 2017-07-20 RX ORDER — HYDROCODONE BITARTRATE AND ACETAMINOPHEN 5; 325 MG/1; MG/1
1 TABLET ORAL EVERY 6 HOURS PRN
Status: DISCONTINUED | OUTPATIENT
Start: 2017-07-20 | End: 2017-07-22 | Stop reason: HOSPADM

## 2017-07-20 RX ORDER — HEPARIN SODIUM 5000 [USP'U]/ML
5000 INJECTION, SOLUTION INTRAVENOUS; SUBCUTANEOUS EVERY 8 HOURS
Status: DISCONTINUED | OUTPATIENT
Start: 2017-07-20 | End: 2017-07-22 | Stop reason: HOSPADM

## 2017-07-20 RX ADMIN — CEFTRIAXONE 2 G: 2 INJECTION, SOLUTION INTRAVENOUS at 11:07

## 2017-07-20 RX ADMIN — HEPARIN SODIUM 5000 UNITS: 5000 INJECTION, SOLUTION INTRAVENOUS; SUBCUTANEOUS at 11:07

## 2017-07-20 RX ADMIN — SODIUM CHLORIDE 500 ML: 0.9 INJECTION, SOLUTION INTRAVENOUS at 08:07

## 2017-07-20 RX ADMIN — SODIUM CHLORIDE 500 ML: 0.9 INJECTION, SOLUTION INTRAVENOUS at 07:07

## 2017-07-20 NOTE — PROVIDER PROGRESS NOTES - EMERGENCY DEPT.
Encounter Date: 7/20/2017    ED Physician Progress Notes       SCRIBE NOTE: I, Ladonna Thacker, am scribing for, and in the presence of,  Dr. Mitchell.  Physician Statement: I, Dr. Mitchell, personally performed the services described in this documentation as scribed by Ladonna Thacker in my presence, and it is both accurate and complete.      EKG - STEMI Decision  Initial Reading: No STEMI present.

## 2017-07-20 NOTE — ED NOTES
Pt placed on continuous cardiac and pulse ox monitoring with blood pressure to cycle every 30 minutes. .  Bed locked in lowest position; side rails up and locked x 2; call light, bedside table, and personal belongings within reach.  Pt denies needs or complaints at this time; will continue to monitor pt.
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
## 2017-07-20 NOTE — LETTER
[facility] - General Surgery  [address]  [place] [address]  Phone: [phone] July 20, 2017      Saurabh Zuleta MD  [address]  [place] [address]    Patient: Maksim Hidalgo   MR Number: [id number]   YOB: 1980   Date of Visit: 7/20/2017     Dear Dr. Zuleta:    Thank you for referring Maksim Hidalgo to me for evaluation. Below are the relevant portions of my assessment and plan of care.    Patient is status post PD revision (change in position due to adhesions) 7/8/17 with continued malfunction.    PLAN:  Will set up to remove catheter with diagnostic laparoscopy in three months to see if it can be replaced.     If you have questions, please do not hesitate to call me. I look forward to following Maksim along with you.    Sincerely,      Isai Gillespie MD Section Head - General, Laparoscopic, Bariatric  Acute Care and Oncologic Surgery   - Surgical Weight Loss Program  [facility]    WSR/allison    CC  Juventino Garcia MD
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
## 2017-07-21 PROBLEM — E86.9 VOLUME DEPLETION: Status: ACTIVE | Noted: 2017-07-21

## 2017-07-21 PROBLEM — E87.20 LACTIC ACIDOSIS: Status: RESOLVED | Noted: 2017-07-21 | Resolved: 2017-07-21

## 2017-07-21 PROBLEM — R55 POSTURAL DIZZINESS WITH NEAR SYNCOPE: Status: ACTIVE | Noted: 2017-07-21

## 2017-07-21 PROBLEM — R42 POSTURAL DIZZINESS WITH NEAR SYNCOPE: Status: ACTIVE | Noted: 2017-07-21

## 2017-07-21 PROBLEM — I95.9 HYPOTENSION ARTERIAL: Status: ACTIVE | Noted: 2017-07-21

## 2017-07-21 PROBLEM — E87.20 LACTIC ACIDOSIS: Status: ACTIVE | Noted: 2017-07-21

## 2017-07-21 LAB
ALBUMIN SERPL BCP-MCNC: 3.2 G/DL
ALP SERPL-CCNC: 81 U/L
ALT SERPL W/O P-5'-P-CCNC: 16 U/L
ANION GAP SERPL CALC-SCNC: 14 MMOL/L
AST SERPL-CCNC: 18 U/L
BASOPHILS # BLD AUTO: 0.03 K/UL
BASOPHILS NFR BLD: 0.4 %
BILIRUB SERPL-MCNC: 0.3 MG/DL
BUN SERPL-MCNC: 37 MG/DL
CALCIUM SERPL-MCNC: 8.9 MG/DL
CHLORIDE SERPL-SCNC: 96 MMOL/L
CO2 SERPL-SCNC: 30 MMOL/L
CREAT SERPL-MCNC: 8.1 MG/DL
DIFFERENTIAL METHOD: ABNORMAL
EOSINOPHIL # BLD AUTO: 0.3 K/UL
EOSINOPHIL NFR BLD: 3.9 %
ERYTHROCYTE [DISTWIDTH] IN BLOOD BY AUTOMATED COUNT: 12.1 %
EST. GFR  (AFRICAN AMERICAN): 8.7 ML/MIN/1.73 M^2
EST. GFR  (NON AFRICAN AMERICAN): 7.5 ML/MIN/1.73 M^2
GLUCOSE SERPL-MCNC: 80 MG/DL
HCT VFR BLD AUTO: 33.9 %
HGB BLD-MCNC: 11 G/DL
LACTATE SERPL-SCNC: 0.8 MMOL/L
LYMPHOCYTES # BLD AUTO: 3 K/UL
LYMPHOCYTES NFR BLD: 37.3 %
MAGNESIUM SERPL-MCNC: 2.2 MG/DL
MCH RBC QN AUTO: 29 PG
MCHC RBC AUTO-ENTMCNC: 32.4 G/DL
MCV RBC AUTO: 89 FL
MONOCYTES # BLD AUTO: 0.7 K/UL
MONOCYTES NFR BLD: 9.2 %
NEUTROPHILS # BLD AUTO: 3.9 K/UL
NEUTROPHILS NFR BLD: 49.1 %
PHOSPHATE SERPL-MCNC: 4.6 MG/DL
PLATELET # BLD AUTO: 253 K/UL
PMV BLD AUTO: 9.8 FL
POTASSIUM SERPL-SCNC: 3.4 MMOL/L
PROT SERPL-MCNC: 6.9 G/DL
RBC # BLD AUTO: 3.79 M/UL
SODIUM SERPL-SCNC: 140 MMOL/L
WBC # BLD AUTO: 7.91 K/UL

## 2017-07-21 PROCEDURE — 36415 COLL VENOUS BLD VENIPUNCTURE: CPT | Mod: NTX

## 2017-07-21 PROCEDURE — 83605 ASSAY OF LACTIC ACID: CPT | Mod: NTX

## 2017-07-21 PROCEDURE — 63600175 PHARM REV CODE 636 W HCPCS: Mod: NTX | Performed by: HOSPITALIST

## 2017-07-21 PROCEDURE — 84100 ASSAY OF PHOSPHORUS: CPT | Mod: NTX

## 2017-07-21 PROCEDURE — 99225 PR SUBSEQUENT OBSERVATION CARE,LEVEL II: CPT | Mod: NTX,,, | Performed by: PHYSICIAN ASSISTANT

## 2017-07-21 PROCEDURE — 90935 HEMODIALYSIS ONE EVALUATION: CPT | Mod: NTX,,, | Performed by: INTERNAL MEDICINE

## 2017-07-21 PROCEDURE — 93970 EXTREMITY STUDY: CPT | Mod: NTX | Performed by: SURGERY

## 2017-07-21 PROCEDURE — 25000003 PHARM REV CODE 250: Mod: NTX | Performed by: INTERNAL MEDICINE

## 2017-07-21 PROCEDURE — 25000003 PHARM REV CODE 250: Mod: NTX | Performed by: HOSPITALIST

## 2017-07-21 PROCEDURE — 80053 COMPREHEN METABOLIC PANEL: CPT | Mod: NTX

## 2017-07-21 PROCEDURE — G0378 HOSPITAL OBSERVATION PER HR: HCPCS | Mod: NTX

## 2017-07-21 PROCEDURE — G0257 UNSCHED DIALYSIS ESRD PT HOS: HCPCS

## 2017-07-21 PROCEDURE — 85025 COMPLETE CBC W/AUTO DIFF WBC: CPT | Mod: NTX

## 2017-07-21 PROCEDURE — 80100016 HC MAINTENANCE HEMODIALYSIS: Mod: NTX

## 2017-07-21 PROCEDURE — 83735 ASSAY OF MAGNESIUM: CPT | Mod: NTX

## 2017-07-21 RX ORDER — HEPARIN SODIUM 1000 [USP'U]/ML
1000 INJECTION, SOLUTION INTRAVENOUS; SUBCUTANEOUS
Status: DISCONTINUED | OUTPATIENT
Start: 2017-07-21 | End: 2017-07-22 | Stop reason: HOSPADM

## 2017-07-21 RX ORDER — SODIUM CHLORIDE 9 MG/ML
INJECTION, SOLUTION INTRAVENOUS ONCE
Status: COMPLETED | OUTPATIENT
Start: 2017-07-21 | End: 2017-07-21

## 2017-07-21 RX ADMIN — SEVELAMER CARBONATE 800 MG: 800 TABLET, FILM COATED ORAL at 08:07

## 2017-07-21 RX ADMIN — PANTOPRAZOLE SODIUM 40 MG: 40 TABLET, DELAYED RELEASE ORAL at 01:07

## 2017-07-21 RX ADMIN — VANCOMYCIN HYDROCHLORIDE 1500 MG: 100 INJECTION, POWDER, LYOPHILIZED, FOR SOLUTION INTRAVENOUS at 01:07

## 2017-07-21 RX ADMIN — SEVELAMER CARBONATE 800 MG: 800 TABLET, FILM COATED ORAL at 12:07

## 2017-07-21 RX ADMIN — HEPARIN SODIUM 1000 UNITS: 1000 INJECTION, SOLUTION INTRAVENOUS; SUBCUTANEOUS at 04:07

## 2017-07-21 RX ADMIN — CALCITRIOL 0.5 MCG: 0.25 CAPSULE, LIQUID FILLED ORAL at 08:07

## 2017-07-21 RX ADMIN — ACETAMINOPHEN 650 MG: 325 TABLET ORAL at 05:07

## 2017-07-21 RX ADMIN — HEPARIN SODIUM 5000 UNITS: 5000 INJECTION, SOLUTION INTRAVENOUS; SUBCUTANEOUS at 05:07

## 2017-07-21 RX ADMIN — CEFTRIAXONE 2 G: 2 INJECTION, SOLUTION INTRAVENOUS at 09:07

## 2017-07-21 RX ADMIN — SODIUM CHLORIDE: 0.9 INJECTION, SOLUTION INTRAVENOUS at 01:07

## 2017-07-21 RX ADMIN — SEVELAMER CARBONATE 800 MG: 800 TABLET, FILM COATED ORAL at 05:07

## 2017-07-21 NOTE — PLAN OF CARE
07/21/17 1000   Discharge Assessment   Assessment Type Discharge Planning Assessment   Confirmed/corrected address and phone number on facesheet? Yes   Assessment information obtained from? Patient   Expected Length of Stay (days) 2   Communicated expected length of stay with patient/caregiver yes   Prior to hospitilization cognitive status: Alert/Oriented   Prior to hospitalization functional status: Independent   Current cognitive status: Alert/Oriented   Current Functional Status: Independent   Arrived From home or self-care   Lives With other relative(s)   Able to Return to Prior Arrangements yes   Is patient able to care for self after discharge? Yes   Patient's perception of discharge disposition home or selfcare   Patient currently being followed by outpatient case management? No   Patient currently receives home health services? No   Does the patient currently use HME? Yes   Equipment Currently Used at Home cane, straight   Do you have any problems affording any of your prescribed medications? No   Is the patient taking medications as prescribed? yes   Transportation Available family or friend will provide   On Dialysis? Yes   If yes, what is the name of the dialysis unit? [facility]   Does the patient receive outpatient dialysis? Yes   Does the patient receive services at the Coumadin Clinic? Yes   Discharge Plan A Home   Discharge Plan B Home   Patient/Family In Agreement With Plan yes   No anticipated d/c needs.

## 2017-07-21 NOTE — PLAN OF CARE
Problem: Patient Care Overview  Goal: Plan of Care Review  Outcome: Ongoing (interventions implemented as appropriate)  Patient remained a,a,ox4, free of falls, and free of signs of infection.  Patient did complain of mild malaise and dizziness.  Now receiving dialysis and tolerating well.

## 2017-07-21 NOTE — PROGRESS NOTES
"[facility] Hospital Medicine  Progress Note    Patient Name: Maksim Hidalgo  MRN: [id number]  Patient Class: OP- Observation   Admission Date: 7/20/2017  Length of Stay: 0 days  Attending Physician: Robert Rodriguez MD  Primary Care Provider: Saurabh Zuleta MD    [facility] Medicine Team: [facility] HOSP MED F Mikala Terry PA-C    Subjective:     Principal Problem:Dizziness    HPI:  37 year old male ESRD on dialysis for one year due to uncontrolled HTN presents to ED with dizziness. He was on PD for last one year until recently when PD catheter stopped working. He underwent PD catheter  revision with change in position due to adhesions on 07/08/17 by Dr. Gillespie. Plan to remove catheter with diagnostic laparoscopy in 3 months to see if it can be replaced. Patient currently having HD ( MWF ) thru right chest walled tunneled catheter that was placed two weeks ago. He had unremarkable clinic visit with Dr. Gillespie earlier today.      Reports he started feeling tingling in his legs associated with dizziness to the point that he felt like "he would pass out" soon after returning home from clinic. He had full session of HD yesterday without any issue. He was just recently converted from PD to HD two weeks ago and not sure if they took out too much fluid at HD yesterday.  He still make little urine twice a day. Denies fever, chills, chest pain, cough, SOH, N/V/D, abdominal pain.      Upon arrival to ER his had low SBP of 77 with dizziness. He received  cc bolus x 1 with minimal improvement. Labs unremarkable except mild elevated lactate of 2.3.     During my interview he still reports feeling dizzy after 500 cc NS bolus with SBP 91. Lung CTA and clinically appears hypovolemic. I ordered another 500 cc NS bolus with improvement of  -110.        States he has clinic visit tomorrow morning at vascular surgery for vein mapping for permanent  HD access.     Hospital Course:  38 yo male " admitted to observation for further evaluation and treatment of dizziness/near syncope. BP on admission low, likely secondary to volume depletion from excess volume removal at HD. Lactate mildly elevated on admission, trended down with repeat (2.3 -> 0.8). Given one dose vancomycin and rocephin empirically. Blood cultures NGTD. Nephrology consulted, full recommendations pending. Vein mapping to be completed today.     Interval History: No acute events overnight. Reports some improvement in dizziness and fatigue overnight but has not ambulated much. Denies headaches, fevers, chills.     Review of Systems   Constitutional: Positive for fatigue. Negative for chills, diaphoresis and fever.   Respiratory: Negative for cough, shortness of breath and wheezing.    Cardiovascular: Negative for chest pain, palpitations and leg swelling.   Gastrointestinal: Negative for abdominal pain, diarrhea, nausea and vomiting.   Genitourinary: Positive for decreased urine volume. Negative for dysuria.   Musculoskeletal: Negative for back pain and gait problem.   Neurological: Positive for dizziness (improving) and weakness. Negative for facial asymmetry and speech difficulty.   Psychiatric/Behavioral: Negative for agitation, confusion and hallucinations.     Objective:     Vital Signs (Most Recent):  Temp: 97.7 °F (36.5 °C) (07/21/17 0737)  Pulse: 84 (07/21/17 0737)  Resp: 17 (07/21/17 0737)  BP: (!) 98/56 (07/21/17 0737)  SpO2: 95 % (07/21/17 0737) Vital Signs (24h Range):  Temp:  [97.7 °F (36.5 °C)-98.7 °F (37.1 °C)] 97.7 °F (36.5 °C)  Pulse:  [76-90] 84  Resp:  [12-26] 17  SpO2:  [92 %-100 %] 95 %  BP: ()/(46-73) 98/56     Weight: 94.6 kg (208 lb 9 oz)  Body mass index is 29.93 kg/m².  No intake or output data in the 24 hours ending 07/21/17 1107   Physical Exam   Constitutional: He is oriented to person, place, and time. He appears well-developed and well-nourished. No distress.   Cardiovascular: Normal rate, regular rhythm,  normal heart sounds and intact distal pulses.    No murmur heard.  Pulmonary/Chest: Effort normal and breath sounds normal. No respiratory distress. He has no wheezes.   Tunneled HD catheter to right chest wall, no visible erythema or fluctuance.   Abdominal: Soft. Bowel sounds are normal. He exhibits no distension. There is tenderness (mild LLQ).   PD catheter in place over LLQ   Musculoskeletal: Normal range of motion. He exhibits no edema or deformity.   Neurological: He is alert and oriented to person, place, and time. No cranial nerve deficit.   Psychiatric: He has a normal mood and affect. His behavior is normal. Judgment and thought content normal.       Significant Labs:   CBC:   Recent Labs  Lab 07/20/17  1648 07/21/17  0443   WBC 7.44 7.91   HGB 12.0* 11.0*   HCT 36.2* 33.9*    253     CMP:   Recent Labs  Lab 07/20/17 1648 07/21/17  0443    140   K 3.8 3.4*   CL 94* 96   CO2 34* 30*   GLU 87 80   BUN 34* 37*   CREATININE 7.8* 8.1*   CALCIUM 9.1 8.9   PROT 7.6 6.9   ALBUMIN 3.3* 3.2*   BILITOT 0.4 0.3   ALKPHOS 94 81   AST 17 18   ALT 15 16   ANIONGAP 13 14   EGFRNONAA 7.9* 7.5*     All pertinent labs within the past 24 hours have been reviewed.    Significant Imaging: I have reviewed all pertinent imaging results/findings within the past 24 hours.    Assessment/Plan:      * Dizziness    Volume depletion, lactic acidosis, postural dizziness with near syncope  - Likely due to volume depletion from excessive volume removal at HD yesterday   - Sepsis can not be ruled out given vascular  HD catheter and mildly elevated lactate on admission  - check blood culture from 2 diff sites - NGTD prelim, will continue to follow up cultures  - Given one dose of vancomycin and rocephin empirically. Will hold off on further abx for now.   - Repeat lactate in morning after IVF back to normal limits (0.8)  - Continue hold lisinopril and lasix in setting of hypotension   - Will consider additional gentle IVFs if  BP does not continue to improve/increased dizziness          ESRD (end stage renal disease) on dialysis    - Patient recently transitioned to HD from PD, reports he has had 5 outpatient HD sessions  - Excess volume removal with HD likely contributing to current presentation with dizziness, weakness  - Nephrology consulted, full recommendations pending  - Vein mapping to be completed today. Patient to follow up as outpatient for permanent HD access as perma-cath still functioning at this point.  - Continue calcitriol and renvela          Renovascular hypertension    - Home regimen of lisinopril, will continue to hold for now in setting of hyptension          Anemia in ESRD (end-stage renal disease)    - H/H fluctuates but appears close to baseline (11.0/33.9 today)  - Will continue to monitor            VTE Risk Mitigation         Ordered     High Risk of VTE  Once      07/21/17 0010     Place sequential compression device  Until discontinued      07/21/17 0010     heparin (porcine) injection 5,000 Units  Every 8 hours     Route:  Subcutaneous        07/20/17 2102          Mikala Terry PA-C  Department of Hospital Medicine   [facility]  Staff: Dr. Rodriguez

## 2017-07-21 NOTE — ASSESSMENT & PLAN NOTE
Volume depletion, lactic acidosis, postural dizziness with near syncope  - Likely due to volume depletion from excessive volume removal at HD yesterday   - Sepsis can not be ruled out given vascular  HD catheter and mildly elevated lactate on admission  - check blood culture from 2 diff sites - NGTD prelim, will continue to follow up cultures  - Given one dose of vancomycin and rocephin empirically. Will hold off on further abx for now.   - Repeat lactate in morning after IVF back to normal limits (0.8)  - Continue hold lisinopril and lasix in setting of hypotension   - Will consider additional gentle IVFs if BP does not continue to improve/increased dizziness

## 2017-07-21 NOTE — HOSPITAL COURSE
36 yo male admitted to observation for further evaluation and treatment of dizziness/near syncope. BP on admission low, likely secondary to volume depletion from excess volume removal at HD. Lactate mildly elevated on admission, trended down with repeat (2.3 -> 0.8). Given one dose vancomycin and rocephin empirically. Blood cultures NGTD. Nephrology consulted, completed HD 7/21, patient tolerated well with no further episodes of dizziness or weakness. Vein mapping completed 7/21. Patient reports feeling at baseline, requesting discharge home 7/22. Patient to follow up with vascular surgery for continued workup for permanent HD access. Stable for discharge home today.

## 2017-07-21 NOTE — PROGRESS NOTES
Patient is a 37 year old gentleman with ESRD;  admitted with chest tightness and volume overload. He was recently converted to HD; secondary to  peritoneal catheter dysfunction(; s/p revision 7/8/17) Denies fever chills; night sweats;hemoptysis; orthpnea PND. Noted to be hypotensive in ED with elevated lactate Today he says that he feels better    Exam  Patient show no acute distress; oriented x 3  HEENT; Grossly wnl  CHEST; Clear P&A; no rales of rhonchi  HEART; RR; S1&S2; no murmur rub gallop  ABD; BS(+); non-tender; no organomegaly   EXT; (-)Edema    Impression  ESRD Currently undergoing an uneventful HD    Plan  Reevaluate in am  D/C as per primary team    Best Jauregui MD
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
## 2017-07-21 NOTE — ASSESSMENT & PLAN NOTE
- Patient recently transitioned to HD from PD, reports he has had 5 outpatient HD sessions  - Excess volume removal with HD likely contributing to current presentation with dizziness, weakness  - Nephrology consulted, full recommendations pending  - Vein mapping to be completed today. Patient to follow up as outpatient for permanent HD access as perma-cath still functioning at this point.  - Continue calcitriol and renvela

## 2017-07-22 VITALS
HEART RATE: 68 BPM | SYSTOLIC BLOOD PRESSURE: 112 MMHG | OXYGEN SATURATION: 99 % | WEIGHT: 208.56 LBS | RESPIRATION RATE: 18 BRPM | HEIGHT: 70 IN | TEMPERATURE: 98 F | BODY MASS INDEX: 29.86 KG/M2 | DIASTOLIC BLOOD PRESSURE: 68 MMHG

## 2017-07-22 PROBLEM — E86.9 VOLUME DEPLETION: Status: RESOLVED | Noted: 2017-07-21 | Resolved: 2017-07-22

## 2017-07-22 LAB
ALBUMIN SERPL BCP-MCNC: 3.4 G/DL
ANION GAP SERPL CALC-SCNC: 13 MMOL/L
BASOPHILS # BLD AUTO: 0.03 K/UL
BASOPHILS NFR BLD: 0.4 %
BUN SERPL-MCNC: 31 MG/DL
CALCIUM SERPL-MCNC: 9.6 MG/DL
CHLORIDE SERPL-SCNC: 104 MMOL/L
CO2 SERPL-SCNC: 22 MMOL/L
CREAT SERPL-MCNC: 7.2 MG/DL
DIFFERENTIAL METHOD: ABNORMAL
EOSINOPHIL # BLD AUTO: 0.4 K/UL
EOSINOPHIL NFR BLD: 5.1 %
ERYTHROCYTE [DISTWIDTH] IN BLOOD BY AUTOMATED COUNT: 12 %
EST. GFR  (AFRICAN AMERICAN): 10 ML/MIN/1.73 M^2
EST. GFR  (NON AFRICAN AMERICAN): 8.7 ML/MIN/1.73 M^2
GLUCOSE SERPL-MCNC: 93 MG/DL
HCT VFR BLD AUTO: 36.3 %
HGB BLD-MCNC: 11.9 G/DL
LYMPHOCYTES # BLD AUTO: 2.1 K/UL
LYMPHOCYTES NFR BLD: 30.9 %
MCH RBC QN AUTO: 29.2 PG
MCHC RBC AUTO-ENTMCNC: 32.8 G/DL
MCV RBC AUTO: 89 FL
MONOCYTES # BLD AUTO: 0.6 K/UL
MONOCYTES NFR BLD: 8.9 %
NEUTROPHILS # BLD AUTO: 3.7 K/UL
NEUTROPHILS NFR BLD: 54.4 %
PHOSPHATE SERPL-MCNC: 5.2 MG/DL
PLATELET # BLD AUTO: 238 K/UL
PMV BLD AUTO: 9.7 FL
POTASSIUM SERPL-SCNC: 4.4 MMOL/L
RBC # BLD AUTO: 4.07 M/UL
SODIUM SERPL-SCNC: 139 MMOL/L
WBC # BLD AUTO: 6.86 K/UL

## 2017-07-22 PROCEDURE — 80069 RENAL FUNCTION PANEL: CPT | Mod: NTX

## 2017-07-22 PROCEDURE — 85025 COMPLETE CBC W/AUTO DIFF WBC: CPT | Mod: NTX

## 2017-07-22 PROCEDURE — 25000003 PHARM REV CODE 250: Mod: NTX | Performed by: HOSPITALIST

## 2017-07-22 PROCEDURE — 99217 PR OBSERVATION CARE DISCHARGE: CPT | Mod: NTX,,, | Performed by: PHYSICIAN ASSISTANT

## 2017-07-22 PROCEDURE — 36415 COLL VENOUS BLD VENIPUNCTURE: CPT | Mod: NTX

## 2017-07-22 PROCEDURE — G0378 HOSPITAL OBSERVATION PER HR: HCPCS | Mod: NTX

## 2017-07-22 RX ADMIN — SEVELAMER CARBONATE 800 MG: 800 TABLET, FILM COATED ORAL at 09:07

## 2017-07-22 NOTE — DISCHARGE SUMMARY
"[facility] Hospital Medicine  Discharge Summary      Patient Name: Maksim Hidalgo  MRN: [id number]  Admission Date: 7/20/2017  Hospital Length of Stay: 0 days  Discharge Date and Time:  07/22/2017 12:47 PM  Attending Physician: Sofia att. providers found   Discharging Provider: Mikala Terry PA-C  Primary Care Provider: Saurabh Zuleta MD  [facility] Medicine Team: [facility] F Mikala Terry PA-C    HPI:   37 year old male ESRD on dialysis for one year due to uncontrolled HTN presents to ED with dizziness. He was on PD for last one year until recently when PD catheter stopped working. He underwent PD catheter  revision with change in position due to adhesions on 07/08/17 by Dr. Gillespie. Plan to remove catheter with diagnostic laparoscopy in 3 months to see if it can be replaced. Patient currently having HD ( MWF ) thru right chest walled tunneled catheter that was placed two weeks ago. He had unremarkable clinic visit with Dr. Gillespie earlier today.      Reports he started feeling tingling in his legs associated with dizziness to the point that he felt like "he would pass out" soon after returning home from clinic. He had full session of HD yesterday without any issue. He was just recently converted from PD to HD two weeks ago and not sure if they took out too much fluid at HD yesterday.  He still make little urine twice a day. Denies fever, chills, chest pain, cough, SOH, N/V/D, abdominal pain.      Upon arrival to ER his had low SBP of 77 with dizziness. He received  cc bolus x 1 with minimal improvement. Labs unremarkable except mild elevated lactate of 2.3.     During my interview he still reports feeling dizzy after 500 cc NS bolus with SBP 91. Lung CTA and clinically appears hypovolemic. I ordered another 500 cc NS bolus with improvement of  -110.        States he has clinic visit tomorrow morning at vascular surgery for vein mapping for permanent  HD access.     * " No surgery found *      Indwelling Lines/Drains at time of discharge:   Lines/Drains/Airways     Central Venous Catheter Line                 Percutaneous Central Line Insertion/Assessment - double lumen  07/07/17 1600 right internal jugular 14 days              Hospital Course:   36 yo male admitted to observation for further evaluation and treatment of dizziness/near syncope. BP on admission low, likely secondary to volume depletion from excess volume removal at HD. Lactate mildly elevated on admission, trended down with repeat (2.3 -> 0.8). Given one dose vancomycin and rocephin empirically. Blood cultures NGTD. Nephrology consulted, completed HD 7/21, patient tolerated well with no further episodes of dizziness or weakness. Vein mapping completed 7/21. Patient reports feeling at baseline, requesting discharge home 7/22. Patient to follow up with vascular surgery for continued workup for permanent HD access. Stable for discharge home today.     Consults:   Consults         Status Ordering Provider     Inpatient consult to Nephrology  Once     Provider:  (Not yet assigned)    Completed DOTTY LIZ     Inpatient consult to Nephrology  Once     Provider:  (Not yet assigned)    Completed MADDY DOBBINS          Significant Diagnostic Studies: Vas US vein mapping    Pending Diagnostic Studies:     None        Final Active Diagnoses:    Diagnosis Date Noted POA    PRINCIPAL PROBLEM:  Dizziness [R42] 07/20/2017 Yes    ESRD (end stage renal disease) on dialysis [N18.6, Z99.2]  Not Applicable    Hypotension arterial [I95.9] 07/21/2017 Yes    Postural dizziness with near syncope [R42, R55] 07/21/2017 Yes    Preop examination [Z01.818]  Not Applicable    Renovascular hypertension [I15.0] 06/27/2017 Yes     Chronic    Peritoneal dialysis catheter in place [Z99.2]  Not Applicable    Anemia in ESRD (end-stage renal disease) [N18.6, D63.1]  Yes     Chronic    Secondary hyperparathyroidism [N25.81] 05/27/2016 Yes     Vitamin D deficiency [E55.9] 04/06/2016 Yes      Problems Resolved During this Admission:    Diagnosis Date Noted Date Resolved POA    Volume depletion [E86.9] 07/21/2017 07/22/2017 Yes    Lactic acidosis [E87.2] 07/21/2017 07/21/2017 Yes      * Dizziness    Volume depletion, lactic acidosis, postural dizziness with near syncope  - Likely due to volume depletion from excessive volume removal at HD yesterday   - Sepsis can not be ruled out given vascular  HD catheter and mildly elevated lactate on admission  - check blood culture from 2 diff sites - no growth x2 days   - Given one dose of vancomycin and rocephin empirically. Will hold off on further abx as patient clinically improved since admission   - Repeat lactate after IVF back to normal limits (0.8)  - Held lisinopril and lasix during admission in setting of hypotension, discharge with hold parameters  - patient reports resolution of dizziness since admission, stable for discharge home today            ESRD (end stage renal disease) on dialysis    - Patient recently transitioned to HD from PD, reports he has had 5 outpatient HD sessions  - Excess volume removal with HD likely contributing to current presentation with dizziness, weakness  - Nephrology consulted, completed HD 7/21  - Vein mapping completed 7/21. Patient to follow up as outpatient for permanent HD access as perma-cath still functioning at this point. Referral placed to vascular surgery to reschedule appointment.   - Continue calcitriol and renvela          Renovascular hypertension    - Home regimen of lisinopril, discharged with hold parameters        Anemia in ESRD (end-stage renal disease)    - H/H fluctuates but appears close to baseline (11.9/36.3 on day of discharge)  - Will continue to monitor              Discharged Condition: stable    Disposition: Home or Self Care    Follow Up:  Follow-up Information     W Umberto Singh Iii, MD.    Specialty:  Vascular Surgery  Why:  Please  continue vascular surgery follow up. The office will call you to shedule appointment.   Contact information:  [address]  [place] [id number] [phone]                 Patient Instructions:     Ambulatory Referral to Vascular Surgery   Referral Priority: Routine Referral Type: Consultation   Referral Reason: Specialty Services Required    Referred to Provider: DAYNA HARRIS III Requested Specialty: Vascular Surgery   Number of Visits Requested: 1      Diet renal     Activity as tolerated     Call MD for:  temperature >100.4     Call MD for:  severe uncontrolled pain     Call MD for:  persistent nausea and vomiting or diarrhea     Call MD for:  difficulty breathing or increased cough     Call MD for:  persistent dizziness, light-headedness, or visual disturbances     Call MD for:  increased confusion or weakness       Medications:  Reconciled Home Medications:   Discharge Medication List as of 7/22/2017 11:35 AM      CONTINUE these medications which have NOT CHANGED    Details   albuterol 90 mcg/actuation inhaler Inhale 2 puffs into the lungs every 4 (four) hours as needed for Wheezing., Starting Sun 6/4/2017, Normal      benzonatate (TESSALON) 200 MG capsule Take 1 capsule (200 mg total) by mouth every evening., Starting Sun 6/4/2017, Normal      calcitRIOL (ROCALTROL) 0.5 MCG Cap Take 1 capsule (0.5 mcg total) by mouth once daily., Starting 4/13/2016, Until Discontinued, Normal      fluconazole (DIFLUCAN) 200 MG Tab Take 1 tablet (200 mg total) by mouth every other day., Starting Fri 6/23/2017, Normal      furosemide (LASIX) 40 MG tablet Take 1 tablet (40 mg total) by mouth once daily., Starting 3/20/2017, Until Tue 3/20/18, No Print      INV lisinopril 10 MG Tab Take 1 tablet (10 mg total) by mouth once daily., Starting Fri 6/23/2017, Until Sat 6/23/2018, Normal      melatonin 5 mg Tab Take 1 tablet (5 mg total) by mouth every evening., Starting Tue 6/27/2017, OTC      oxycodone-acetaminophen  (PERCOCET) 5-325 mg per tablet Take 1 tablet by mouth every 4 (four) hours as needed., Starting Fri 7/7/2017, Print      pantoprazole (PROTONIX) 40 MG tablet Take 1 tablet (40 mg total) by mouth once daily., Starting Fri 6/23/2017, Until Sat 6/23/2018, Normal      senna-docusate 8.6-50 mg (PERICOLACE) 8.6-50 mg per tablet Take 2 tablets by mouth every evening., Starting 9/29/2016, Until Discontinued, OTC      sevelamer carbonate (RENVELA) 800 mg Tab Take 1 tablet (800 mg total) by mouth 3 (three) times daily with meals., Starting Fri 6/23/2017, Until Sat 6/23/2018, Normal           Time spent on the discharge of patient: 35 minutes    Mikala Terry PA-C  Department of Hospital Medicine  [facility]  Staff: Dr. Rodriguez

## 2017-07-22 NOTE — PLAN OF CARE
Problem: Patient Care Overview  Goal: Plan of Care Review  Outcome: Ongoing (interventions implemented as appropriate)  POC reviewed w/ pt. No signs of distress noted and no injuries sustained during the shift. BP low during 8pm vitals as pt had been in hemodialysis during the day. On call MD notified. Nursing interventions implemented. Pt resting quietly in bed. Remains a, a & o x 4. No symptoms of dizziness reported.  Will continue to monitor pt.

## 2017-07-22 NOTE — ASSESSMENT & PLAN NOTE
- H/H fluctuates but appears close to baseline (11.9/36.3 on day of discharge)  - Will continue to monitor

## 2017-07-22 NOTE — NURSING
Patient discharged to home with family per family vehicle  iv removed discharge instructions given and patient verbalized understanding of instructions

## 2017-07-22 NOTE — ASSESSMENT & PLAN NOTE
Volume depletion, lactic acidosis, postural dizziness with near syncope  - Likely due to volume depletion from excessive volume removal at HD yesterday   - Sepsis can not be ruled out given vascular  HD catheter and mildly elevated lactate on admission  - check blood culture from 2 diff sites - no growth x2 days   - Given one dose of vancomycin and rocephin empirically. Will hold off on further abx as patient clinically improved since admission   - Repeat lactate after IVF back to normal limits (0.8)  - Held lisinopril and lasix during admission in setting of hypotension, discharge with hold parameters  - patient reports resolution of dizziness since admission, stable for discharge home today

## 2017-07-22 NOTE — ASSESSMENT & PLAN NOTE
- Patient recently transitioned to HD from PD, reports he has had 5 outpatient HD sessions  - Excess volume removal with HD likely contributing to current presentation with dizziness, weakness  - Nephrology consulted, completed HD 7/21  - Vein mapping completed 7/21. Patient to follow up as outpatient for permanent HD access as perma-cath still functioning at this point. Referral placed to vascular surgery to reschedule appointment.   - Continue calcitriol and renvela

## 2017-07-25 LAB
BACTERIA BLD CULT: NORMAL
BACTERIA BLD CULT: NORMAL

## 2017-07-28 ENCOUNTER — TELEPHONE (OUTPATIENT)
Dept: SURGERY | Facility: CLINIC | Age: 37
End: 2017-07-28

## 2017-07-30 ENCOUNTER — ANESTHESIA EVENT (OUTPATIENT)
Dept: SURGERY | Facility: HOSPITAL | Age: 37
DRG: 853 | End: 2017-07-30
Payer: MEDICARE

## 2017-07-30 NOTE — ANESTHESIA PREPROCEDURE EVALUATION
Pre-operative evaluation for Procedure(s) (LRB):  REMOVAL-CATHETER-DIALYSIS-PERITONEAL (N/A)    Maksim Hidalgo is a 37 year old male with a past medical history of ESRD on PD, Anemia, HTN, and chronic Hep B who presents for the above stated procedure.     IV Access: Peripheral IV    Last Airway:    Placement Date: 07/07/17; Placement Time: 0928; Method of Intubation: Khan; Inserted by: Anesthesia Resident; Airway Device: Endotracheal Tube; Mask Ventilation:  (Two hand w/o oral airway); Intubated: Postinduction; Blade: Kwasi #3; Airway Device Size: 8.0; Style: Cuffed; Cuff Inflation: Minimal occlusive pressure; Inflation Amount: 7; Placement Verified By: Auscultation, Capnometry, ETT Condensation; Grade: Grade I; Intubation Findings: Positive EtCO2, Bilateral breath sounds, Atraumatic/Condition of teeth unchanged;  Depth of Insertion: 25; Securment: Lips; Complications: None; Breath Sounds: Equal Bilateral; Insertion Attempts: 2; Removal Date: 07/07/17;  Removal Time: 1019      Patient Active Problem List   Diagnosis    LVH (left ventricular hypertrophy) due to hypertensive disease    Chronic hepatitis B virus infection    Vitamin D deficiency    Secondary hyperparathyroidism    Peritonitis of undetermined cause    Chronic constipation    ESRD (end stage renal disease) on dialysis    Organ transplant candidate    Anemia in ESRD (end-stage renal disease)    Peritoneal dialysis catheter in place    Peritoneal dialysis status    Renovascular hypertension    ESRD (end stage renal disease)    Dizziness    Hypotension arterial    Postural dizziness with near syncope    Preop examination       Review of patient's allergies indicates:  No Known Allergies    No current facility-administered medications on file prior to encounter.      Current Outpatient Prescriptions on File Prior to Encounter   Medication Sig Dispense Refill    albuterol 90 mcg/actuation inhaler Inhale 2 puffs into the lungs  every 4 (four) hours as needed for Wheezing. 1 Inhaler 0    benzonatate (TESSALON) 200 MG capsule Take 1 capsule (200 mg total) by mouth every evening. 30 capsule 0    calcitRIOL (ROCALTROL) 0.5 MCG Cap Take 1 capsule (0.5 mcg total) by mouth once daily. 30 capsule 11    fluconazole (DIFLUCAN) 200 MG Tab Take 1 tablet (200 mg total) by mouth every other day. 3 tablet 0    furosemide (LASIX) 40 MG tablet Take 1 tablet (40 mg total) by mouth once daily. 30 tablet 0    INV lisinopril 10 MG Tab Take 1 tablet (10 mg total) by mouth once daily. 30 tablet 11    melatonin 5 mg Tab Take 1 tablet (5 mg total) by mouth every evening.      oxycodone-acetaminophen (PERCOCET) 5-325 mg per tablet Take 1 tablet by mouth every 4 (four) hours as needed. 15 tablet 0    pantoprazole (PROTONIX) 40 MG tablet Take 1 tablet (40 mg total) by mouth once daily. 30 tablet 11    senna-docusate 8.6-50 mg (PERICOLACE) 8.6-50 mg per tablet Take 2 tablets by mouth every evening.      sevelamer carbonate (RENVELA) 800 mg Tab Take 1 tablet (800 mg total) by mouth 3 (three) times daily with meals. 90 tablet 11       Past Surgical History:   Procedure Laterality Date    PERITONEAL CATHETER INSERTION         Social History     Social History    Marital status: Single     Spouse name: N/A    Number of children: N/A    Years of education: N/A     Occupational History    Not on file.     Social History Main Topics    Smoking status: Former Smoker     Packs/day: 0.00     Years: 8.00     Types: Cigarettes     Quit date: 06/2016    Smokeless tobacco: Never Used    Alcohol use No    Drug use: No    Sexual activity: No     Other Topics Concern    Not on file     Social History Narrative    He works in Management.         Vital Signs Range (Last 24H):         CBC: No results for input(s): WBC, RBC, HGB, HCT, PLT, MCV, MCH, MCHC in the last 72 hours.    CMP: No results for input(s): NA, K, CL, CO2, BUN, CREATININE, GLU, MG, PHOS, CALCIUM,  ALBUMIN, PROT, ALKPHOS, ALT, AST, BILITOT in the last 72 hours.    INR  No results for input(s): INR, PROTIME, APTT in the last 72 hours.    Invalid input(s): PT        Diagnostic Studies:      EK/20/17    Test Reason : R07.9  Vent. Rate : 082 BPM     Atrial Rate : 082 BPM     P-R Int : 178 ms          QRS Dur : 084 ms      QT Int : 398 ms       P-R-T Axes : 038 013 032 degrees     QTc Int : 464 ms    Normal sinus rhythm    2D Echo:    17    Ref Range & Units 1mo ago 5mo ago    EF 55 - 65 60     Diastolic Dysfunction  No No    Est. PA Systolic Pressure  14.16     Tricuspid Valve Regurgitation  TRIVIAL          Anesthesia Evaluation    I have reviewed the Patient Summary Reports.    I have reviewed the Nursing Notes.   I have reviewed the Medications.     Review of Systems  Anesthesia Hx:  History of prior surgery of interest to airway management or planning: Denies Family Hx of Anesthesia complications.   Denies Personal Hx of Anesthesia complications.   Cardiovascular:   Hypertension Denies CAD.    Denies CABG/stent.     Renal/:   Chronic Renal Disease (Peritoneal dialysis), ESRD    Hepatic/GI:   Hepatitis, B        Physical Exam  General:  Well nourished    Airway/Jaw/Neck:  Airway Findings: Mouth Opening: Normal Tongue: Normal  General Airway Assessment: Adult  Mallampati: II  TM Distance: Normal, at least 6 cm  Jaw/Neck Findings:  Neck ROM: Normal ROM      Dental:  Dental Findings: Periodontal disease, Mild        Mental Status:  Mental Status Findings:  Cooperative         Anesthesia Plan  Type of Anesthesia, risks & benefits discussed:  Anesthesia Type:  general  Patient's Preference: General  Intra-op Monitoring Plan: standard ASA monitors  Intra-op Monitoring Plan Comments: Standard ASA monitors.   Post Op Pain Control Plan: per primary service following discharge from PACU, IV/PO Opioids PRN and multimodal analgesia  Post Op Pain Control Plan Comments: Per primary service.     Induction:    IV  Beta Blocker:  Patient is not currently on a Beta-Blocker (No further documentation required).       Informed Consent: Patient understands risks and agrees with Anesthesia plan.  Questions answered. Anesthesia consent signed with patient.  ASA Score: 4     Day of Surgery Review of History & Physical: I have interviewed and examined the patient. I have reviewed the patient's H&P dated:  There are no significant changes.  H&P update referred to the surgeon.     Anesthesia Plan Notes: Chart reviewed, patient interviewed and examined.  The plan for general anesthesia was explained.  Questions were answered and the consent was signed.  Bernice NAVA         Ready For Surgery From Anesthesia Perspective.

## 2017-07-31 ENCOUNTER — ANESTHESIA (OUTPATIENT)
Dept: SURGERY | Facility: HOSPITAL | Age: 37
DRG: 853 | End: 2017-07-31
Payer: MEDICARE

## 2017-07-31 ENCOUNTER — HOSPITAL ENCOUNTER (INPATIENT)
Facility: HOSPITAL | Age: 37
LOS: 7 days | Discharge: HOME HEALTH | DRG: 853 | End: 2017-08-08
Attending: SURGERY | Admitting: SURGERY
Payer: MEDICARE

## 2017-07-31 DIAGNOSIS — F51.02 ADJUSTMENT INSOMNIA: ICD-10-CM

## 2017-07-31 DIAGNOSIS — Z99.2 ESRD (END STAGE RENAL DISEASE) ON DIALYSIS: ICD-10-CM

## 2017-07-31 DIAGNOSIS — R53.1 WEAKNESS: ICD-10-CM

## 2017-07-31 DIAGNOSIS — N18.6 ESRD (END STAGE RENAL DISEASE) ON DIALYSIS: ICD-10-CM

## 2017-07-31 DIAGNOSIS — R25.1 TREMOR OF RIGHT HAND: ICD-10-CM

## 2017-07-31 DIAGNOSIS — N18.6 ESRD (END STAGE RENAL DISEASE): Primary | ICD-10-CM

## 2017-07-31 DIAGNOSIS — N25.81 SECONDARY HYPERPARATHYROIDISM: ICD-10-CM

## 2017-07-31 LAB
ALLENS TEST: ABNORMAL
ANION GAP SERPL CALC-SCNC: 13 MMOL/L
BASOPHILS # BLD AUTO: 0.02 K/UL
BASOPHILS NFR BLD: 0.2 %
BUN SERPL-MCNC: 42 MG/DL
CALCIUM SERPL-MCNC: 9.1 MG/DL
CHLORIDE SERPL-SCNC: 110 MMOL/L
CO2 SERPL-SCNC: 18 MMOL/L
CREAT SERPL-MCNC: 7.1 MG/DL
DELSYS: ABNORMAL
DIFFERENTIAL METHOD: ABNORMAL
EOSINOPHIL # BLD AUTO: 0.2 K/UL
EOSINOPHIL NFR BLD: 2.4 %
ERYTHROCYTE [DISTWIDTH] IN BLOOD BY AUTOMATED COUNT: 12.2 %
ERYTHROCYTE [SEDIMENTATION RATE] IN BLOOD BY WESTERGREN METHOD: 16 MM/H
EST. GFR  (AFRICAN AMERICAN): 10.2 ML/MIN/1.73 M^2
EST. GFR  (NON AFRICAN AMERICAN): 8.8 ML/MIN/1.73 M^2
FIO2: 21
GLUCOSE SERPL-MCNC: 70 MG/DL
GRAM STN SPEC: NORMAL
GRAM STN SPEC: NORMAL
HCO3 UR-SCNC: 19.5 MMOL/L (ref 24–28)
HCT VFR BLD AUTO: 37.3 %
HGB BLD-MCNC: 12.1 G/DL
LYMPHOCYTES # BLD AUTO: 1.8 K/UL
LYMPHOCYTES NFR BLD: 19.8 %
MAGNESIUM SERPL-MCNC: 2 MG/DL
MCH RBC QN AUTO: 29.2 PG
MCHC RBC AUTO-ENTMCNC: 32.4 G/DL
MCV RBC AUTO: 90 FL
MODE: ABNORMAL
MONOCYTES # BLD AUTO: 0.7 K/UL
MONOCYTES NFR BLD: 7.2 %
NEUTROPHILS # BLD AUTO: 6.5 K/UL
NEUTROPHILS NFR BLD: 70.2 %
PCO2 BLDA: 38.4 MMHG (ref 35–45)
PH SMN: 7.32 [PH] (ref 7.35–7.45)
PHOSPHATE SERPL-MCNC: 3.3 MG/DL
PLATELET # BLD AUTO: 330 K/UL
PMV BLD AUTO: 10 FL
PO2 BLDA: 90 MMHG (ref 80–100)
POC BE: -7 MMOL/L
POC SATURATED O2: 96 % (ref 95–100)
POC TCO2: 21 MMOL/L (ref 23–27)
POTASSIUM SERPL-SCNC: 4.9 MMOL/L
RBC # BLD AUTO: 4.15 M/UL
SAMPLE: ABNORMAL
SITE: ABNORMAL
SODIUM SERPL-SCNC: 141 MMOL/L
SP02: 96
WBC # BLD AUTO: 9.22 K/UL

## 2017-07-31 PROCEDURE — 63600175 PHARM REV CODE 636 W HCPCS: Mod: TXP | Performed by: SURGERY

## 2017-07-31 PROCEDURE — 84100 ASSAY OF PHOSPHORUS: CPT | Mod: NTX

## 2017-07-31 PROCEDURE — 37000009 HC ANESTHESIA EA ADD 15 MINS: Mod: NTX | Performed by: SURGERY

## 2017-07-31 PROCEDURE — 85025 COMPLETE CBC W/AUTO DIFF WBC: CPT | Mod: NTX

## 2017-07-31 PROCEDURE — 37000008 HC ANESTHESIA 1ST 15 MINUTES: Mod: NTX | Performed by: SURGERY

## 2017-07-31 PROCEDURE — 82803 BLOOD GASES ANY COMBINATION: CPT | Mod: NTX

## 2017-07-31 PROCEDURE — 87116 MYCOBACTERIA CULTURE: CPT | Mod: NTX

## 2017-07-31 PROCEDURE — 25000003 PHARM REV CODE 250: Mod: TXP | Performed by: SURGERY

## 2017-07-31 PROCEDURE — 87205 SMEAR GRAM STAIN: CPT | Mod: NTX

## 2017-07-31 PROCEDURE — 71000033 HC RECOVERY, INTIAL HOUR: Mod: NTX | Performed by: SURGERY

## 2017-07-31 PROCEDURE — 80048 BASIC METABOLIC PNL TOTAL CA: CPT | Mod: NTX

## 2017-07-31 PROCEDURE — 71000039 HC RECOVERY, EACH ADD'L HOUR: Mod: NTX | Performed by: SURGERY

## 2017-07-31 PROCEDURE — 94002 VENT MGMT INPAT INIT DAY: CPT | Mod: TXP

## 2017-07-31 PROCEDURE — G0378 HOSPITAL OBSERVATION PER HR: HCPCS | Mod: NTX

## 2017-07-31 PROCEDURE — 36000705 HC OR TIME LEV I EA ADD 15 MIN: Mod: NTX | Performed by: SURGERY

## 2017-07-31 PROCEDURE — 87070 CULTURE OTHR SPECIMN AEROBIC: CPT | Mod: NTX

## 2017-07-31 PROCEDURE — D9220A PRA ANESTHESIA: Mod: NTX,,, | Performed by: ANESTHESIOLOGY

## 2017-07-31 PROCEDURE — 25000003 PHARM REV CODE 250: Mod: TXP | Performed by: STUDENT IN AN ORGANIZED HEALTH CARE EDUCATION/TRAINING PROGRAM

## 2017-07-31 PROCEDURE — 0WPG03Z REMOVAL OF INFUSION DEVICE FROM PERITONEAL CAVITY, OPEN APPROACH: ICD-10-PCS | Performed by: SURGERY

## 2017-07-31 PROCEDURE — 83735 ASSAY OF MAGNESIUM: CPT | Mod: NTX

## 2017-07-31 PROCEDURE — 63600175 PHARM REV CODE 636 W HCPCS: Mod: TXP | Performed by: STUDENT IN AN ORGANIZED HEALTH CARE EDUCATION/TRAINING PROGRAM

## 2017-07-31 PROCEDURE — 87075 CULTR BACTERIA EXCEPT BLOOD: CPT | Mod: NTX

## 2017-07-31 PROCEDURE — 36000704 HC OR TIME LEV I 1ST 15 MIN: Mod: NTX | Performed by: SURGERY

## 2017-07-31 PROCEDURE — 87102 FUNGUS ISOLATION CULTURE: CPT | Mod: NTX

## 2017-07-31 PROCEDURE — 89051 BODY FLUID CELL COUNT: CPT | Mod: NTX

## 2017-07-31 PROCEDURE — 49422 REMOVE TUNNELED IP CATH: CPT | Mod: NTX,,, | Performed by: SURGERY

## 2017-07-31 PROCEDURE — 36600 WITHDRAWAL OF ARTERIAL BLOOD: CPT | Mod: NTX

## 2017-07-31 PROCEDURE — 36415 COLL VENOUS BLD VENIPUNCTURE: CPT | Mod: NTX

## 2017-07-31 RX ORDER — OXYCODONE AND ACETAMINOPHEN 5; 325 MG/1; MG/1
1 TABLET ORAL EVERY 4 HOURS PRN
Qty: 25 TABLET | Refills: 0 | Status: SHIPPED | OUTPATIENT
Start: 2017-07-31 | End: 2017-08-03

## 2017-07-31 RX ORDER — NEOSTIGMINE METHYLSULFATE 1 MG/ML
INJECTION, SOLUTION INTRAVENOUS
Status: DISCONTINUED | OUTPATIENT
Start: 2017-07-31 | End: 2017-07-31

## 2017-07-31 RX ORDER — SODIUM CHLORIDE 0.9 % (FLUSH) 0.9 %
3 SYRINGE (ML) INJECTION
Status: DISCONTINUED | OUTPATIENT
Start: 2017-07-31 | End: 2017-07-31 | Stop reason: HOSPADM

## 2017-07-31 RX ORDER — ACETAMINOPHEN 10 MG/ML
1000 INJECTION, SOLUTION INTRAVENOUS ONCE
Status: COMPLETED | OUTPATIENT
Start: 2017-07-31 | End: 2017-07-31

## 2017-07-31 RX ORDER — FENTANYL CITRATE 50 UG/ML
INJECTION, SOLUTION INTRAMUSCULAR; INTRAVENOUS
Status: DISCONTINUED | OUTPATIENT
Start: 2017-07-31 | End: 2017-07-31

## 2017-07-31 RX ORDER — SODIUM CHLORIDE 9 MG/ML
INJECTION, SOLUTION INTRAVENOUS CONTINUOUS
Status: DISCONTINUED | OUTPATIENT
Start: 2017-07-31 | End: 2017-07-31

## 2017-07-31 RX ORDER — LIDOCAINE HCL/PF 100 MG/5ML
SYRINGE (ML) INTRAVENOUS
Status: DISCONTINUED | OUTPATIENT
Start: 2017-07-31 | End: 2017-07-31

## 2017-07-31 RX ORDER — MIDAZOLAM HYDROCHLORIDE 1 MG/ML
INJECTION, SOLUTION INTRAMUSCULAR; INTRAVENOUS
Status: DISCONTINUED | OUTPATIENT
Start: 2017-07-31 | End: 2017-07-31

## 2017-07-31 RX ORDER — PROPOFOL 10 MG/ML
VIAL (ML) INTRAVENOUS
Status: DISCONTINUED | OUTPATIENT
Start: 2017-07-31 | End: 2017-07-31

## 2017-07-31 RX ORDER — HYDROCODONE BITARTRATE AND ACETAMINOPHEN 5; 325 MG/1; MG/1
1 TABLET ORAL EVERY 4 HOURS PRN
Status: DISCONTINUED | OUTPATIENT
Start: 2017-07-31 | End: 2017-08-08 | Stop reason: HOSPADM

## 2017-07-31 RX ORDER — CISATRACURIUM BESYLATE 10 MG/ML
INJECTION, SOLUTION INTRAVENOUS
Status: DISCONTINUED | OUTPATIENT
Start: 2017-07-31 | End: 2017-07-31

## 2017-07-31 RX ORDER — ONDANSETRON 2 MG/ML
INJECTION INTRAMUSCULAR; INTRAVENOUS
Status: DISCONTINUED | OUTPATIENT
Start: 2017-07-31 | End: 2017-07-31

## 2017-07-31 RX ORDER — ACETAMINOPHEN 10 MG/ML
INJECTION, SOLUTION INTRAVENOUS
Status: DISCONTINUED | OUTPATIENT
Start: 2017-07-31 | End: 2017-07-31

## 2017-07-31 RX ORDER — AMOXICILLIN 250 MG
1 CAPSULE ORAL 2 TIMES DAILY
COMMUNITY
Start: 2017-07-31 | End: 2017-08-08 | Stop reason: HOSPADM

## 2017-07-31 RX ORDER — OXYCODONE AND ACETAMINOPHEN 5; 325 MG/1; MG/1
1 TABLET ORAL EVERY 4 HOURS PRN
Qty: 25 TABLET | Refills: 0 | Status: SHIPPED | OUTPATIENT
Start: 2017-07-31 | End: 2017-07-31

## 2017-07-31 RX ORDER — BUPIVACAINE HYDROCHLORIDE 2.5 MG/ML
INJECTION, SOLUTION EPIDURAL; INFILTRATION; INTRACAUDAL
Status: DISCONTINUED | OUTPATIENT
Start: 2017-07-31 | End: 2017-07-31 | Stop reason: HOSPADM

## 2017-07-31 RX ORDER — LIDOCAINE HYDROCHLORIDE 10 MG/ML
1 INJECTION, SOLUTION EPIDURAL; INFILTRATION; INTRACAUDAL; PERINEURAL ONCE
Status: COMPLETED | OUTPATIENT
Start: 2017-07-31 | End: 2017-07-31

## 2017-07-31 RX ORDER — GLYCOPYRROLATE 0.2 MG/ML
INJECTION INTRAMUSCULAR; INTRAVENOUS
Status: DISCONTINUED | OUTPATIENT
Start: 2017-07-31 | End: 2017-07-31

## 2017-07-31 RX ORDER — HYDROMORPHONE HYDROCHLORIDE 1 MG/ML
0.2 INJECTION, SOLUTION INTRAMUSCULAR; INTRAVENOUS; SUBCUTANEOUS EVERY 5 MIN PRN
Status: DISCONTINUED | OUTPATIENT
Start: 2017-07-31 | End: 2017-07-31 | Stop reason: HOSPADM

## 2017-07-31 RX ORDER — PROPOFOL 10 MG/ML
VIAL (ML) INTRAVENOUS CONTINUOUS PRN
Status: DISCONTINUED | OUTPATIENT
Start: 2017-07-31 | End: 2017-07-31

## 2017-07-31 RX ORDER — ONDANSETRON 2 MG/ML
4 INJECTION INTRAMUSCULAR; INTRAVENOUS DAILY PRN
Status: DISCONTINUED | OUTPATIENT
Start: 2017-07-31 | End: 2017-07-31 | Stop reason: HOSPADM

## 2017-07-31 RX ADMIN — Medication 2 G: at 09:07

## 2017-07-31 RX ADMIN — GLYCOPYRROLATE 0.6 MG: 0.2 INJECTION, SOLUTION INTRAMUSCULAR; INTRAVENOUS at 09:07

## 2017-07-31 RX ADMIN — ONDANSETRON 4 MG: 2 INJECTION INTRAMUSCULAR; INTRAVENOUS at 09:07

## 2017-07-31 RX ADMIN — LIDOCAINE HYDROCHLORIDE 40 MG: 20 INJECTION, SOLUTION INTRAVENOUS at 08:07

## 2017-07-31 RX ADMIN — MIDAZOLAM HYDROCHLORIDE 2 MG: 1 INJECTION, SOLUTION INTRAMUSCULAR; INTRAVENOUS at 09:07

## 2017-07-31 RX ADMIN — ACETAMINOPHEN 1000 MG: 10 INJECTION, SOLUTION INTRAVENOUS at 08:07

## 2017-07-31 RX ADMIN — MIDAZOLAM HYDROCHLORIDE 1 MG: 1 INJECTION, SOLUTION INTRAMUSCULAR; INTRAVENOUS at 08:07

## 2017-07-31 RX ADMIN — FENTANYL CITRATE 100 MCG: 50 INJECTION, SOLUTION INTRAMUSCULAR; INTRAVENOUS at 09:07

## 2017-07-31 RX ADMIN — HYDROCODONE BITARTRATE AND ACETAMINOPHEN 1 TABLET: 5; 325 TABLET ORAL at 01:07

## 2017-07-31 RX ADMIN — PROPOFOL 200 MG: 10 INJECTION, EMULSION INTRAVENOUS at 08:07

## 2017-07-31 RX ADMIN — NEOSTIGMINE METHYLSULFATE 5 MG: 1 INJECTION INTRAVENOUS at 09:07

## 2017-07-31 RX ADMIN — SODIUM CHLORIDE: 0.9 INJECTION, SOLUTION INTRAVENOUS at 08:07

## 2017-07-31 RX ADMIN — FENTANYL CITRATE 100 MCG: 50 INJECTION, SOLUTION INTRAMUSCULAR; INTRAVENOUS at 08:07

## 2017-07-31 RX ADMIN — LIDOCAINE HYDROCHLORIDE 20 MG: 10 INJECTION, SOLUTION EPIDURAL; INFILTRATION; INTRACAUDAL; PERINEURAL at 08:07

## 2017-07-31 RX ADMIN — SODIUM CHLORIDE: 0.9 INJECTION, SOLUTION INTRAVENOUS at 09:07

## 2017-07-31 RX ADMIN — PROPOFOL 50 MG/KG/HR: 10 INJECTION, EMULSION INTRAVENOUS at 09:07

## 2017-07-31 RX ADMIN — HYDROMORPHONE HYDROCHLORIDE 0.2 MG: 1 INJECTION, SOLUTION INTRAMUSCULAR; INTRAVENOUS; SUBCUTANEOUS at 02:07

## 2017-07-31 RX ADMIN — CISATRACURIUM BESYLATE 14 MG: 10 INJECTION INTRAVENOUS at 08:07

## 2017-07-31 NOTE — PROGRESS NOTES
Dr. Christianson notified of pt bp. Leg weakness and cramping. Pt complaining of bad head ached. VSS. resp even and unlabored. Orders given for labs. Orders being carried out. Will continue to monitor.    Anesthesia updated and at bedside assessing pt.

## 2017-07-31 NOTE — ANESTHESIA POSTPROCEDURE EVALUATION
"Anesthesia Post Evaluation    Patient: Maksim Hidalgo    Procedure(s) Performed: Procedure(s) (LRB):  REMOVAL-CATHETER-DIALYSIS-PERITONEAL (N/A)    Final Anesthesia Type: general  Patient location during evaluation: PACU  Patient participation: Yes- Able to Participate  Level of consciousness: awake and alert  Post-procedure vital signs: reviewed and stable  Pain management: adequate  Airway patency: patent  PONV status at discharge: No PONV  Anesthetic complications: no      Cardiovascular status: hemodynamically stable  Respiratory status: unassisted  Hydration status: euvolemic  Follow-up not needed.        Visit Vitals  BP (!) 145/102   Pulse 62   Temp 36.4 °C (97.6 °F) (Oral)   Resp 12   Ht 5' 10" (1.778 m)   Wt 93 kg (205 lb)   SpO2 100%   BMI 29.41 kg/m²       Pain/Delvin Score: Pain Assessment Performed: Yes (7/31/2017 11:10 AM)  Presence of Pain: non-verbal indicators absent (7/31/2017 11:10 AM)  Pain Rating Prior to Med Admin: 6 (7/31/2017  8:20 AM)  Delvin Score: 6 (7/31/2017 11:10 AM)      "

## 2017-07-31 NOTE — H&P (VIEW-ONLY)
"[facility] Hospital Medicine  Progress Note    Patient Name: Maksim Hidalgo  MRN: [id number]  Patient Class: OP- Observation   Admission Date: 7/20/2017  Length of Stay: 0 days  Attending Physician: Robert Rodriguez MD  Primary Care Provider: Saurabh Zuleta MD    [facility] Medicine Team: [facility] HOSP MED F Mikala Terry PA-C    Subjective:     Principal Problem:Dizziness    HPI:  37 year old male ESRD on dialysis for one year due to uncontrolled HTN presents to ED with dizziness. He was on PD for last one year until recently when PD catheter stopped working. He underwent PD catheter  revision with change in position due to adhesions on 07/08/17 by Dr. Gillespie. Plan to remove catheter with diagnostic laparoscopy in 3 months to see if it can be replaced. Patient currently having HD ( MWF ) thru right chest walled tunneled catheter that was placed two weeks ago. He had unremarkable clinic visit with Dr. Gillespie earlier today.      Reports he started feeling tingling in his legs associated with dizziness to the point that he felt like "he would pass out" soon after returning home from clinic. He had full session of HD yesterday without any issue. He was just recently converted from PD to HD two weeks ago and not sure if they took out too much fluid at HD yesterday.  He still make little urine twice a day. Denies fever, chills, chest pain, cough, SOH, N/V/D, abdominal pain.      Upon arrival to ER his had low SBP of 77 with dizziness. He received  cc bolus x 1 with minimal improvement. Labs unremarkable except mild elevated lactate of 2.3.     During my interview he still reports feeling dizzy after 500 cc NS bolus with SBP 91. Lung CTA and clinically appears hypovolemic. I ordered another 500 cc NS bolus with improvement of  -110.        States he has clinic visit tomorrow morning at vascular surgery for vein mapping for permanent  HD access.     Hospital Course:  38 yo male " admitted to observation for further evaluation and treatment of dizziness/near syncope. BP on admission low, likely secondary to volume depletion from excess volume removal at HD. Lactate mildly elevated on admission, trended down with repeat (2.3 -> 0.8). Given one dose vancomycin and rocephin empirically. Blood cultures NGTD. Nephrology consulted, full recommendations pending. Vein mapping to be completed today.     Interval History: No acute events overnight. Reports some improvement in dizziness and fatigue overnight but has not ambulated much. Denies headaches, fevers, chills.     Review of Systems   Constitutional: Positive for fatigue. Negative for chills, diaphoresis and fever.   Respiratory: Negative for cough, shortness of breath and wheezing.    Cardiovascular: Negative for chest pain, palpitations and leg swelling.   Gastrointestinal: Negative for abdominal pain, diarrhea, nausea and vomiting.   Genitourinary: Positive for decreased urine volume. Negative for dysuria.   Musculoskeletal: Negative for back pain and gait problem.   Neurological: Positive for dizziness (improving) and weakness. Negative for facial asymmetry and speech difficulty.   Psychiatric/Behavioral: Negative for agitation, confusion and hallucinations.     Objective:     Vital Signs (Most Recent):  Temp: 97.7 °F (36.5 °C) (07/21/17 0737)  Pulse: 84 (07/21/17 0737)  Resp: 17 (07/21/17 0737)  BP: (!) 98/56 (07/21/17 0737)  SpO2: 95 % (07/21/17 0737) Vital Signs (24h Range):  Temp:  [97.7 °F (36.5 °C)-98.7 °F (37.1 °C)] 97.7 °F (36.5 °C)  Pulse:  [76-90] 84  Resp:  [12-26] 17  SpO2:  [92 %-100 %] 95 %  BP: ()/(46-73) 98/56     Weight: 94.6 kg (208 lb 9 oz)  Body mass index is 29.93 kg/m².  No intake or output data in the 24 hours ending 07/21/17 1107   Physical Exam   Constitutional: He is oriented to person, place, and time. He appears well-developed and well-nourished. No distress.   Cardiovascular: Normal rate, regular rhythm,  normal heart sounds and intact distal pulses.    No murmur heard.  Pulmonary/Chest: Effort normal and breath sounds normal. No respiratory distress. He has no wheezes.   Tunneled HD catheter to right chest wall, no visible erythema or fluctuance.   Abdominal: Soft. Bowel sounds are normal. He exhibits no distension. There is tenderness (mild LLQ).   PD catheter in place over LLQ   Musculoskeletal: Normal range of motion. He exhibits no edema or deformity.   Neurological: He is alert and oriented to person, place, and time. No cranial nerve deficit.   Psychiatric: He has a normal mood and affect. His behavior is normal. Judgment and thought content normal.       Significant Labs:   CBC:   Recent Labs  Lab 07/20/17  1648 07/21/17  0443   WBC 7.44 7.91   HGB 12.0* 11.0*   HCT 36.2* 33.9*    253     CMP:   Recent Labs  Lab 07/20/17 1648 07/21/17  0443    140   K 3.8 3.4*   CL 94* 96   CO2 34* 30*   GLU 87 80   BUN 34* 37*   CREATININE 7.8* 8.1*   CALCIUM 9.1 8.9   PROT 7.6 6.9   ALBUMIN 3.3* 3.2*   BILITOT 0.4 0.3   ALKPHOS 94 81   AST 17 18   ALT 15 16   ANIONGAP 13 14   EGFRNONAA 7.9* 7.5*     All pertinent labs within the past 24 hours have been reviewed.    Significant Imaging: I have reviewed all pertinent imaging results/findings within the past 24 hours.    Assessment/Plan:      * Dizziness    Volume depletion, lactic acidosis, postural dizziness with near syncope  - Likely due to volume depletion from excessive volume removal at HD yesterday   - Sepsis can not be ruled out given vascular  HD catheter and mildly elevated lactate on admission  - check blood culture from 2 diff sites - NGTD prelim, will continue to follow up cultures  - Given one dose of vancomycin and rocephin empirically. Will hold off on further abx for now.   - Repeat lactate in morning after IVF back to normal limits (0.8)  - Continue hold lisinopril and lasix in setting of hypotension   - Will consider additional gentle IVFs if  BP does not continue to improve/increased dizziness          ESRD (end stage renal disease) on dialysis    - Patient recently transitioned to HD from PD, reports he has had 5 outpatient HD sessions  - Excess volume removal with HD likely contributing to current presentation with dizziness, weakness  - Nephrology consulted, full recommendations pending  - Vein mapping to be completed today. Patient to follow up as outpatient for permanent HD access as perma-cath still functioning at this point.  - Continue calcitriol and renvela          Renovascular hypertension    - Home regimen of lisinopril, will continue to hold for now in setting of hyptension          Anemia in ESRD (end-stage renal disease)    - H/H fluctuates but appears close to baseline (11.0/33.9 today)  - Will continue to monitor            VTE Risk Mitigation         Ordered     High Risk of VTE  Once      07/21/17 0010     Place sequential compression device  Until discontinued      07/21/17 0010     heparin (porcine) injection 5,000 Units  Every 8 hours     Route:  Subcutaneous        07/20/17 2102          Mikala Terry PA-C  Department of Hospital Medicine   [facility]  Staff: Dr. Rodriguez

## 2017-07-31 NOTE — TRANSFER OF CARE
"Anesthesia Transfer of Care Note    Patient: Maksim Hidalgo    Procedure(s) Performed: Procedure(s) (LRB):  REMOVAL-CATHETER-DIALYSIS-PERITONEAL (N/A)    Patient location: PACU    Anesthesia Type: general    Transport from OR: Upon arrival to PACU/ICU, patient attached to ventilator and auscultated to confirm bilateral breath sounds and adequate TV    Post pain: adequate analgesia    Post-procedure assessment: See complication comment.    Post vital signs: stable    Level of consciousness: unresponsive    Nausea/Vomiting: no nausea/vomiting    Complications: other (see comments), prolonged recovery from cistatracurium    Transfer of care protocol was followed      Last vitals:   Visit Vitals  BP (!) 150/91   Pulse 77   Temp 37.1 °C (98.7 °F)   Resp 18   Ht 5' 10" (1.778 m)   Wt 93 kg (205 lb)   SpO2 100%   BMI 29.41 kg/m²     "

## 2017-07-31 NOTE — INTERVAL H&P NOTE
The patient has been examined and the H&P has been reviewed:    I concur with the findings and no changes have occurred since H&P was written.    Anesthesia/Surgery risks, benefits and alternative options discussed and understood by patient/family.          Active Hospital Problems    Diagnosis  POA    ESRD (end stage renal disease) [N18.6]  Yes      Resolved Hospital Problems    Diagnosis Date Resolved POA   No resolved problems to display.

## 2017-07-31 NOTE — PROGRESS NOTES
Dr. Chopra at bedside. Stimulated pt. Pt coughing. Dr. Chopra extubated pt. Pt placed on 10 L simple face mask. VSS. Will continue to monitor pt.

## 2017-07-31 NOTE — PROGRESS NOTES
Dr. Gillespie at bedside assessing pt. Pt still complaining of leg pain and weakness. VSS. Resp even and unlabored. Pt still very drowsy but arousable. New orders given and being carried out. Will continue to monitor.

## 2017-07-31 NOTE — BRIEF OP NOTE
[facility]  Brief Operative Note     SUMMARY     Surgery Date: 7/31/2017     Surgeon(s) and Role:     * Isai Gillespie MD - Primary     * Sandi Christianson MD - Resident - Assisting        Pre-op Diagnosis:  Peritoneal dialysis catheter in place [Z99.2]    Post-op Diagnosis:  Post-Op Diagnosis Codes:     * Peritoneal dialysis catheter in place [Z99.2]    Procedure(s) (LRB):  REMOVAL-CATHETER-DIALYSIS-PERITONEAL (N/A)    Anesthesia: General    Description of the findings of the procedure: Removal of peritoneal dialysis catheter    Findings/Key Components: Peritoneal dialysis catheter, removed. Fascia repaired primarily    Estimated Blood Loss: * No values recorded between 7/31/2017  9:06 AM and 7/31/2017  9:34 AM *         Specimens:   Specimen (12h ago through future)    None          Discharge Note    SUMMARY     Admit Date: 7/31/2017    Discharge Date and Time:  07/31/2017 9:34 AM    Hospital Course (synopsis of major diagnoses, care, treatment, and services provided during the course of the hospital stay): Pt presented to F for removal of a peritoneal dialysis catheter.  Pt tolerated the procedure well in its entirety without issue.  For more details, please refer to op note.  Post-op, pt was observed in the recovery area.  Pt was started on a regular diet and tolerated it well.  Pain was controlled with PO analgesics.  Pt was able to ambulate and urinate without issue.  Pt stable for discharge.       Final Diagnosis: Post-Op Diagnosis Codes:     * Peritoneal dialysis catheter in place [Z99.2]    Disposition: Home or Self Care    Follow Up/Patient Instructions:     Medications:  Reconciled Home Medications:   Current Discharge Medication List      START taking these medications    Details   !! oxycodone-acetaminophen (PERCOCET) 5-325 mg per tablet Take 1 tablet by mouth every 4 (four) hours as needed for Pain.  Qty: 25 tablet, Refills: 0      !! senna-docusate 8.6-50 mg  (PERICOLACE) 8.6-50 mg per tablet Take 1 tablet by mouth 2 (two) times daily.       !! - Potential duplicate medications found. Please discuss with provider.      CONTINUE these medications which have NOT CHANGED    Details   albuterol 90 mcg/actuation inhaler Inhale 2 puffs into the lungs every 4 (four) hours as needed for Wheezing.  Qty: 1 Inhaler, Refills: 0    Associated Diagnoses: Acute non-recurrent sinusitis, unspecified location      benzonatate (TESSALON) 200 MG capsule Take 1 capsule (200 mg total) by mouth every evening.  Qty: 30 capsule, Refills: 0    Associated Diagnoses: Acute non-recurrent sinusitis, unspecified location      calcitRIOL (ROCALTROL) 0.5 MCG Cap Take 1 capsule (0.5 mcg total) by mouth once daily.  Qty: 30 capsule, Refills: 11      fluconazole (DIFLUCAN) 200 MG Tab Take 1 tablet (200 mg total) by mouth every other day.  Qty: 3 tablet, Refills: 0      furosemide (LASIX) 40 MG tablet Take 1 tablet (40 mg total) by mouth once daily.  Qty: 30 tablet, Refills: 0    Associated Diagnoses: ESRD (end stage renal disease)      INV lisinopril 10 MG Tab Take 1 tablet (10 mg total) by mouth once daily.  Qty: 30 tablet, Refills: 11      melatonin 5 mg Tab Take 1 tablet (5 mg total) by mouth every evening.    Associated Diagnoses: Adjustment insomnia      !! oxycodone-acetaminophen (PERCOCET) 5-325 mg per tablet Take 1 tablet by mouth every 4 (four) hours as needed.  Qty: 15 tablet, Refills: 0      pantoprazole (PROTONIX) 40 MG tablet Take 1 tablet (40 mg total) by mouth once daily.  Qty: 30 tablet, Refills: 11      !! senna-docusate 8.6-50 mg (PERICOLACE) 8.6-50 mg per tablet Take 2 tablets by mouth every evening.      sevelamer carbonate (RENVELA) 800 mg Tab Take 1 tablet (800 mg total) by mouth 3 (three) times daily with meals.  Qty: 90 tablet, Refills: 11       !! - Potential duplicate medications found. Please discuss with provider.          Discharge Procedure Orders  Diet general     Activity as  tolerated     Lifting restrictions   Order Comments: No lifting anything over 10lbs for 6 weeks     Call MD for:  temperature >100.4     Call MD for:  persistent nausea and vomiting     Call MD for:  severe uncontrolled pain     Call MD for:  difficulty breathing, headache or visual disturbances     Call MD for:  redness, tenderness, or signs of infection (pain, swelling, redness, odor or green/yellow discharge around incision site)     Remove dressing in 48 hours   Order Comments: After that, no dressing needed  Ok to shower over incision  No soaking in a tub or pool for 4 weeks  Steri strips will fall off on their own       Follow-up Information     Isai Gillespie MD In 2 weeks.    Specialties:  General Surgery, Bariatrics  Contact information:  [facility]  [facility] [id number] [phone]

## 2017-07-31 NOTE — OP NOTE
DATE OF PROCEDURE: 7/31/2017    PREOPERATIVE DIAGNOSIS: Malfunctioning peritoneal dialysis catheter    POSTOPERATIVE DIAGNOSIS: Malfunctioning peritoneal dialysis catheter    PROCEDURES PERFORMED:  1. Removal of peritoneal dialysis catheter    ATTENDING SURGEON: Dr. Isai Gillespie    RESIDENT: Dr. Sandi Christianson    ANESTHESIA: General    KEY FINDINGS: Peritoneal dialysis catheter in place    ESTIMATED BLOOD LOSS: 3 ml    DRAINS: None    COMPLICATIONS: None    INDICATIONS FOR PROCEDURE: The patient is a 37 y.o. male   who presented with a malfunctioning peritoneal dialysis catheter which had already been revised previously and was still malfunctioning. Based on this, removal was indicated.    PROCEDURE IN DETAIL: After informed consent was obtained, the patient was brought to the Operative Theater and placed in in the supine position. After adequate anesthesia the patient was prepped and draped in the usual sterile fashion. A timeout was performed and a  4cm incision was made over the area of the catheter insertion. Dissection was carried down with Bovie electrocautery until the catheter was identified and grasped with a hemostat. The fascia was scored in a cruciate manner around the catheter. Dissection was carried around the catheter until it was removed in its entirety. The fascia was closed with 2 0-prolene figure of eight sutures. The skin was closed with a 3-0 vicryl interrupted deep dermal sutures and a running 4-0 vicryl subcuticular stitch. The catheter insertion site in the skin was left open and covered with gauze. The patient tolerated the procedure well and without issue. All instrument and needle counts were correct at the end of the case. He was delivered to PACU extubated and in stable condition.

## 2017-07-31 NOTE — BRIEF OP NOTE
Operative Note       Surgery Date: 7/31/2017     Surgeon(s) and Role:     * Isai Gillespie MD - Primary     * Sandi Christianson MD - Resident - Assisting    Pre-op Diagnosis:  Peritoneal dialysis catheter in place [Z99.2]    Post-op Diagnosis:  Peritoneal dialysis catheter in place [Z99.2]    Procedure(s) (LRB):  REMOVAL-CATHETER-DIALYSIS-PERITONEAL (N/A)    Anesthesia: General    Procedure in Detail/Findings:  Pd removal without apparent complication    Estimated Blood Loss: Minimal           Specimens     None        Implants: * No implants in log *           Disposition: PACU - hemodynamically stable.           Condition: Good    Attestation:  I was present and scrubbed for the entire procedure.

## 2017-08-01 PROBLEM — R53.1 WEAKNESS: Status: ACTIVE | Noted: 2017-08-01

## 2017-08-01 PROBLEM — R41.82 ALTERED MENTAL STATUS: Status: ACTIVE | Noted: 2017-08-01

## 2017-08-01 PROBLEM — R25.1 TREMOR OF RIGHT HAND: Status: ACTIVE | Noted: 2017-08-01

## 2017-08-01 PROBLEM — M79.10 MYALGIA: Status: ACTIVE | Noted: 2017-08-01

## 2017-08-01 LAB
ANION GAP SERPL CALC-SCNC: 10 MMOL/L
BASOPHILS # BLD AUTO: 0.03 K/UL
BASOPHILS # BLD AUTO: 0.04 K/UL
BASOPHILS NFR BLD: 0.4 %
BASOPHILS NFR BLD: 0.4 %
BUN SERPL-MCNC: 40 MG/DL
CALCIUM SERPL-MCNC: 8.4 MG/DL
CHLORIDE SERPL-SCNC: 113 MMOL/L
CK SERPL-CCNC: 242 U/L
CO2 SERPL-SCNC: 17 MMOL/L
CREAT SERPL-MCNC: 6.9 MG/DL
DIFFERENTIAL METHOD: ABNORMAL
DIFFERENTIAL METHOD: ABNORMAL
EOSINOPHIL # BLD AUTO: 0.3 K/UL
EOSINOPHIL # BLD AUTO: 0.5 K/UL
EOSINOPHIL NFR BLD: 4.6 %
EOSINOPHIL NFR BLD: 5 %
ERYTHROCYTE [DISTWIDTH] IN BLOOD BY AUTOMATED COUNT: 12.2 %
ERYTHROCYTE [DISTWIDTH] IN BLOOD BY AUTOMATED COUNT: 12.2 %
ERYTHROCYTE [SEDIMENTATION RATE] IN BLOOD BY WESTERGREN METHOD: 27 MM/HR
EST. GFR  (AFRICAN AMERICAN): 10.6 ML/MIN/1.73 M^2
EST. GFR  (NON AFRICAN AMERICAN): 9.1 ML/MIN/1.73 M^2
GLUCOSE SERPL-MCNC: 75 MG/DL
HCT VFR BLD AUTO: 31 %
HCT VFR BLD AUTO: 31.4 %
HGB BLD-MCNC: 10.4 G/DL
HGB BLD-MCNC: 10.5 G/DL
LYMPHOCYTES # BLD AUTO: 1.6 K/UL
LYMPHOCYTES # BLD AUTO: 1.9 K/UL
LYMPHOCYTES NFR BLD: 21 %
LYMPHOCYTES NFR BLD: 22.1 %
MAGNESIUM SERPL-MCNC: 1.7 MG/DL
MCH RBC QN AUTO: 29.2 PG
MCH RBC QN AUTO: 29.4 PG
MCHC RBC AUTO-ENTMCNC: 33.4 G/DL
MCHC RBC AUTO-ENTMCNC: 33.5 G/DL
MCV RBC AUTO: 88 FL
MCV RBC AUTO: 88 FL
MONOCYTES # BLD AUTO: 0.4 K/UL
MONOCYTES # BLD AUTO: 0.6 K/UL
MONOCYTES NFR BLD: 5.1 %
MONOCYTES NFR BLD: 6.5 %
NEUTROPHILS # BLD AUTO: 5 K/UL
NEUTROPHILS # BLD AUTO: 6 K/UL
NEUTROPHILS NFR BLD: 66.9 %
NEUTROPHILS NFR BLD: 67.7 %
PHOSPHATE SERPL-MCNC: 3.2 MG/DL
PLATELET # BLD AUTO: 301 K/UL
PLATELET # BLD AUTO: 306 K/UL
PMV BLD AUTO: 9 FL
PMV BLD AUTO: 9.1 FL
POTASSIUM SERPL-SCNC: 4.7 MMOL/L
RBC # BLD AUTO: 3.54 M/UL
RBC # BLD AUTO: 3.59 M/UL
SODIUM SERPL-SCNC: 140 MMOL/L
WBC # BLD AUTO: 7.32 K/UL
WBC # BLD AUTO: 9.01 K/UL

## 2017-08-01 PROCEDURE — 84439 ASSAY OF FREE THYROXINE: CPT | Mod: NTX

## 2017-08-01 PROCEDURE — 87040 BLOOD CULTURE FOR BACTERIA: CPT | Mod: NTX

## 2017-08-01 PROCEDURE — 99223 1ST HOSP IP/OBS HIGH 75: CPT | Mod: GC,NTX,, | Performed by: PSYCHIATRY & NEUROLOGY

## 2017-08-01 PROCEDURE — 99223 1ST HOSP IP/OBS HIGH 75: CPT | Mod: GC,NTX,, | Performed by: HOSPITALIST

## 2017-08-01 PROCEDURE — 80100016 HC MAINTENANCE HEMODIALYSIS: Mod: NTX

## 2017-08-01 PROCEDURE — 83735 ASSAY OF MAGNESIUM: CPT | Mod: NTX

## 2017-08-01 PROCEDURE — 80048 BASIC METABOLIC PNL TOTAL CA: CPT | Mod: NTX

## 2017-08-01 PROCEDURE — 85651 RBC SED RATE NONAUTOMATED: CPT | Mod: NTX

## 2017-08-01 PROCEDURE — 84443 ASSAY THYROID STIM HORMONE: CPT | Mod: NTX

## 2017-08-01 PROCEDURE — 82140 ASSAY OF AMMONIA: CPT | Mod: NTX

## 2017-08-01 PROCEDURE — 63600175 PHARM REV CODE 636 W HCPCS: Mod: NTX | Performed by: NURSE PRACTITIONER

## 2017-08-01 PROCEDURE — 63600175 PHARM REV CODE 636 W HCPCS: Mod: NTX | Performed by: STUDENT IN AN ORGANIZED HEALTH CARE EDUCATION/TRAINING PROGRAM

## 2017-08-01 PROCEDURE — 84100 ASSAY OF PHOSPHORUS: CPT | Mod: NTX

## 2017-08-01 PROCEDURE — 36415 COLL VENOUS BLD VENIPUNCTURE: CPT | Mod: NTX

## 2017-08-01 PROCEDURE — 85025 COMPLETE CBC W/AUTO DIFF WBC: CPT | Mod: NTX

## 2017-08-01 PROCEDURE — 25000003 PHARM REV CODE 250: Mod: NTX | Performed by: STUDENT IN AN ORGANIZED HEALTH CARE EDUCATION/TRAINING PROGRAM

## 2017-08-01 PROCEDURE — 11000001 HC ACUTE MED/SURG PRIVATE ROOM: Mod: NTX

## 2017-08-01 PROCEDURE — 82550 ASSAY OF CK (CPK): CPT | Mod: NTX

## 2017-08-01 PROCEDURE — 25000003 PHARM REV CODE 250: Mod: NTX | Performed by: NURSE PRACTITIONER

## 2017-08-01 PROCEDURE — A4216 STERILE WATER/SALINE, 10 ML: HCPCS | Mod: NTX | Performed by: STUDENT IN AN ORGANIZED HEALTH CARE EDUCATION/TRAINING PROGRAM

## 2017-08-01 PROCEDURE — 99233 SBSQ HOSP IP/OBS HIGH 50: CPT | Mod: NTX,,, | Performed by: INTERNAL MEDICINE

## 2017-08-01 RX ORDER — HEPARIN SODIUM 5000 [USP'U]/ML
5000 INJECTION, SOLUTION INTRAVENOUS; SUBCUTANEOUS EVERY 8 HOURS
Status: DISCONTINUED | OUTPATIENT
Start: 2017-08-01 | End: 2017-08-08 | Stop reason: HOSPADM

## 2017-08-01 RX ORDER — SODIUM CHLORIDE 0.9 % (FLUSH) 0.9 %
3 SYRINGE (ML) INJECTION EVERY 8 HOURS
Status: DISCONTINUED | OUTPATIENT
Start: 2017-08-01 | End: 2017-08-08 | Stop reason: HOSPADM

## 2017-08-01 RX ORDER — HEPARIN SODIUM 1000 [USP'U]/ML
1000 INJECTION, SOLUTION INTRAVENOUS; SUBCUTANEOUS
Status: DISCONTINUED | OUTPATIENT
Start: 2017-08-01 | End: 2017-08-08 | Stop reason: HOSPADM

## 2017-08-01 RX ORDER — SODIUM CHLORIDE 9 MG/ML
INJECTION, SOLUTION INTRAVENOUS ONCE
Status: COMPLETED | OUTPATIENT
Start: 2017-08-01 | End: 2017-08-01

## 2017-08-01 RX ORDER — SODIUM CHLORIDE 9 MG/ML
INJECTION, SOLUTION INTRAVENOUS
Status: DISCONTINUED | OUTPATIENT
Start: 2017-08-01 | End: 2017-08-04

## 2017-08-01 RX ADMIN — HEPARIN SODIUM 5000 UNITS: 5000 INJECTION, SOLUTION INTRAVENOUS; SUBCUTANEOUS at 10:08

## 2017-08-01 RX ADMIN — SODIUM CHLORIDE: 0.9 INJECTION, SOLUTION INTRAVENOUS at 02:08

## 2017-08-01 RX ADMIN — HYDROCODONE BITARTRATE AND ACETAMINOPHEN 1 TABLET: 5; 325 TABLET ORAL at 06:08

## 2017-08-01 RX ADMIN — HEPARIN SODIUM 5000 UNITS: 5000 INJECTION, SOLUTION INTRAVENOUS; SUBCUTANEOUS at 05:08

## 2017-08-01 RX ADMIN — Medication 3 ML: at 10:08

## 2017-08-01 RX ADMIN — Medication 3 ML: at 05:08

## 2017-08-01 RX ADMIN — HYDROCODONE BITARTRATE AND ACETAMINOPHEN 1 TABLET: 5; 325 TABLET ORAL at 10:08

## 2017-08-01 RX ADMIN — HEPARIN SODIUM 1000 UNITS: 1000 INJECTION, SOLUTION INTRAVENOUS; SUBCUTANEOUS at 05:08

## 2017-08-01 NOTE — HOSPITAL COURSE
08/01/17:  Upon review of labs, patient's BUN and Cr appear to be at baseline.  Hgb 10.5 g/dL today, down from 12.1 g/dL yesterday.  Corrected Ca 8.9 mg/dL.  Mg, Phos wnl.  Glucose slightly low at 75 mg/dL this am.

## 2017-08-01 NOTE — PROGRESS NOTES
Pt drowsy this AM but arouses to voice. Oriented to self only. Twitching noted to right and left arms- more notable to right side. Labs and VSS this AM. Dr. Sammy watson. States they have seen pt this AM- currently waiting for neuro consult. Bed alarm set. Will cont to monitor pt closely.

## 2017-08-01 NOTE — PROGRESS NOTES
"[facility] Hospital Medicine  Progress Note    Patient Name: Maksim Hidalgo  MRN: [id number]  Patient Class: IP- Inpatient   Admission Date: 7/31/2017  Length of Stay: 0 days  Attending Physician: Isai Gillespie MD  Primary Care Provider: Saurabh Zuleta MD    [facility] Medicine Team: Networked reference to record PCT  Anthony Ruiz MD    Subjective:     Principal Problem:ESRD (end stage renal disease)    HPI:  Mr. Hidalgo is a 37 y.o. AAM with a h/o ESRD on dialysis secondary to uncontrolled HTN, admitted for obstructed peritoneal catheter removal. He has been receiving HD MWF through a R tunneled catheter and is currently being worked up for permanent fistula. Following his catheter removal he developed diffuse, aching pain all over his body which he describes as "someone punching me." He reports weakness, tremor, fatigue, SOB, HA, dysuria, and subjective fevers/chills as he feels "wet." Patient was in a normal state of health prior to this admission. He was able to ambulate and speak normally. His weakness and pain is now to the point where he cannot move in bed and speaks only a few words at a time.     Hospital Course:  No notes on file        Review of Systems   Constitutional: Positive for chills and fever.   HENT: Negative for congestion and rhinorrhea.    Eyes: Positive for pain. Negative for photophobia.   Respiratory: Positive for shortness of breath.    Cardiovascular: Negative for chest pain and palpitations.   Gastrointestinal: Positive for abdominal pain. Negative for abdominal distention, constipation, diarrhea and nausea.   Musculoskeletal: Positive for arthralgias (BLE).   Neurological: Positive for tremors (RUE) and headaches.     Objective:     Vital Signs (Most Recent):  Temp: 98.3 °F (36.8 °C) (08/01/17 0830)  Pulse: 86 (08/01/17 1135)  Resp: 16 (08/01/17 1135)  BP: (!) 142/86 (08/01/17 1135)  SpO2: 95 % (08/01/17 1135) Vital Signs (24h Range):  Temp:  [96.4 °F " (35.8 °C)-98.3 °F (36.8 °C)] 98.3 °F (36.8 °C)  Pulse:  [58-86] 86  Resp:  [11-20] 16  SpO2:  [95 %-100 %] 95 %  BP: (125-155)/() 142/86     Weight: 93 kg (205 lb)  Body mass index is 29.41 kg/m².    Intake/Output Summary (Last 24 hours) at 08/01/17 1154  Last data filed at 08/01/17 1030   Gross per 24 hour   Intake              480 ml   Output              500 ml   Net              -20 ml      Physical Exam   Constitutional: He appears well-developed.   HENT:   Head: Normocephalic and atraumatic.   Oral mucosa appears very dry   Neck: No tracheal deviation present.   Cardiovascular: Normal rate and regular rhythm.    No murmur heard.  Pulmonary/Chest: Effort normal and breath sounds normal. No respiratory distress.   Abdominal: Soft. Bowel sounds are normal. He exhibits no distension. There is tenderness.   Lymphadenopathy:     He has no cervical adenopathy.   Neurological:   Drowsy, arouses to name; oriented to person and place. Unclear if limbs are weak or unwilling to move 2/2 pain   Skin: Skin is warm. He is diaphoretic.       Significant Labs:     Recent Labs  Lab 07/31/17  1514 08/01/17  0602    140   K 4.9 4.7    113*   CO2 18* 17*   BUN 42* 40*   CREATININE 7.1* 6.9*   CALCIUM 9.1 8.4*       Recent Labs  Lab 07/31/17  1514 08/01/17  0602   WBC 9.22 9.01   HGB 12.1* 10.5*   HCT 37.3* 31.4*    301           No new imaging    Assessment/Plan:      Myalgia    - Pt reports severe myalgia from toes to upper abdomen, tender to light touch  - check CPK and ESR for signs of inflammation  - closely monitor electrolytes (Ca, K, Mg, Phos)  - d/c Percocet 5 due to nausea                VTE Risk Mitigation         Ordered     heparin (porcine) injection 5,000 Units  Every 8 hours     Route:  Subcutaneous        08/01/17 0526     Medium Risk of VTE  Once      08/01/17 0526     Place sequential compression device  Until discontinued      08/01/17 0526     Place sequential compression device  Until  discontinued      07/31/17 8188        Thank you for the consult. Please contact us with any questions or concerns.    Anthony Ruiz MD  Department of Hospital Medicine   [facility]

## 2017-08-01 NOTE — SUBJECTIVE & OBJECTIVE
Review of Systems   Constitutional: Positive for chills and fever.   HENT: Negative for congestion and rhinorrhea.    Eyes: Positive for pain. Negative for photophobia.   Respiratory: Positive for shortness of breath.    Cardiovascular: Negative for chest pain and palpitations.   Gastrointestinal: Positive for abdominal pain. Negative for abdominal distention, constipation, diarrhea and nausea.   Musculoskeletal: Positive for arthralgias (BLE).   Neurological: Positive for tremors (RUE) and headaches.     Objective:     Vital Signs (Most Recent):  Temp: 98.3 °F (36.8 °C) (08/01/17 0830)  Pulse: 86 (08/01/17 1135)  Resp: 16 (08/01/17 1135)  BP: (!) 142/86 (08/01/17 1135)  SpO2: 95 % (08/01/17 1135) Vital Signs (24h Range):  Temp:  [96.4 °F (35.8 °C)-98.3 °F (36.8 °C)] 98.3 °F (36.8 °C)  Pulse:  [58-86] 86  Resp:  [11-20] 16  SpO2:  [95 %-100 %] 95 %  BP: (125-155)/() 142/86     Weight: 93 kg (205 lb)  Body mass index is 29.41 kg/m².    Intake/Output Summary (Last 24 hours) at 08/01/17 1154  Last data filed at 08/01/17 1030   Gross per 24 hour   Intake              480 ml   Output              500 ml   Net              -20 ml      Physical Exam   Constitutional: He appears well-developed.   HENT:   Head: Normocephalic and atraumatic.   Oral mucosa appears very dry   Neck: No tracheal deviation present.   Cardiovascular: Normal rate and regular rhythm.    No murmur heard.  Pulmonary/Chest: Effort normal and breath sounds normal. No respiratory distress.   Abdominal: Soft. Bowel sounds are normal. He exhibits no distension. There is tenderness.   Lymphadenopathy:     He has no cervical adenopathy.   Neurological:   Drowsy, arouses to name; oriented to person and place. Unclear if limbs are weak or unwilling to move 2/2 pain   Skin: Skin is warm. He is diaphoretic.       Significant Labs:     Recent Labs  Lab 07/31/17  1514 08/01/17  0602    140   K 4.9 4.7    113*   CO2 18* 17*   BUN 42* 40*    CREATININE 7.1* 6.9*   CALCIUM 9.1 8.4*       Recent Labs  Lab 07/31/17  1514 08/01/17  0602   WBC 9.22 9.01   HGB 12.1* 10.5*   HCT 37.3* 31.4*    301           No new imaging

## 2017-08-01 NOTE — ASSESSMENT & PLAN NOTE
-Continue HD.  Mild hypocalcemia today with corrected Ca 8.9.  -Suspect weakness is more pain-related than neurologic at this time.

## 2017-08-01 NOTE — PLAN OF CARE
Plan of care and periop routine discussed with patient. Pt verbalized understanding. All questions answered. VSS. Respirations even and unlabored. Pt reports surgical pain is tolerable. Will continue to monitor.

## 2017-08-01 NOTE — HPI
"38 yo M with PMHx of chronic HBV, L-sided heart failure, ESRD on PD transitioning to HD originally presented for uncontrolled HTN associated with dizziness and has since had removal of PD catheter 07/31/17.  Neurology consulted for reported BLE weakness and diffuse pain.  Patient notes that he has had weakness in BLE for several months, but it has been worse since the procedure and is associated with severe pain which is new for him.  At home he uses a walker and is able to get around well but fatigues easily.  He also appears to have a tremor which is worse than his baseline though he reports he sometimes has a slight tremor at home that he notices when he tries to hold things, like a drink.      Per original H&P: on PD for last year until recently when PD catheter stopped working. He underwent PD catheter revision with change in position due to adhesions on 07/08/17 by Dr. Gillespie. Plan to remove catheter with diagnostic laparoscopy in 3 months to see if it can be replaced. Patient currently having HD (MWF) thru RIJ tunneled catheter that was placed 07/07/17.      Pt started feeling tingling in his legs associated with dizziness to the point that he felt like "he would pass out" soon after returning home from clinic. He had full session of HD 07/20/17  without any issue, thinks maybe too much fluid was taken off.  He still makes small amt of urine 2x/d. Upon arrival to ED, had SBP of 77 with dizziness and received  cc bolus x 1 with minimal improvement. Labs unremarkable except mild elevated lactate of 2.3.  Plan for vascular surgery to do vein mapping for permanent HD access (has been getting HD for past several weeks).  "
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
## 2017-08-01 NOTE — PLAN OF CARE
Problem: Patient Care Overview  Goal: Plan of Care Review  Outcome: Ongoing (interventions implemented as appropriate)  Pt drowsy- arouses to voice. Oriented to self only- pt has been re-oriented. Sleeping between care. No falls/injury this shift- bed alarm on and fall precautions in place. No s/s of skin breakdown noted. SCD's on. Currently in HD. Will resume care upon return.

## 2017-08-01 NOTE — NURSING
"Pt is still very drowsy through out the night. Hard for pt to answer questions about orientation, says he could not feel me touching his legs and feet. "My whole body hurts" Pulses +2 and cap less than 3 seconds. MD Werner was notified.   "

## 2017-08-01 NOTE — PLAN OF CARE
Patient lives in a 1 story house w/cousin & her family. Per Dr. Christianson, patient is not medically stable for discharge due to patient having twitching, lethargy, weakness, not being oriented. 3 consults placed: 1. IM. 2. Neurology, 3. Nephrology. Currently no needs determined. CM will continue to follow.     [facility] Packet given to patient after informed about it;patient verbalized their understanding.        17 1215   Discharge Assessment   Assessment Type Discharge Planning Assessment   Confirmed/corrected address and phone number on facesheet? Yes   Assessment information obtained from? Patient;Medical Record   Expected Length of Stay (days) (2+)   Communicated expected length of stay with patient/caregiver no  (Per MD)   Type of Healthcare Directive Received (Unknown)   Prior to hospitilization cognitive status: Alert/Oriented;No Deficits   Prior to hospitalization functional status: Independent;Assistive Equipment   Current cognitive status: Not Oriented to Time;Not Oriented to Place;Inappropriate Behavior  (Asleep on arrival. Awakens easily. Groggy & appears slow to answer. Oriented to name &  only. Answered discharge planning assessment questions approriately but appears foggy w/memorization. )   Current Functional Status: Independent;Assistive Equipment   Arrived From home or self-care   Lives With other relative(s)  (Cousin, cousin's spouse, cousin's 3 children)   Able to Return to Prior Arrangements yes   Is patient able to care for self after discharge? Yes   How many people do you have in your home that can help with your care after discharge? 2   Who are your caregiver(s) and their phone number(s)? (Cousin: Collette C [phone]. Does not live w/him but available as needs: Aunt:  Pari [phone]. )   Patient's perception of discharge disposition home or selfcare   Readmission Within The Last 30 Days no previous admission in last 30 days   Patient currently being followed by  outpatient case management? No   Patient currently receives home health services? No   Does the patient currently use HME? Yes   Name and contact number for HME provider: (Unknown)   Patient currently receives private duty nursing? N/A   Patient currently receives any other outside agency services? No   Equipment Currently Used at Home walker, standard;cane, quad   Do you have any problems affording any of your prescribed medications? No   Is the patient taking medications as prescribed? yes   Do you have any financial concerns preventing you from receiving the healthcare you need? No   Does the patient have transportation to healthcare appointments? Yes   Transportation Available family or friend will provide;car   On Dialysis? Yes   If yes, what is the name of the dialysis unit? ([facility] HD MWF, 5 PM time ?location)   Does the patient receive outpatient dialysis? Yes  (see above)   Does the patient receive services at the Coumadin Clinic? No   Are there any open cases? No   Discharge Plan A Home with family   Discharge Plan B Home with family   Patient/Family In Agreement With Plan yes

## 2017-08-01 NOTE — ASSESSMENT & PLAN NOTE
- Pt reports severe myalgia from toes to upper abdomen, tender to light touch  - check CPK and ESR for signs of inflammation  - closely monitor electrolytes (Ca, K, Mg, Phos)  - d/c Percocet 5 due to nausea
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
## 2017-08-01 NOTE — PLAN OF CARE
Problem: Patient Care Overview  Goal: Plan of Care Review  Outcome: Ongoing (interventions implemented as appropriate)  No acute events throughout night, VS and assessment per flow sheet, patient progressing towards goals as tolerated, plan of care reviewed with Maksim Hidalgo, all concerns addressed, will continue to monitor.

## 2017-08-01 NOTE — HPI
"Mr. Hidalgo is a 37 y.o. AAM with a h/o ESRD on dialysis secondary to uncontrolled HTN, admitted for obstructed peritoneal catheter removal. He has been receiving HD MWF through a R tunneled catheter and is currently being worked up for permanent fistula. Following his catheter removal he developed diffuse, aching pain all over his body which he describes as "someone punching me." He reports weakness, tremor, fatigue, SOB, HA, dysuria, and subjective fevers/chills as he feels "wet." Patient was in a normal state of health prior to this admission. He was able to ambulate and speak normally. His weakness and pain is now to the point where he cannot move in bed and speaks only a few words at a time.   "

## 2017-08-01 NOTE — PROGRESS NOTES
Pt returned from dialysis. /105; similar to how it has been running in dialysis. Dr. Gillespie team page. Awaiting return page.    Dr. Martinez returned page. No new orders at this time, continue to watch BP.

## 2017-08-01 NOTE — ASSESSMENT & PLAN NOTE
-Tremor appears non-physiologic as well as unlikely to be related to focal seizure due to variable frequency, distractibility   -Suspect improvement in tremor with treating pain and continuing HD.  If tremor does not improve, could consider EEG to evaluate for focal seizures.

## 2017-08-01 NOTE — ASSESSMENT & PLAN NOTE
Nephrology consulted   Neurology consulted for mental status  Regular diet  Daily labs  PO pain meds  Repeat CBC at noon  Will consult hospital medicine, as source of patient's pain is unclear and not surgical in nature

## 2017-08-01 NOTE — PROGRESS NOTES
[facility] H[facility]ODIALYSIS NOTE    Maksim Hidalgo is a 37 y.o. male currently on hemodialysis for removal of uremic toxins and volume.    Pt was seen during hemodialysis today, dialysis flowsheet, labs, vitals were reviewed and d/w staff.      Details noted. Pt has been having altered mental status, generalized body ache and twitching of right arm especially since yesterday. He basically presented for removal of PD catheter. He has ESRD, hypertension, hep B positive status and other medical problems and was on PD for almost a little over one year. He had repeated PD catheter dysfunction that required multiple revisions that finally he was transferred to in center HD via tunneled catheter in the last 3-4 weeks. His dialysis care was transferred to another nephrologist that time. But prior to that per my experience he was compliant patient and did not have any psychiatric diagnosis or any issues with illicit drug abuse. Per information obtained from his in center dialysis unit he was compliant with his HD schedule and received HD until last week (TTS). He was seen during dialysis. He was ill appearing, had severe pain on even light touch. He was somewhat incoherent and could not provide any meaningful ROS. He was noted to have shaking of right arm. Neuro team arrived at his bedside for evaluation. D/w neuro team, they do not feel he has any focal seizures.     Labs have been reviewed and the dialysate bath has been adjusted.    He has been otherwise hemodynamically stable during the treatment.     Exam  Ill appearing  Vitals noted  HEENT slight periorbital edema  Neck he is unable to co-operate with exam or movement of neck  Lungs no crackles  CV S1S2+  abd tenderness on light touch  extr edema +  Access tunneled CVC     Labs:        Recent Labs  Lab 07/31/17  1514 08/01/17  0602    140   K 4.9 4.7    113*   CO2 18* 17*   BUN 42* 40*   CREATININE 7.1* 6.9*   CALCIUM 9.1 8.4*   PHOS 3.3 3.2          Recent Labs  Lab 07/31/17  1514 08/01/17  0602 08/01/17  1152   WBC 9.22 9.01 7.32   HGB 12.1* 10.5* 10.4*   HCT 37.3* 31.4* 31.0*    301 306       Assessment/Plan:    ESRD  HD today for removal of uremic toxins and volume.    Metabolic acidosis  Dialysate bath is adjusted per labs    CKD MBD  Phos level is stable    S/p removal of PD  Although there was no suspicion for any PD related infection, per surgery cultures have been sent    Altered mental status  Involuntary shaking  Elevated CPK (mild)  CT head w/o unremarkable  Neuro following, appreciate input  Patient will need repeat CPK to follow the trend  He does not have leukocytosis, left shift, lymphocytosis, has been afebrile so far, etiology of his altered mental status and other symptoms is unclear yet  We are not aware of any new medication he was started on in the recent past but will confirm with his dialysis clinic SW

## 2017-08-01 NOTE — PROGRESS NOTES
[facility]  General Surgery  Progress Note    Subjective:     History of Present Illness:  Mr. Hidalgo is a 38 yo male who presented for removal of a malfunctioning PD catheter    Post-Op Info:  Procedure(s) (LRB):  REMOVAL-CATHETER-DIALYSIS-PERITONEAL (N/A)   1 Day Post-Op     Interval History: No acute events overnight. Patient still complaining of whole body pain and weakness. CT head negative. Labs WNL. Disoriented this morning.    Medications:  Continuous Infusions:   Scheduled Meds:   heparin (porcine)  5,000 Units Subcutaneous Q8H    sodium chloride 0.9%  3 mL Intravenous Q8H     PRN Meds:hydrocodone-acetaminophen 5-325mg     Review of patient's allergies indicates:  No Known Allergies  Objective:     Vital Signs (Most Recent):  Temp: 98.2 °F (36.8 °C) (08/01/17 0453)  Pulse: 78 (08/01/17 0453)  Resp: 16 (08/01/17 0453)  BP: (!) 144/97 (08/01/17 0453)  SpO2: 99 % (08/01/17 0453) Vital Signs (24h Range):  Temp:  [94 °F (34.4 °C)-98.7 °F (37.1 °C)] 98.2 °F (36.8 °C)  Pulse:  [58-85] 78  Resp:  [5-20] 16  SpO2:  [97 %-100 %] 99 %  BP: (106-157)/() 144/97     Weight: 93 kg (205 lb)  Body mass index is 29.41 kg/m².    Intake/Output - Last 3 Shifts       07/30 0700 - 07/31 0659 07/31 0700 - 08/01 0659 08/01 0700 - 08/02 0659    I.V. (mL/kg)  250 (2.7)     Total Intake(mL/kg)  250 (2.7)     Net   +250                   Physical Exam   Constitutional: He is oriented to person, place, and time. He appears well-developed and well-nourished. No distress.   HENT:   Head: Normocephalic and atraumatic.   Cardiovascular: Normal rate and regular rhythm.    Pulmonary/Chest: Effort normal. No respiratory distress.   Abdominal: Soft. He exhibits no distension. There is tenderness (diffusely tender to palpation. No evidence of hematoma). There is no rebound.   Neurological: He is alert and oriented to person, place, and time.   Skin: Skin is warm and dry.   Psychiatric: He has a normal mood and affect.  His behavior is normal.       Significant Labs:  CBC:   Recent Labs  Lab 08/01/17  0602   WBC 9.01   RBC 3.59*   HGB 10.5*   HCT 31.4*      MCV 88   MCH 29.2   MCHC 33.4     BMP:   Recent Labs  Lab 08/01/17  0602   GLU 75      K 4.7   *   CO2 17*   BUN 40*   CREATININE 6.9*   CALCIUM 8.4*   MG 1.7       Significant Diagnostics:  CT:  FINDINGS: No evidence of hemorrhage, mass, midline shift or acute major vascular territory infarct.  Gray-white interface appears intact.  The ventricular system is normal in size for age and demonstrates no distortion by mass effect.      No extra-axial hemorrhage or mass. The extracranial structures are within normal limits.  No displaced calvarial fracture.  Imaged portions of the paranasal sinuses and mastoid air cells are clear. Imaged portions of the orbits are within normal limits.    Assessment/Plan:     * ESRD (end stage renal disease)    Nephrology consulted   Neurology consulted for mental status  Regular diet  Daily labs  PO pain meds  Repeat CBC at noon  Will consult hospital medicine, as source of patient's pain is unclear and not surgical in nature            Sandi Christianson MD  General Surgery  [facility]

## 2017-08-01 NOTE — NURSING TRANSFER
Nursing Transfer Note      7/31/2017     Transfer To: [address]    Transfer via stretcher    Transfer with none    Transported by PCT    Medicines sent: none    Chart send with patient: Yes    Notified: cousin    Patient reassessed at: 7/31/17 see flowsheets

## 2017-08-01 NOTE — SUBJECTIVE & OBJECTIVE
Interval History: No acute events overnight. Patient still complaining of whole body pain and weakness. CT head negative. Labs WNL. Disoriented this morning.    Medications:  Continuous Infusions:   Scheduled Meds:   heparin (porcine)  5,000 Units Subcutaneous Q8H    sodium chloride 0.9%  3 mL Intravenous Q8H     PRN Meds:hydrocodone-acetaminophen 5-325mg     Review of patient's allergies indicates:  No Known Allergies  Objective:     Vital Signs (Most Recent):  Temp: 98.2 °F (36.8 °C) (08/01/17 0453)  Pulse: 78 (08/01/17 0453)  Resp: 16 (08/01/17 0453)  BP: (!) 144/97 (08/01/17 0453)  SpO2: 99 % (08/01/17 0453) Vital Signs (24h Range):  Temp:  [94 °F (34.4 °C)-98.7 °F (37.1 °C)] 98.2 °F (36.8 °C)  Pulse:  [58-85] 78  Resp:  [5-20] 16  SpO2:  [97 %-100 %] 99 %  BP: (106-157)/() 144/97     Weight: 93 kg (205 lb)  Body mass index is 29.41 kg/m².    Intake/Output - Last 3 Shifts       07/30 0700 - 07/31 0659 07/31 0700 - 08/01 0659 08/01 0700 - 08/02 0659    I.V. (mL/kg)  250 (2.7)     Total Intake(mL/kg)  250 (2.7)     Net   +250                   Physical Exam   Constitutional: He is oriented to person, place, and time. He appears well-developed and well-nourished. No distress.   HENT:   Head: Normocephalic and atraumatic.   Cardiovascular: Normal rate and regular rhythm.    Pulmonary/Chest: Effort normal. No respiratory distress.   Abdominal: Soft. He exhibits no distension. There is tenderness (diffusely tender to palpation. No evidence of hematoma). There is no rebound.   Neurological: He is alert and oriented to person, place, and time.   Skin: Skin is warm and dry.   Psychiatric: He has a normal mood and affect. His behavior is normal.       Significant Labs:  CBC:   Recent Labs  Lab 08/01/17  0602   WBC 9.01   RBC 3.59*   HGB 10.5*   HCT 31.4*      MCV 88   MCH 29.2   MCHC 33.4     BMP:   Recent Labs  Lab 08/01/17  0602   GLU 75      K 4.7   *   CO2 17*   BUN 40*   CREATININE 6.9*    CALCIUM 8.4*   MG 1.7       Significant Diagnostics:  CT:  FINDINGS: No evidence of hemorrhage, mass, midline shift or acute major vascular territory infarct.  Gray-white interface appears intact.  The ventricular system is normal in size for age and demonstrates no distortion by mass effect.      No extra-axial hemorrhage or mass. The extracranial structures are within normal limits.  No displaced calvarial fracture.  Imaged portions of the paranasal sinuses and mastoid air cells are clear. Imaged portions of the orbits are within normal limits.

## 2017-08-01 NOTE — NURSING TRANSFER
Nursing Transfer Note      8/1/2017     Transfer to dialysis    Transfer via stretcher    Transfer with none    Transported by pt escort    Medicines sent: none    Notified: patient    Patient reassessed at: transfer
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
## 2017-08-01 NOTE — PLAN OF CARE
Problem: Patient Care Overview  Goal: Plan of Care Review  Outcome: Ongoing (interventions implemented as appropriate)  HD treatment complete. Duration of treatment 3 hours and 2 L removed. Treatment was tolerated well and no complications with access to right chest wall catheter. Catheter flushed with NS and locked with heparin. Capped and taped.

## 2017-08-01 NOTE — ANESTHESIA RELEASE NOTE
"Anesthesia Release from PACU Note    Patient: Maksim Hidalgo    Procedure(s) Performed: Procedure(s) (LRB):  REMOVAL-CATHETER-DIALYSIS-PERITONEAL (N/A)    Anesthesia type: general    Post pain: Adequate analgesia    Post assessment: no apparent anesthetic complications    Last Vitals:   Visit Vitals  BP (!) 146/94   Pulse (!) 59   Temp 36.7 °C (98 °F) (Oral)   Resp 12   Ht 5' 10" (1.778 m)   Wt 93 kg (205 lb)   SpO2 98%   BMI 29.41 kg/m²       Post vital signs: stable    Level of consciousness: awake, alert  and oriented    Nausea/Vomiting: no nausea/no vomiting    Complications: none    Airway Patency: patent    Respiratory: unassisted    Cardiovascular: stable and blood pressure at baseline    Hydration: euvolemic  "

## 2017-08-02 PROBLEM — M79.10 MYALGIA: Status: ACTIVE | Noted: 2017-08-02

## 2017-08-02 PROBLEM — R20.8 HYPERALGESIA: Status: ACTIVE | Noted: 2017-08-02

## 2017-08-02 PROBLEM — M79.2 NEUROPATHIC PAIN: Status: ACTIVE | Noted: 2017-08-02

## 2017-08-02 PROBLEM — N19 UREMIA: Status: ACTIVE | Noted: 2017-08-02

## 2017-08-02 PROBLEM — R53.1 WEAKNESS: Status: ACTIVE | Noted: 2017-08-02

## 2017-08-02 PROBLEM — R52 INTRACTABLE PAIN: Status: ACTIVE | Noted: 2017-08-02

## 2017-08-02 LAB
ALBUMIN SERPL BCP-MCNC: 3.3 G/DL
ALP SERPL-CCNC: 84 U/L
ALT SERPL W/O P-5'-P-CCNC: 9 U/L
AMMONIA PLAS-SCNC: 34 UMOL/L
ANION GAP SERPL CALC-SCNC: 11 MMOL/L
AST SERPL-CCNC: 19 U/L
BACTERIA #/AREA URNS AUTO: NORMAL /HPF
BASOPHILS # BLD AUTO: 0.01 K/UL
BASOPHILS NFR BLD: 0.2 %
BILIRUB SERPL-MCNC: 0.3 MG/DL
BILIRUB UR QL STRIP: NEGATIVE
BUN SERPL-MCNC: 24 MG/DL
CALCIUM SERPL-MCNC: 8.9 MG/DL
CHLORIDE SERPL-SCNC: 105 MMOL/L
CK SERPL-CCNC: 296 U/L
CLARITY UR REFRACT.AUTO: CLEAR
CO2 SERPL-SCNC: 22 MMOL/L
COLOR UR AUTO: YELLOW
CREAT SERPL-MCNC: 5.6 MG/DL
DIFFERENTIAL METHOD: ABNORMAL
EOSINOPHIL # BLD AUTO: 0.3 K/UL
EOSINOPHIL NFR BLD: 5.4 %
ERYTHROCYTE [DISTWIDTH] IN BLOOD BY AUTOMATED COUNT: 12.1 %
EST. GFR  (AFRICAN AMERICAN): 13.6 ML/MIN/1.73 M^2
EST. GFR  (NON AFRICAN AMERICAN): 11.8 ML/MIN/1.73 M^2
GLUCOSE SERPL-MCNC: 78 MG/DL
GLUCOSE UR QL STRIP: NEGATIVE
HCT VFR BLD AUTO: 31.4 %
HGB BLD-MCNC: 10.5 G/DL
HGB UR QL STRIP: NEGATIVE
HYALINE CASTS UR QL AUTO: 0 /LPF
KETONES UR QL STRIP: NEGATIVE
LEUKOCYTE ESTERASE UR QL STRIP: NEGATIVE
LYMPHOCYTES # BLD AUTO: 2.1 K/UL
LYMPHOCYTES NFR BLD: 33.5 %
MCH RBC QN AUTO: 29.4 PG
MCHC RBC AUTO-ENTMCNC: 33.4 G/DL
MCV RBC AUTO: 88 FL
MICROSCOPIC COMMENT: NORMAL
MONOCYTES # BLD AUTO: 0.5 K/UL
MONOCYTES NFR BLD: 8.6 %
NEUTROPHILS # BLD AUTO: 3.3 K/UL
NEUTROPHILS NFR BLD: 52 %
NITRITE UR QL STRIP: NEGATIVE
PH UR STRIP: 7 [PH] (ref 5–8)
PHOSPHATE SERPL-MCNC: 3.6 MG/DL
PLATELET # BLD AUTO: 281 K/UL
PMV BLD AUTO: 10 FL
POTASSIUM SERPL-SCNC: 4.1 MMOL/L
PROT SERPL-MCNC: 7 G/DL
PROT UR QL STRIP: ABNORMAL
RBC # BLD AUTO: 3.57 M/UL
RBC #/AREA URNS AUTO: 0 /HPF (ref 0–4)
SODIUM SERPL-SCNC: 138 MMOL/L
SP GR UR STRIP: 1.01 (ref 1–1.03)
T4 FREE SERPL-MCNC: 1.25 NG/DL
TSH SERPL DL<=0.005 MIU/L-ACNC: 0.18 UIU/ML
URN SPEC COLLECT METH UR: ABNORMAL
UROBILINOGEN UR STRIP-ACNC: NEGATIVE EU/DL
WBC # BLD AUTO: 6.29 K/UL
WBC #/AREA URNS AUTO: 0 /HPF (ref 0–5)

## 2017-08-02 PROCEDURE — 25000003 PHARM REV CODE 250: Mod: NTX | Performed by: STUDENT IN AN ORGANIZED HEALTH CARE EDUCATION/TRAINING PROGRAM

## 2017-08-02 PROCEDURE — 11000001 HC ACUTE MED/SURG PRIVATE ROOM: Mod: NTX

## 2017-08-02 PROCEDURE — 85025 COMPLETE CBC W/AUTO DIFF WBC: CPT | Mod: NTX

## 2017-08-02 PROCEDURE — 87186 SC STD MICRODIL/AGAR DIL: CPT | Mod: NTX

## 2017-08-02 PROCEDURE — 82550 ASSAY OF CK (CPK): CPT | Mod: NTX

## 2017-08-02 PROCEDURE — 63600175 PHARM REV CODE 636 W HCPCS: Mod: NTX | Performed by: STUDENT IN AN ORGANIZED HEALTH CARE EDUCATION/TRAINING PROGRAM

## 2017-08-02 PROCEDURE — 87088 URINE BACTERIA CULTURE: CPT | Mod: NTX

## 2017-08-02 PROCEDURE — 80053 COMPREHEN METABOLIC PANEL: CPT | Mod: NTX

## 2017-08-02 PROCEDURE — 36415 COLL VENOUS BLD VENIPUNCTURE: CPT | Mod: NTX

## 2017-08-02 PROCEDURE — 84100 ASSAY OF PHOSPHORUS: CPT | Mod: NTX

## 2017-08-02 PROCEDURE — 99233 SBSQ HOSP IP/OBS HIGH 50: CPT | Mod: NTX,,, | Performed by: PSYCHIATRY & NEUROLOGY

## 2017-08-02 PROCEDURE — A4216 STERILE WATER/SALINE, 10 ML: HCPCS | Mod: NTX | Performed by: STUDENT IN AN ORGANIZED HEALTH CARE EDUCATION/TRAINING PROGRAM

## 2017-08-02 PROCEDURE — 99233 SBSQ HOSP IP/OBS HIGH 50: CPT | Mod: GC,NTX,, | Performed by: HOSPITALIST

## 2017-08-02 PROCEDURE — 87086 URINE CULTURE/COLONY COUNT: CPT | Mod: NTX

## 2017-08-02 PROCEDURE — 81001 URINALYSIS AUTO W/SCOPE: CPT | Mod: NTX

## 2017-08-02 PROCEDURE — 87077 CULTURE AEROBIC IDENTIFY: CPT | Mod: NTX

## 2017-08-02 RX ORDER — SEVELAMER CARBONATE 800 MG/1
800 TABLET, FILM COATED ORAL
Status: DISCONTINUED | OUTPATIENT
Start: 2017-08-02 | End: 2017-08-08 | Stop reason: HOSPADM

## 2017-08-02 RX ORDER — FLUCONAZOLE 200 MG/1
200 TABLET ORAL EVERY OTHER DAY
Status: DISCONTINUED | OUTPATIENT
Start: 2017-08-03 | End: 2017-08-04

## 2017-08-02 RX ORDER — SODIUM CHLORIDE 9 MG/ML
INJECTION, SOLUTION INTRAVENOUS ONCE
Status: COMPLETED | OUTPATIENT
Start: 2017-08-02 | End: 2017-08-03

## 2017-08-02 RX ORDER — SODIUM CHLORIDE 9 MG/ML
INJECTION, SOLUTION INTRAVENOUS
Status: DISCONTINUED | OUTPATIENT
Start: 2017-08-02 | End: 2017-08-08 | Stop reason: HOSPADM

## 2017-08-02 RX ORDER — ALBUTEROL SULFATE 90 UG/1
2 AEROSOL, METERED RESPIRATORY (INHALATION) EVERY 4 HOURS PRN
Status: DISCONTINUED | OUTPATIENT
Start: 2017-08-02 | End: 2017-08-08 | Stop reason: HOSPADM

## 2017-08-02 RX ORDER — FUROSEMIDE 40 MG/1
40 TABLET ORAL DAILY
Status: DISCONTINUED | OUTPATIENT
Start: 2017-08-02 | End: 2017-08-08 | Stop reason: HOSPADM

## 2017-08-02 RX ORDER — LISINOPRIL 10 MG/1
10 TABLET ORAL DAILY
Status: DISCONTINUED | OUTPATIENT
Start: 2017-08-02 | End: 2017-08-08 | Stop reason: HOSPADM

## 2017-08-02 RX ORDER — AMOXICILLIN 250 MG
2 CAPSULE ORAL NIGHTLY
Status: DISCONTINUED | OUTPATIENT
Start: 2017-08-02 | End: 2017-08-08 | Stop reason: HOSPADM

## 2017-08-02 RX ORDER — GABAPENTIN 300 MG/1
300 CAPSULE ORAL NIGHTLY
Status: DISCONTINUED | OUTPATIENT
Start: 2017-08-02 | End: 2017-08-08 | Stop reason: HOSPADM

## 2017-08-02 RX ORDER — PANTOPRAZOLE SODIUM 40 MG/1
40 TABLET, DELAYED RELEASE ORAL DAILY
Status: DISCONTINUED | OUTPATIENT
Start: 2017-08-02 | End: 2017-08-08 | Stop reason: HOSPADM

## 2017-08-02 RX ORDER — CALCITRIOL 0.25 UG/1
0.5 CAPSULE ORAL DAILY
Status: DISCONTINUED | OUTPATIENT
Start: 2017-08-02 | End: 2017-08-08 | Stop reason: HOSPADM

## 2017-08-02 RX ADMIN — GABAPENTIN 300 MG: 300 CAPSULE ORAL at 12:08

## 2017-08-02 RX ADMIN — HEPARIN SODIUM 5000 UNITS: 5000 INJECTION, SOLUTION INTRAVENOUS; SUBCUTANEOUS at 08:08

## 2017-08-02 RX ADMIN — PANTOPRAZOLE SODIUM 40 MG: 40 TABLET, DELAYED RELEASE ORAL at 12:08

## 2017-08-02 RX ADMIN — LISINOPRIL 10 MG: 10 TABLET ORAL at 12:08

## 2017-08-02 RX ADMIN — FUROSEMIDE 40 MG: 40 TABLET ORAL at 12:08

## 2017-08-02 RX ADMIN — Medication 3 ML: at 10:08

## 2017-08-02 RX ADMIN — STANDARDIZED SENNA CONCENTRATE AND DOCUSATE SODIUM 2 TABLET: 8.6; 5 TABLET, FILM COATED ORAL at 08:08

## 2017-08-02 RX ADMIN — HEPARIN SODIUM 5000 UNITS: 5000 INJECTION, SOLUTION INTRAVENOUS; SUBCUTANEOUS at 05:08

## 2017-08-02 RX ADMIN — HYDROCODONE BITARTRATE AND ACETAMINOPHEN 1 TABLET: 5; 325 TABLET ORAL at 03:08

## 2017-08-02 RX ADMIN — HYDROCODONE BITARTRATE AND ACETAMINOPHEN 1 TABLET: 5; 325 TABLET ORAL at 09:08

## 2017-08-02 RX ADMIN — HYDROCODONE BITARTRATE AND ACETAMINOPHEN 1 TABLET: 5; 325 TABLET ORAL at 01:08

## 2017-08-02 RX ADMIN — SEVELAMER CARBONATE 800 MG: 800 TABLET, FILM COATED ORAL at 04:08

## 2017-08-02 RX ADMIN — SEVELAMER CARBONATE 800 MG: 800 TABLET, FILM COATED ORAL at 12:08

## 2017-08-02 RX ADMIN — Medication 3 ML: at 05:08

## 2017-08-02 RX ADMIN — CALCITRIOL 0.5 MCG: 0.25 CAPSULE, LIQUID FILLED ORAL at 12:08

## 2017-08-02 RX ADMIN — HYDROCODONE BITARTRATE AND ACETAMINOPHEN 1 TABLET: 5; 325 TABLET ORAL at 05:08

## 2017-08-02 RX ADMIN — HEPARIN SODIUM 5000 UNITS: 5000 INJECTION, SOLUTION INTRAVENOUS; SUBCUTANEOUS at 01:08
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
## 2017-08-02 NOTE — ASSESSMENT & PLAN NOTE
- Pt continues to exhibit generalized weakness (8/2)  - unable to identify specific cause   - D/w Neurology who was also unable to determine a cause   - continue to monitor electrolytes  -   - consult PT/OT

## 2017-08-02 NOTE — ASSESSMENT & PLAN NOTE
Nephrology on board  Neurology consulted for mental status  Regular diet  Daily labs  PO pain meds  Will discuss case with pain medicine and anesthesia  Will discuss with hospital medicine, as patient's current issues are not surgical in nature

## 2017-08-02 NOTE — ASSESSMENT & PLAN NOTE
- possible source of intractable pain  - BUN 24, Cr 5.6 (8/2)  - if source of pain, should resolve with scheduled hemodialysis

## 2017-08-02 NOTE — PLAN OF CARE
Problem: Patient Care Overview  Goal: Plan of Care Review  Outcome: Ongoing (interventions implemented as appropriate)  Patient resting in bed comfortably. IV intact and free of infection and irration, fall precautions maintained no falls noted. Call light in reach bed locked and in lowest position. Non skid socks on while out of bed. Patient instructed to call for assistance. Skin integrity maintained as patient is independent with frequent positioning, C/o pain managed with PRN meds, pt is not happy with renal diet,. Patient arousing to noise and voice now.  Will continue to monitor and follow careplan of care.

## 2017-08-02 NOTE — ASSESSMENT & PLAN NOTE
-Repeat CK elevated but not largely increased from previous  -Cxs pending, ngtd currently.  Myalgias seem non-specific and no infectious/inflammatory cause apparent at this time.  -Patient noting some improvement in pain today.  Could try gabapentin if pain persists.

## 2017-08-02 NOTE — SUBJECTIVE & OBJECTIVE
Interval History: No acute events overnight. Tolerated some PO intake. Still complaining of weakness and whole body pain.    Medications:  Continuous Infusions:   Scheduled Meds:   heparin (porcine)  5,000 Units Subcutaneous Q8H    sodium chloride 0.9%  3 mL Intravenous Q8H     PRN Meds:sodium chloride 0.9%, heparin (porcine), hydrocodone-acetaminophen 5-325mg     Review of patient's allergies indicates:  No Known Allergies  Objective:     Vital Signs (Most Recent):  Temp: 98.3 °F (36.8 °C) (08/02/17 0400)  Pulse: 77 (08/02/17 0400)  Resp: 17 (08/02/17 0400)  BP: (!) 137/90 (08/02/17 0400)  SpO2: 98 % (08/02/17 0400) Vital Signs (24h Range):  Temp:  [97 °F (36.1 °C)-99.3 °F (37.4 °C)] 98.3 °F (36.8 °C)  Pulse:  [] 77  Resp:  [16-19] 17  SpO2:  [95 %-100 %] 98 %  BP: (136-185)/() 137/90     Weight: 93 kg (205 lb)  Body mass index is 29.41 kg/m².    Intake/Output - Last 3 Shifts       07/31 0700 - 08/01 0659 08/01 0700 - 08/02 0659    P.O.  870    I.V. (mL/kg) 250 (2.7) 0 (0)    Other  600    Total Intake(mL/kg) 250 (2.7) 1470 (15.8)    Urine (mL/kg/hr)  500 (0.2)    Other  2600 (1.2)    Stool  0 (0)    Total Output   3100    Net +250 -1630          Urine Occurrence  0 x    Stool Occurrence  0 x    Emesis Occurrence  0 x          Physical Exam   Constitutional: He appears well-developed and well-nourished. No distress.   HENT:   Head: Normocephalic and atraumatic.   Cardiovascular: Normal rate and regular rhythm.    Pulmonary/Chest: Effort normal. No respiratory distress.   Abdominal: Soft. There is tenderness.   Tender to palpation in whole body. Global weakness    Significant Labs:  CBC:   Recent Labs  Lab 08/02/17  0427   WBC 6.29   RBC 3.57*   HGB 10.5*   HCT 31.4*      MCV 88   MCH 29.4   MCHC 33.4     BMP:   Recent Labs  Lab 08/01/17  0602   GLU 75      K 4.7   *   CO2 17*   BUN 40*   CREATININE 6.9*   CALCIUM 8.4*   MG 1.7

## 2017-08-02 NOTE — ASSESSMENT & PLAN NOTE
-Continue HD.  Calcium wnl, no electrolyte abnormalities apparent.  -Suspect weakness is more pain-related than neurologic, trial of gabapentin for pain control.  -Would encourage patient to work with PT/OT.

## 2017-08-02 NOTE — CONSULTS
"[facility] Hospital Medicine  Consult Note    Patient Name: Maksim Hidalgo  MRN: [id number]  Admission Date: 7/31/2017  Hospital Length of Stay: 1 days  Attending Physician: Isai Gillespie MD   Primary Care Provider: Saurabh Zuleta MD     [facility] Medicine Team: Networked reference to record PCT  Anthony Ruiz MD      Patient information was obtained from patient.     Consults  Subjective:     Principal Problem: ESRD (end stage renal disease)    Chief Complaint: generalized pain and weakness       HPI: Mr. Hidalgo is a 37 y.o. AAM with a h/o ESRD on dialysis secondary to uncontrolled HTN, admitted for obstructed peritoneal catheter removal. He has been receiving HD MWF through a R tunneled catheter and is currently being worked up for permanent fistula. Following his catheter removal he developed diffuse, aching pain all over his body which he describes as "someone punching me." He reports weakness, tremor, fatigue, SOB, HA, dysuria, and subjective fevers/chills as he feels "wet." Patient was in a normal state of health prior to this admission. He was able to ambulate and speak normally. His weakness and pain is now to the point where he cannot move in bed and speaks only a few words at a time.     Interval History: Pt still complains of severe pain, that is intensified by light touch. However, he reports that pain is slightly decreased this morning. Pain is present from upper abdomen to the toes. Pt still experiencing generalized weakness. Pt is eating and drinking, as evidenced by the remnants of his food tray. Pt's CK and ESR are slightly elevated at 296 and 27, respectively. BUN and Cr remain elevated at 24 and 5.6.     Review of Systems   Constitutional: Positive for chills, diaphoresis and fever.   HENT: Negative for congestion and rhinorrhea.    Eyes: Positive for pain. Negative for photophobia.   Respiratory: Positive for shortness of breath.    Cardiovascular: Negative for " chest pain and palpitations.   Gastrointestinal: Positive for abdominal pain. Negative for abdominal distention, constipation, diarrhea and nausea.   Musculoskeletal: Positive for arthralgias (BLE).   Neurological: Positive for tremors (RUE) and headaches.     Objective:     Vital Signs (Most Recent):  Temp: 96.3 °F (35.7 °C) (08/02/17 1539)  Pulse: 85 (08/02/17 1539)  Resp: 18 (08/02/17 1539)  BP: (!) 122/92 (08/02/17 1539)  SpO2: 98 % (08/02/17 1539) Vital Signs (24h Range):  Temp:  [96.3 °F (35.7 °C)-99.3 °F (37.4 °C)] 96.3 °F (35.7 °C)  Pulse:  [75-99] 85  Resp:  [17-19] 18  SpO2:  [95 %-100 %] 98 %  BP: (119-163)/() 122/92     Weight: 93 kg (205 lb)  Body mass index is 29.41 kg/m².    Intake/Output Summary (Last 24 hours) at 08/02/17 1719  Last data filed at 08/02/17 1100   Gross per 24 hour   Intake              570 ml   Output                0 ml   Net              570 ml      Physical Exam   Constitutional: He is oriented to person, place, and time. He appears well-developed.   HENT:   Head: Normocephalic and atraumatic.   Neck: No tracheal deviation present.   Cardiovascular: Normal rate and regular rhythm.    No murmur heard.  Pulmonary/Chest: Effort normal and breath sounds normal. No respiratory distress.   Abdominal: Soft. Bowel sounds are normal. He exhibits no distension. There is tenderness.   Musculoskeletal:   Pt is tender to light touch from toes to upper abdomen   Lymphadenopathy:     He has no cervical adenopathy.   Neurological: He is oriented to person, place, and time.   Somnolent, but can be aroused by name with loud voice. Unclear if limbs are weak or unwilling to move 2/2 pain   Skin: Skin is warm.       Significant Labs:     Recent Labs  Lab 07/31/17  1514 08/01/17  0602 08/02/17  0427    140 138   K 4.9 4.7 4.1    113* 105   CO2 18* 17* 22*   BUN 42* 40* 24*   CREATININE 7.1* 6.9* 5.6*   CALCIUM 9.1 8.4* 8.9   PROT  --   --  7.0   BILITOT  --   --  0.3   ALKPHOS  --    --  84   ALT  --   --  9*   AST  --   --  19       Recent Labs  Lab 08/01/17  0602 08/01/17  1152 08/02/17  0427   WBC 9.01 7.32 6.29   HGB 10.5* 10.4* 10.5*   HCT 31.4* 31.0* 31.4*    306 281           Significant Imaging: No new imaging    Assessment/Plan:     Intractable pain    - Pt reports severe myalgia from toes to upper abdomen, tender to light touch  - neuropathic vs. Hyperalgesia vs. Uremia   - d/w Neurology who agrees that there is no physiologic source of this pain  -  (<2/3 ULN, not concerning due to recent surgical procedure)  - ESR 27 (<2/3 ULN, not concerning due to recent surgical procedure)  - Continue to monitor electrolytes (Ca, K, Mg, Phos)  - continue  Percocet 5mg q4h prn  - start Gabapentin 300mg qhs  - consider Venlafaxine 37.5mg qd (renal dosing)                              Hyperalgesia    - pain out of proportion to exam  - Pt winces at light touch from toes to upper abdomen  - started Gabapentin 300mg qhs (8/2)        Neuropathic pain    - less likely vs. Hyperalgesia  - however, no physiological source of pain has been identified  - started Gabapentin 300mg qhs  - consider starting Venlafaxine 37.5mg qd (renal dosing)           Uremia    - possible source of intractable pain  - BUN 24, Cr 5.6 (8/2)  - if source of pain, should resolve with scheduled hemodialysis        Weakness    - Pt continues to exhibit generalized weakness (8/2)  - unable to identify specific cause   - D/w Neurology who was also unable to determine a cause   - continue to monitor electrolytes  -   - consult PT/OT                                        VTE Risk Mitigation         Ordered     heparin (porcine) injection 5,000 Units  Every 8 hours     Route:  Subcutaneous        08/01/17 0526     Medium Risk of VTE  Once      08/01/17 0526     Place sequential compression device  Until discontinued      08/01/17 0526     Place sequential compression device  Until discontinued      07/31/17 0740         Thank you for your consult. I will follow-up with patient. Please contact us if you have any additional questions.    Anthony Ruiz MD  Department of Hospital Medicine   [facility]

## 2017-08-02 NOTE — ASSESSMENT & PLAN NOTE
- less likely vs. Hyperalgesia  - however, no physiological source of pain has been identified  - started Gabapentin 300mg qhs  - consider starting Venlafaxine 37.5mg qd (renal dosing)

## 2017-08-02 NOTE — PROGRESS NOTES
[facility]  Neurology  Progress Note    Patient Name: Maksim Hidalgo  MRN: [id number]  Admission Date: 7/31/2017  Hospital Length of Stay: 1 days  Code Status: Full Code   Attending Provider: Isai Gillespie MD  Primary Care Physician: Saurabh Zuleta MD   Principal Problem:ESRD (end stage renal disease)    Interval History:  Patient with improvement in pain today from yesterday but still noting diffuse pain worse in BLE than UE.  Mild improvement in weakness, but patient still not ambulatory.  Somnolent but oriented today which is an improvement from yesterday--stated correct year, correct location.    Review of Systems   Constitutional: Negative for chills and fever.   HENT: Negative for rhinorrhea and sore throat.    Respiratory: Negative for cough and shortness of breath.    Cardiovascular: Negative for palpitations.   Gastrointestinal: Negative for abdominal pain, constipation, diarrhea, nausea and vomiting.   Genitourinary: Negative for dysuria and hematuria.   Musculoskeletal: Positive for arthralgias and myalgias.   Skin: Negative for rash and wound.   Neurological: Positive for tremors and weakness. Negative for numbness.   Hematological: Negative for adenopathy. Does not bruise/bleed easily.   Psychiatric/Behavioral: Positive for confusion. Negative for agitation.     Objective:     Vital Signs (Most Recent):  Temp: 97.3 °F (36.3 °C) (08/02/17 1121)  Pulse: 75 (08/02/17 1121)  Resp: 18 (08/02/17 1121)  BP: 119/82 (08/02/17 1121)  SpO2: 99 % (08/02/17 1121) Vital Signs (24h Range):  Temp:  [96.6 °F (35.9 °C)-99.3 °F (37.4 °C)] 97.3 °F (36.3 °C)  Pulse:  [] 75  Resp:  [17-19] 18  SpO2:  [95 %-100 %] 99 %  BP: (119-185)/() 119/82     Weight: 93 kg (205 lb)  Body mass index is 29.41 kg/m².    Physical Exam  General:  Well-developed, well-nourished, nad--tremor no longer present  HEENT:  NCAT, PERRL, EOMI, oropharyngeal membranes dry but non-erythematous/without  exudate  Neck:  Supple, no lad, no apparent JVD  Resp:  Symmetric expansion, no increased wob  CVS:  RRR without m/r/g, no LE edema  GI:  Abd soft, non-distended, diffusely tender to palpation  Neurologic:  Mental Status:  Somnolent but oriented to self, location, date.  Speech, thought content appropriate.  CNs:  PERRLA, EOMI.  Visual fields intact to confrontation.  Facial sensation and movement intact.  Palate raises symmetrically, tongue protrudes midline.  SCM, trapezius strength 5/5.  Motor:  Normal bulk and tone..  No effort from patient to move BLE, occasional spontaneous movements.   strength 4/5, proximal BUE strength 3/5 with questionable effort.  Sensory:  Patient notes diffuse tenderness to touch throughout entire body.  Intact to light touch, temperature throughout with no inattention or extinction.  Reflexes:  Reflexes 2+ biceps, brachioradialis, triceps, patellar.  Coordination:  Patient sleeping without tremor from yesterday, on waking had few beats of RUE tremor similar to the high amplitude, variable frequency tremor yesterday but this did not persist.    Gait:  Deferred due to patient refusing and citing pain in BLE.       Significant Labs:     Recent Labs  Lab 08/02/17  0427   WBC 6.29   RBC 3.57*   HGB 10.5*   HCT 31.4*      MCV 88   MCH 29.4   MCHC 33.4       Recent Labs  Lab 08/02/17  0427   CALCIUM 8.9   PROT 7.0      K 4.1   CO2 22*      BUN 24*   CREATININE 5.6*   ALKPHOS 84   ALT 9*   AST 19   BILITOT 0.3     Significant Imaging:   Imaging Results          CT Head Without Contrast (Final result)  Result time 07/31/17 18:49:44    Impression:    No acute intracranial abnormality identified.  Further evaluation/followup as warranted.               Narrative:    COMPARISON: Head CT 2/24/16    FINDINGS: No evidence of hemorrhage, mass, midline shift or acute major vascular territory infarct.  Gray-white interface appears intact.  The ventricular system is normal in size  for age and demonstrates no distortion by mass effect.      No extra-axial hemorrhage or mass. The extracranial structures are within normal limits.  No displaced calvarial fracture.  Imaged portions of the paranasal sinuses and mastoid air cells are clear. Imaged portions of the orbits are within normal limits.                 Assessment and Plan:     Weakness    -Continue HD.  Calcium wnl, no electrolyte abnormalities apparent.  -Suspect weakness is more pain-related than neurologic, trial of gabapentin for pain control.  -Would encourage patient to work with PT/OT.        Tremor of right hand    -Tremor seems largely resolved since presentation yesterday.  Appeared non-physiologic.   -No plans for further work up at this time, possibly related to anesthesia but is still an unusual presentation.        Myalgia    -Repeat CK elevated but not largely increased from previous  -Cxs pending, ngtd currently.  Myalgias seem non-specific and no infectious/inflammatory cause apparent at this time.  -Patient noting some improvement in pain today, trial gabapentin.         VTE Risk Mitigation         Ordered     heparin (porcine) injection 5,000 Units  Every 8 hours     Route:  Subcutaneous        08/01/17 0526     Medium Risk of VTE  Once      08/01/17 0526     Place sequential compression device  Until discontinued      08/01/17 0526     Place sequential compression device  Until discontinued      07/31/17 0740        Leonor Russo MD  Neurology  [facility]    I have personally taken the history and examined the patient and agree with the resident's note as stated above.    Dino Reece MD, DARINEL, FAAN  Department of Neurology   [facility] [place], [place]

## 2017-08-02 NOTE — ASSESSMENT & PLAN NOTE
- pain out of proportion to exam  - Pt winces at light touch from toes to upper abdomen  - started Gabapentin 300mg qhs (8/2)

## 2017-08-02 NOTE — PROGRESS NOTES
[facility]  General Surgery  Progress Note    Subjective:     History of Present Illness:  Mr. Hidalgo is a 36 yo male who presented for removal of a malfunctioning PD catheter    Post-Op Info:  Procedure(s) (LRB):  REMOVAL-CATHETER-DIALYSIS-PERITONEAL (N/A)   2 Days Post-Op     Interval History: No acute events overnight. Tolerated some PO intake. Still complaining of weakness and whole body pain.    Medications:  Continuous Infusions:   Scheduled Meds:   heparin (porcine)  5,000 Units Subcutaneous Q8H    sodium chloride 0.9%  3 mL Intravenous Q8H     PRN Meds:sodium chloride 0.9%, heparin (porcine), hydrocodone-acetaminophen 5-325mg     Review of patient's allergies indicates:  No Known Allergies  Objective:     Vital Signs (Most Recent):  Temp: 98.3 °F (36.8 °C) (08/02/17 0400)  Pulse: 77 (08/02/17 0400)  Resp: 17 (08/02/17 0400)  BP: (!) 137/90 (08/02/17 0400)  SpO2: 98 % (08/02/17 0400) Vital Signs (24h Range):  Temp:  [97 °F (36.1 °C)-99.3 °F (37.4 °C)] 98.3 °F (36.8 °C)  Pulse:  [] 77  Resp:  [16-19] 17  SpO2:  [95 %-100 %] 98 %  BP: (136-185)/() 137/90     Weight: 93 kg (205 lb)  Body mass index is 29.41 kg/m².    Intake/Output - Last 3 Shifts       07/31 0700 - 08/01 0659 08/01 0700 - 08/02 0659    P.O.  870    I.V. (mL/kg) 250 (2.7) 0 (0)    Other  600    Total Intake(mL/kg) 250 (2.7) 1470 (15.8)    Urine (mL/kg/hr)  500 (0.2)    Other  2600 (1.2)    Stool  0 (0)    Total Output   3100    Net +250 -1630          Urine Occurrence  0 x    Stool Occurrence  0 x    Emesis Occurrence  0 x          Physical Exam   Constitutional: He appears well-developed and well-nourished. No distress.   HENT:   Head: Normocephalic and atraumatic.   Cardiovascular: Normal rate and regular rhythm.    Pulmonary/Chest: Effort normal. No respiratory distress.   Abdominal: Soft. There is tenderness.   Tender to palpation in whole body. Global weakness    Significant Labs:  CBC:   Recent Labs  Lab  08/02/17  0427   WBC 6.29   RBC 3.57*   HGB 10.5*   HCT 31.4*      MCV 88   MCH 29.4   MCHC 33.4     BMP:   Recent Labs  Lab 08/01/17  0602   GLU 75      K 4.7   *   CO2 17*   BUN 40*   CREATININE 6.9*   CALCIUM 8.4*   MG 1.7         Assessment/Plan:     * ESRD (end stage renal disease)    Nephrology on board  Neurology consulted for mental status  Regular diet  Daily labs  PO pain meds  Will discuss case with pain medicine and anesthesia  Will discuss with hospital medicine, as patient's current issues are not surgical in nature            Sandi Christianson MD  General Surgery  [facility]

## 2017-08-02 NOTE — PROGRESS NOTES
Paged intern on call for nephrology to clarify orders for collection of urine for UA, UC and drug screen. Awaiting call back at this time.
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
## 2017-08-02 NOTE — ASSESSMENT & PLAN NOTE
- Pt reports severe myalgia from toes to upper abdomen, tender to light touch  - neuropathic vs. Hyperalgesia vs. Uremia   - d/w Neurology who agrees that there is no physiologic source of this pain  -  (<2/3 ULN, not concerning due to recent surgical procedure)  - ESR 27 (<2/3 ULN, not concerning due to recent surgical procedure)  - Continue to monitor electrolytes (Ca, K, Mg, Phos)  - continue  Percocet 5mg q4h prn  - start Gabapentin 300mg qhs  - consider Venlafaxine 37.5mg qd (renal dosing)

## 2017-08-02 NOTE — ASSESSMENT & PLAN NOTE
-Tremor seems largely resolved since presentation yesterday.  Appeared non-physiologic.   -No plans for further work up at this time.

## 2017-08-02 NOTE — SUBJECTIVE & OBJECTIVE
Interval History: Pt still complains of severe pain, that is intensified by light touch. However, he reports that pain is slightly decreased this morning. Pain is present from upper abdomen to the toes. Pt still experiencing generalized weakness. Pt is eating and drinking, as evidenced by the remnants of his food tray. Pt's CK and ESR are slightly elevated at 296 and 27, respectively. BUN and Cr remain elevated at 24 and 5.6.     Review of Systems   Constitutional: Positive for chills, diaphoresis and fever.   HENT: Negative for congestion and rhinorrhea.    Eyes: Positive for pain. Negative for photophobia.   Respiratory: Positive for shortness of breath.    Cardiovascular: Negative for chest pain and palpitations.   Gastrointestinal: Positive for abdominal pain. Negative for abdominal distention, constipation, diarrhea and nausea.   Musculoskeletal: Positive for arthralgias (BLE).   Neurological: Positive for tremors (RUE) and headaches.     Objective:     Vital Signs (Most Recent):  Temp: 96.3 °F (35.7 °C) (08/02/17 1539)  Pulse: 85 (08/02/17 1539)  Resp: 18 (08/02/17 1539)  BP: (!) 122/92 (08/02/17 1539)  SpO2: 98 % (08/02/17 1539) Vital Signs (24h Range):  Temp:  [96.3 °F (35.7 °C)-99.3 °F (37.4 °C)] 96.3 °F (35.7 °C)  Pulse:  [75-99] 85  Resp:  [17-19] 18  SpO2:  [95 %-100 %] 98 %  BP: (119-163)/() 122/92     Weight: 93 kg (205 lb)  Body mass index is 29.41 kg/m².    Intake/Output Summary (Last 24 hours) at 08/02/17 1719  Last data filed at 08/02/17 1100   Gross per 24 hour   Intake              570 ml   Output                0 ml   Net              570 ml      Physical Exam   Constitutional: He is oriented to person, place, and time. He appears well-developed.   HENT:   Head: Normocephalic and atraumatic.   Neck: No tracheal deviation present.   Cardiovascular: Normal rate and regular rhythm.    No murmur heard.  Pulmonary/Chest: Effort normal and breath sounds normal. No respiratory distress.    Abdominal: Soft. Bowel sounds are normal. He exhibits no distension. There is tenderness.   Musculoskeletal:   Pt is tender to light touch from toes to upper abdomen   Lymphadenopathy:     He has no cervical adenopathy.   Neurological: He is oriented to person, place, and time.   Somnolent, but can be aroused by name with loud voice. Unclear if limbs are weak or unwilling to move 2/2 pain   Skin: Skin is warm.       Significant Labs:     Recent Labs  Lab 07/31/17  1514 08/01/17  0602 08/02/17  0427    140 138   K 4.9 4.7 4.1    113* 105   CO2 18* 17* 22*   BUN 42* 40* 24*   CREATININE 7.1* 6.9* 5.6*   CALCIUM 9.1 8.4* 8.9   PROT  --   --  7.0   BILITOT  --   --  0.3   ALKPHOS  --   --  84   ALT  --   --  9*   AST  --   --  19       Recent Labs  Lab 08/01/17  0602 08/01/17  1152 08/02/17  0427   WBC 9.01 7.32 6.29   HGB 10.5* 10.4* 10.5*   HCT 31.4* 31.0* 31.4*    306 281           Significant Imaging: No new imaging

## 2017-08-02 NOTE — PLAN OF CARE
Problem: Infection, Risk/Actual (Adult)  Goal: Identify Related Risk Factors and Signs and Symptoms  Related risk factors and signs and symptoms are identified upon initiation of Human [employer] Clinical Practice Guideline (CPG)   Outcome: Ongoing (interventions implemented as appropriate)  Pt is progressing with plan of care. Frequent rounds made to assess pain and safety. Pt's pain controlled with pain medication ordered at this time. Bed locked and at lowest position. Side rails up x 2. Call light within reach. Will continue to monitor.

## 2017-08-02 NOTE — HOSPITAL COURSE
Maksim Hidalgo underwent peritoneal dialysis catheter removal on 7/31/2017.  Patients post surgical course complicated by bilateral lower extremity myalgias and a right upper extremity tremor.  Initially on the surgery service followed by the hospitalist comanagment team.  Neurology consulted and was unable to find a physiological explanation behind patients tremor or pain.  Started on gabapentin with improvement of symptoms.  Worked with PT/OT who have been recommending SNF placement secondary to debility.  He was transferred to  3 after found to have fever of 100.3 F.  Blood cultures obtained which have been no growth today.  Urine culture demonstrating enterococcus sensitive to linezolid. US of abdomen does not reveal enough fluid to complete IR paracentesis. Patient started on vanc and rocephin 8/4 and started on linezolid 8/6. Cell count from IVF not consistent with SBP.  Blood and intra-operative cultures have been negative or no growth to date.  MRI of the spine has unremarkable with exception of bulge of the L5-S1 disc without impingement.  Consulted psychiatry and pain management for further evaluation of patient's pain. Recommendations pending. PT OT recommending discharge home with home health.

## 2017-08-02 NOTE — PROGRESS NOTES
Spoke to intern on call about pt's BP and c/o pain after pain medication administration. MD to come to the bedside. Will continue to monitor.

## 2017-08-03 PROBLEM — N19 UREMIA: Status: RESOLVED | Noted: 2017-08-02 | Resolved: 2017-08-03

## 2017-08-03 PROBLEM — R20.8 HYPERALGESIA: Status: RESOLVED | Noted: 2017-08-02 | Resolved: 2017-08-03

## 2017-08-03 LAB
ANION GAP SERPL CALC-SCNC: 12 MMOL/L
BACTERIA SPEC AEROBE CULT: NO GROWTH
BASOPHILS # BLD AUTO: 0.01 K/UL
BASOPHILS NFR BLD: 0.1 %
BUN SERPL-MCNC: 33 MG/DL
CALCIUM SERPL-MCNC: 8.9 MG/DL
CHLORIDE SERPL-SCNC: 106 MMOL/L
CO2 SERPL-SCNC: 20 MMOL/L
CREAT SERPL-MCNC: 6.4 MG/DL
DIFFERENTIAL METHOD: ABNORMAL
EOSINOPHIL # BLD AUTO: 0.3 K/UL
EOSINOPHIL NFR BLD: 2.4 %
ERYTHROCYTE [DISTWIDTH] IN BLOOD BY AUTOMATED COUNT: 12.1 %
EST. GFR  (AFRICAN AMERICAN): 11.6 ML/MIN/1.73 M^2
EST. GFR  (NON AFRICAN AMERICAN): 10 ML/MIN/1.73 M^2
GLUCOSE SERPL-MCNC: 83 MG/DL
HCT VFR BLD AUTO: 30.8 %
HGB BLD-MCNC: 10.2 G/DL
LYMPHOCYTES # BLD AUTO: 1.7 K/UL
LYMPHOCYTES NFR BLD: 14.6 %
MAGNESIUM SERPL-MCNC: 1.7 MG/DL
MCH RBC QN AUTO: 29 PG
MCHC RBC AUTO-ENTMCNC: 33.1 G/DL
MCV RBC AUTO: 88 FL
MONOCYTES # BLD AUTO: 0.9 K/UL
MONOCYTES NFR BLD: 7.8 %
NEUTROPHILS # BLD AUTO: 8.6 K/UL
NEUTROPHILS NFR BLD: 74.9 %
PHOSPHATE SERPL-MCNC: 3.4 MG/DL
PLATELET # BLD AUTO: 276 K/UL
PMV BLD AUTO: 9.6 FL
POTASSIUM SERPL-SCNC: 4.3 MMOL/L
PTH-INTACT SERPL-MCNC: 333 PG/ML
RBC # BLD AUTO: 3.52 M/UL
SODIUM SERPL-SCNC: 138 MMOL/L
WBC # BLD AUTO: 11.47 K/UL

## 2017-08-03 PROCEDURE — 80100016 HC MAINTENANCE HEMODIALYSIS: Mod: NTX

## 2017-08-03 PROCEDURE — 80048 BASIC METABOLIC PNL TOTAL CA: CPT | Mod: NTX

## 2017-08-03 PROCEDURE — 87040 BLOOD CULTURE FOR BACTERIA: CPT | Mod: NTX

## 2017-08-03 PROCEDURE — 25000003 PHARM REV CODE 250: Mod: NTX | Performed by: STUDENT IN AN ORGANIZED HEALTH CARE EDUCATION/TRAINING PROGRAM

## 2017-08-03 PROCEDURE — 97116 GAIT TRAINING THERAPY: CPT | Mod: NTX

## 2017-08-03 PROCEDURE — 99232 SBSQ HOSP IP/OBS MODERATE 35: CPT | Mod: GC,NTX,, | Performed by: PSYCHIATRY & NEUROLOGY

## 2017-08-03 PROCEDURE — 97535 SELF CARE MNGMENT TRAINING: CPT | Mod: NTX

## 2017-08-03 PROCEDURE — 97530 THERAPEUTIC ACTIVITIES: CPT | Mod: NTX

## 2017-08-03 PROCEDURE — 84100 ASSAY OF PHOSPHORUS: CPT | Mod: NTX

## 2017-08-03 PROCEDURE — 63600175 PHARM REV CODE 636 W HCPCS: Mod: NTX | Performed by: NURSE PRACTITIONER

## 2017-08-03 PROCEDURE — 97165 OT EVAL LOW COMPLEX 30 MIN: CPT | Mod: NTX

## 2017-08-03 PROCEDURE — 97162 PT EVAL MOD COMPLEX 30 MIN: CPT | Mod: NTX

## 2017-08-03 PROCEDURE — 83735 ASSAY OF MAGNESIUM: CPT | Mod: NTX

## 2017-08-03 PROCEDURE — 63600175 PHARM REV CODE 636 W HCPCS: Mod: NTX | Performed by: STUDENT IN AN ORGANIZED HEALTH CARE EDUCATION/TRAINING PROGRAM

## 2017-08-03 PROCEDURE — 85025 COMPLETE CBC W/AUTO DIFF WBC: CPT | Mod: NTX

## 2017-08-03 PROCEDURE — 11000001 HC ACUTE MED/SURG PRIVATE ROOM: Mod: NTX

## 2017-08-03 PROCEDURE — A4216 STERILE WATER/SALINE, 10 ML: HCPCS | Mod: NTX | Performed by: STUDENT IN AN ORGANIZED HEALTH CARE EDUCATION/TRAINING PROGRAM

## 2017-08-03 PROCEDURE — 83970 ASSAY OF PARATHORMONE: CPT | Mod: NTX

## 2017-08-03 PROCEDURE — 90935 HEMODIALYSIS ONE EVALUATION: CPT | Mod: NTX,,, | Performed by: INTERNAL MEDICINE

## 2017-08-03 PROCEDURE — 36415 COLL VENOUS BLD VENIPUNCTURE: CPT | Mod: NTX

## 2017-08-03 PROCEDURE — 99232 SBSQ HOSP IP/OBS MODERATE 35: CPT | Mod: GC,NTX,, | Performed by: HOSPITALIST

## 2017-08-03 PROCEDURE — 25000003 PHARM REV CODE 250: Mod: NTX | Performed by: NURSE PRACTITIONER

## 2017-08-03 RX ORDER — GABAPENTIN 300 MG/1
300 CAPSULE ORAL NIGHTLY
Qty: 30 CAPSULE | Refills: 1 | Status: SHIPPED | OUTPATIENT
Start: 2017-08-03 | End: 2023-05-28

## 2017-08-03 RX ORDER — ACETAMINOPHEN 325 MG/1
650 TABLET ORAL EVERY 6 HOURS PRN
Status: DISCONTINUED | OUTPATIENT
Start: 2017-08-03 | End: 2017-08-08 | Stop reason: HOSPADM

## 2017-08-03 RX ORDER — ACETAMINOPHEN 325 MG/1
650 TABLET ORAL ONCE
Status: COMPLETED | OUTPATIENT
Start: 2017-08-03 | End: 2017-08-03

## 2017-08-03 RX ORDER — OXYCODONE AND ACETAMINOPHEN 5; 325 MG/1; MG/1
1 TABLET ORAL EVERY 4 HOURS PRN
Qty: 25 TABLET | Refills: 0 | Status: SHIPPED | OUTPATIENT
Start: 2017-08-03 | End: 2018-09-11

## 2017-08-03 RX ADMIN — HEPARIN SODIUM 5000 UNITS: 5000 INJECTION, SOLUTION INTRAVENOUS; SUBCUTANEOUS at 02:08

## 2017-08-03 RX ADMIN — HYDROCODONE BITARTRATE AND ACETAMINOPHEN 1 TABLET: 5; 325 TABLET ORAL at 02:08

## 2017-08-03 RX ADMIN — HEPARIN SODIUM 5000 UNITS: 5000 INJECTION, SOLUTION INTRAVENOUS; SUBCUTANEOUS at 10:08

## 2017-08-03 RX ADMIN — HYDROCODONE BITARTRATE AND ACETAMINOPHEN 1 TABLET: 5; 325 TABLET ORAL at 06:08

## 2017-08-03 RX ADMIN — LISINOPRIL 10 MG: 10 TABLET ORAL at 02:08

## 2017-08-03 RX ADMIN — HEPARIN SODIUM 5000 UNITS: 5000 INJECTION, SOLUTION INTRAVENOUS; SUBCUTANEOUS at 05:08

## 2017-08-03 RX ADMIN — ACETAMINOPHEN 650 MG: 325 TABLET ORAL at 04:08

## 2017-08-03 RX ADMIN — STANDARDIZED SENNA CONCENTRATE AND DOCUSATE SODIUM 2 TABLET: 8.6; 5 TABLET, FILM COATED ORAL at 10:08

## 2017-08-03 RX ADMIN — PANTOPRAZOLE SODIUM 40 MG: 40 TABLET, DELAYED RELEASE ORAL at 02:08

## 2017-08-03 RX ADMIN — GABAPENTIN 300 MG: 300 CAPSULE ORAL at 10:08

## 2017-08-03 RX ADMIN — CALCITRIOL 0.5 MCG: 0.25 CAPSULE, LIQUID FILLED ORAL at 02:08

## 2017-08-03 RX ADMIN — Medication 3 ML: at 02:08

## 2017-08-03 RX ADMIN — HEPARIN SODIUM 1000 UNITS: 1000 INJECTION, SOLUTION INTRAVENOUS; SUBCUTANEOUS at 12:08

## 2017-08-03 RX ADMIN — Medication 3 ML: at 05:08

## 2017-08-03 RX ADMIN — FLUCONAZOLE 200 MG: 200 TABLET ORAL at 02:08

## 2017-08-03 RX ADMIN — FUROSEMIDE 40 MG: 40 TABLET ORAL at 02:08

## 2017-08-03 RX ADMIN — Medication 3 ML: at 10:08

## 2017-08-03 RX ADMIN — HYDROCODONE BITARTRATE AND ACETAMINOPHEN 1 TABLET: 5; 325 TABLET ORAL at 05:08

## 2017-08-03 RX ADMIN — SODIUM CHLORIDE 350 ML: 0.9 INJECTION, SOLUTION INTRAVENOUS at 01:08
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
## 2017-08-03 NOTE — ASSESSMENT & PLAN NOTE
Assessment  - The patient is in a deconditioned, debilitated state  - This appears to be the primary  of his generalized weakness  - He has no focal findings of weakness on physical exam  - His electrolytes are WNL with exception of slight low Mg (1.7). These are managed through dialysis as the patient is ESRD    Treatment Plan  - Continue PT/OT  - Arrange for appropriate placement  - Will follow up PT/OT recommendations for therapy upon discharge from hospital [facility]  - Appreciate Neurology recommendations

## 2017-08-03 NOTE — ASSESSMENT & PLAN NOTE
- less likely vs. Allodynia  - however, no physiological source of pain has been identified  - Continue Gabapentin 300mg qhs

## 2017-08-03 NOTE — PLAN OF CARE
Problem: Patient Care Overview  Goal: Plan of Care Review  Outcome: Ongoing (interventions implemented as appropriate)  Patient in c/o chills and being cold, placed another blanket on pt, pt temp elevated, md aware and ordered tylenol, c/o of pain monitored with prn medications,  no other complaints voiced at this time, patient instructed to ask for assistance, call light in reach, fall precautions maintained. Will continue to monitor.

## 2017-08-03 NOTE — SUBJECTIVE & OBJECTIVE
Interval History: NAEON. Afebrile and hemodynamically stable. Continues to report pain 10/10 with light touch of lower extremities. Evaluated by PT/OT. They were able to get the patient standing with limited ambulation inside the room. He will require further rehabilitation. The patient lives with a cousin but does not have extensive support at home. His cousin is employed as a caregiver. The patient is frequently home alone.    His mental status is improving. More alert today. Strength is likewise improving both subjective report and objective mobility. He sat up for meals and consumed >80% of his meal tray.    Review of Systems   Constitutional: Positive for chills. Negative for diaphoresis and fever.   HENT: Negative for congestion and rhinorrhea.    Eyes: Negative for photophobia and pain.   Respiratory: Negative for shortness of breath.    Cardiovascular: Negative for chest pain and palpitations.   Gastrointestinal: Positive for abdominal pain. Negative for abdominal distention, constipation, diarrhea and nausea.   Musculoskeletal: Positive for myalgias. Negative for arthralgias (BLE).   Neurological: Positive for tremors (RUE). Negative for headaches.     Objective:     Vital Signs (Most Recent):  Temp: 100 °F (37.8 °C) (08/03/17 1402)  Pulse: 110 (08/03/17 1402)  Resp: 18 (08/03/17 1402)  BP: (!) 139/92 (08/03/17 1402)  SpO2: 99 % (08/03/17 1402) Vital Signs (24h Range):  Temp:  [96.3 °F (35.7 °C)-100 °F (37.8 °C)] 100 °F (37.8 °C)  Pulse:  [] 110  Resp:  [16-18] 18  SpO2:  [93 %-99 %] 99 %  BP: (112-164)/() 139/92     Weight: 93 kg (205 lb)  Body mass index is 29.41 kg/m².    Intake/Output Summary (Last 24 hours) at 08/03/17 1451  Last data filed at 08/03/17 1305   Gross per 24 hour   Intake             1310 ml   Output             1650 ml   Net             -340 ml      Physical Exam   Constitutional: He is oriented to person, place, and time. He appears well-developed.   HENT:   Head:  Normocephalic and atraumatic.   Neck: Neck supple. No JVD present.   Cardiovascular: Normal rate and regular rhythm.    No murmur heard.  Pulmonary/Chest: Effort normal and breath sounds normal. No respiratory distress.   Abdominal: Soft. Bowel sounds are normal. He exhibits no distension. There is tenderness.   Musculoskeletal:   Pt is tender to light touch from toes to upper abdomen. Pain is out of proportion to exam.   Neurological: He is alert and oriented to person, place, and time. No cranial nerve deficit.   Motor Strength  RUE 4/5  LUE 4/5  RLE 4/5  LLE 4/5   Skin: Skin is warm.   Psychiatric: Thought content normal.   Though content is normal. The patient is withdrawn but more interactive than yesterday. He is concerned regarding his medical condition and prognosis, perhaps unreasonably concerned. For example, he asked if he was going to be permanently wheel chair bound due to the weakness in his lower extremities. He was comforted by reassurance that he could improve his functional status by full participation in daily physical therapy.       Significant Labs:   Recent Lab Results       08/03/17  1151 08/03/17  0547 08/02/17  1649      Anion Gap  12      Appearance, UA   Clear     Bacteria, UA   Rare     Baso #  0.01      Basophil%  0.1      Bilirubin (UA)   Negative     BUN, Bld  33(H)      Calcium  8.9      Chloride  106      CO2  20(L)      Color, UA   Yellow     Creatinine  6.4(H)      Differential Method  Automated      eGFR if   11.6(A)      eGFR if non   10.0  Comment:  Calculation used to obtain the estimated glomerular filtration  rate (eGFR) is the CKD-EPI equation. Since race is unknown   in our information system, the eGFR values for   -American and Non--American patients are given   for each creatinine result.  (A)      Eos #  0.3      Eosinophil%  2.4      Glucose  83      Glucose, UA   Negative     Gran #  8.6(H)      Gran%  74.9(H)       Hematocrit  30.8(L)      Hemoglobin  10.2(L)      Hyaline Casts, UA   0     Ketones, UA   Negative     Leukocytes, UA   Negative     Lymph #  1.7      Lymph%  14.6(L)      Magnesium  1.7      MCH  29.0      MCHC  33.1      MCV  88      Microscopic Comment   SEE COMMENT  Comment:  Other formed elements not mentioned in the report are not   present in the microscopic examination.        Mono #  0.9      Mono%  7.8      MPV  9.6      Nitrite, UA   Negative     Occult Blood UA   Negative     pH, UA   7.0     Phosphorus  3.4      Platelets  276      Potassium  4.3      Protein, UA   2+  Comment:  Recommend a 24 hour urine protein or a urine   protein/creatinine ratio if globulin induced proteinuria is  clinically suspected.  (A)     .0(H)       RBC  3.52(L)      RBC, UA   0     RDW  12.1      Sodium  138      Specific Gravity, UA   1.015     Specimen UA   Urine, Clean Catch     Urobilinogen, UA   Negative     WBC, UA   0     WBC  11.47            Significant Imaging: I have reviewed all pertinent imaging results/findings within the past 24 hours.

## 2017-08-03 NOTE — SUBJECTIVE & OBJECTIVE
Subjective:     Interval History:   Patient is still noting pain in abdomen and BLE.  Pain is to light touch and out of proportion to exam.  Abdominal pain may be related to removal of PD, but this does not quite explain the leg pain.  Discussed this with patient and possibility of a bizarre reaction to anesthesia which is also unlikely.  Also talked about labs not showing signs of infection or myositis/myopathy which would help explain his pain.  Tremor of the RUE is not as noticeable today and only occurs intermittently during exam.    Current Neurological Medications:   Gabapentin 300 mg po qHS    Current Facility-Administered Medications   Medication Dose Route Frequency Provider Last Rate Last Dose    0.9%  NaCl infusion   Intravenous PRN Pari Jimenez NP        0.9%  NaCl infusion   Intravenous PRN Pari Jimenez NP        0.9%  NaCl infusion   Intravenous Once Pari Jimenez  mL/hr at 08/03/17 1301 350 mL at 08/03/17 1301    albuterol inhaler 2 puff  2 puff Inhalation Q4H PRN Sandi Christianson MD        calcitRIOL capsule 0.5 mcg  0.5 mcg Oral Daily Sandi Christianson MD   0.5 mcg at 08/03/17 1404    fluconazole tablet 200 mg  200 mg Oral Every other day Sandi Christianson MD   200 mg at 08/03/17 1405    furosemide tablet 40 mg  40 mg Oral Daily Sandi Christianson MD   40 mg at 08/03/17 1415    gabapentin capsule 300 mg  300 mg Oral QHS Anthony Ruiz MD   300 mg at 08/02/17 1236    heparin (porcine) injection 1,000 Units  1,000 Units Intra-Catheter PRN Pari Jimenez NP   1,000 Units at 08/03/17 1259    heparin (porcine) injection 5,000 Units  5,000 Units Subcutaneous Q8H Sandi Christianson MD   5,000 Units at 08/03/17 1406    hydrocodone-acetaminophen 5-325mg per tablet 1 tablet  1 tablet Oral Q4H PRN Sandi Christianson MD   1 tablet at 08/03/17 1406    lisinopril tablet 10 mg  10 mg Oral Daily Sandi Christianson MD   10 mg at 08/03/17 1405    pantoprazole EC  tablet 40 mg  40 mg Oral Daily Sandi Christianson MD   40 mg at 08/03/17 1404    senna-docusate 8.6-50 mg per tablet 2 tablet  2 tablet Oral QHS Sandi Christianson MD   2 tablet at 08/02/17 2041    sevelamer carbonate tablet 800 mg  800 mg Oral TID WM Sandi Christianson MD   800 mg at 08/02/17 1619    sodium chloride 0.9% flush 3 mL  3 mL Intravenous Q8H Sandi Christianson MD   3 mL at 08/03/17 1407     Review of Systems   Constitutional: Negative for chills and fever.   HENT: Negative for rhinorrhea and sore throat.    Respiratory: Negative for cough and shortness of breath.    Cardiovascular: Negative for palpitations.   Gastrointestinal: Negative for abdominal pain, constipation, diarrhea, nausea and vomiting.   Genitourinary: Negative for dysuria and hematuria.   Musculoskeletal: Positive for arthralgias and myalgias.   Skin: Negative for rash and wound.   Neurological: Positive for tremors and weakness. Negative for numbness.   Hematological: Negative for adenopathy. Does not bruise/bleed easily.   Psychiatric/Behavioral: Positive for confusion. Negative for agitation.    Objective:     Vital Signs (Most Recent):  Temp: 100 °F (37.8 °C) (08/03/17 1402)  Pulse: 110 (08/03/17 1402)  Resp: 18 (08/03/17 1402)  BP: (!) 139/92 (08/03/17 1402)  SpO2: 99 % (08/03/17 1402) Vital Signs (24h Range):  Temp:  [98 °F (36.7 °C)-100 °F (37.8 °C)] 100 °F (37.8 °C)  Pulse:  [] 110  Resp:  [16-18] 18  SpO2:  [93 %-99 %] 99 %  BP: (112-164)/() 139/92     Weight: 93 kg (205 lb)  Body mass index is 29.41 kg/m².    Physical Exam  General:  Well-developed, well-nourished, nad--tremor no longer present  HEENT:  NCAT, PERRL, EOMI, oropharyngeal membranes dry but non-erythematous/without exudate  Neck:  Supple, no lad, no apparent JVD  Resp:  Symmetric expansion, no increased wob  CVS:  RRR without m/r/g, no LE edema  GI:  Abd soft, non-distended, diffusely tender to palpation  Neurologic:  Mental Status:  AAOx3.   Speech, thought content appropriate.  CNs:  PERRLA, EOMI.  Visual fields intact to confrontation.  Facial sensation and movement intact.  Palate raises symmetrically, tongue protrudes midline.  SCM, trapezius strength 5/5.  Motor:  Normal bulk and tone..  No effort from patient to move BLE, occasional spontaneous movements.   strength 4/5, proximal BUE strength 3/5 with questionable effort.  Sensory:  Patient notes diffuse tenderness to touch over abdomen and BLE.  Intact to light touch, temperature throughout with no inattention or extinction.  Reflexes:  Reflexes 2+ biceps, brachioradialis, triceps, patellar.  Coordination:  Occasional RUE tremor--much less apparent than 2 d ago, still variable frequency now with decreased amplitude and appears non-physiologic.  Gait:  Deferred due to patient refusing and citing pain in BLE.       Significant Labs:     Recent Labs  Lab 08/03/17  0547   WBC 11.47   RBC 3.52*   HGB 10.2*   HCT 30.8*      MCV 88   MCH 29.0   MCHC 33.1       Recent Labs  Lab 08/03/17  0547   CALCIUM 8.9      K 4.3   CO2 20*      BUN 33*   CREATININE 6.4*     Significant Imaging:   Imaging Results          CT Head Without Contrast (Final result)  Result time 07/31/17 18:49:44           Impression:    No acute intracranial abnormality identified.  Further evaluation/followup as warranted.               Narrative:    COMPARISON: Head CT 2/24/16    FINDINGS: No evidence of hemorrhage, mass, midline shift or acute major vascular territory infarct.  Gray-white interface appears intact.  The ventricular system is normal in size for age and demonstrates no distortion by mass effect.      No extra-axial hemorrhage or mass. The extracranial structures are within normal limits.  No displaced calvarial fracture.  Imaged portions of the paranasal sinuses and mastoid air cells are clear. Imaged portions of the orbits are within normal limits.

## 2017-08-03 NOTE — PROGRESS NOTES
[facility]  Neurology  Progress Note    Patient Name: Maksim Hidalgo  MRN: [id number]  Admission Date: 7/31/2017  Hospital Length of Stay: 2 days  Code Status: Full Code   Attending Provider: Isai Gillespie MD  Primary Care Physician: Saurabh Zuleta MD   Principal Problem:ESRD (end stage renal disease)      Subjective:     Interval History:   Patient is still noting pain in abdomen and BLE.  Pain is to light touch and out of proportion to exam.  Abdominal pain may be related to removal of PD, but this does not quite explain the leg pain.  Discussed this with patient and possibility of a bizarre reaction to anesthesia which is also unlikely.  Also talked about labs not showing signs of infection or myositis/myopathy which would help explain his pain.  Tremor of the RUE is not as noticeable today and only occurs intermittently during exam.    Current Neurological Medications:   Gabapentin 300 mg po qHS    Current Facility-Administered Medications   Medication Dose Route Frequency Provider Last Rate Last Dose    0.9%  NaCl infusion   Intravenous PRN Pari Jimenez NP        0.9%  NaCl infusion   Intravenous PRN Pari Jimenez NP        0.9%  NaCl infusion   Intravenous Once Pari Jimenez  mL/hr at 08/03/17 1301 350 mL at 08/03/17 1301    albuterol inhaler 2 puff  2 puff Inhalation Q4H PRN Sandi Christianson MD        calcitRIOL capsule 0.5 mcg  0.5 mcg Oral Daily Sandi Christianson MD   0.5 mcg at 08/03/17 1404    fluconazole tablet 200 mg  200 mg Oral Every other day Sandi Christianson MD   200 mg at 08/03/17 1405    furosemide tablet 40 mg  40 mg Oral Daily Sandi Christianson MD   40 mg at 08/03/17 1415    gabapentin capsule 300 mg  300 mg Oral QHS Anthony Ruiz MD   300 mg at 08/02/17 1236    heparin (porcine) injection 1,000 Units  1,000 Units Intra-Catheter PRN Pari Jimenez NP   1,000 Units at 08/03/17 1259    heparin (porcine) injection  5,000 Units  5,000 Units Subcutaneous Q8H Sandi Christianson MD   5,000 Units at 08/03/17 1406    hydrocodone-acetaminophen 5-325mg per tablet 1 tablet  1 tablet Oral Q4H PRN Sandi Christianson MD   1 tablet at 08/03/17 1406    lisinopril tablet 10 mg  10 mg Oral Daily Sandi Christianson MD   10 mg at 08/03/17 1405    pantoprazole EC tablet 40 mg  40 mg Oral Daily Sandi Christianson MD   40 mg at 08/03/17 1404    senna-docusate 8.6-50 mg per tablet 2 tablet  2 tablet Oral QHS Sandi Christianson MD   2 tablet at 08/02/17 2041    sevelamer carbonate tablet 800 mg  800 mg Oral TID WM Sandi Christianson MD   800 mg at 08/02/17 1619    sodium chloride 0.9% flush 3 mL  3 mL Intravenous Q8H Sandi Christianson MD   3 mL at 08/03/17 1407     Review of Systems   Constitutional: Negative for chills and fever.   HENT: Negative for rhinorrhea and sore throat.    Respiratory: Negative for cough and shortness of breath.    Cardiovascular: Negative for palpitations.   Gastrointestinal: Negative for abdominal pain, constipation, diarrhea, nausea and vomiting.   Genitourinary: Negative for dysuria and hematuria.   Musculoskeletal: Positive for arthralgias and myalgias.   Skin: Negative for rash and wound.   Neurological: Positive for tremors and weakness. Negative for numbness.   Hematological: Negative for adenopathy. Does not bruise/bleed easily.   Psychiatric/Behavioral: Positive for confusion. Negative for agitation.    Objective:     Vital Signs (Most Recent):  Temp: 100 °F (37.8 °C) (08/03/17 1402)  Pulse: 110 (08/03/17 1402)  Resp: 18 (08/03/17 1402)  BP: (!) 139/92 (08/03/17 1402)  SpO2: 99 % (08/03/17 1402) Vital Signs (24h Range):  Temp:  [98 °F (36.7 °C)-100 °F (37.8 °C)] 100 °F (37.8 °C)  Pulse:  [] 110  Resp:  [16-18] 18  SpO2:  [93 %-99 %] 99 %  BP: (112-164)/() 139/92     Weight: 93 kg (205 lb)  Body mass index is 29.41 kg/m².    Physical Exam  General:  Well-developed, well-nourished,  nad--tremor no longer present  HEENT:  NCAT, PERRL, EOMI, oropharyngeal membranes dry but non-erythematous/without exudate  Neck:  Supple, no lad, no apparent JVD  Resp:  Symmetric expansion, no increased wob  CVS:  RRR without m/r/g, no LE edema  GI:  Abd soft, non-distended, diffusely tender to palpation  Neurologic:  Mental Status:   AAOx3.  Speech, thought content appropriate.  CNs:  PERRLA, EOMI.  Visual fields intact to confrontation.  Facial sensation and movement intact.  Palate raises symmetrically, tongue protrudes midline.  SCM, trapezius strength 5/5.  Motor:  Normal bulk and tone..  No effort from patient to move BLE, occasional spontaneous movements.   strength 4/5, proximal BUE strength 3/5 with questionable effort.  Sensory:  Patient notes diffuse tenderness to touch over abdomen and BLE.  Intact to light touch, temperature throughout with no inattention or extinction.  Reflexes:  Reflexes 2+ biceps, brachioradialis, triceps, patellar.  Coordination:   Occasional RUE tremor--much less apparent than 2 d ago, still variable frequency now with decreased amplitude and appears non-physiologic.  Gait:  Deferred due to patient refusing and citing pain in BLE.       Significant Labs:     Recent Labs  Lab 08/03/17  0547   WBC 11.47   RBC 3.52*   HGB 10.2*   HCT 30.8*      MCV 88   MCH 29.0   MCHC 33.1       Recent Labs  Lab 08/03/17  0547   CALCIUM 8.9      K 4.3   CO2 20*      BUN 33*   CREATININE 6.4*     Significant Imaging:   Imaging Results          CT Head Without Contrast (Final result)  Result time 07/31/17 18:49:44           Impression:    No acute intracranial abnormality identified.  Further evaluation/followup as warranted.               Narrative:    COMPARISON: Head CT 2/24/16    FINDINGS: No evidence of hemorrhage, mass, midline shift or acute major vascular territory infarct.  Gray-white interface appears intact.  The ventricular system is normal in size for age and  demonstrates no distortion by mass effect.      No extra-axial hemorrhage or mass. The extracranial structures are within normal limits.  No displaced calvarial fracture.  Imaged portions of the paranasal sinuses and mastoid air cells are clear. Imaged portions of the orbits are within normal limits.                 Assessment and Plan:     Intractable pain    -Continue gabapentin 300 mg--can titrate up to tid as tolerated  -Would avoid opioids, can consider additional centrally acting medications if gabapentin is ineffective  -Per above, pain currently seems out of proportion to exam.    -Recent increase in WBCs and temp up to 100 F--would monitor for signs of peritonitis or other infection.          Altered mental status    Resolved.        Weakness    -Patient notes weakness is 2/2 pain in BLE, good strength otherwise  -Continue pain control with gabapentin, can increase to tid as tolerated  -No discernible etiology for diffuse pain at this time.     Abd pain may be post-procedural tenderness, mild temperature increase and increase in WBCs today--monitor for signs of peritonitis.   Negative work up for myositis, myopathy to explain BLE pain--continues to be out of proportion to examination.        Tremor of right hand    -RUE tremor improving.  Has appeared non-physiologic and requires no further work up at this time.        Myalgia    -Repeat CK elevated but not largely increased from previous  -Cxs pending, ngtd currently.  Myalgias seem non-specific and no infectious/inflammatory cause apparent at this time.  -Patient noting some improvement in pain today.  Could try gabapentin if pain persists.            VTE Risk Mitigation         Ordered     heparin (porcine) injection 5,000 Units  Every 8 hours     Route:  Subcutaneous        08/01/17 0526     Medium Risk of VTE  Once      08/01/17 0526     Place sequential compression device  Until discontinued      08/01/17 0526     Place sequential compression device   Until discontinued      07/31/17 0740        Will sign off for now, please feel free to call with any questions/concerns or new developments.    Leonor Russo MD  Neurology  [facility] [address]    I have personally taken the history and examined the patient and agree with the resident's note as stated above.    Dino Reece MD, DARINEL, FAAN  Department of Neurology   [facility] [place], [place]

## 2017-08-03 NOTE — ASSESSMENT & PLAN NOTE
Assessment  - Pt reports severe myalgia (pain on light touch of any soft tissue) from toes to upper abdomen  - CPK is only mildly elevated, less than two times the upper limit of normal  - electrolytes (Ca, K, Mg, Phos) are grossly WNL  - He's been afebrile, no leukocytosis  - Pain decreased 10/10 to 8/10 following physical therapy session today  - I suspect this pain reflects active deconditioning due to bed ridden, immobile state. There does not appear to be any other physiologic cause.  - I am concerned that patient is depressed which is exacerbating discomfort related to his deconditioned state and discouraging him from improving his mobility  - Alternatively, this may be allodynia reflective of fibromyalgia (though pain is not in typical distribution). Given fatigue, headache, anxiety, pain, he has a number of symptoms which would support fibromyalgia diagnosis.    Treatment Plan  -  Continue physical therapy  - Up in chair for meals  - Allow natural light into the room during daytime  - Will educate patient on non-physiologic nature of his condition and encourage him to actively participate in making a full recovery  - Continue gabapentin 300mg QHS. Will discuss with staff increase to gabapentin TID vs switching to pregabalin 75mg BID (fibromyalgia dosing)   - appreciate Neurology recs

## 2017-08-03 NOTE — PLAN OF CARE
Problem: Occupational Therapy Goal  Goal: Occupational Therapy Goal  Goals to be met by: 8/17/2017    Patient will increase functional independence with ADLs by performing:    UE Dressing with Set-up Assistance.  LE Dressing with Set-up Assistance.  Grooming while standing with Modified [place].  Toileting from toilet with Modified [place] for hygiene and clothing management.   Sitting at edge of bed x 25 minutes with Supervision.  Supine to sit with Modified [place].  Stand pivot transfers with Modified [place].  Toilet transfer to toilet with Modified [place].    Outcome: Ongoing (interventions implemented as appropriate)  OT Evaluation completed 8/3/2017.

## 2017-08-03 NOTE — PLAN OF CARE
Problem: Physical Therapy Goal  Goal: Physical Therapy Goal  Goals to be met by: 2017     Patient will increase functional independence with mobility by performin. Supine to sit with Set-up [place]  2. Sit to supine with Set-up [place]  3. Sit to stand transfer with Supervision  4. Bed to chair transfer with Supervision using Rolling Walker  5. Gait  x 75 feet with Supervision using Rolling Walker.   6. Lower extremity exercise program x15 reps per handout, with supervision    Outcome: Ongoing (interventions implemented as appropriate)  Goals set

## 2017-08-03 NOTE — PT/OT/SLP PROGRESS
Occupational Therapy      Giovannysage Hidalgo  MRN: [id number]    Patient not seen today secondary to Dialysis. Will follow-up as time allows and pt is available/willing.    Yessy Trujillo OT  8/3/2017

## 2017-08-03 NOTE — PROGRESS NOTES
3.5hr HD treatment completed 1L of fluid removed pt tolerated well. Both lumens of a RIJ permcath instilled with 1.8cc of Heparin, capped and taped. Report given to AMBREEN Rangel.

## 2017-08-03 NOTE — ASSESSMENT & PLAN NOTE
-Patient notes weakness is 2/2 pain in BLE, good strength otherwise  -Continue pain control with gabapentin, can increase to tid as tolerated  -No discernible etiology for diffuse pain at this time.     Abd pain may be post-procedural tenderness, mild temperature increase and increase in WBCs today--monitor for signs of peritonitis.   Negative work up for myositis, myopathy to explain BLE pain--continues to be out of proportion to examination.

## 2017-08-03 NOTE — PROGRESS NOTES
[facility] NEPHROLOGY HEMODIALYSIS NOTE     Patient currently on hemodialysis for removal of uremic toxins and volume.     Patient seen and evaluated on hemodialysis, tolerating treatment, see HD flowsheet for vitals and assessments.      No Hypotension, chest pain, shortness of breath, cramping, nausea or vomiting. Reports both lower extremities weakness.       Recent Labs  Lab 08/01/17  1152 08/02/17  0427 08/03/17  0547   WBC 7.32 6.29 11.47   HGB 10.4* 10.5* 10.2*   HCT 31.0* 31.4* 30.8*    281 276       Recent Labs  Lab 07/31/17  1514 08/01/17  0602 08/02/17  0427 08/03/17  0547    140 138 138   K 4.9 4.7 4.1 4.3    113* 105 106   CO2 18* 17* 22* 20*   BUN 42* 40* 24* 33*   CREATININE 7.1* 6.9* 5.6* 6.4*   GLU 70 75 78 83   CALCIUM 9.1 8.4* 8.9 8.9   MG 2.0 1.7  --  1.7   PHOS 3.3 3.2 3.6 3.4       Recent Labs  Lab 08/02/17  0427   ALKPHOS 84   ALT 9*   AST 19   ALBUMIN 3.3*   PROT 7.0   BILITOT 0.3      Lab Results   Component Value Date    .0 (H) 09/20/2016    CALCIUM 8.9 08/03/2017    PHOS 3.4 08/03/2017       Exam  AAOx x2 but still not making sense   Lungs clear  Heart S1S2  Extremities  No edema  Access Right tunneled CVC looks good    ASSESSMENT/PLAN:  ESRD on Hemodialysis  - Will provide dialysis for metabolic clearance and volume   - Seen and examined in [facility] during hemodialysis; Right tunneled CVC no issues. IDW TBD.  - Dialysate adjusted to current labs    Metabolic Acidosis  Last treatment use high bicarb dialysate-will continue. If no improvement, add PO sodium bicarb until correction. Will discuss with staff.      Anemia of Chronic Kidney Disease   - No SKY for now.       Mineral Bone Disease in CKD   -  Renal diet. Continue renvela. No recent iPTH. Obtain PTH.      Acute encephalopathy  Tremors  Generalized Weakness/Pain  -Hospital Medicine and Neurology team following.     Pari Jimenez NP  Nephrology

## 2017-08-03 NOTE — PROGRESS NOTES
Report received from AMBREEN Rangel. Pt arrived from floor AAOx4. Maintenance dialysis initiated via [facility] permcat without difficulty.

## 2017-08-03 NOTE — PROGRESS NOTES
[facility]  General Surgery  Progress Note    Subjective:     History of Present Illness:  Mr. Hidalgo is a 36 yo male who presented for removal of a malfunctioning PD catheter    Post-Op Info:  Procedure(s) (LRB):  REMOVAL-CATHETER-DIALYSIS-PERITONEAL (N/A)   3 Days Post-Op     Interval History: mental status improving, apin improving in arms, still present in his legs, tolerated diet, hasn't ambulated    Medications:  Continuous Infusions:   Scheduled Meds:   sodium chloride 0.9%   Intravenous Once    calcitRIOL  0.5 mcg Oral Daily    fluconazole  200 mg Oral Every other day    furosemide  40 mg Oral Daily    gabapentin  300 mg Oral QHS    heparin (porcine)  5,000 Units Subcutaneous Q8H    INV lisinopril  10 mg Oral Daily    pantoprazole  40 mg Oral Daily    senna-docusate 8.6-50 mg  2 tablet Oral QHS    sevelamer carbonate  800 mg Oral TID WM    sodium chloride 0.9%  3 mL Intravenous Q8H     PRN Meds:sodium chloride 0.9%, sodium chloride 0.9%, albuterol, heparin (porcine), hydrocodone-acetaminophen 5-325mg     Review of patient's allergies indicates:  No Known Allergies  Objective:     Vital Signs (Most Recent):  Temp: 99 °F (37.2 °C) (08/03/17 0407)  Pulse: 96 (08/03/17 0407)  Resp: 16 (08/03/17 0407)  BP: 134/79 (08/03/17 0407)  SpO2: 97 % (08/03/17 0407) Vital Signs (24h Range):  Temp:  [96.3 °F (35.7 °C)-99 °F (37.2 °C)] 99 °F (37.2 °C)  Pulse:  [75-96] 96  Resp:  [16-18] 16  SpO2:  [93 %-99 %] 97 %  BP: (112-134)/(79-92) 134/79     Weight: 93 kg (205 lb)  Body mass index is 29.41 kg/m².    Intake/Output - Last 3 Shifts       08/01 0700 - 08/02 0659 08/02 0700 - 08/03 0659 08/03 0700 - 08/04 0659    P.O. 870 1020     I.V. (mL/kg) 0 (0)      Other 600      Total Intake(mL/kg) 1470 (15.8) 1020 (11)     Urine (mL/kg/hr) 500 (0.2)      Other 2600 (1.2)      Stool 0 (0)      Total Output 3100        Net -1630 +1020             Urine Occurrence 0 x 0 x     Stool Occurrence 0 x 0 x      Emesis Occurrence 0 x 0 x           Physical Exam   Constitutional: He is oriented to person, place, and time. He appears well-developed and well-nourished. No distress.   HENT:   Head: Normocephalic and atraumatic.   Eyes: Conjunctivae are normal. Right eye exhibits no discharge. Left eye exhibits no discharge. No scleral icterus.   Neck: Normal range of motion. Neck supple. No JVD present. No tracheal deviation present.   Cardiovascular: Normal rate and regular rhythm.    Pulmonary/Chest: Effort normal. No respiratory distress.   Abdominal: Soft. He exhibits no distension. There is tenderness.   Neurological: He is alert and oriented to person, place, and time.   Skin: Skin is warm and dry. No rash noted. He is not diaphoretic. No erythema.       Significant Labs:  CBC:   Recent Labs  Lab 08/03/17  0547   WBC 11.47   RBC 3.52*   HGB 10.2*   HCT 30.8*      MCV 88   MCH 29.0   MCHC 33.1     BMP:   Recent Labs  Lab 08/03/17  0547   GLU 83      K 4.3      CO2 20*   BUN 33*   CREATININE 6.4*   CALCIUM 8.9   MG 1.7       Significant Diagnostics:  none    Assessment/Plan:     * ESRD (end stage renal disease)    Nephrology on board  Neurology consulted for mental status  Regular diet  Daily labs  PO pain meds  Pt/Ot patient must ambulate today  Will discuss case with pain medicine and anesthesia  Will discuss with hospital medicine, as patient's current issues are not surgical in nature            Efe Mendieta MD  General Surgery  [facility]

## 2017-08-03 NOTE — PLAN OF CARE
Problem: Infection, Risk/Actual (Adult)  Goal: Infection Prevention/Resolution  Patient will demonstrate the desired outcomes by discharge/transition of care.   Outcome: Ongoing (interventions implemented as appropriate)  Pt is progressing with plan of care. Frequent rounds made to assess pain and safety. Pt's pain controlled with pain medication ordered at this time. Bed locked and at lowest position. Side rails up x 2. Call light within reach. Will continue to monitor.

## 2017-08-03 NOTE — ASSESSMENT & PLAN NOTE
-Continue gabapentin 300 mg--can titrate up to tid as tolerated  -Would avoid opioids, can consider additional centrally acting medications if gabapentin is ineffective  -Per above, pain currently seems out of proportion to exam.    -Recent increase in WBCs and temp up to 100 F--would monitor for signs of peritonitis or other infection.

## 2017-08-03 NOTE — ASSESSMENT & PLAN NOTE
Nephrology on board  Neurology consulted for mental status  Regular diet  Daily labs  PO pain meds  Pt/Ot patient must ambulate today  Will discuss case with pain medicine and anesthesia  Will discuss with hospital medicine, as patient's current issues are not surgical in nature
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
## 2017-08-03 NOTE — CONSULTS
Spoke to Dr Mendieta, Surgery Resident, about psychiatric consult for Mr Hidalgo.  He didn't believe that psychiatry was needed at this time and if in the future psychiatry was needed he would re consult.  Thank you for the consult, psychiatry will sign off, please re consult as needed.    Brandie Torres NP  August 3, 2017

## 2017-08-03 NOTE — ASSESSMENT & PLAN NOTE
-RUE tremor improving.  Has appeared non-physiologic and requires no further work up at this time.

## 2017-08-03 NOTE — PROGRESS NOTES
[facility]  General Surgery  Progress Note    Subjective:     History of Present Illness:  Mr. Hidalgo is a 36 yo male who presented for removal of a malfunctioning PD catheter    Post-Op Info:  Procedure(s) (LRB):  REMOVAL-CATHETER-DIALYSIS-PERITONEAL (N/A)   3 Days Post-Op     No new subjective & objective note has been filed under this hospital service since the last note was generated.    Assessment/Plan:     * ESRD (end stage renal disease)    Nephrology on board  Neurology consulted for mental status  Regular diet  Daily labs  PO pain meds  Pt/Ot patient must ambulate today  Will discuss case with pain medicine and anesthesia  Will discuss with hospital medicine, as patient's current issues are not surgical in nature            Efe Mendieta MD  General Surgery  [facility]

## 2017-08-03 NOTE — PROGRESS NOTES
"[facility] Hospital Medicine  Progress Note    Patient Name: Maksim Hidalgo  MRN: [id number]  Patient Class: IP- Inpatient   Admission Date: 7/31/2017  Length of Stay: 2 days  Attending Physician: Isai Gillespie MD  Primary Care Provider: Saurabh Zuleta MD    [facility] Medicine Team: Networked reference to record PCT  Devang Bardales MD    Subjective:     Principal Problem:ESRD (end stage renal disease)    HPI:  Mr. Hidalgo is a 37 y.o. AAM with a h/o ESRD on dialysis secondary to uncontrolled HTN, admitted for obstructed peritoneal catheter removal. He has been receiving HD MWF through a R tunneled catheter and is currently being worked up for permanent fistula. Following his catheter removal he developed diffuse, aching pain all over his body which he describes as "someone punching me." He reports weakness, tremor, fatigue, SOB, HA, dysuria, and subjective fevers/chills as he feels "wet." Patient was in a normal state of health prior to this admission. He was able to ambulate and speak normally. His weakness and pain is now to the point where he cannot move in bed and speaks only a few words at a time.     Hospital Course:  08/02/17 Started Gabapentin 300mg qhs. Pt is still experiencing severe pain in the same pattern as yesterday.    8/3/17 Myalgias persist but diminishing in intensity. Strength improving. Scheduled to participate in PT/OT.    Interval History: NAEON. Afebrile and hemodynamically stable. Continues to report pain 10/10 with light touch of lower extremities. Evaluated by PT/OT. They were able to get the patient standing with limited ambulation inside the room. He will require further rehabilitation. The patient lives with a cousin but does not have extensive support at home. His cousin is employed as a caregiver. The patient is frequently home alone.    His mental status is improving. More alert today. Strength is likewise improving both subjective report and objective " mobility. He sat up for meals and consumed >80% of his meal tray.    Review of Systems   Constitutional: Positive for chills. Negative for diaphoresis and fever.   HENT: Negative for congestion and rhinorrhea.    Eyes: Negative for photophobia and pain.   Respiratory: Negative for shortness of breath.    Cardiovascular: Negative for chest pain and palpitations.   Gastrointestinal: Positive for abdominal pain. Negative for abdominal distention, constipation, diarrhea and nausea.   Musculoskeletal: Positive for myalgias. Negative for arthralgias (BLE).   Neurological: Positive for tremors (RUE). Negative for headaches.     Objective:     Vital Signs (Most Recent):  Temp: 100 °F (37.8 °C) (08/03/17 1402)  Pulse: 110 (08/03/17 1402)  Resp: 18 (08/03/17 1402)  BP: (!) 139/92 (08/03/17 1402)  SpO2: 99 % (08/03/17 1402) Vital Signs (24h Range):  Temp:  [96.3 °F (35.7 °C)-100 °F (37.8 °C)] 100 °F (37.8 °C)  Pulse:  [] 110  Resp:  [16-18] 18  SpO2:  [93 %-99 %] 99 %  BP: (112-164)/() 139/92     Weight: 93 kg (205 lb)  Body mass index is 29.41 kg/m².    Intake/Output Summary (Last 24 hours) at 08/03/17 1451  Last data filed at 08/03/17 1305   Gross per 24 hour   Intake             1310 ml   Output             1650 ml   Net             -340 ml      Physical Exam   Constitutional: He is oriented to person, place, and time. He appears well-developed.   HENT:   Head: Normocephalic and atraumatic.   Neck: Neck supple. No JVD present.   Cardiovascular: Normal rate and regular rhythm.    No murmur heard.  Pulmonary/Chest: Effort normal and breath sounds normal. No respiratory distress.   Abdominal: Soft. Bowel sounds are normal. He exhibits no distension. There is tenderness.   Musculoskeletal:   Pt is tender to light touch from toes to upper abdomen. Pain is out of proportion to exam.   Neurological: He is alert and oriented to person, place, and time. No cranial nerve deficit.   Motor Strength  RUE 4/5  LUE 4/5  RLE  4/5  LLE 4/5   Skin: Skin is warm.   Psychiatric: Thought content normal.   Though content is normal. The patient is withdrawn but more interactive than yesterday. He is concerned regarding his medical condition and prognosis, perhaps unreasonably concerned. For example, he asked if he was going to be permanently wheel chair bound due to the weakness in his lower extremities. He was comforted by reassurance that he could improve his functional status by full participation in daily physical therapy.       Significant Labs:   Recent Lab Results       08/03/17  1151 08/03/17  0547 08/02/17  1649      Anion Gap  12      Appearance, UA   Clear     Bacteria, UA   Rare     Baso #  0.01      Basophil%  0.1      Bilirubin (UA)   Negative     BUN, Bld  33(H)      Calcium  8.9      Chloride  106      CO2  20(L)      Color, UA   Yellow     Creatinine  6.4(H)      Differential Method  Automated      eGFR if   11.6(A)      eGFR if non   10.0  Comment:  Calculation used to obtain the estimated glomerular filtration  rate (eGFR) is the CKD-EPI equation. Since race is unknown   in our information system, the eGFR values for   -American and Non--American patients are given   for each creatinine result.  (A)      Eos #  0.3      Eosinophil%  2.4      Glucose  83      Glucose, UA   Negative     Gran #  8.6(H)      Gran%  74.9(H)      Hematocrit  30.8(L)      Hemoglobin  10.2(L)      Hyaline Casts, UA   0     Ketones, UA   Negative     Leukocytes, UA   Negative     Lymph #  1.7      Lymph%  14.6(L)      Magnesium  1.7      MCH  29.0      MCHC  33.1      MCV  88      Microscopic Comment   SEE COMMENT  Comment:  Other formed elements not mentioned in the report are not   present in the microscopic examination.        Mono #  0.9      Mono%  7.8      MPV  9.6      Nitrite, UA   Negative     Occult Blood UA   Negative     pH, UA   7.0     Phosphorus  3.4      Platelets  276      Potassium  4.3       Protein, UA   2+  Comment:  Recommend a 24 hour urine protein or a urine   protein/creatinine ratio if globulin induced proteinuria is  clinically suspected.  (A)     .0(H)       RBC  3.52(L)      RBC, UA   0     RDW  12.1      Sodium  138      Specific Gravity, UA   1.015     Specimen UA   Urine, Clean Catch     Urobilinogen, UA   Negative     WBC, UA   0     WBC  11.47            Significant Imaging: I have reviewed all pertinent imaging results/findings within the past 24 hours.    Assessment/Plan:      Neuropathic pain    - less likely vs. Allodynia  - however, no physiological source of pain has been identified  - Continue Gabapentin 300mg qhs           Intractable pain    - d/w Neurology who agrees that there is no physiologic source of this pain  -  (<2/3 ULN, not concerning due to recent surgical procedure)  - ESR 27 (<2/3 ULN, not concerning due to recent surgical procedure)  - Continue to monitor electrolytes (Ca, K, Mg, Phos)  - continue  Percocet 5mg q4h prn  - Continue Gabapentin 300mg qhs  - See Myalgia for treatment plan          Weakness    Assessment  - The patient is in a deconditioned, debilitated state  - This appears to be the primary  of his generalized weakness  - He has no focal findings of weakness on physical exam  - His electrolytes are WNL with exception of slight low Mg (1.7). These are managed through dialysis as the patient is ESRD    Treatment Plan  - Continue PT/OT  - Arrange for appropriate placement  - Will follow up PT/OT recommendations for therapy upon discharge from hospital [facility]  - Appreciate Neurology recommendations        Tremor of right hand    - RUE tremor is decreasing in frequency and intensity   - appreciate Neurology assessment and treatment recommendations  - agree that tremor is non-physiologic in appearance          Myalgia    Assessment  - Pt reports severe myalgia (pain on light touch of any soft tissue) from toes to upper abdomen  - CPK is  only mildly elevated, less than two times the upper limit of normal  - electrolytes (Ca, K, Mg, Phos) are grossly WNL  - He's been afebrile, no leukocytosis  - Pain decreased 10/10 to 8/10 following physical therapy session today  - I suspect this pain reflects active deconditioning due to bed ridden, immobile state. There does not appear to be any other physiologic cause.  - I am concerned that patient is depressed which is exacerbating discomfort related to his deconditioned state and discouraging him from improving his mobility  - Alternatively, this may be allodynia reflective of fibromyalgia (though pain is not in typical distribution). Given fatigue, headache, anxiety, pain, he has a number of symptoms which would support fibromyalgia diagnosis.    Treatment Plan  -  Continue physical therapy  - Up in chair for meals  - Allow natural light into the room during daytime  - Will educate patient on non-physiologic nature of his condition and encourage him to actively participate in making a full recovery  - Continue gabapentin 300mg QHS. Will discuss with staff increase to gabapentin TID vs switching to pregabalin 75mg BID (fibromyalgia dosing)   - appreciate Neurology recs        Secondary hyperparathyroidism              * ESRD (end stage renal disease)    - Continue HD  - Appreciate Nephrology recs            VTE Risk Mitigation         Ordered     heparin (porcine) injection 5,000 Units  Every 8 hours     Route:  Subcutaneous        08/01/17 0526     Medium Risk of VTE  Once      08/01/17 0526     Place sequential compression device  Until discontinued      08/01/17 0526     Place sequential compression device  Until discontinued      07/31/17 0740              Devang Bardales MD  Department of Hospital Medicine   [facility]

## 2017-08-03 NOTE — ASSESSMENT & PLAN NOTE
- d/w Neurology who agrees that there is no physiologic source of this pain  -  (<2/3 ULN, not concerning due to recent surgical procedure)  - ESR 27 (<2/3 ULN, not concerning due to recent surgical procedure)  - Continue to monitor electrolytes (Ca, K, Mg, Phos)  - continue  Percocet 5mg q4h prn  - Continue Gabapentin 300mg qhs  - See Myalgia for treatment plan

## 2017-08-03 NOTE — ASSESSMENT & PLAN NOTE
- RUE tremor is decreasing in frequency and intensity   - appreciate Neurology assessment and treatment recommendations  - agree that tremor is non-physiologic in appearance

## 2017-08-03 NOTE — SUBJECTIVE & OBJECTIVE
Interval History: mental status improving, apin improving in arms, still present in his legs, tolerated diet, hasn't ambulated    Medications:  Continuous Infusions:   Scheduled Meds:   sodium chloride 0.9%   Intravenous Once    calcitRIOL  0.5 mcg Oral Daily    fluconazole  200 mg Oral Every other day    furosemide  40 mg Oral Daily    gabapentin  300 mg Oral QHS    heparin (porcine)  5,000 Units Subcutaneous Q8H    INV lisinopril  10 mg Oral Daily    pantoprazole  40 mg Oral Daily    senna-docusate 8.6-50 mg  2 tablet Oral QHS    sevelamer carbonate  800 mg Oral TID WM    sodium chloride 0.9%  3 mL Intravenous Q8H     PRN Meds:sodium chloride 0.9%, sodium chloride 0.9%, albuterol, heparin (porcine), hydrocodone-acetaminophen 5-325mg     Review of patient's allergies indicates:  No Known Allergies  Objective:     Vital Signs (Most Recent):  Temp: 99 °F (37.2 °C) (08/03/17 0407)  Pulse: 96 (08/03/17 0407)  Resp: 16 (08/03/17 0407)  BP: 134/79 (08/03/17 0407)  SpO2: 97 % (08/03/17 0407) Vital Signs (24h Range):  Temp:  [96.3 °F (35.7 °C)-99 °F (37.2 °C)] 99 °F (37.2 °C)  Pulse:  [75-96] 96  Resp:  [16-18] 16  SpO2:  [93 %-99 %] 97 %  BP: (112-134)/(79-92) 134/79     Weight: 93 kg (205 lb)  Body mass index is 29.41 kg/m².    Intake/Output - Last 3 Shifts       08/01 0700 - 08/02 0659 08/02 0700 - 08/03 0659 08/03 0700 - 08/04 0659    P.O. 870 1020     I.V. (mL/kg) 0 (0)      Other 600      Total Intake(mL/kg) 1470 (15.8) 1020 (11)     Urine (mL/kg/hr) 500 (0.2)      Other 2600 (1.2)      Stool 0 (0)      Total Output 3100        Net -1630 +1020             Urine Occurrence 0 x 0 x     Stool Occurrence 0 x 0 x     Emesis Occurrence 0 x 0 x           Physical Exam   Constitutional: He is oriented to person, place, and time. He appears well-developed and well-nourished. No distress.   HENT:   Head: Normocephalic and atraumatic.   Eyes: Conjunctivae are normal. Right eye exhibits no discharge. Left eye exhibits  no discharge. No scleral icterus.   Neck: Normal range of motion. Neck supple. No JVD present. No tracheal deviation present.   Cardiovascular: Normal rate and regular rhythm.    Pulmonary/Chest: Effort normal. No respiratory distress.   Abdominal: Soft. He exhibits no distension. There is tenderness.   Neurological: He is alert and oriented to person, place, and time.   Skin: Skin is warm and dry. No rash noted. He is not diaphoretic. No erythema.       Significant Labs:  CBC:   Recent Labs  Lab 08/03/17  0547   WBC 11.47   RBC 3.52*   HGB 10.2*   HCT 30.8*      MCV 88   MCH 29.0   MCHC 33.1     BMP:   Recent Labs  Lab 08/03/17  0547   GLU 83      K 4.3      CO2 20*   BUN 33*   CREATININE 6.4*   CALCIUM 8.9   MG 1.7       Significant Diagnostics:  none

## 2017-08-04 PROBLEM — A41.9 SEVERE SEPSIS: Status: ACTIVE | Noted: 2017-08-04

## 2017-08-04 PROBLEM — R25.1 TREMOR OF RIGHT HAND: Status: ACTIVE | Noted: 2017-08-04

## 2017-08-04 PROBLEM — R65.20 SEVERE SEPSIS: Status: ACTIVE | Noted: 2017-08-04

## 2017-08-04 LAB
ALBUMIN SERPL BCP-MCNC: 3.4 G/DL
ALP SERPL-CCNC: 84 U/L
AMYLASE SERPL-CCNC: 98 U/L
ANION GAP SERPL CALC-SCNC: 12 MMOL/L
BACTERIA #/AREA URNS AUTO: NORMAL /HPF
BASOPHILS # BLD AUTO: 0.02 K/UL
BASOPHILS NFR BLD: 0.1 %
BILIRUB UR QL STRIP: NEGATIVE
BUN SERPL-MCNC: 23 MG/DL
CALCIUM SERPL-MCNC: 9.1 MG/DL
CHLORIDE SERPL-SCNC: 100 MMOL/L
CLARITY UR REFRACT.AUTO: CLEAR
CO2 SERPL-SCNC: 25 MMOL/L
COLOR UR AUTO: YELLOW
CREAT SERPL-MCNC: 6.3 MG/DL
DIFFERENTIAL METHOD: ABNORMAL
EOSINOPHIL # BLD AUTO: 0.2 K/UL
EOSINOPHIL NFR BLD: 1 %
ERYTHROCYTE [DISTWIDTH] IN BLOOD BY AUTOMATED COUNT: 12.2 %
EST. GFR  (AFRICAN AMERICAN): 11.8 ML/MIN/1.73 M^2
EST. GFR  (NON AFRICAN AMERICAN): 10.2 ML/MIN/1.73 M^2
GLUCOSE SERPL-MCNC: 93 MG/DL
GLUCOSE UR QL STRIP: NEGATIVE
HCT VFR BLD AUTO: 31.2 %
HGB BLD-MCNC: 10.2 G/DL
HGB UR QL STRIP: NEGATIVE
HYALINE CASTS UR QL AUTO: 0 /LPF
KETONES UR QL STRIP: NEGATIVE
LDH SERPL L TO P-CCNC: 217 U/L
LEUKOCYTE ESTERASE UR QL STRIP: NEGATIVE
LYMPHOCYTES # BLD AUTO: 2.5 K/UL
LYMPHOCYTES NFR BLD: 16.1 %
MAGNESIUM SERPL-MCNC: 1.9 MG/DL
MCH RBC QN AUTO: 29.1 PG
MCHC RBC AUTO-ENTMCNC: 32.7 G/DL
MCV RBC AUTO: 89 FL
MICROSCOPIC COMMENT: NORMAL
MONOCYTES # BLD AUTO: 1.2 K/UL
MONOCYTES NFR BLD: 8 %
NEUTROPHILS # BLD AUTO: 11.6 K/UL
NEUTROPHILS NFR BLD: 74.5 %
NITRITE UR QL STRIP: NEGATIVE
PH UR STRIP: 7 [PH] (ref 5–8)
PHOSPHATE SERPL-MCNC: 4.4 MG/DL
PLATELET # BLD AUTO: 232 K/UL
PMV BLD AUTO: 9.7 FL
POTASSIUM SERPL-SCNC: 4.2 MMOL/L
PROT SERPL-MCNC: 7.8 G/DL
PROT UR QL STRIP: ABNORMAL
RBC # BLD AUTO: 3.5 M/UL
RBC #/AREA URNS AUTO: 0 /HPF (ref 0–4)
SODIUM SERPL-SCNC: 137 MMOL/L
SP GR UR STRIP: 1.01 (ref 1–1.03)
SQUAMOUS #/AREA URNS AUTO: 0 /HPF
URN SPEC COLLECT METH UR: ABNORMAL
UROBILINOGEN UR STRIP-ACNC: NEGATIVE EU/DL
WBC # BLD AUTO: 12.61 K/UL
WBC # BLD AUTO: 15.56 K/UL
WBC #/AREA URNS AUTO: 1 /HPF (ref 0–5)

## 2017-08-04 PROCEDURE — 25000003 PHARM REV CODE 250: Mod: NTX | Performed by: STUDENT IN AN ORGANIZED HEALTH CARE EDUCATION/TRAINING PROGRAM

## 2017-08-04 PROCEDURE — A4216 STERILE WATER/SALINE, 10 ML: HCPCS | Mod: NTX | Performed by: STUDENT IN AN ORGANIZED HEALTH CARE EDUCATION/TRAINING PROGRAM

## 2017-08-04 PROCEDURE — 63600175 PHARM REV CODE 636 W HCPCS: Mod: NTX | Performed by: STUDENT IN AN ORGANIZED HEALTH CARE EDUCATION/TRAINING PROGRAM

## 2017-08-04 PROCEDURE — 11000001 HC ACUTE MED/SURG PRIVATE ROOM: Mod: NTX

## 2017-08-04 PROCEDURE — 36415 COLL VENOUS BLD VENIPUNCTURE: CPT | Mod: NTX

## 2017-08-04 PROCEDURE — 84155 ASSAY OF PROTEIN SERUM: CPT | Mod: NTX

## 2017-08-04 PROCEDURE — 97530 THERAPEUTIC ACTIVITIES: CPT | Mod: NTX

## 2017-08-04 PROCEDURE — 97116 GAIT TRAINING THERAPY: CPT | Mod: NTX

## 2017-08-04 PROCEDURE — 82040 ASSAY OF SERUM ALBUMIN: CPT | Mod: NTX

## 2017-08-04 PROCEDURE — 84075 ASSAY ALKALINE PHOSPHATASE: CPT | Mod: NTX

## 2017-08-04 PROCEDURE — 85048 AUTOMATED LEUKOCYTE COUNT: CPT | Mod: NTX

## 2017-08-04 PROCEDURE — 99233 SBSQ HOSP IP/OBS HIGH 50: CPT | Mod: GC,NTX,, | Performed by: HOSPITALIST

## 2017-08-04 PROCEDURE — 83735 ASSAY OF MAGNESIUM: CPT | Mod: NTX

## 2017-08-04 PROCEDURE — 84100 ASSAY OF PHOSPHORUS: CPT | Mod: NTX

## 2017-08-04 PROCEDURE — 81001 URINALYSIS AUTO W/SCOPE: CPT | Mod: NTX

## 2017-08-04 PROCEDURE — 85025 COMPLETE CBC W/AUTO DIFF WBC: CPT | Mod: NTX

## 2017-08-04 PROCEDURE — 97535 SELF CARE MNGMENT TRAINING: CPT | Mod: NTX

## 2017-08-04 PROCEDURE — 80048 BASIC METABOLIC PNL TOTAL CA: CPT | Mod: NTX

## 2017-08-04 PROCEDURE — 83615 LACTATE (LD) (LDH) ENZYME: CPT | Mod: NTX

## 2017-08-04 PROCEDURE — 82150 ASSAY OF AMYLASE: CPT | Mod: NTX

## 2017-08-04 RX ORDER — SODIUM CHLORIDE 9 MG/ML
INJECTION, SOLUTION INTRAVENOUS ONCE
Status: COMPLETED | OUTPATIENT
Start: 2017-08-04 | End: 2017-08-05

## 2017-08-04 RX ORDER — SODIUM CHLORIDE 9 MG/ML
INJECTION, SOLUTION INTRAVENOUS
Status: DISCONTINUED | OUTPATIENT
Start: 2017-08-04 | End: 2017-08-08 | Stop reason: HOSPADM

## 2017-08-04 RX ADMIN — HEPARIN SODIUM 5000 UNITS: 5000 INJECTION, SOLUTION INTRAVENOUS; SUBCUTANEOUS at 02:08

## 2017-08-04 RX ADMIN — Medication 3 ML: at 05:08

## 2017-08-04 RX ADMIN — HEPARIN SODIUM 5000 UNITS: 5000 INJECTION, SOLUTION INTRAVENOUS; SUBCUTANEOUS at 05:08

## 2017-08-04 RX ADMIN — HYDROCODONE BITARTRATE AND ACETAMINOPHEN 1 TABLET: 5; 325 TABLET ORAL at 08:08

## 2017-08-04 RX ADMIN — SEVELAMER CARBONATE 800 MG: 800 TABLET, FILM COATED ORAL at 08:08

## 2017-08-04 RX ADMIN — CEFTRIAXONE 1 G: 1 INJECTION, SOLUTION INTRAVENOUS at 08:08

## 2017-08-04 RX ADMIN — FUROSEMIDE 40 MG: 40 TABLET ORAL at 08:08

## 2017-08-04 RX ADMIN — GABAPENTIN 300 MG: 300 CAPSULE ORAL at 08:08

## 2017-08-04 RX ADMIN — PANTOPRAZOLE SODIUM 40 MG: 40 TABLET, DELAYED RELEASE ORAL at 08:08

## 2017-08-04 RX ADMIN — Medication 3 ML: at 10:08

## 2017-08-04 RX ADMIN — VANCOMYCIN HYDROCHLORIDE 1250 MG: 100 INJECTION, POWDER, LYOPHILIZED, FOR SOLUTION INTRAVENOUS at 06:08

## 2017-08-04 RX ADMIN — CALCITRIOL 0.5 MCG: 0.25 CAPSULE, LIQUID FILLED ORAL at 08:08

## 2017-08-04 RX ADMIN — LISINOPRIL 10 MG: 10 TABLET ORAL at 08:08

## 2017-08-04 RX ADMIN — ACETAMINOPHEN 650 MG: 325 TABLET ORAL at 02:08

## 2017-08-04 RX ADMIN — HEPARIN SODIUM 5000 UNITS: 5000 INJECTION, SOLUTION INTRAVENOUS; SUBCUTANEOUS at 09:08

## 2017-08-04 RX ADMIN — STANDARDIZED SENNA CONCENTRATE AND DOCUSATE SODIUM 2 TABLET: 8.6; 5 TABLET, FILM COATED ORAL at 08:08

## 2017-08-04 RX ADMIN — SEVELAMER CARBONATE 800 MG: 800 TABLET, FILM COATED ORAL at 11:08

## 2017-08-04 NOTE — SUBJECTIVE & OBJECTIVE
Interval History: Continues to having intermittent tachycardia.  He also endorses continued but improved pain in bilateral lower extremities that is worse below the knee.      Review of Systems   Constitutional: Positive for chills. Negative for diaphoresis and fever.   HENT: Negative for congestion and rhinorrhea.    Eyes: Negative for photophobia and pain.   Respiratory: Negative for shortness of breath.    Cardiovascular: Negative for chest pain and palpitations.   Gastrointestinal: Positive for abdominal pain. Negative for abdominal distention, constipation, diarrhea and nausea.   Musculoskeletal: Positive for myalgias. Negative for arthralgias (BLE).   Neurological: Positive for tremors (RUE). Negative for headaches.     Objective:     Vital Signs (Most Recent):  Temp: 98.9 °F (37.2 °C) (08/04/17 0746)  Pulse: 90 (08/04/17 0746)  Resp: 18 (08/04/17 0746)  BP: 119/62 (08/04/17 0746)  SpO2: 98 % (08/04/17 0746) Vital Signs (24h Range):  Temp:  [98.8 °F (37.1 °C)-102.7 °F (39.3 °C)] 98.9 °F (37.2 °C)  Pulse:  [] 90  Resp:  [16-19] 18  SpO2:  [95 %-99 %] 98 %  BP: ()/() 119/62     Weight: 93 kg (205 lb)  Body mass index is 29.41 kg/m².    Intake/Output Summary (Last 24 hours) at 08/04/17 1159  Last data filed at 08/04/17 1000   Gross per 24 hour   Intake              800 ml   Output             2350 ml   Net            -1550 ml      Physical Exam   Constitutional: He is oriented to person, place, and time. He appears well-developed.   HENT:   Head: Normocephalic and atraumatic.   Neck: Neck supple. No JVD present.   Cardiovascular: Normal rate and regular rhythm.    No murmur heard.  Pulmonary/Chest: Effort normal and breath sounds normal. No respiratory distress.   Abdominal: Soft. Bowel sounds are normal. He exhibits no distension. There is tenderness (around PD catheter site).   Musculoskeletal:   Pt is tender bilateral lower extremities that is most noticeable below the knees   Neurological:  He is alert and oriented to person, place, and time. No cranial nerve deficit.   Motor Strength  RUE 4/5  LUE 4/5  RLE 4/5  LLE 4/5   Skin: Skin is warm.   Psychiatric: He has a normal mood and affect. Thought content normal.       Significant Labs: All pertinent labs within the past 24 hours have been reviewed.    Significant Imaging: No new imaging

## 2017-08-04 NOTE — PLAN OF CARE
Problem: Physical Therapy Goal  Goal: Physical Therapy Goal  Goals to be met by: 2017     Patient will increase functional independence with mobility by performin. Supine to sit with Set-up [place]  2. Sit to supine with Set-up [place]  3. Sit to stand transfer with Supervision  4. Bed to chair transfer with Supervision using Rolling Walker  5. Gait  x 75 feet with Supervision using Rolling Walker.   6. Lower extremity exercise program x15 reps per handout, with supervision     Goals remain appropriate. Continue with plan of care.
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
## 2017-08-04 NOTE — CONSULTS
Request for diagnostic paracentesis on this patient with PD catheter removal day 3 to assess for SBP. Ultrasound was performed by primary team resident and there is possibly fluid. Order placed for IR paracentesis. Labs have been ordered on peritoneal fluid.

## 2017-08-04 NOTE — ASSESSMENT & PLAN NOTE
- d/w Neurology who agrees that there is no physiologic source of this pain  -  (<2/3 ULN, not concerning due to recent surgical procedure)  - ESR 27 (<2/3 ULN, not concerning due to recent surgical procedure)  - Continue to monitor electrolytes (Ca, K, Mg, Phos)  - continue  Percocet 5mg q4h prn  - Continue Gabapentin 300mg qhs

## 2017-08-04 NOTE — PROGRESS NOTES
"[facility] Hospital Medicine  Progress Note    Patient Name: Maksim Hidalgo  MRN: [id number]  Patient Class: IP- Inpatient   Admission Date: 7/31/2017  Length of Stay: 3 days  Attending Physician: Avelino Abebe MD  Primary Care Provider: Saurabh Zuleta MD    [facility] Medicine Team: [facility] MED 3 Enrique Foster MD    Subjective:     Principal Problem:ESRD (end stage renal disease)    HPI:  Mr. Hidalgo is a 37 y.o. AAM with a h/o ESRD on dialysis secondary to uncontrolled HTN, admitted for obstructed peritoneal catheter removal. He has been receiving HD MWF through a R tunneled catheter and is currently being worked up for permanent fistula. Following his catheter removal he developed diffuse, aching pain all over his body which he describes as "someone punching me." He reports weakness, tremor, fatigue, SOB, HA, dysuria, and subjective fevers/chills as he feels "wet." Patient was in a normal state of health prior to this admission. He was able to ambulate and speak normally. His weakness and pain is now to the point where he cannot move in bed and speaks only a few words at a time.     Hospital Course:  Maksim Hidalgo underwent peritoneal dialysis catheter removal on 7/31/2017.  Patients post surgical course complicated by bilateral lower extremity myalgias and a right upper extremity tremor.  Initially on the surgery service followed by the hospitalist comanagment team.  Neurology consulted and was unable to find a physiological explanation behind patients tremor or pain.  Started on gabapentin with improvement of symptoms.  Worked with PT/OT who have been recommending SNF placement secondary to debility.  He was transferred to  3 after found to have fever of 100.3 F.  Blood cultures obtained which have been no growth today.  Urine culture demonstrating enterococcus.     Interval History: Continues to having intermittent tachycardia.  He also endorses continued but " improved pain in bilateral lower extremities that is worse below the knee.      Review of Systems   Constitutional: Positive for chills. Negative for diaphoresis and fever.   HENT: Negative for congestion and rhinorrhea.    Eyes: Negative for photophobia and pain.   Respiratory: Negative for shortness of breath.    Cardiovascular: Negative for chest pain and palpitations.   Gastrointestinal: Positive for abdominal pain. Negative for abdominal distention, constipation, diarrhea and nausea.   Musculoskeletal: Positive for myalgias. Negative for arthralgias (BLE).   Neurological: Positive for tremors (RUE). Negative for headaches.     Objective:     Vital Signs (Most Recent):  Temp: 98.9 °F (37.2 °C) (08/04/17 0746)  Pulse: 90 (08/04/17 0746)  Resp: 18 (08/04/17 0746)  BP: 119/62 (08/04/17 0746)  SpO2: 98 % (08/04/17 0746) Vital Signs (24h Range):  Temp:  [98.8 °F (37.1 °C)-102.7 °F (39.3 °C)] 98.9 °F (37.2 °C)  Pulse:  [] 90  Resp:  [16-19] 18  SpO2:  [95 %-99 %] 98 %  BP: ()/() 119/62     Weight: 93 kg (205 lb)  Body mass index is 29.41 kg/m².    Intake/Output Summary (Last 24 hours) at 08/04/17 1159  Last data filed at 08/04/17 1000   Gross per 24 hour   Intake              800 ml   Output             2350 ml   Net            -1550 ml      Physical Exam   Constitutional: He is oriented to person, place, and time. He appears well-developed.   HENT:   Head: Normocephalic and atraumatic.   Neck: Neck supple. No JVD present.   Cardiovascular: Normal rate and regular rhythm.    No murmur heard.  Pulmonary/Chest: Effort normal and breath sounds normal. No respiratory distress.   Abdominal: Soft. Bowel sounds are normal. He exhibits no distension. There is tenderness (around PD catheter site).   Musculoskeletal:   Pt is tender bilateral lower extremities that is most noticeable below the knees   Neurological: He is alert and oriented to person, place, and time. No cranial nerve deficit.   Motor  Strength  RUE 4/5  LUE 4/5  RLE 4/5  LLE 4/5   Skin: Skin is warm.   Psychiatric: He has a normal mood and affect. Thought content normal.       Significant Labs: All pertinent labs within the past 24 hours have been reviewed.    Significant Imaging: No new imaging    Assessment/Plan:      Sepsis    Patient has met 2/4 SIRS criteria with source being likely urinary tract infection versus possible peritonitis  -Blood cultures and intra-operative peritoneal fluid cultures are NGTD  -WIll obtain CXR  - Consult to IR for possible diagnostic paracentesis           Neuropathic pain      - however, no physiological source of pain has been identified  - Continue Gabapentin 300mg qhs           Intractable pain    - d/w Neurology who agrees that there is no physiologic source of this pain  -  (<2/3 ULN, not concerning due to recent surgical procedure)  - ESR 27 (<2/3 ULN, not concerning due to recent surgical procedure)  - Continue to monitor electrolytes (Ca, K, Mg, Phos)  - continue  Percocet 5mg q4h prn  - Continue Gabapentin 300mg qhs            Weakness    Assessment  - The patient is in a deconditioned, debilitated state  - This appears to be the primary  of his generalized weakness  - He has no focal findings of weakness on physical exam  - His electrolytes are WNL with exception of slight low Mg (1.7). These are managed through dialysis as the patient is ESRD    Treatment Plan  - Continue PT/OT  - Arrange for appropriate placement  - Will follow up PT/OT recommendations for therapy upon discharge from hospital kim  - Appreciate Neurology recommendations        Tremor of right hand    - RUE tremor is decreasing in frequency and intensity   - appreciate Neurology assessment and treatment recommendations  - agree that tremor is non-physiologic in appearance          Myalgia      - Pt reports severe myalgia lower extremities that have improved during hospital course  - CPK is only mildly elevated, less than  two times the upper limit of normal  - electrolytes (Ca, K, Mg, Phos) are grossly WNL  - Myalgia can be due to septic picture versus fibromylagia  -  Continue physical therapy  - Up in chair for meals  - Allow natural light into the room during daytime  - Will educate patient on non-physiologic nature of his condition and encourage him to actively participate in making a full recovery  - Continue gabapentin 300mg QHS.   - appreciate Neurology recs        ESRD (end stage renal disease) on dialysis    HD yesterday right IJ permacath  Followed by nephrology, appreciate all recommendations          Secondary hyperparathyroidism    PTH elevated at 333, Calcium WNL  Continue to monitor electrolytes          * ESRD (end stage renal disease)    - Continue HD  - Appreciate Nephrology recs            VTE Risk Mitigation         Ordered     heparin (porcine) injection 5,000 Units  Every 8 hours     Route:  Subcutaneous        08/01/17 0526     Medium Risk of VTE  Once      08/01/17 0526     Place sequential compression device  Until discontinued      08/01/17 0526     Place sequential compression device  Until discontinued      07/31/17 0740              Enrique Foster MD  Department of Hospital Medicine   [facility]  Pager [phone]

## 2017-08-04 NOTE — ASSESSMENT & PLAN NOTE
- Pt reports severe myalgia lower extremities that have improved during hospital course  - CPK is only mildly elevated, less than two times the upper limit of normal  - electrolytes (Ca, K, Mg, Phos) are grossly WNL  - Myalgia can be due to septic picture versus fibromylagia  -  Continue physical therapy  - Up in chair for meals  - Allow natural light into the room during daytime  - Will educate patient on non-physiologic nature of his condition and encourage him to actively participate in making a full recovery  - Continue gabapentin 300mg QHS.   - appreciate Neurology recs

## 2017-08-04 NOTE — PLAN OF CARE
Problem: Occupational Therapy Goal  Goal: Occupational Therapy Goal  Goals to be met by: 8/17/2017    Patient will increase functional independence with ADLs by performing:    UE Dressing with Set-up Assistance.  LE Dressing with Set-up Assistance.  Grooming while standing with Modified [place].  Toileting from toilet with Modified [place] for hygiene and clothing management.   Sitting at edge of bed x 25 minutes with Supervision. Met   Supine to sit with Modified [place]. Progressing   Stand pivot transfers with Modified [place]. Progressing   Toilet transfer to toilet with Modified [place].     Outcome: Ongoing (interventions implemented as appropriate)  Goals reviewed and remain appropriate at this time.

## 2017-08-04 NOTE — ASSESSMENT & PLAN NOTE
Patient has met 2/4 SIRS criteria with source being likely urinary tract infection versus possible peritonitis  -Blood cultures and intra-operative peritoneal fluid cultures are NGTD  -WIll obtain CXR  - Consult to IR for possible diagnostic paracentesis

## 2017-08-04 NOTE — PT/OT/SLP PROGRESS
"Occupational Therapy  Treatment    Maksim Hidalgo   MRN: [id number]   Admitting Diagnosis: ESRD (end stage renal disease)    OT Date of Treatment: 08/04/17   OT Start Time: 1230  OT Stop Time: 1311  OT Total Time (min): 41 min    Billable Minutes:  Self Care/Home Management 10 and Therapeutic Activity 31    General Precautions: Standard, fall    Subjective:  Communicated with nursing prior to session. As per nursing, pt ok to be seen for OT session at this time, however reporting pt was unable to move BLE's in bed 2/2 increased levels of pain. Pt agreeable to therapy session at this time.   "MY legs are hurting so bad"  Pain/Comfort  Pain Rating 1:  (pt reporting pain but did not assign a numeric value for the pain)  Location - Side 1: Bilateral  Location 1: leg  Pain Addressed 1: Nurse notified, Distraction  Pain Rating Post-Intervention 1:  (did not obtain)    Objective:  Patient found with: peripheral IV     Functional Mobility:  Bed Mobility:  Supine to Sit: Supervision (slow movements 2/2 pain in BLE's); HOB raised approx to 45 degrees     Transfers:   Sit <> Stand Assistance: Supervision-Minimum Assistance   Sit <> Stand Assistive Device: Rolling Walker    Activities of Daily Living:  Feeding Level of Assistance: Independent  LE Dressing Level of Assistance: Total assistance (pt unable to access BLE's )  Grooming Position: Seated, EOB  Grooming Level of Assistance: Independent     Pt able to sit at EOB and brush his teeth independently. Prior to task, pt noted with uncontrollable tremors in the RUE, however during task, pt noted with ability to hold cup with L hand to take sips, hold toothbrush while applying toothpaste with the R hand, and clean toothbrush with water using L hand. Tremors in the LUE were absent during the entirety of the task and resumed again during ambulation.      Balance:   Static Sit: GOOD+: Takes MAXIMAL challenges from all directions.    Dynamic Sit: GOOD: Maintains balance " "through MODERATE excursions of active trunk movement  Static Stand: FAIR: Maintains without assist but unable to take challenges  Dynamic stand: FAIR: Needs CONTACT GUARD during gait    Therapeutic Activities:  Pt completed sup> sit with HOB elevated approx 45 degrees with sup and additional time. At EOB, pt able to scoot anteriorly approx 6 inches to attain seated position at EOB. Pt able to complete x2 trials of sit>stand from EOB, the first trial with min A and the second with sup utilizing a Rw. Pt req min verbal cues for proper hand placement for transfer, however req no additional reminders. Once in standing, pt noted with ability to ambulate to the door and back with CGA, x1 rest break, and with additional time. Pt c/o pain throughout session, however no noted LOB or inability to ambulate.    AM-PAC 6 CLICK ADL   How much help from another person does this patient currently need?   1 = Unable, Total/Dependent Assistance  2 = A lot, Maximum/Moderate Assistance  3 = A little, Minimum/Contact Guard/Supervision  4 = None, Modified [place]/Independent    Putting on and taking off regular lower body clothing? : 2  Bathing (including washing, rinsing, drying)?: 2  Toileting, which includes using toilet, bedpan, or urinal? : 3  Putting on and taking off regular upper body clothing?: 3  Taking care of personal grooming such as brushing teeth?: 4  Eating meals?: 4  Total Score: 18     AM-PAC Raw Score CMS "G-Code Modifier Level of Impairment Assistance   6 % Total / Unable   7 - 8 CM 80 - 100% Maximal Assist   9-13 CL 60 - 80% Moderate Assist   14 - 19 CK 40 - 60% Moderate Assist   20 - 22 CJ 20 - 40% Minimal Assist   23 CI 1-20% SBA / CGA   24 CH 0% Independent/ Mod I       Patient left seated at EOB with call button in reach and nurse notified.    ASSESSMENT:  Maksim Hidalgo is a 37 y.o. male with a medical diagnosis of ESRD (end stage renal disease). Pt noted with decreased levels of pain/ability " to tolerate the pain and increased ability to participate during this session. Pt progressing with functional mobility and engagement in ADLs. No weakness noted in BLE's throughout session as pt ambulated with CGA. Inconsistencies still present during session regarding R hand tremor. Pt also noted with WFL ROM this session as opposed to limited range assessed the day prior during evaluation. Pt would continue to benefit from skilled OT services at this time to maximize independence and return to PLOF.     Rehab identified problem list/impairments: Rehab identified problem list/impairments: weakness, impaired endurance, impaired balance, impaired self care skills, decreased safety awareness    Rehab potential is good.    Activity tolerance: Fair    Discharge recommendations: Discharge Facility/Level Of Care Needs: nursing facility, skilled     Barriers to discharge: Barriers to Discharge: Decreased caregiver support    Equipment recommendations:  (TBD)     GOALS:    Occupational Therapy Goals        Problem: Occupational Therapy Goal    Goal Priority Disciplines Outcome Interventions   Occupational Therapy Goal     OT, PT/OT Ongoing (interventions implemented as appropriate)    Description:  Goals to be met by: 8/17/2017    Patient will increase functional independence with ADLs by performing:    UE Dressing with Set-up Assistance.  LE Dressing with Set-up Assistance.  Grooming while standing with Modified [place].  Toileting from toilet with Modified [place] for hygiene and clothing management.   Sitting at edge of bed x 25 minutes with Supervision. Met   Supine to sit with Modified [place]. Progressing   Stand pivot transfers with Modified [place]. Progressing   Toilet transfer to toilet with Modified [place].                       Plan:  Patient to be seen 3 x/week to address the above listed problems via therapeutic activities, therapeutic exercises, self-care/home management  Plan of  Care expires: 09/02/17  Plan of Care reviewed with: patient         Yessy Avelarhrotra, OT  08/04/2017

## 2017-08-04 NOTE — PT/OT/SLP PROGRESS
Physical Therapy  Treatment    Maksim Hidalgo   MRN: [id number]   Admitting Diagnosis: ESRD (end stage renal disease)    PT Received On: 08/04/17  PT Start Time: 1350     PT Stop Time: 1410    PT Total Time (min): 20 min       Billable Minutes:  Gait Oncdnyle23 and Therapeutic Activity 5    Treatment Type: Treatment  PT/PTA: PTA     PTA Visit Number: 1       General Precautions: Standard, fall  Orthopedic Precautions: N/A   Braces: N/A    Subjective:  Communicated with Gema steward prior to session.  Pt agreeable to therapy.     Pain/Comfort  Pain Rating 1: 10/10 (JAMEL JUAN's)  Pain Addressed 1: Distraction, Reposition, Cessation of Activity, Nurse notified  Pain Rating Post-Intervention 1: 10/10    Objective:   Pt found sleeping with HOB elevated. PIV disconnected but in room.     Functional Mobility:  Bed Mobility:   Supine to Sit: Contact Guard Assistance, Stand by Assistance    Transfers:  Sit <> Stand Assistance: Contact Guard Assistance  Sit <> Stand Assistive Device: Rolling Walker    Gait:   Gait Distance: 25' x 2 trials with seated rest break between trials (RUE tremor, min A for RW management)  Assistance 1: Contact Guard Assistance  Gait Assistive Device: Rolling walker  Gait Pattern: swing-to gait  Gait Deviation(s): decreased eliecer, decreased step length, decreased toe-to-floor clearance, decreased weight-shifting ability, excessive knee flexion, forward lean, decreased velocity of limb motion  Pt with instability during gait. He reported that he felt like he was going to fall multiple times despite chair follow. Pt educated that the lidia would be behind him if he needed a seated rest break. Pt utilized one rest break between trials after ambulating as far as he could despite the pain he felt in his BLE's.     Balance:   Static Sit: FAIR+: Able to take MINIMAL challenges from all directions  Dynamic Sit: FAIR+: Maintains balance through MINIMAL excursions of active trunk motion  Static Stand: FAIR:  Maintains without assist but unable to take challenges  Dynamic stand: FAIR: Needs CONTACT GUARD during gait     Therapeutic Activities and Exercises:  Adjusted gown for privacy.   Set up equipment for ambulation and sitting up in the chair (bedside chair was in the bathroom).   Donned skid proof socks.   Pt educated on the importance of sitting up in his chair and demonstrated understanding by doing so after gait training.  Pt reported that he preferred his chair be away from the corner of the room because he is fearful of spiders.    AM-PAC 6 CLICK MOBILITY  How much help from another person does this patient currently need?   1 = Unable, Total/Dependent Assistance  2 = A lot, Maximum/Moderate Assistance  3 = A little, Minimum/Contact Guard/Supervision  4 = None, Modified [place]/Independent    Turning over in bed (including adjusting bedclothes, sheets and blankets)?: 2  Sitting down on and standing up from a chair with arms (e.g., wheelchair, bedside commode, etc.): 2  Moving from lying on back to sitting on the side of the bed?: 2  Moving to and from a bed to a chair (including a wheelchair)?: 2  Need to walk in hospital room?: 2  Climbing 3-5 steps with a railing?: 1  Total Score: 11    AM-PAC Raw Score CMS G-Code Modifier Level of Impairment Assistance   6 % Total / Unable   7 - 9 CM 80 - 100% Maximal Assist   10 - 14 CL 60 - 80% Moderate Assist   15 - 19 CK 40 - 60% Moderate Assist   20 - 22 CJ 20 - 40% Minimal Assist   23 CI 1-20% SBA / CGA   24 CH 0% Independent/ Mod I     Patient left up in chair with all lines intact and call button in reach.    Assessment:  Mkasim Hidalgo is a 37 y.o. male with a medical diagnosis of ESRD (end stage renal disease) and presents with the rehab identified problem list below. The patient ambulated further today than during his last therapy session. The patient would continue to benefit from skilled PT to address the deficits in his plan of care.      Rehab identified problem list/impairments: Rehab identified problem list/impairments: weakness, impaired endurance, impaired balance, impaired self care skills, decreased safety awareness    Rehab potential is good.    Activity tolerance: Good    Discharge recommendations: Discharge Facility/Level Of Care Needs: nursing facility, skilled     Barriers to discharge: Barriers to Discharge: Decreased caregiver support    Equipment recommendations: Equipment Needed After Discharge:  (TBD)     GOALS:    Physical Therapy Goals        Problem: Physical Therapy Goal    Goal Priority Disciplines Outcome Goal Variances Interventions   Physical Therapy Goal     PT/OT, PT Ongoing (interventions implemented as appropriate)     Description:  Goals to be met by: 2017     Patient will increase functional independence with mobility by performin. Supine to sit with Set-up [place]  2. Sit to supine with Set-up [place]  3. Sit to stand transfer with Supervision  4. Bed to chair transfer with Supervision using Rolling Walker  5. Gait  x 75 feet with Supervision using Rolling Walker.   6. Lower extremity exercise program x15 reps per handout, with supervision                      PLAN:    Patient to be seen 5 x/week  to address the above listed problems via gait training, therapeutic activities, therapeutic exercises, neuromuscular re-education  Plan of Care expires: 17  Plan of Care reviewed with: patient         Lurdesalex CARMICHAEL Thomas, PTA  2017

## 2017-08-04 NOTE — PHARMACY MED REC
[facility] Medication Reconciliation  Template    Patient was admitted on 7/31/2017 for ESRD (end stage renal disease).    Patient's prior to admission medication regimen was as follows:  Prescriptions Prior to Admission   Medication Sig Dispense Refill Last Dose    albuterol 90 mcg/actuation inhaler Inhale 2 puffs into the lungs every 4 (four) hours as needed for Wheezing. 1 Inhaler 0 Past Week at Unknown time    calcitRIOL (ROCALTROL) 0.5 MCG Cap Take 1 capsule (0.5 mcg total) by mouth once daily. 30 capsule 11 7/31/2017 at 0600    fluconazole (DIFLUCAN) 200 MG Tab Take 1 tablet (200 mg total) by mouth every other day. 3 tablet 0 7/30/2017 at 0700    furosemide (LASIX) 40 MG tablet Take 1 tablet (40 mg total) by mouth once daily. 30 tablet 0 7/31/2017 at 0600    INV lisinopril 10 MG Tab Take 1 tablet (10 mg total) by mouth once daily. 30 tablet 11 7/31/2017 at 0600    melatonin 5 mg Tab Take 1 tablet (5 mg total) by mouth every evening.   7/30/2017 at 2300    pantoprazole (PROTONIX) 40 MG tablet Take 1 tablet (40 mg total) by mouth once daily. 30 tablet 11 7/30/2017 at 0600    senna-docusate 8.6-50 mg (PERICOLACE) 8.6-50 mg per tablet Take 2 tablets by mouth every evening.   7/31/2017 at 0600    [DISCONTINUED] oxycodone-acetaminophen (PERCOCET) 5-325 mg per tablet Take 1 tablet by mouth every 4 (four) hours as needed. 15 tablet 0 Past Month at Unknown time    benzonatate (TESSALON) 200 MG capsule Take 1 capsule (200 mg total) by mouth every evening. 30 capsule 0     sevelamer carbonate (RENVELA) 800 mg Tab Take 1 tablet (800 mg total) by mouth 3 (three) times daily with meals. 90 tablet 11 More than a month at Unknown time     Please add appropriate    SmartPhrase below:  Admission Medication Reconciliation - Pharmacy Consult Note    The home medication history was taken by Chen Garcia, pharmacy technician.  Based on information gathered and subsequent review by the clinical pharmacist, the items  "below may need attention.     You may go to "Admission" then "Reconcile Home Medications" tabs to review and/or act upon these items. Based on information gathered and subsequent review by the clinical pharmacist, the items below may need attention.    Potentially problematic discrepancies with current MAR  o Patient IS NOT taking the following which was ordered upon admit  o Renvela 800 mg TID- patient reports that was taken off medication 2 weeks ago     Please address this information as you see fit.  Feel free to contact us if you have any questions or require assistance.    Georgette Cisneros  EXT [phone]        "

## 2017-08-04 NOTE — PLAN OF CARE
Problem: Patient Care Overview  Goal: Individualization & Mutuality  Outcome: Ongoing (interventions implemented as appropriate)  Patient progressing towards goals. No  signs of  skin  breakdown.Pain controlled  With  medsBed in low position and call bell in reach.

## 2017-08-04 NOTE — ASSESSMENT & PLAN NOTE
- however, no physiological source of pain has been identified  - Continue Gabapentin 300mg qhs

## 2017-08-04 NOTE — PROGRESS NOTES
Per RYAN Tabor, NP insufficent amount of fluid seen via ultrasound to perform paracentesis. Report called to AMBREEN Ibarra. Pt awaiting transport via stretcher. No acute distress noted.

## 2017-08-05 LAB
ALBUMIN SERPL BCP-MCNC: 3.1 G/DL
ANION GAP SERPL CALC-SCNC: 14 MMOL/L
APPEARANCE FLD: ABNORMAL
BACTERIA UR CULT: NORMAL
BASOPHILS # BLD AUTO: 0.02 K/UL
BASOPHILS NFR BLD: 0.3 %
BODY FLD TYPE: ABNORMAL
BODY FLUID COMMENTS: ABNORMAL
BUN SERPL-MCNC: 35 MG/DL
CALCIUM SERPL-MCNC: 9.5 MG/DL
CHLORIDE SERPL-SCNC: 101 MMOL/L
CO2 SERPL-SCNC: 24 MMOL/L
COLOR FLD: ABNORMAL
CREAT SERPL-MCNC: 7.3 MG/DL
DIFFERENTIAL METHOD: ABNORMAL
EOSINOPHIL # BLD AUTO: 0.4 K/UL
EOSINOPHIL NFR BLD: 5.1 %
ERYTHROCYTE [DISTWIDTH] IN BLOOD BY AUTOMATED COUNT: 12.2 %
EST. GFR  (AFRICAN AMERICAN): 9.9 ML/MIN/1.73 M^2
EST. GFR  (NON AFRICAN AMERICAN): 8.5 ML/MIN/1.73 M^2
GLUCOSE SERPL-MCNC: 79 MG/DL
HCT VFR BLD AUTO: 30.7 %
HGB BLD-MCNC: 10.5 G/DL
LYMPHOCYTES # BLD AUTO: 1.5 K/UL
LYMPHOCYTES NFR BLD: 22.1 %
LYMPHOCYTES NFR FLD MANUAL: 55 %
MCH RBC QN AUTO: 29.3 PG
MCHC RBC AUTO-ENTMCNC: 34.2 G/DL
MCV RBC AUTO: 86 FL
MESOTHL CELL NFR FLD MANUAL: 5 %
MONOCYTES # BLD AUTO: 0.8 K/UL
MONOCYTES NFR BLD: 11.1 %
MONOS+MACROS NFR FLD MANUAL: 29 %
NEUTROPHILS # BLD AUTO: 4.2 K/UL
NEUTROPHILS NFR BLD: 61.3 %
NEUTROPHILS NFR FLD MANUAL: 5 %
OTHER CELLS FLD MANUAL: 6 %
PHOSPHATE SERPL-MCNC: 3.5 MG/DL
PLATELET # BLD AUTO: 243 K/UL
PMV BLD AUTO: 9.9 FL
POTASSIUM SERPL-SCNC: 4.7 MMOL/L
RBC # BLD AUTO: 3.58 M/UL
SODIUM SERPL-SCNC: 139 MMOL/L
VANCOMYCIN SERPL-MCNC: 12.4 UG/ML
WBC # BLD AUTO: 6.83 K/UL
WBC # FLD: 157 /CU MM

## 2017-08-05 PROCEDURE — 80100016 HC MAINTENANCE HEMODIALYSIS: Mod: NTX

## 2017-08-05 PROCEDURE — 80069 RENAL FUNCTION PANEL: CPT | Mod: NTX

## 2017-08-05 PROCEDURE — 25000003 PHARM REV CODE 250: Mod: NTX | Performed by: STUDENT IN AN ORGANIZED HEALTH CARE EDUCATION/TRAINING PROGRAM

## 2017-08-05 PROCEDURE — 99232 SBSQ HOSP IP/OBS MODERATE 35: CPT | Mod: GC,NTX,, | Performed by: HOSPITALIST

## 2017-08-05 PROCEDURE — 36415 COLL VENOUS BLD VENIPUNCTURE: CPT | Mod: NTX

## 2017-08-05 PROCEDURE — 85025 COMPLETE CBC W/AUTO DIFF WBC: CPT | Mod: NTX

## 2017-08-05 PROCEDURE — 11000001 HC ACUTE MED/SURG PRIVATE ROOM: Mod: NTX

## 2017-08-05 PROCEDURE — 80202 ASSAY OF VANCOMYCIN: CPT | Mod: NTX

## 2017-08-05 PROCEDURE — 63600175 PHARM REV CODE 636 W HCPCS: Mod: NTX | Performed by: STUDENT IN AN ORGANIZED HEALTH CARE EDUCATION/TRAINING PROGRAM

## 2017-08-05 PROCEDURE — 27000207 HC ISOLATION: Mod: NTX

## 2017-08-05 PROCEDURE — 25000003 PHARM REV CODE 250: Mod: NTX | Performed by: NURSE PRACTITIONER

## 2017-08-05 PROCEDURE — 63600175 PHARM REV CODE 636 W HCPCS: Mod: NTX | Performed by: NURSE PRACTITIONER

## 2017-08-05 PROCEDURE — A4216 STERILE WATER/SALINE, 10 ML: HCPCS | Mod: NTX | Performed by: STUDENT IN AN ORGANIZED HEALTH CARE EDUCATION/TRAINING PROGRAM

## 2017-08-05 RX ADMIN — STANDARDIZED SENNA CONCENTRATE AND DOCUSATE SODIUM 2 TABLET: 8.6; 5 TABLET, FILM COATED ORAL at 08:08

## 2017-08-05 RX ADMIN — HEPARIN SODIUM 5000 UNITS: 5000 INJECTION, SOLUTION INTRAVENOUS; SUBCUTANEOUS at 03:08

## 2017-08-05 RX ADMIN — ACETAMINOPHEN 650 MG: 325 TABLET ORAL at 03:08

## 2017-08-05 RX ADMIN — PANTOPRAZOLE SODIUM 40 MG: 40 TABLET, DELAYED RELEASE ORAL at 03:08

## 2017-08-05 RX ADMIN — VANCOMYCIN HYDROCHLORIDE 1250 MG: 100 INJECTION, POWDER, LYOPHILIZED, FOR SOLUTION INTRAVENOUS at 05:08

## 2017-08-05 RX ADMIN — CALCITRIOL 0.5 MCG: 0.25 CAPSULE, LIQUID FILLED ORAL at 03:08

## 2017-08-05 RX ADMIN — LISINOPRIL 10 MG: 10 TABLET ORAL at 03:08

## 2017-08-05 RX ADMIN — HEPARIN SODIUM 1000 UNITS: 1000 INJECTION, SOLUTION INTRAVENOUS; SUBCUTANEOUS at 10:08

## 2017-08-05 RX ADMIN — GABAPENTIN 300 MG: 300 CAPSULE ORAL at 08:08

## 2017-08-05 RX ADMIN — CEFTRIAXONE 1 G: 1 INJECTION, SOLUTION INTRAVENOUS at 08:08

## 2017-08-05 RX ADMIN — SODIUM CHLORIDE 300 ML: 0.9 INJECTION, SOLUTION INTRAVENOUS at 10:08

## 2017-08-05 RX ADMIN — Medication 3 ML: at 10:08

## 2017-08-05 RX ADMIN — FUROSEMIDE 40 MG: 40 TABLET ORAL at 03:08

## 2017-08-05 RX ADMIN — HEPARIN SODIUM 5000 UNITS: 5000 INJECTION, SOLUTION INTRAVENOUS; SUBCUTANEOUS at 05:08

## 2017-08-05 NOTE — ASSESSMENT & PLAN NOTE
Patient has met 2/4 SIRS criteria with source being likely urinary tract infection versus possible peritonitis  -Blood cultures and intra-operative peritoneal fluid cultures are NGTD  -WIll obtain CXR- no acute process  -Consult to IR for possible diagnostic paracentesis - US shows inadequate volume of fluid to complete paracentesis  -WBC with diff ordered on old peritoneal fluid sample  - Started pt on abx- vanc and rocephin 8/4

## 2017-08-05 NOTE — PROGRESS NOTES
Dialysis treatment terminated early due to poorly functioning catheter with high venous pressures. Unable to aspirate from venous line. Flushes easily.Dr. Calderón notified. Right IJ tunneled catheter flushed with normal saline, heparinized, capped and taped. Patient dialyzed for 2 hours 10 minutes. Report called to floor to Sai Ribeiro.

## 2017-08-05 NOTE — PROGRESS NOTES
"Dialysis trx stopped after 2' 10" d/t poor HD catheter flow. Dr Calderón notified, will consider TPA to HD cath on Monday prior to next HD trx.    "

## 2017-08-05 NOTE — PROGRESS NOTES
"[facility] Hospital Medicine  Progress Note    Patient Name: Maksim Hidalgo  MRN: [id number]  Patient Class: IP- Inpatient   Admission Date: 7/31/2017  Length of Stay: 4 days  Attending Physician: Avelino Abebe MD  Primary Care Provider: Saurabh Zuleta MD    [facility] Medicine Team: [facility] MED 3 Carol Wood MD    Subjective:     Principal Problem:ESRD (end stage renal disease)    HPI:  Mr. Hidalgo is a 37 y.o. AAM with a h/o ESRD on dialysis secondary to uncontrolled HTN, admitted for obstructed peritoneal catheter removal. He has been receiving HD MWF through a R tunneled catheter and is currently being worked up for permanent fistula. Following his catheter removal he developed diffuse, aching pain all over his body which he describes as "someone punching me." He reports weakness, tremor, fatigue, SOB, HA, dysuria, and subjective fevers/chills as he feels "wet." Patient was in a normal state of health prior to this admission. He was able to ambulate and speak normally. His weakness and pain is now to the point where he cannot move in bed and speaks only a few words at a time.     Hospital Course:  Maksim Hidalgo underwent peritoneal dialysis catheter removal on 7/31/2017.  Patients post surgical course complicated by bilateral lower extremity myalgias and a right upper extremity tremor.  Initially on the surgery service followed by the hospitalist comanagment team.  Neurology consulted and was unable to find a physiological explanation behind patients tremor or pain.  Started on gabapentin with improvement of symptoms.  Worked with PT/OT who have been recommending SNF placement secondary to debility.  He was transferred to  3 after found to have fever of 100.3 F.  Blood cultures obtained which have been no growth today.  Urine culture demonstrating enterococcus. US of abdomen does not reveal enough fluid to complete IR paracentesis. Patient started on vanc and rocephin " 8/4. Awaiting results from WBC with diff from old peritoneal fluid sample.     Interval History: NAEON. Patient reports continued RLQ and LLQ abdominal pain unchanged from yesterday. Denies hematuria, dysuria, flank pain, fevers, chills. He endorses continued but improved pain in bilateral lower extremities that is worse below the knee.  RUE tremor unchanged from yesterday. Patient getting dialysis today.    Review of Systems   Constitutional: Positive for chills. Negative for diaphoresis and fever.   HENT: Negative for congestion and rhinorrhea.    Eyes: Negative for photophobia and pain.   Respiratory: Negative for shortness of breath.    Cardiovascular: Negative for chest pain and palpitations.   Gastrointestinal: Positive for abdominal pain. Negative for abdominal distention, constipation, diarrhea and nausea.   Musculoskeletal: Positive for myalgias. Negative for arthralgias (BLE).   Neurological: Positive for tremors (RUE). Negative for headaches.     Objective:     Vital Signs (Most Recent):  Temp: 98.2 °F (36.8 °C) (08/05/17 0713)  Pulse: 80 (08/05/17 0713)  Resp: 18 (08/05/17 0713)  BP: 110/74 (08/05/17 0713)  SpO2: 97 % (08/05/17 0713) Vital Signs (24h Range):  Temp:  [96.8 °F (36 °C)-98.8 °F (37.1 °C)] 98.2 °F (36.8 °C)  Pulse:  [72-99] 80  Resp:  [18] 18  SpO2:  [94 %-99 %] 97 %  BP: ()/(47-86) 110/74     Weight: 93 kg (205 lb)  Body mass index is 29.41 kg/m².    Intake/Output Summary (Last 24 hours) at 08/05/17 0833  Last data filed at 08/04/17 2044   Gross per 24 hour   Intake              180 ml   Output              700 ml   Net             -520 ml      Physical Exam   Constitutional: He is oriented to person, place, and time. He appears well-developed.   HENT:   Head: Normocephalic and atraumatic.   Neck: Neck supple. No JVD present.   Cardiovascular: Normal rate and regular rhythm.    No murmur heard.  Pulmonary/Chest: Effort normal and breath sounds normal. No respiratory distress.    Abdominal: Soft. Bowel sounds are normal. He exhibits no distension. There is tenderness (around PD catheter site).   Musculoskeletal:   Pt is tender bilateral lower extremities that is most noticeable below the knees   Neurological: He is alert and oriented to person, place, and time. No cranial nerve deficit.   Motor Strength  RUE 4/5  LUE 4/5  RLE 4/5  LLE 4/5   Skin: Skin is warm.   Psychiatric: He has a normal mood and affect. Thought content normal.       Significant Labs: All pertinent labs within the past 24 hours have been reviewed.    Significant Imaging:     CXR 8/5/17  Central line in right atrium. Heart size is normal. Lungs are normal. No acute process.    Assessment/Plan:      Sepsis    Patient has met 2/4 SIRS criteria with source being likely urinary tract infection versus possible peritonitis  -Blood cultures and intra-operative peritoneal fluid cultures are NGTD  -WIll obtain CXR- no acute process  -Consult to IR for possible diagnostic paracentesis - US shows inadequate volume of fluid to complete paracentesis  -WBC with diff ordered on old peritoneal fluid sample  - Started pt on abx- vanc and rocephin 8/4          Neuropathic pain    - no physiological source of pain has been identified  - Continue Gabapentin 300mg qhs           Intractable pain    - d/w Neurology who agrees that there is no physiologic source of this pain  -  (<2/3 ULN, not concerning due to recent surgical procedure)  - ESR 27 (<2/3 ULN, not concerning due to recent surgical procedure)  - Continue to monitor electrolytes (Ca, K, Mg, Phos)  - continue  Percocet 5mg q4h prn  - Continue Gabapentin 300mg qhs            Weakness    Assessment  - The patient is in a deconditioned, debilitated state  - This appears to be the primary  of his generalized weakness  - He has no focal findings of weakness on physical exam  - His electrolytes are WNL with exception of slight low Mg (1.7). These are managed through dialysis  as the patient is ESRD    Treatment Plan  - Continue PT/OT  - Arrange for appropriate placement  - Will follow up PT/OT recommendations for therapy upon discharge from hospital [facility]  - Appreciate Neurology recommendations        Tremor of right hand    - RUE tremor is decreasing in frequency and intensity   - appreciate Neurology assessment and treatment recommendations  - agree that tremor is non-physiologic in appearance          Myalgia      - Pt reports severe myalgia lower extremities that have improved during hospital course  - CPK is only mildly elevated, less than two times the upper limit of normal  - electrolytes (Ca, K, Mg, Phos) are grossly WNL  - Myalgia can be due to septic picture versus fibromylagia  -  Continue physical therapy  - Up in chair for meals  - Allow natural light into the room during daytime  - Will educate patient on non-physiologic nature of his condition and encourage him to actively participate in making a full recovery  - Continue gabapentin 300mg QHS.   - appreciate Neurology recs        ESRD (end stage renal disease) on dialysis    Due for HD today right IJ permacath  Followed by nephrology, appreciate all recommendations          Secondary hyperparathyroidism    PTH elevated at 333, Calcium WNL  Continue to monitor electrolytes          * ESRD (end stage renal disease)    - Continue HD  - Appreciate Nephrology recs            VTE Risk Mitigation         Ordered     heparin (porcine) injection 5,000 Units  Every 8 hours     Route:  Subcutaneous        08/01/17 0526     Medium Risk of VTE  Once      08/01/17 0526     Place sequential compression device  Until discontinued      08/01/17 0526     Place sequential compression device  Until discontinued      07/31/17 0740              Carol Wood MD  Department of Hospital Medicine   [facility]

## 2017-08-05 NOTE — PLAN OF CARE
Problem: Patient Care Overview  Goal: Plan of Care Review  Outcome: Ongoing (interventions implemented as appropriate)  Patient AAOX3, VSS.Two side rails up, bed locked, call light within reach. No falls noted as these precautions remain. Patient is free of skin breakdown as patient moves well independently. Pain controlled well with PRN meds. Hourly rounds made and no complaints at this time noted. Will resume with plan of care.

## 2017-08-05 NOTE — SUBJECTIVE & OBJECTIVE
Interval History: NAEON. Patient reports continued RLQ and LLQ abdominal pain unchanged from yesterday. Denies hematuria, dysuria, flank pain, fevers, chills. He endorses continued but improved pain in bilateral lower extremities that is worse below the knee.  RUE tremor unchanged from yesterday. Patient getting dialysis today.    Review of Systems   Constitutional: Positive for chills. Negative for diaphoresis and fever.   HENT: Negative for congestion and rhinorrhea.    Eyes: Negative for photophobia and pain.   Respiratory: Negative for shortness of breath.    Cardiovascular: Negative for chest pain and palpitations.   Gastrointestinal: Positive for abdominal pain. Negative for abdominal distention, constipation, diarrhea and nausea.   Musculoskeletal: Positive for myalgias. Negative for arthralgias (BLE).   Neurological: Positive for tremors (RUE). Negative for headaches.     Objective:     Vital Signs (Most Recent):  Temp: 98.2 °F (36.8 °C) (08/05/17 0713)  Pulse: 80 (08/05/17 0713)  Resp: 18 (08/05/17 0713)  BP: 110/74 (08/05/17 0713)  SpO2: 97 % (08/05/17 0713) Vital Signs (24h Range):  Temp:  [96.8 °F (36 °C)-98.8 °F (37.1 °C)] 98.2 °F (36.8 °C)  Pulse:  [72-99] 80  Resp:  [18] 18  SpO2:  [94 %-99 %] 97 %  BP: ()/(47-86) 110/74     Weight: 93 kg (205 lb)  Body mass index is 29.41 kg/m².    Intake/Output Summary (Last 24 hours) at 08/05/17 0833  Last data filed at 08/04/17 2044   Gross per 24 hour   Intake              180 ml   Output              700 ml   Net             -520 ml      Physical Exam   Constitutional: He is oriented to person, place, and time. He appears well-developed.   HENT:   Head: Normocephalic and atraumatic.   Neck: Neck supple. No JVD present.   Cardiovascular: Normal rate and regular rhythm.    No murmur heard.  Pulmonary/Chest: Effort normal and breath sounds normal. No respiratory distress.   Abdominal: Soft. Bowel sounds are normal. He exhibits no distension. There is  tenderness (around PD catheter site).   Musculoskeletal:   Pt is tender bilateral lower extremities that is most noticeable below the knees   Neurological: He is alert and oriented to person, place, and time. No cranial nerve deficit.   Motor Strength  RUE 4/5  LUE 4/5  RLE 4/5  LLE 4/5   Skin: Skin is warm.   Psychiatric: He has a normal mood and affect. Thought content normal.       Significant Labs: All pertinent labs within the past 24 hours have been reviewed.    Significant Imaging:     CXR 8/5/17  Central line in right atrium. Heart size is normal. Lungs are normal. No acute process.

## 2017-08-05 NOTE — PROGRESS NOTES
Patient received from floor with report from Sai Ribeiro.   Maintenance dialysis began per orders via left IJ tunneled catheter.

## 2017-08-05 NOTE — PLAN OF CARE
Problem: Patient Care Overview  Goal: Plan of Care Review  Outcome: Ongoing (interventions implemented as appropriate)  Patient is awake, alert and oriented. Patient remained free from complaints this shift. Patient is on bedrest with pain and tremors. . Remained free from injury and falls this shift. Bed is in the low and locked position with side rail up x 2. Call light is within reach. Informed to call if need assistance. Will continue to monitor.

## 2017-08-06 LAB
ANION GAP SERPL CALC-SCNC: 11 MMOL/L
BASOPHILS # BLD AUTO: 0.02 K/UL
BASOPHILS NFR BLD: 0.3 %
BUN SERPL-MCNC: 31 MG/DL
CALCIUM SERPL-MCNC: 9.2 MG/DL
CHLORIDE SERPL-SCNC: 105 MMOL/L
CO2 SERPL-SCNC: 24 MMOL/L
CREAT SERPL-MCNC: 6.3 MG/DL
DIFFERENTIAL METHOD: ABNORMAL
EOSINOPHIL # BLD AUTO: 0.3 K/UL
EOSINOPHIL NFR BLD: 5.5 %
ERYTHROCYTE [DISTWIDTH] IN BLOOD BY AUTOMATED COUNT: 12.1 %
EST. GFR  (AFRICAN AMERICAN): 11.8 ML/MIN/1.73 M^2
EST. GFR  (NON AFRICAN AMERICAN): 10.2 ML/MIN/1.73 M^2
GLUCOSE SERPL-MCNC: 82 MG/DL
HCT VFR BLD AUTO: 30.2 %
HGB BLD-MCNC: 10 G/DL
LYMPHOCYTES # BLD AUTO: 1.4 K/UL
LYMPHOCYTES NFR BLD: 24.7 %
MCH RBC QN AUTO: 29.2 PG
MCHC RBC AUTO-ENTMCNC: 33.1 G/DL
MCV RBC AUTO: 88 FL
MONOCYTES # BLD AUTO: 0.8 K/UL
MONOCYTES NFR BLD: 13.7 %
NEUTROPHILS # BLD AUTO: 3.3 K/UL
NEUTROPHILS NFR BLD: 55.8 %
PLATELET # BLD AUTO: 247 K/UL
PMV BLD AUTO: 9.8 FL
POTASSIUM SERPL-SCNC: 4.4 MMOL/L
RBC # BLD AUTO: 3.43 M/UL
SODIUM SERPL-SCNC: 140 MMOL/L
WBC # BLD AUTO: 5.83 K/UL

## 2017-08-06 PROCEDURE — 85025 COMPLETE CBC W/AUTO DIFF WBC: CPT | Mod: NTX

## 2017-08-06 PROCEDURE — 25000003 PHARM REV CODE 250: Mod: NTX | Performed by: STUDENT IN AN ORGANIZED HEALTH CARE EDUCATION/TRAINING PROGRAM

## 2017-08-06 PROCEDURE — A4216 STERILE WATER/SALINE, 10 ML: HCPCS | Mod: NTX | Performed by: STUDENT IN AN ORGANIZED HEALTH CARE EDUCATION/TRAINING PROGRAM

## 2017-08-06 PROCEDURE — 99232 SBSQ HOSP IP/OBS MODERATE 35: CPT | Mod: GC,NTX,, | Performed by: HOSPITALIST

## 2017-08-06 PROCEDURE — 11000001 HC ACUTE MED/SURG PRIVATE ROOM: Mod: NTX

## 2017-08-06 PROCEDURE — 36415 COLL VENOUS BLD VENIPUNCTURE: CPT | Mod: NTX

## 2017-08-06 PROCEDURE — 63600175 PHARM REV CODE 636 W HCPCS: Mod: NTX | Performed by: STUDENT IN AN ORGANIZED HEALTH CARE EDUCATION/TRAINING PROGRAM

## 2017-08-06 PROCEDURE — 80048 BASIC METABOLIC PNL TOTAL CA: CPT | Mod: NTX

## 2017-08-06 PROCEDURE — 25000003 PHARM REV CODE 250: Mod: NTX | Performed by: SURGERY

## 2017-08-06 RX ORDER — LINEZOLID 600 MG/1
600 TABLET, FILM COATED ORAL EVERY 12 HOURS
Status: DISCONTINUED | OUTPATIENT
Start: 2017-08-06 | End: 2017-08-08 | Stop reason: HOSPADM

## 2017-08-06 RX ADMIN — GABAPENTIN 300 MG: 300 CAPSULE ORAL at 09:08

## 2017-08-06 RX ADMIN — LISINOPRIL 10 MG: 10 TABLET ORAL at 08:08

## 2017-08-06 RX ADMIN — LINEZOLID 600 MG: 600 TABLET, FILM COATED ORAL at 09:08

## 2017-08-06 RX ADMIN — HEPARIN SODIUM 5000 UNITS: 5000 INJECTION, SOLUTION INTRAVENOUS; SUBCUTANEOUS at 01:08

## 2017-08-06 RX ADMIN — SEVELAMER CARBONATE 800 MG: 800 TABLET, FILM COATED ORAL at 08:08

## 2017-08-06 RX ADMIN — SEVELAMER CARBONATE 800 MG: 800 TABLET, FILM COATED ORAL at 12:08

## 2017-08-06 RX ADMIN — HEPARIN SODIUM 5000 UNITS: 5000 INJECTION, SOLUTION INTRAVENOUS; SUBCUTANEOUS at 05:08

## 2017-08-06 RX ADMIN — Medication 3 ML: at 01:08

## 2017-08-06 RX ADMIN — Medication 3 ML: at 09:08

## 2017-08-06 RX ADMIN — HEPARIN SODIUM 5000 UNITS: 5000 INJECTION, SOLUTION INTRAVENOUS; SUBCUTANEOUS at 09:08

## 2017-08-06 RX ADMIN — HEPARIN SODIUM 5000 UNITS: 5000 INJECTION, SOLUTION INTRAVENOUS; SUBCUTANEOUS at 12:08

## 2017-08-06 RX ADMIN — PANTOPRAZOLE SODIUM 40 MG: 40 TABLET, DELAYED RELEASE ORAL at 08:08

## 2017-08-06 RX ADMIN — CALCITRIOL 0.5 MCG: 0.25 CAPSULE, LIQUID FILLED ORAL at 08:08

## 2017-08-06 RX ADMIN — FUROSEMIDE 40 MG: 40 TABLET ORAL at 08:08

## 2017-08-06 RX ADMIN — Medication 3 ML: at 08:08

## 2017-08-06 RX ADMIN — STANDARDIZED SENNA CONCENTRATE AND DOCUSATE SODIUM 2 TABLET: 8.6; 5 TABLET, FILM COATED ORAL at 09:08

## 2017-08-06 RX ADMIN — SEVELAMER CARBONATE 800 MG: 800 TABLET, FILM COATED ORAL at 05:08

## 2017-08-06 NOTE — ASSESSMENT & PLAN NOTE
Due for HD today right IJ permacath  Followed by nephrology, appreciate all recommendations  Last HD session was 8/5 which was not a full run 2/2 sluggish catheter flow.
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
## 2017-08-06 NOTE — ASSESSMENT & PLAN NOTE
Assessment  - The patient is in a deconditioned, debilitated state  - This appears to be the primary  of his generalized weakness  - He has no focal findings of weakness on physical exam  - His electrolytes are WNL with exceptionThese are managed through dialysis as the patient is ESRD  - Continue PT/OT  - Arrange for appropriate placement  - Will follow up PT/OT recommendations for therapy upon discharge from hospital [facility]  - Appreciate Neurology recommendations  - Will obtain spine imaging.

## 2017-08-06 NOTE — ASSESSMENT & PLAN NOTE
Patient had 2/4 SIRS criteria with source being likely urinary tract infection- WBC WNL 8/6  -Blood cultures and intra-operative peritoneal fluid cultures are negative, Cell count of peritoneal fluid not consistent with SBP  - Started pt on abx- vanc and rocephin 8/4, will discontinue vanc  - Only plausible source is from urinary tract which was positive for VRE sensitive to linezolid and macrobid.  -Will start appropriate antibiotics

## 2017-08-06 NOTE — PLAN OF CARE
Problem: Patient Care Overview  Goal: Plan of Care Review  Outcome: Ongoing (interventions implemented as appropriate)  Patient is awake, alert and oriented. Patient remained free from complaints this shift. Patient is on bedrest. Patient did not have tremors and was more alert today. Remained free from injury and falls this shift. Bed is in the low and locked position with side rail up x 2. Call light is within reach. Informed to call if need assistance. Will continue to monitor.
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
## 2017-08-07 LAB
ANION GAP SERPL CALC-SCNC: 10 MMOL/L
BACTERIA BLD CULT: NORMAL
BACTERIA SPEC ANAEROBE CULT: NORMAL
BASOPHILS # BLD AUTO: 0.02 K/UL
BASOPHILS NFR BLD: 0.3 %
BUN SERPL-MCNC: 38 MG/DL
CALCIUM SERPL-MCNC: 9.2 MG/DL
CHLORIDE SERPL-SCNC: 107 MMOL/L
CK SERPL-CCNC: 141 U/L
CO2 SERPL-SCNC: 22 MMOL/L
CREAT SERPL-MCNC: 6.7 MG/DL
DIFFERENTIAL METHOD: ABNORMAL
EOSINOPHIL # BLD AUTO: 0.4 K/UL
EOSINOPHIL NFR BLD: 5.9 %
ERYTHROCYTE [DISTWIDTH] IN BLOOD BY AUTOMATED COUNT: 12.3 %
EST. GFR  (AFRICAN AMERICAN): 10.9 ML/MIN/1.73 M^2
EST. GFR  (NON AFRICAN AMERICAN): 9.5 ML/MIN/1.73 M^2
GLUCOSE SERPL-MCNC: 99 MG/DL
HCT VFR BLD AUTO: 30.5 %
HGB BLD-MCNC: 9.9 G/DL
LYMPHOCYTES # BLD AUTO: 2.4 K/UL
LYMPHOCYTES NFR BLD: 35.5 %
MCH RBC QN AUTO: 28.9 PG
MCHC RBC AUTO-ENTMCNC: 32.5 G/DL
MCV RBC AUTO: 89 FL
MONOCYTES # BLD AUTO: 0.7 K/UL
MONOCYTES NFR BLD: 11 %
NEUTROPHILS # BLD AUTO: 3.2 K/UL
NEUTROPHILS NFR BLD: 47.2 %
PATH INTERP FLD-IMP: NORMAL
PLATELET # BLD AUTO: 266 K/UL
PMV BLD AUTO: 9.9 FL
POTASSIUM SERPL-SCNC: 4.3 MMOL/L
RBC # BLD AUTO: 3.42 M/UL
SODIUM SERPL-SCNC: 139 MMOL/L
WBC # BLD AUTO: 6.73 K/UL

## 2017-08-07 PROCEDURE — 63600175 PHARM REV CODE 636 W HCPCS: Mod: NTX | Performed by: STUDENT IN AN ORGANIZED HEALTH CARE EDUCATION/TRAINING PROGRAM

## 2017-08-07 PROCEDURE — 97535 SELF CARE MNGMENT TRAINING: CPT | Mod: NTX

## 2017-08-07 PROCEDURE — 25000003 PHARM REV CODE 250: Mod: NTX | Performed by: STUDENT IN AN ORGANIZED HEALTH CARE EDUCATION/TRAINING PROGRAM

## 2017-08-07 PROCEDURE — 85025 COMPLETE CBC W/AUTO DIFF WBC: CPT | Mod: NTX

## 2017-08-07 PROCEDURE — 97116 GAIT TRAINING THERAPY: CPT | Mod: NTX

## 2017-08-07 PROCEDURE — 82550 ASSAY OF CK (CPK): CPT | Mod: NTX

## 2017-08-07 PROCEDURE — 36415 COLL VENOUS BLD VENIPUNCTURE: CPT | Mod: NTX

## 2017-08-07 PROCEDURE — 11000001 HC ACUTE MED/SURG PRIVATE ROOM: Mod: NTX

## 2017-08-07 PROCEDURE — 80048 BASIC METABOLIC PNL TOTAL CA: CPT | Mod: NTX

## 2017-08-07 PROCEDURE — 97530 THERAPEUTIC ACTIVITIES: CPT | Mod: NTX

## 2017-08-07 PROCEDURE — 99232 SBSQ HOSP IP/OBS MODERATE 35: CPT | Mod: GC,NTX,, | Performed by: HOSPITALIST

## 2017-08-07 PROCEDURE — 25000003 PHARM REV CODE 250: Mod: NTX | Performed by: SURGERY

## 2017-08-07 PROCEDURE — A4216 STERILE WATER/SALINE, 10 ML: HCPCS | Mod: NTX | Performed by: STUDENT IN AN ORGANIZED HEALTH CARE EDUCATION/TRAINING PROGRAM

## 2017-08-07 RX ORDER — SODIUM CHLORIDE 9 MG/ML
INJECTION, SOLUTION INTRAVENOUS
Status: CANCELLED | OUTPATIENT
Start: 2017-08-07

## 2017-08-07 RX ADMIN — Medication 3 ML: at 02:08

## 2017-08-07 RX ADMIN — LISINOPRIL 10 MG: 10 TABLET ORAL at 08:08

## 2017-08-07 RX ADMIN — LINEZOLID 600 MG: 600 TABLET, FILM COATED ORAL at 10:08

## 2017-08-07 RX ADMIN — HEPARIN SODIUM 5000 UNITS: 5000 INJECTION, SOLUTION INTRAVENOUS; SUBCUTANEOUS at 09:08

## 2017-08-07 RX ADMIN — GABAPENTIN 300 MG: 300 CAPSULE ORAL at 10:08

## 2017-08-07 RX ADMIN — LINEZOLID 600 MG: 600 TABLET, FILM COATED ORAL at 03:08

## 2017-08-07 RX ADMIN — SEVELAMER CARBONATE 800 MG: 800 TABLET, FILM COATED ORAL at 06:08

## 2017-08-07 RX ADMIN — SEVELAMER CARBONATE 800 MG: 800 TABLET, FILM COATED ORAL at 02:08

## 2017-08-07 RX ADMIN — HEPARIN SODIUM 5000 UNITS: 5000 INJECTION, SOLUTION INTRAVENOUS; SUBCUTANEOUS at 02:08

## 2017-08-07 RX ADMIN — CALCITRIOL 0.5 MCG: 0.25 CAPSULE, LIQUID FILLED ORAL at 08:08

## 2017-08-07 RX ADMIN — PANTOPRAZOLE SODIUM 40 MG: 40 TABLET, DELAYED RELEASE ORAL at 08:08

## 2017-08-07 RX ADMIN — STANDARDIZED SENNA CONCENTRATE AND DOCUSATE SODIUM 2 TABLET: 8.6; 5 TABLET, FILM COATED ORAL at 10:08

## 2017-08-07 RX ADMIN — Medication 3 ML: at 10:08

## 2017-08-07 RX ADMIN — FUROSEMIDE 40 MG: 40 TABLET ORAL at 08:08

## 2017-08-07 RX ADMIN — HEPARIN SODIUM 5000 UNITS: 5000 INJECTION, SOLUTION INTRAVENOUS; SUBCUTANEOUS at 05:08

## 2017-08-07 RX ADMIN — Medication 3 ML: at 06:08

## 2017-08-07 NOTE — PT/OT/SLP PROGRESS
Physical Therapy  Treatment    Maksim Hidalgo   MRN: [id number]   Admitting Diagnosis: ESRD (end stage renal disease)    PT Received On: 08/07/17  PT Start Time: 1026     PT Stop Time: 1049    PT Total Time (min): 23 min       Billable Minutes:  Gait [id number] and Therapeutic Activity 8    Treatment Type: Treatment  PT/PTA: PT     PTA Visit Number: 1       General Precautions: Standard, fall  Orthopedic Precautions: N/A   Braces:           Subjective:  Communicated with nursing prior to session.      Pain/Comfort  Pain Rating 1:  (pt. did not rate)  Location 1: abdomen    Objective:   Patient found with: peripheral IV    Functional Mobility:  Bed Mobility:   Rolling/Turning to Left: Stand by assistance  Scooting/Bridging: Stand by Assistance  Supine to Sit: Stand by Assistance  Sit to Supine: Stand by Assistance    Transfers:  Sit <> Stand Assistance: Stand By Assistance  Sit <> Stand Assistive Device: Rolling Walker  Bed <> Chair Technique: Stand Pivot  Bed <> Chair Assistance: Contact Guard Assistance  Bed <> Chair Assistive Device: Rolling Walker    Gait:   Gait Distance: 130'  Assistance 1: Contact Guard Assistance  Gait Assistive Device: Rolling walker  Gait Pattern: swing-through gait  Gait Deviation(s): decreased eliecer, decreased step length, decreased stride length, decreased toe-to-floor clearance    Stairs:      Balance:   Static Sit: FAIR+: Able to take MINIMAL challenges from all directions  Dynamic Sit: FAIR+: Maintains balance through MINIMAL excursions of active trunk motion  Static Stand: FAIR+: Takes MINIMAL challenges from all directions  Dynamic stand: FAIR: Needs CONTACT GUARD during gait     Therapeutic Activities and Exercises:  Discussed pt.'s progress.     AM-PAC 6 CLICK MOBILITY  How much help from another person does this patient currently need?   1 = Unable, Total/Dependent Assistance  2 = A lot, Maximum/Moderate Assistance  3 = A little, Minimum/Contact Guard/Supervision  4 =  None, Modified [place]/Independent    Turning over in bed (including adjusting bedclothes, sheets and blankets)?: 3  Sitting down on and standing up from a chair with arms (e.g., wheelchair, bedside commode, etc.): 3  Moving from lying on back to sitting on the side of the bed?: 3  Moving to and from a bed to a chair (including a wheelchair)?: 3  Need to walk in hospital room?: 3  Climbing 3-5 steps with a railing?: 3  Total Score: 18    AM-PAC Raw Score CMS G-Code Modifier Level of Impairment Assistance   6 % Total / Unable   7 - 9 CM 80 - 100% Maximal Assist   10 - 14 CL 60 - 80% Moderate Assist   15 - 19 CK 40 - 60% Moderate Assist   20 - 22 CJ 20 - 40% Minimal Assist   23 CI 1-20% SBA / CGA   24 CH 0% Independent/ Mod I     Patient left on stretcher to go to MRI with all lines intact and call button in reach.    Assessment:  Maksim Hidalgo is a 37 y.o. male with a medical diagnosis of ESRD (end stage renal disease) and presents with increased gait distance. Pt. cooperative and tolerated treatment well. Pt. progressing with mobility. Pt. would benefit from continued PT to address functional deficits.    Rehab identified problem list/impairments: Rehab identified problem list/impairments: weakness, impaired endurance, impaired functional mobilty, gait instability, impaired balance, pain    Rehab potential is good.    Activity tolerance: Good    Discharge recommendations: Discharge Facility/Level Of Care Needs: home health PT     Barriers to discharge: Barriers to Discharge: Decreased caregiver support    Equipment recommendations: Equipment Needed After Discharge: walker, rolling     GOALS:    Physical Therapy Goals        Problem: Physical Therapy Goal    Goal Priority Disciplines Outcome Goal Variances Interventions   Physical Therapy Goal     PT/OT, PT Ongoing (interventions implemented as appropriate)     Description:  Goals to be met by: 8/17/2017     Patient will increase functional  independence with mobility by performin. Supine to sit with Set-up [place]  2. Sit to supine with Set-up [place]  3. Sit to stand transfer with Supervision  4. Bed to chair transfer with Supervision using Rolling Walker  5. Gait  x 75 feet with Supervision using Rolling Walker.   6. Lower extremity exercise program x15 reps per handout, with supervision                      PLAN:    Patient to be seen 3 x/week  to address the above listed problems via gait training, therapeutic activities, therapeutic exercises, neuromuscular re-education  Plan of Care expires: 17  Plan of Care reviewed with: patient         Avelino MARTINEZ Mariano, PT  2017

## 2017-08-07 NOTE — PLAN OF CARE
Problem: Occupational Therapy Goal  Goal: Occupational Therapy Goal  Goals to be met by: 8/17/2017    Patient will increase functional independence with ADLs by performing:    UE Dressing with Set-up Assistance. Met  LE Dressing with Set-up Assistance. Met  Grooming while standing with Modified [place]. Met  Toileting from toilet with Modified [place] for hygiene and clothing management. Met  Sitting at edge of bed x 25 minutes with Supervision. Met   Supine to sit with Modified [place]. Met  Stand pivot transfers with Modified [place]. Met  Toilet transfer to toilet with Modified [place]. Met      Outcome: Outcome(s) achieved Date Met: 08/07/17  Pt achieved all goals and no longer requires skilled OT services at this time.

## 2017-08-07 NOTE — HPI
"Mr Hidalgo is a 37 year old black male with no known psychiatric history and medical history of HBV, ESRD on hemodialysis, and left-heart failure. He presented to the hospital on 07/31/17 for ultrasound guided paracentesis and was then evaluated by neurology on 08/01/17 due to BLE weakness. Psychiatry is now being consulted as no physiologic cause has been found for this weakness.    On interview, Mr Hidalgo complains of pain in his abdomen and both legs. Rates both of these pains as 8/10 and says they started just after he came to the hospital for this admission. Describes pain in his legs "like someone is pulling at the bones" and that abdomen feels as if "there's a ball rolling around inside it". Says this has decreased slightly this afternoon compared to this morning. He has never had such pains before. Also says that both legs and right arm are weak, and that right hand is tremulous as well. Also states that both these things started right after he was admitted to the hospital this current time. Says that both hands are mildly tremulous at baseline, but right hand is now much worse than usual. Does not know what could be causing these things, but states "you're the doctors, right?". Does not feel that these symptoms could be related to stress, and he states that he has not experienced any stressful events recently. Says that mood is currently at baseline, but he feels that he needs a medication to control it because he gets angry easily. Gives an example of a physician not respecting him, and says that he felt like "throwing her out the window" but that he does not have any HI at present. Says that he deals with these types of frustrations by "holding it in...then telling someone else about it later". Also gave permission for me to speak with his mother, Mattie Hidalgo at [phone]. I tried calling that number three times, but got a busy signal each time.    Mr Hidalgo also states that sleep has been poor since " "starting dialysis one year ago, and that it worsened even more in the hospital. Says he has taken melatonin and did not find it helpful. Also reports taking diphenhydramine, but that he never took it again because he got a nose bleed 3 minutes after taking it. Also states his body "can't tolerate narcotics" and is agreeable with trial of hydroxyzine tonight to target sleeping problems.  "

## 2017-08-07 NOTE — MEDICAL/APP STUDENT
CRISTINA MONTAGUE  37 y.o.    Pt is a 36 yo male with a pmhx of ESRD on HD 2/2 uncontrolled HTN, admitted for peritoneal dialysis catheter removal. He has since developed a UTI and multiple neurological symptoms.    Subjective  Today, pt feels relatively the same as yesterday. He is drowsy and reports he is always short of breath. He denies nausea, vomiting, diarrhea, or constipation but has significant abdominal pain. He has not had a bowel movement in days. He still endorses dysuria although says he only urinates every 3-4 days. He has the same neurological complaints as yesterday, weakness, tremor, and decreased sensation. He also reports a minor headache.    Review of Systems   Constitutional: Negative for chills and fever.   Respiratory: Positive for shortness of breath. Negative for cough.    Cardiovascular: Negative for chest pain and palpitations.   Gastrointestinal: Positive for abdominal pain. Negative for constipation, diarrhea, heartburn, nausea and vomiting.   Genitourinary: Positive for dysuria. Negative for frequency.   Musculoskeletal: Positive for myalgias.   Neurological: Positive for tremors, sensory change, focal weakness, weakness and headaches.        Tremor in RUE, global weakness, worse on right side of body. Decreased sensation on right side of body.       Objective    Vitals  Temp:  [95.7 °F (35.4 °C)-98.4 °F (36.9 °C)] 95.7 °F (35.4 °C)  Pulse:  [74-89] 79  Resp:  [17-18] 18  SpO2:  [98 %-100 %] 100 %  BP: (109-118)/(68-78) 118/78    Physical Exam   Constitutional: He appears well-developed and well-nourished.   HENT:   Head: Normocephalic and atraumatic.   Eyes: EOM are normal.   Neck: No JVD present.   Cardiovascular: Normal rate, regular rhythm and normal heart sounds.    Pulmonary/Chest: Effort normal and breath sounds normal.   Abdominal: Soft. Bowel sounds are normal. He exhibits no distension. There is tenderness.   Musculoskeletal: Normal range of motion.   Neurological: He is  alert.   Pt displays decreased strength globally. Pt unable to squeeze finger but can hold walker. Upon entering room, pt was returning from the restroom with the assistance of a walker. Tremor in RUE   Skin: Skin is warm and dry.   Psychiatric: His affect is blunt. He is slowed.     Recent Labs      08/07/17   0441   WBC  6.73   HGB  9.9*   HCT  30.5*   PLT  266   NA  139   K  4.3   CL  107   CO2  22*   BUN  38*   CREATININE  6.7*   GLU  99     CPK: (8/2) 296 (h)    Imaging  MRI Lumbar spine 8/6: small disc protrusion L5-S1 possible impingement on S1, large amount of free pelvic fluid  MRI Thoracic spine 8/6: multiple small disc protrusions, no stenosis or narrowing  MRI Cervical spine 8/7: no narrowing    Assessment  Mr. Hidalgo is a 36yo male with a history of ESRD on HD 2/2 uncontrolled HTN who now has a UTI and new onset neurological symptoms.     Plan  UTI: Cultures grew VRE, pt was switched from vanc/rocephin to linezolid. Continue linezolid 600mg bid 7d for complicated UTI in male.    Neurological symptoms: MRI of spine and CT head showed no physiological cause for weakness, tremor, and sensation change. Symptoms are intermittent. Psychiatric consult to evaluate for conversion disorder, pt also reports insomnia.     ESRD: Continue hemodialysis, however catheter is sluggish. Pt is being worked up for permanent fistula. Nephrology consulted.     HTN: Continue lasix 40mg and lisinopril. Pt has been normotensive over past 48 hours.     Pain: Pt continues to complain of significant pain, especially in lower abdomen. May be post-surgical pain in which case it should improve with time. Pt is on gabapentin. He has not been using PRN acetaminophen or hydrocodone-acetaminophen, so appears to be under control at this time.     Dispo: [facility]    Irene Grant, MS3  8/7/17

## 2017-08-07 NOTE — ASSESSMENT & PLAN NOTE
Assessment  - The patient is in a deconditioned, debilitated state  - This appears to be the primary  of his generalized weakness  - He has no focal findings of weakness on physical exam  - His electrolytes are WNL with exceptionThese are managed through dialysis as the patient is ESRD  - Continue PT/OT  - Arrange for appropriate placement  - Will follow up PT/OT recommendations for therapy upon discharge from hospital kim  - Appreciate Neurology recommendations  - Spine imaging unremarkable   - Will consider psych consult for possible conversion disorder

## 2017-08-07 NOTE — PROGRESS NOTES
"[facility] Hospital Medicine  Progress Note    Patient Name: Maksim Hidalgo  MRN: [id number]  Patient Class: IP- Inpatient   Admission Date: 7/31/2017  Length of Stay: 6 days  Attending Physician: Avelino Abebe MD  Primary Care Provider: Saurabh Zuleta MD    [facility] Medicine Team: [facility] HOSP MED 3 Enrique Foster MD    Subjective:     Principal Problem:ESRD (end stage renal disease)    HPI:  Mr. Hidalgo is a 37 y.o. AAM with a h/o ESRD on dialysis secondary to uncontrolled HTN, admitted for obstructed peritoneal catheter removal. He has been receiving HD MWF through a R tunneled catheter and is currently being worked up for permanent fistula. Following his catheter removal he developed diffuse, aching pain all over his body which he describes as "someone punching me." He reports weakness, tremor, fatigue, SOB, HA, dysuria, and subjective fevers/chills as he feels "wet." Patient was in a normal state of health prior to this admission. He was able to ambulate and speak normally. His weakness and pain is now to the point where he cannot move in bed and speaks only a few words at a time.     Hospital Course:  Maksim Hidalgo underwent peritoneal dialysis catheter removal on 7/31/2017.  Patients post surgical course complicated by bilateral lower extremity myalgias and a right upper extremity tremor.  Initially on the surgery service followed by the hospitalist comanagment team.  Neurology consulted and was unable to find a physiological explanation behind patients tremor or pain.  Started on gabapentin with improvement of symptoms.  Worked with PT/OT who have been recommending SNF placement secondary to debility.  He was transferred to  3 after found to have fever of 100.3 F.  Blood cultures obtained which have been no growth today.  Urine culture demonstrating enterococcus. US of abdomen does not reveal enough fluid to complete IR paracentesis. Patient started on vanc and " rocephin 8/4. Cell count from IVF not consistent with SBP.  Blood and intra-operative cultures have been negative or no growth to date.  MRI of the spine has unremarkable with exception of bulge of the L5-S1 disc without impingement.      Interval History: Patient ambulating with aid of walker this AM.  Thoracic and lumbar spine MRI unremarkable.  Myalgias improved from previous day.      Review of Systems   Constitutional: Negative for chills, diaphoresis and fever.   HENT: Negative for congestion and rhinorrhea.    Eyes: Negative for photophobia and pain.   Respiratory: Negative for shortness of breath.    Cardiovascular: Negative for chest pain and palpitations.   Gastrointestinal: Positive for abdominal pain. Negative for abdominal distention, constipation, diarrhea and nausea.   Musculoskeletal: Positive for myalgias. Negative for arthralgias (BLE).   Neurological: Positive for tremors (RUE). Negative for headaches.     Objective:     Vital Signs (Most Recent):  Temp: (!) 95.7 °F (35.4 °C) (08/07/17 0900)  Pulse: 79 (08/07/17 0900)  Resp: 18 (08/07/17 0900)  BP: 118/78 (08/07/17 0900)  SpO2: 100 % (08/07/17 0900) Vital Signs (24h Range):  Temp:  [95.7 °F (35.4 °C)-98.4 °F (36.9 °C)] 95.7 °F (35.4 °C)  Pulse:  [74-89] 79  Resp:  [17-18] 18  SpO2:  [98 %-100 %] 100 %  BP: (109-118)/(68-78) 118/78     Weight: 93 kg (205 lb)  Body mass index is 29.41 kg/m².    Intake/Output Summary (Last 24 hours) at 08/07/17 1337  Last data filed at 08/07/17 0159   Gross per 24 hour   Intake              740 ml   Output                0 ml   Net              740 ml      Physical Exam   Constitutional: He is oriented to person, place, and time. He appears well-developed.   HENT:   Head: Normocephalic and atraumatic.   Neck: Neck supple. No JVD present.   Cardiovascular: Normal rate and regular rhythm.    No murmur heard.  Pulmonary/Chest: Effort normal and breath sounds normal. No respiratory distress.   Abdominal: Soft. Bowel  sounds are normal. He exhibits no distension. There is tenderness (around PD catheter site).   Musculoskeletal:   Pt is tender bilateral lower extremities that is most noticeable below the knees improved from previous    Neurological: He is alert and oriented to person, place, and time. No cranial nerve deficit.   Motor Strength  RUE 4/5  LUE 4/5  RLE 4/5  LLE 4/5   Skin: Skin is warm.   Psychiatric: Thought content normal.   Flat affect       Significant Labs: All pertinent labs within the past 24 hours have been reviewed.    Significant Imaging: I have reviewed all pertinent imaging results/findings within the past 24 hours.    Assessment/Plan:      Sepsis    Patient had 2/4 SIRS criteria with source being likely urinary tract infection- WBC WNL  -Blood cultures and intra-operative peritoneal fluid cultures are negative, Cell count of peritoneal fluid not consistent with SBP  - Started pt on abx- vanc and rocephin 8/4, will discontinue vanc  - Only plausible source is from urinary tract which was positive for VRE sensitive to linezolid and macrobid.  -Transitioned to linezolid for 7 day course          Neuropathic pain    - no physiological source of pain has been identified  - Continue Gabapentin 300mg qhs           Intractable pain    - d/w Neurology who agrees that there is no physiologic source of this pain  -  (<2/3 ULN, not concerning due to recent surgical procedure)  - ESR 27 (<2/3 ULN, not concerning due to recent surgical procedure)  - Continue to monitor electrolytes (Ca, K, Mg, Phos)  - continue  Percocet 5mg q4h prn  - Continue Gabapentin 300mg qhs          Weakness    Assessment  - The patient is in a deconditioned, debilitated state  - This appears to be the primary  of his generalized weakness  - He has no focal findings of weakness on physical exam  - His electrolytes are WNL with exceptionThese are managed through dialysis as the patient is ESRD  - Continue PT/OT  - Arrange for  appropriate placement  - Will follow up PT/OT recommendations for therapy upon discharge from hospital kim  - Appreciate Neurology recommendations  - Spine imaging unremarkable   - Will consider psych consult for possible conversion disorder        Tremor of right hand    - RUE tremor is decreasing in frequency and intensity   - appreciate Neurology assessment and treatment recommendations  - agree that tremor is non-physiologic in appearance          Myalgia      - Pt reports severe myalgia lower extremities that have improved during hospital course  - CPK is only mildly elevated, less than two times the upper limit of normal  - electrolytes (Ca, K, Mg, Phos) are grossly WNL  - Myalgia can be due to septic picture versus fibromylagia  -  Continue physical therapy  - Up in chair for meals  - Allow natural light into the room during daytime  - Will educate patient on non-physiologic nature of his condition and encourage him to actively participate in making a full recovery  - Continue gabapentin 300mg QHS.   - appreciate Neurology recs        ESRD (end stage renal disease) on dialysis    Due for HD today right IJ permacath  Followed by nephrology, appreciate all recommendations  Last HD session was 8/5 which was not a full run 2/2 sluggish catheter flow.           Secondary hyperparathyroidism    PTH elevated at 333, Calcium WNL  Continue to monitor electrolytes          * ESRD (end stage renal disease)    - Continue HD  - Appreciate Nephrology recs            VTE Risk Mitigation         Ordered     heparin (porcine) injection 5,000 Units  Every 8 hours     Route:  Subcutaneous        08/01/17 0526     Medium Risk of VTE  Once      08/01/17 0526     Place sequential compression device  Until discontinued      08/01/17 0526     Place sequential compression device  Until discontinued      07/31/17 0740              Enrique Foster MD  Department of Hospital Medicine   [facility]

## 2017-08-07 NOTE — PLAN OF CARE
Urine:  + VRE.  Please place on contact isolation with appropriate use of PPE and hand hygiene.  Contact Infection Control @ [phone] for guidelines on discontinuation of isolation.  Spoke with Cassie peres + VRE and new order for contact isolation.

## 2017-08-07 NOTE — ASSESSMENT & PLAN NOTE
Patient had 2/4 SIRS criteria with source being likely urinary tract infection- WBC WNL  -Blood cultures and intra-operative peritoneal fluid cultures are negative, Cell count of peritoneal fluid not consistent with SBP  - Started pt on abx- vanc and rocephin 8/4, will discontinue vanc  - Only plausible source is from urinary tract which was positive for VRE sensitive to linezolid and macrobid.  -Transitioned to linezolid for 7 day course

## 2017-08-07 NOTE — SUBJECTIVE & OBJECTIVE
Interval History: Patient ambulating with aid of walker this AM.  Thoracic and lumbar spine MRI unremarkable.  Myalgias improved from previous day.      Review of Systems   Constitutional: Negative for chills, diaphoresis and fever.   HENT: Negative for congestion and rhinorrhea.    Eyes: Negative for photophobia and pain.   Respiratory: Negative for shortness of breath.    Cardiovascular: Negative for chest pain and palpitations.   Gastrointestinal: Positive for abdominal pain. Negative for abdominal distention, constipation, diarrhea and nausea.   Musculoskeletal: Positive for myalgias. Negative for arthralgias (BLE).   Neurological: Positive for tremors (RUE). Negative for headaches.     Objective:     Vital Signs (Most Recent):  Temp: (!) 95.7 °F (35.4 °C) (08/07/17 0900)  Pulse: 79 (08/07/17 0900)  Resp: 18 (08/07/17 0900)  BP: 118/78 (08/07/17 0900)  SpO2: 100 % (08/07/17 0900) Vital Signs (24h Range):  Temp:  [95.7 °F (35.4 °C)-98.4 °F (36.9 °C)] 95.7 °F (35.4 °C)  Pulse:  [74-89] 79  Resp:  [17-18] 18  SpO2:  [98 %-100 %] 100 %  BP: (109-118)/(68-78) 118/78     Weight: 93 kg (205 lb)  Body mass index is 29.41 kg/m².    Intake/Output Summary (Last 24 hours) at 08/07/17 1337  Last data filed at 08/07/17 0159   Gross per 24 hour   Intake              740 ml   Output                0 ml   Net              740 ml      Physical Exam   Constitutional: He is oriented to person, place, and time. He appears well-developed.   HENT:   Head: Normocephalic and atraumatic.   Neck: Neck supple. No JVD present.   Cardiovascular: Normal rate and regular rhythm.    No murmur heard.  Pulmonary/Chest: Effort normal and breath sounds normal. No respiratory distress.   Abdominal: Soft. Bowel sounds are normal. He exhibits no distension. There is tenderness (around PD catheter site).   Musculoskeletal:   Pt is tender bilateral lower extremities that is most noticeable below the knees improved from previous    Neurological: He is  alert and oriented to person, place, and time. No cranial nerve deficit.   Motor Strength  RUE 4/5  LUE 4/5  RLE 4/5  LLE 4/5   Skin: Skin is warm.   Psychiatric: Thought content normal.   Flat affect       Significant Labs: All pertinent labs within the past 24 hours have been reviewed.    Significant Imaging: I have reviewed all pertinent imaging results/findings within the past 24 hours.

## 2017-08-07 NOTE — PLAN OF CARE
Problem: Physical Therapy Goal  Goal: Physical Therapy Goal  Goals to be met by: 2017     Patient will increase functional independence with mobility by performin. Supine to sit with Set-up [place]  2. Sit to supine with Set-up [place]  3. Sit to stand transfer with Supervision  4. Bed to chair transfer with Supervision using Rolling Walker  5. Gait  x 75 feet with Supervision using Rolling Walker.   6. Lower extremity exercise program x15 reps per handout, with supervision     Outcome: Ongoing (interventions implemented as appropriate)  Pt. Progressing towards goals

## 2017-08-07 NOTE — PT/OT/SLP PROGRESS
"Occupational Therapy  Treatment & Discharge    Maksim Hidalgo   MRN: [id number]   Admitting Diagnosis: ESRD (end stage renal disease)    OT Date of Treatment: 08/07/17   OT Start Time: 1322  OT Stop Time: 1401  OT Total Time (min): 39 min    Billable Minutes:  Self Care/Home Management 15 and Therapeutic Activity 24    General Precautions: Standard, fall  Orthopedic Precautions: N/A  Braces: N/A    Subjective:  Communicated with nursing prior to session. As per nursing, pt ok to be seen for therapy at this time. Pt agreeable to therapy at this time.   "i've been taking care of myself just fine"  Pain/Comfort  Pain Rating 1:  (pt reporting pain in the abdomen when ambulating, however did not assign a numerical value to the pain)    Objective:    Transfers:   Sit <> Stand Assistance: Modified Independent (from recliner chair; utilizing a SW; no cues req for proper hand placement and safety)  Sit <> Stand Assistive Device: Standard Walker  Toilet Transfer Technique:  (ambulating to the toilet in the bathroom)  Toilet Transfer Assistance: Modified Independent (req additional time); able to simulate toilet hygiene with unsupported dynamic standing balance without LOB  Toilet Transfer Assistive Device: Standard Walker    Functional Ambulation: Pt ambulated from the recliner chair to the bathroom and back utilizing standard walker with mod I and no noted LOB or fatigue. Pt c/o pain in abdomen once during mobility, however was able to continue to walk.     Activities of Daily Living:  LE Dressing Level of Assistance: Modified independent (additional time req; pt able to don/doff socks seated in tailor sitting position and in anterior trunk flexion); pt able to maintain balance without support of SW while bending knees  Grooming Position: Standing at sink ; pt able to reach in all planes and across midline with both UE's without LOB or UE support  Grooming Level of Assistance: Modified independent (additional time req) " "  Toileting Where Assessed: Toilet   Toileting Level of Assistance: Independent     Balance:   Static Sit: GOOD+: Takes MAXIMAL challenges from all directions.    Dynamic Sit: NORMAL: No deviations seen in posture held dynamically  Static Stand: NORMAL: No deviations seen in posture held statically  Dynamic stand: GOOD+: Independent gait (with or without assistive device)    Therapeutic Activities:  Standard walker was removed and pt was able to maintain static stand without assist or LOB. Pt able to retreive items above shoulder level, knee height, and at floor level without support from walker and without assistance.   High levels of pain in BLE's and RUE tremors absent throughout session.  Pt educated on energy conservation techniques as he reported being fatigued at the end of the session, however not physiological signs were observed.   Pt educated on role of OT and POC  Communication board updated-removed name    [facility] 6 CLICK ADL   How much help from another person does this patient currently need?   1 = Unable, Total/Dependent Assistance  2 = A lot, Maximum/Moderate Assistance  3 = A little, Minimum/Contact Guard/Supervision  4 = None, Modified [place]/Independent    Putting on and taking off regular lower body clothing? : 4  Bathing (including washing, rinsing, drying)?: 3  Toileting, which includes using toilet, bedpan, or urinal? : 4  Putting on and taking off regular upper body clothing?: 4  Taking care of personal grooming such as brushing teeth?: 4  Eating meals?: 4  Total Score: 23     AM-PAC Raw Score CMS "G-Code Modifier Level of Impairment Assistance   6 % Total / Unable   7 - 8 CM 80 - 100% Maximal Assist   9-13 CL 60 - 80% Moderate Assist   14 - 19 CK 40 - 60% Moderate Assist   20 - 22 CJ 20 - 40% Minimal Assist   23 CI 1-20% SBA / CGA   24 CH 0% Independent/ Mod I       Patient left up in chair with call button in reach and nursing notified    ASSESSMENT:  Maksim Hidalgo is " a 37 y.o. male with a medical diagnosis of ESRD (end stage renal disease). Pt has drastically improved functionally from the last session. He is now independent or req mod I for ADL's, transfers, and functional mobility at this time. RUE tremor and pain levels in BLE's have improved and were no longer evident throughout the session. Pt no longer requires skilled OT services at this time and would be safe to d/c home with home health OT services.     Activity tolerance: Good    Discharge recommendations: Discharge Facility/Level Of Care Needs: home health OT     Barriers to discharge: Barriers to Discharge: Decreased caregiver support    Equipment recommendations: walker, rolling     GOALS:    Occupational Therapy Goals     Not on file          Multidisciplinary Problems (Resolved)        Problem: Occupational Therapy Goal    Goal Priority Disciplines Outcome Interventions   Occupational Therapy Goal   (Resolved)     OT, PT/OT Outcome(s) achieved    Description:  Goals to be met by: 8/17/2017    Patient will increase functional independence with ADLs by performing:    UE Dressing with Set-up Assistance. Met  LE Dressing with Set-up Assistance. Met  Grooming while standing with Modified [place]. Met  Toileting from toilet with Modified [place] for hygiene and clothing management. Met  Sitting at edge of bed x 25 minutes with Supervision. Met   Supine to sit with Modified [place]. Met  Stand pivot transfers with Modified [place]. Met  Toilet transfer to toilet with Modified [place]. Met                        Plan:  Patient to be seen 3 x/week to address the above listed problems via therapeutic activities, therapeutic exercises, self-care/home management  Plan of Care expires: 09/02/17  Plan of Care reviewed with: patient         Yessy Cassandra, OT  08/07/2017
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
## 2017-08-08 VITALS
BODY MASS INDEX: 29.35 KG/M2 | HEART RATE: 73 BPM | TEMPERATURE: 96 F | OXYGEN SATURATION: 96 % | HEIGHT: 70 IN | WEIGHT: 205 LBS | SYSTOLIC BLOOD PRESSURE: 124 MMHG | DIASTOLIC BLOOD PRESSURE: 79 MMHG | RESPIRATION RATE: 18 BRPM

## 2017-08-08 PROBLEM — F51.02 ADJUSTMENT INSOMNIA: Status: ACTIVE | Noted: 2017-08-08

## 2017-08-08 LAB
ANION GAP SERPL CALC-SCNC: 11 MMOL/L
BACTERIA BLD CULT: NORMAL
BACTERIA BLD CULT: NORMAL
BASOPHILS # BLD AUTO: 0.04 K/UL
BASOPHILS NFR BLD: 0.5 %
BUN SERPL-MCNC: 39 MG/DL
CALCIUM SERPL-MCNC: 9.4 MG/DL
CHLORIDE SERPL-SCNC: 109 MMOL/L
CO2 SERPL-SCNC: 21 MMOL/L
CREAT SERPL-MCNC: 7 MG/DL
DIFFERENTIAL METHOD: ABNORMAL
EOSINOPHIL # BLD AUTO: 0.4 K/UL
EOSINOPHIL NFR BLD: 5.9 %
ERYTHROCYTE [DISTWIDTH] IN BLOOD BY AUTOMATED COUNT: 12.3 %
EST. GFR  (AFRICAN AMERICAN): 10.4 ML/MIN/1.73 M^2
EST. GFR  (NON AFRICAN AMERICAN): 9 ML/MIN/1.73 M^2
GLUCOSE SERPL-MCNC: 76 MG/DL
HCT VFR BLD AUTO: 30.2 %
HGB BLD-MCNC: 10.1 G/DL
LYMPHOCYTES # BLD AUTO: 2.3 K/UL
LYMPHOCYTES NFR BLD: 31.9 %
MCH RBC QN AUTO: 28.8 PG
MCHC RBC AUTO-ENTMCNC: 33.4 G/DL
MCV RBC AUTO: 86 FL
MONOCYTES # BLD AUTO: 0.8 K/UL
MONOCYTES NFR BLD: 11 %
NEUTROPHILS # BLD AUTO: 3.7 K/UL
NEUTROPHILS NFR BLD: 50.6 %
PLATELET # BLD AUTO: 279 K/UL
PMV BLD AUTO: 9.7 FL
POTASSIUM SERPL-SCNC: 4.4 MMOL/L
RBC # BLD AUTO: 3.51 M/UL
SODIUM SERPL-SCNC: 141 MMOL/L
WBC # BLD AUTO: 7.28 K/UL

## 2017-08-08 PROCEDURE — 99239 HOSP IP/OBS DSCHRG MGMT >30: CPT | Mod: GC,NTX,, | Performed by: HOSPITALIST

## 2017-08-08 PROCEDURE — 85025 COMPLETE CBC W/AUTO DIFF WBC: CPT | Mod: NTX

## 2017-08-08 PROCEDURE — 63600175 PHARM REV CODE 636 W HCPCS: Mod: NTX | Performed by: NURSE PRACTITIONER

## 2017-08-08 PROCEDURE — 80048 BASIC METABOLIC PNL TOTAL CA: CPT | Mod: NTX

## 2017-08-08 PROCEDURE — 80100016 HC MAINTENANCE HEMODIALYSIS: Mod: NTX

## 2017-08-08 PROCEDURE — 36415 COLL VENOUS BLD VENIPUNCTURE: CPT | Mod: NTX

## 2017-08-08 PROCEDURE — 63600175 PHARM REV CODE 636 W HCPCS: Mod: NTX | Performed by: INTERNAL MEDICINE

## 2017-08-08 PROCEDURE — 25000003 PHARM REV CODE 250: Mod: NTX | Performed by: STUDENT IN AN ORGANIZED HEALTH CARE EDUCATION/TRAINING PROGRAM

## 2017-08-08 PROCEDURE — 27000207 HC ISOLATION: Mod: NTX

## 2017-08-08 PROCEDURE — 36593 DECLOT VASCULAR DEVICE: CPT | Mod: NTX

## 2017-08-08 PROCEDURE — 90935 HEMODIALYSIS ONE EVALUATION: CPT | Mod: NTX,,, | Performed by: INTERNAL MEDICINE

## 2017-08-08 PROCEDURE — 25000003 PHARM REV CODE 250: Mod: NTX | Performed by: SURGERY

## 2017-08-08 PROCEDURE — 90792 PSYCH DIAG EVAL W/MED SRVCS: CPT | Mod: GC,NTX,, | Performed by: PSYCHIATRY & NEUROLOGY

## 2017-08-08 PROCEDURE — A4216 STERILE WATER/SALINE, 10 ML: HCPCS | Mod: NTX | Performed by: STUDENT IN AN ORGANIZED HEALTH CARE EDUCATION/TRAINING PROGRAM

## 2017-08-08 RX ORDER — SODIUM CHLORIDE 9 MG/ML
INJECTION, SOLUTION INTRAVENOUS ONCE
Status: DISCONTINUED | OUTPATIENT
Start: 2017-08-08 | End: 2017-08-08 | Stop reason: HOSPADM

## 2017-08-08 RX ORDER — LINEZOLID 600 MG/1
600 TABLET, FILM COATED ORAL EVERY 12 HOURS
Qty: 10 TABLET | Refills: 0 | Status: SHIPPED | OUTPATIENT
Start: 2017-08-08 | End: 2017-08-13

## 2017-08-08 RX ADMIN — Medication 3 ML: at 07:08

## 2017-08-08 RX ADMIN — CALCITRIOL 0.5 MCG: 0.25 CAPSULE, LIQUID FILLED ORAL at 08:08

## 2017-08-08 RX ADMIN — Medication 3 ML: at 02:08

## 2017-08-08 RX ADMIN — PANTOPRAZOLE SODIUM 40 MG: 40 TABLET, DELAYED RELEASE ORAL at 08:08

## 2017-08-08 RX ADMIN — HEPARIN SODIUM 1000 UNITS: 1000 INJECTION, SOLUTION INTRAVENOUS; SUBCUTANEOUS at 06:08

## 2017-08-08 RX ADMIN — SEVELAMER CARBONATE 800 MG: 800 TABLET, FILM COATED ORAL at 12:08

## 2017-08-08 RX ADMIN — FUROSEMIDE 40 MG: 40 TABLET ORAL at 08:08

## 2017-08-08 RX ADMIN — SEVELAMER CARBONATE 800 MG: 800 TABLET, FILM COATED ORAL at 08:08

## 2017-08-08 RX ADMIN — SEVELAMER CARBONATE 800 MG: 800 TABLET, FILM COATED ORAL at 06:08

## 2017-08-08 RX ADMIN — LINEZOLID 600 MG: 600 TABLET, FILM COATED ORAL at 07:08

## 2017-08-08 RX ADMIN — LISINOPRIL 10 MG: 10 TABLET ORAL at 08:08

## 2017-08-08 RX ADMIN — ALTEPLASE 4 MG: 2.2 INJECTION, POWDER, LYOPHILIZED, FOR SOLUTION INTRAVENOUS at 11:08

## 2017-08-08 RX ADMIN — LINEZOLID 600 MG: 600 TABLET, FILM COATED ORAL at 08:08

## 2017-08-08 NOTE — DISCHARGE SUMMARY
"[facility] Hospital Medicine  Discharge Summary      Patient Name: Maksim Hidalgo  MRN: [id number]  Admission Date: 7/31/2017  Hospital Length of Stay: 7 days  Discharge Date and Time:  08/08/2017 1:00 PM  Attending Physician: Avelino Abebe MD   Discharging Provider: Carol Wood MD  Primary Care Provider: Saurabh Zuleta MD  Hospital Medicine Team: [facility] 3 Carol Wood MD    HPI:   Mr. Hidalgo is a 37 y.o. AAM with a h/o ESRD on dialysis secondary to uncontrolled HTN, admitted for obstructed peritoneal catheter removal. He has been receiving HD MWF through a R tunneled catheter and is currently being worked up for permanent fistula. Following his catheter removal he developed diffuse, aching pain all over his body which he describes as "someone punching me." He reports weakness, tremor, fatigue, SOB, HA, dysuria, and subjective fevers/chills as he feels "wet." Patient was in a normal state of health prior to this admission. He was able to ambulate and speak normally. His weakness and pain is now to the point where he cannot move in bed and speaks only a few words at a time.     Procedure(s) (LRB):  REMOVAL-CATHETER-DIALYSIS-PERITONEAL (N/A)      Indwelling Lines/Drains at time of discharge:   Lines/Drains/Airways     Central Venous Catheter Line                 Hemodialysis Catheter right internal jugular 99911 days         Percutaneous Central Line Insertion/Assessment - double lumen  07/07/17 1600 right internal jugular 31 days              Hospital Course:   Maksim Hidalgo underwent peritoneal dialysis catheter removal on 7/31/2017.  Patients post surgical course complicated by bilateral lower extremity myalgias and a right upper extremity tremor.  Initially on the surgery service followed by the hospitalist comanagment team.  Neurology consulted and was unable to find a physiological explanation behind patients tremor or pain.  Started on gabapentin with " improvement of symptoms.  Worked with PT/OT who have been recommending SNF placement secondary to debility.  He was transferred to IM 3 after found to have fever of 100.3 F.  Blood cultures obtained which have been no growth today.  Urine culture demonstrating enterococcus sensitive to linezolid. US of abdomen does not reveal enough fluid to complete IR paracentesis. Patient started on vanc and rocephin 8/4 and started on linezolid 8/6. Cell count from IVF not consistent with SBP.  Blood and intra-operative cultures have been negative or no growth to date.  MRI of the spine has unremarkable with exception of bulge of the L5-S1 disc without impingement.  Consulted psychiatry and pain management for further evaluation of patient's pain. Recommendations pending. PT OT recommending discharge home with home health.      Consults:   Consults         Status Ordering Provider     Inpatient consult to Hospital Medicine-General  Once     Provider:  (Not yet assigned)    Completed BONY CROCKETT     Inpatient consult to Interventional Radiology  Once     Provider:  (Not yet assigned)    Completed CORNELL MAY     Inpatient consult to Nephrology  Once     Provider:  (Not yet assigned)    Completed BONY CROCKETT     Inpatient consult to Neurology  Once     Provider:  (Not yet assigned)    Completed BONY CROCKETT     Inpatient consult to Neurology  Once     Provider:  PROCEDURES, NEUROLOGY    Completed LEAH CLINOTN     Inpatient consult to Pain Management  Once     Provider:  (Not yet assigned)    Acknowledged BONY CROCKETT     Inpatient consult to Psychiatry  Once     Provider:  (Not yet assigned)    Completed ASHVIN BROWN     Inpatient consult to Psychiatry  Once     Provider:  (Not yet assigned)    Acknowledged BREE ORO        /84 (BP Location: Left arm, Patient Position: Sitting, BP Method: Automatic)   Pulse 77   Temp 96.4 °F (35.8 °C) (Oral)   Resp 17    "Ht 5' 10" (1.778 m)   Wt 93 kg (205 lb)   SpO2 96%   BMI 29.41 kg/m²     Physical Exam   Constitutional: He is oriented to person, place, and time. He appears well-developed.   HENT:   Head: Normocephalic and atraumatic.   Neck: Neck supple. No JVD present.   Cardiovascular: Normal rate and regular rhythm.    No murmur heard.  Pulmonary/Chest: Effort normal and breath sounds normal. No respiratory distress.   Abdominal: Soft. Bowel sounds are normal. He exhibits no distension. There is tenderness (around PD catheter site).   Musculoskeletal:   Pt is tender bilateral lower extremities that is most noticeable below the knees improved from previous    Neurological: He is alert and oriented to person, place, and time. No cranial nerve deficit.   Motor Strength  RUE 4/5  LUE 4/5  RLE 4/5  LLE 4/5   Skin: Skin is warm.   Psychiatric: Thought content normal.   Flat affect     Significant Diagnostic Studies: Labs:   CMP     Recent Labs  Lab 08/07/17  0441 08/08/17 0443    141   K 4.3 4.4    109   CO2 22* 21*   GLU 99 76   BUN 38* 39*   CREATININE 6.7* 7.0*   CALCIUM 9.2 9.4   ANIONGAP 10 11   ESTGFRAFRICA 10.9* 10.4*   EGFRNONAA 9.5* 9.0*    and CBC     Recent Labs  Lab 08/07/17  0441 08/08/17 0443   WBC 6.73 7.28   HGB 9.9* 10.1*   HCT 30.5* 30.2*    279       Pending Diagnostic Studies:     Procedure Component Value Units Date/Time    CBC auto differential [[id number]] Collected:  07/31/17 1748    Order Status:  Sent Lab Status:  In process Updated:  07/31/17 1748    Specimen:  Blood from Blood     Drug screen panel, emergency [[id number]] Collected:  08/02/17 1631    Order Status:  Sent Lab Status:  In process Updated:  08/02/17 1632    Specimen:  Urine from Urine, Clean Catch     IR Paracentesis with Imaging [[id number]]     Order Status:  Sent Lab Status:  No result         Final Active Diagnoses:    Diagnosis Date Noted POA    PRINCIPAL PROBLEM:  ESRD (end stage renal disease) [N18.6] 07/07/2017 " Yes    Tremor of right hand [R25.1] 08/04/2017 Yes    Sepsis [A41.9, R65.20] 08/04/2017 No    Weakness [R53.1] 08/02/2017 Yes    Intractable pain [R52] 08/02/2017 No    Neuropathic pain [M79.2] 08/02/2017 No    Altered mental status [R41.82] 08/01/2017 Yes    Anemia in ESRD (end-stage renal disease) [N18.6, D63.1]  Yes     Chronic    ESRD (end stage renal disease) on dialysis [N18.6, Z99.2]  Not Applicable    Secondary hyperparathyroidism [N25.81] 05/27/2016 Yes      Problems Resolved During this Admission:    Diagnosis Date Noted Date Resolved POA    Hyperalgesia [R20.8] 08/02/2017 08/03/2017 No    Uremia [N19] 08/02/2017 08/03/2017 Yes      Sepsis    Patient had 2/4 SIRS criteria with source being likely urinary tract infection- WBC WNL  -Blood cultures and intra-operative peritoneal fluid cultures are negative, Cell count of peritoneal fluid not consistent with SBP  - Started pt on abx- vanc and rocephin 8/4, will discontinue vanc  - Only plausible source is from urinary tract which was positive for VRE sensitive to linezolid and macrobid.  -Transitioned to linezolid for 7 day course          Neuropathic pain    - no physiological source of pain has been identified  - Continue Gabapentin 300mg qhs           Intractable pain    - d/w Neurology who agrees that there is no physiologic source of this pain  -  (<2/3 ULN, not concerning due to recent surgical procedure)  - ESR 27 (<2/3 ULN, not concerning due to recent surgical procedure)  - Continue to monitor electrolytes (Ca, K, Mg, Phos)  - continue  Percocet 5mg q4h prn  - Continue Gabapentin 300mg qhs  - consulted psychiatry and pain management for further evaluation and recommendations          Weakness    Assessment  - The patient is in a deconditioned, debilitated state  - This appears to be the primary  of his generalized weakness  - He has no focal findings of weakness on physical exam  - His electrolytes are WNL with exceptionThese  are managed through dialysis as the patient is ESRD  - Continue PT/OT  - Will follow up PT/OT recommendations for therapy upon discharge from hospital kim  - Appreciate Neurology recommendations  - Spine imaging unremarkable   -  psych consult for possible conversion disorder        Tremor of right hand    - RUE tremor is decreasing in frequency and intensity   - appreciate Neurology assessment and treatment recommendations  - agree that tremor is non-physiologic in appearance          ESRD (end stage renal disease) on dialysis    Due for HD 8/8 via right IJ permacath, stable for discharge after dialysis  Followed by nephrology, appreciate all recommendations  Last HD session was 8/5 which was not a full run 2/2 sluggish catheter flow.               Discharged Condition: good    Disposition: Home or Self Care- home with home health    Follow Up:  Follow-up Information     Isai Gillespie MD On 8/15/2017.    Specialties:  General Surgery, Bariatrics  Why:  wound check/Appt: 8/15/17 at 08:45 AM.   Contact information:  [address] [facility] [address]  [phone]                 Patient Instructions:     Diet general     Diet general     Activity as tolerated     Lifting restrictions   Order Comments: No lifting anything over 10lbs for 6 weeks     Call MD for:  temperature >100.4     Call MD for:  persistent nausea and vomiting     Call MD for:  severe uncontrolled pain     Call MD for:  difficulty breathing, headache or visual disturbances     Call MD for:  redness, tenderness, or signs of infection (pain, swelling, redness, odor or green/yellow discharge around incision site)     Remove dressing in 48 hours   Order Comments: After that, no dressing needed  Ok to shower over incision  No soaking in a tub or pool for 4 weeks  Steri strips will fall off on their own     Activity as tolerated     Lifting restrictions   Order Comments: No lifting anything over 10lbs for 6 weeks        Medications:  Reconciled Home Medications:   Current Discharge Medication List      START taking these medications    Details   gabapentin (NEURONTIN) 300 MG capsule Take 1 capsule (300 mg total) by mouth every evening.  Qty: 30 capsule, Refills: 1      linezolid (ZYVOX) 600 mg Tab Take 1 tablet (600 mg total) by mouth every 12 (twelve) hours.  Qty: 10 tablet, Refills: 0         CONTINUE these medications which have CHANGED    Details   oxycodone-acetaminophen (PERCOCET) 5-325 mg per tablet Take 1 tablet by mouth every 4 (four) hours as needed for Pain.  Qty: 25 tablet, Refills: 0         CONTINUE these medications which have NOT CHANGED    Details   albuterol 90 mcg/actuation inhaler Inhale 2 puffs into the lungs every 4 (four) hours as needed for Wheezing.  Qty: 1 Inhaler, Refills: 0    Associated Diagnoses: Acute non-recurrent sinusitis, unspecified location      calcitRIOL (ROCALTROL) 0.5 MCG Cap Take 1 capsule (0.5 mcg total) by mouth once daily.  Qty: 30 capsule, Refills: 11      fluconazole (DIFLUCAN) 200 MG Tab Take 1 tablet (200 mg total) by mouth every other day.  Qty: 3 tablet, Refills: 0      furosemide (LASIX) 40 MG tablet Take 1 tablet (40 mg total) by mouth once daily.  Qty: 30 tablet, Refills: 0    Associated Diagnoses: ESRD (end stage renal disease)      INV lisinopril 10 MG Tab Take 1 tablet (10 mg total) by mouth once daily.  Qty: 30 tablet, Refills: 11      melatonin 5 mg Tab Take 1 tablet (5 mg total) by mouth every evening.    Associated Diagnoses: Adjustment insomnia      pantoprazole (PROTONIX) 40 MG tablet Take 1 tablet (40 mg total) by mouth once daily.  Qty: 30 tablet, Refills: 11      senna-docusate 8.6-50 mg (PERICOLACE) 8.6-50 mg per tablet Take 2 tablets by mouth every evening.      benzonatate (TESSALON) 200 MG capsule Take 1 capsule (200 mg total) by mouth every evening.  Qty: 30 capsule, Refills: 0    Associated Diagnoses: Acute non-recurrent sinusitis, unspecified location       sevelamer carbonate (RENVELA) 800 mg Tab Take 1 tablet (800 mg total) by mouth 3 (three) times daily with meals.  Qty: 90 tablet, Refills: 11               HOS POC IP DISCHARGE SUMMARY    Carol Wood MD  Department of Hospital Medicine  [facility]
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
## 2017-08-08 NOTE — PLAN OF CARE
Problem: Patient Care Overview  Goal: Plan of Care Review  Outcome: Ongoing (interventions implemented as appropriate)  Hemodialysis is complete.  Blood was returned.  CVC dialysis catheter to RIJ  was flushed, capped and Locked with 1000 unit/ml Heparin.  HD time = 150 min.  UF = 1524 ml.  Patient clotted with 30 min. Remaining to treatment.  Dr. Matamoros gave order to discontinue treatment.

## 2017-08-08 NOTE — ASSESSMENT & PLAN NOTE
-seems inconsistent between examiners and history he provides is somewhat inconsistent, which may be an indicator of a conversion disorder or malingering  -however, no inciting event that might trigger conversion symptoms can be identified and he does not seem to have any underlying psychiatric illnesses  -most importantly, conversion disorder would be a diagnosis of exclusion, and we would like to pursue further work-up for organic causes before arriving at such a diagnosis  -would recommend MRI brain at this time

## 2017-08-08 NOTE — ASSESSMENT & PLAN NOTE
Due for HD 8/8 via right IJ permacath, stable for discharge after dialysis  Followed by nephrology, appreciate all recommendations  Last HD session was 8/5 which was not a full run 2/2 sluggish catheter flow.

## 2017-08-08 NOTE — PLAN OF CARE
[facility]    HOME HEALTH ORDERS  FACE TO FACE ENCOUNTER    Patient Name: Maksim Hidalgo  YOB: 1980    PCP: Saurabh Zuleta MD   PCP [facility] [address] [facility] [place] [address]  PCP Phone Number: [phone]  PCP Fax: [phone]    Encounter Date: 08/08/2017    Admit to Home Health    Diagnoses:  Active Hospital Problems    Diagnosis  POA    *ESRD (end stage renal disease) [N18.6]  Yes    Tremor of right hand [R25.1]  Yes     High amplitude, variable frequency.  Diminished with patient distraction.      Sepsis [A41.9, R65.20]  No    Weakness [R53.1]  Yes     08/01/17      Intractable pain [R52]  No    Neuropathic pain [M79.2]  No    Altered mental status [R41.82]  Yes    Anemia in ESRD (end-stage renal disease) [N18.6, D63.1]  Yes     Chronic    ESRD (end stage renal disease) on dialysis [N18.6, Z99.2]  Not Applicable    Secondary hyperparathyroidism [N25.81]  Yes      Resolved Hospital Problems    Diagnosis Date Resolved POA    Hyperalgesia [R20.8] 08/03/2017 No    Uremia [N19] 08/03/2017 Yes       Future Appointments  Date Time Provider Department Center   8/8/2017 1:15 PM [facility]   8/15/2017 8:45 AM Isai Gillespie MD [facility]Y [facility]   8/24/2017 3:00 PM Saurabh Zuleta MD [facility]   10/19/2017 8:45 AM Isai Gillespie MD [facility] [place]     Follow-up Information     Isai Gillespie MD On 8/15/2017.    Specialties:  General Surgery, Bariatrics  Why:  wound check/Appt: 8/15/17 at 08:45 AM.   Contact information:  [address]  [facility] [address]  [phone]                     I have seen and examined this patient face to face today. My clinical findings that support the need for the home health skilled services and home bound status are the following:  Weakness/numbness causing balance and gait disturbance due to Infection and Weakness/Debility making it taxing to leave  home.    Allergies:Review of patient's allergies indicates:  No Known Allergies    Diet: renal diet    Activities: activity as tolerated    Nursing:   SN to complete comprehensive assessment including routine vital signs. Instruct on disease process and s/s of complications to report to MD. Review/verify medication list sent home with the patient at time of discharge  and instruct patient/caregiver as needed. Frequency may be adjusted depending on start of care date.    Notify MD if SBP > 160 or < 90; DBP > 90 or < 50; HR > 120 or < 50; Temp > 101      CONSULTS:    Physical Therapy to evaluate and treat. Evaluate for home safety and equipment needs; Establish/upgrade home exercise program. Perform / instruct on therapeutic exercises, gait training, transfer training, and Range of Motion.  Occupational Therapy to evaluate and treat. Evaluate home environment for safety and equipment needs. Perform/Instruct on transfers, ADL training, ROM, and therapeutic exercises.    MISCELLANEOUS CARE:  N/A    WOUND CARE ORDERS  n/a      Medications: Review discharge medications with patient and family and provide education.      Current Discharge Medication List      START taking these medications    Details   gabapentin (NEURONTIN) 300 MG capsule Take 1 capsule (300 mg total) by mouth every evening.  Qty: 30 capsule, Refills: 1      linezolid (ZYVOX) 600 mg Tab Take 1 tablet (600 mg total) by mouth every 12 (twelve) hours.  Qty: 10 tablet, Refills: 0         CONTINUE these medications which have CHANGED    Details   oxycodone-acetaminophen (PERCOCET) 5-325 mg per tablet Take 1 tablet by mouth every 4 (four) hours as needed for Pain.  Qty: 25 tablet, Refills: 0         CONTINUE these medications which have NOT CHANGED    Details   albuterol 90 mcg/actuation inhaler Inhale 2 puffs into the lungs every 4 (four) hours as needed for Wheezing.  Qty: 1 Inhaler, Refills: 0    Associated Diagnoses: Acute non-recurrent sinusitis,  unspecified location      calcitRIOL (ROCALTROL) 0.5 MCG Cap Take 1 capsule (0.5 mcg total) by mouth once daily.  Qty: 30 capsule, Refills: 11      fluconazole (DIFLUCAN) 200 MG Tab Take 1 tablet (200 mg total) by mouth every other day.  Qty: 3 tablet, Refills: 0      furosemide (LASIX) 40 MG tablet Take 1 tablet (40 mg total) by mouth once daily.  Qty: 30 tablet, Refills: 0    Associated Diagnoses: ESRD (end stage renal disease)      INV lisinopril 10 MG Tab Take 1 tablet (10 mg total) by mouth once daily.  Qty: 30 tablet, Refills: 11      melatonin 5 mg Tab Take 1 tablet (5 mg total) by mouth every evening.    Associated Diagnoses: Adjustment insomnia      pantoprazole (PROTONIX) 40 MG tablet Take 1 tablet (40 mg total) by mouth once daily.  Qty: 30 tablet, Refills: 11      senna-docusate 8.6-50 mg (PERICOLACE) 8.6-50 mg per tablet Take 2 tablets by mouth every evening.      benzonatate (TESSALON) 200 MG capsule Take 1 capsule (200 mg total) by mouth every evening.  Qty: 30 capsule, Refills: 0    Associated Diagnoses: Acute non-recurrent sinusitis, unspecified location      sevelamer carbonate (RENVELA) 800 mg Tab Take 1 tablet (800 mg total) by mouth 3 (three) times daily with meals.  Qty: 90 tablet, Refills: 11             I certify that this patient is confined to his home and needs physical therapy and occupational therapy.

## 2017-08-08 NOTE — ASSESSMENT & PLAN NOTE
- d/w Neurology who agrees that there is no physiologic source of this pain  -  (<2/3 ULN, not concerning due to recent surgical procedure)  - ESR 27 (<2/3 ULN, not concerning due to recent surgical procedure)  - Continue to monitor electrolytes (Ca, K, Mg, Phos)  - continue  Percocet 5mg q4h prn  - Continue Gabapentin 300mg qhs  - consulted psychiatry and pain management for further evaluation and recommendations

## 2017-08-08 NOTE — PLAN OF CARE
Problem: Patient Care Overview  Goal: Plan of Care Review  Outcome: Ongoing (interventions implemented as appropriate)  Pt verbalized understanding plan of care. Frequent assessments done and fall precautions maintained. Pain and discomfort controlled. Will continue to monitor.

## 2017-08-08 NOTE — MEDICAL/APP STUDENT
"Mr. Hidalgo is a 36yo male with a h/o ESRD on HD 2/2 uncontrolled HTN who was admitted for PD cath removal who later developed UTI and multiple neurological symptoms.     Today, Mr. Hidalgo complains of significant "dizziness" which he describes not as spinning but rather lightheadedness. He also states he has shortness of breath. His pain in his legs and abdomen are unchanged. He also reports no changes in his neurological symptoms- decreased strength, RUE tremor, and decreased sensation on the right side. He had dysuria this morning and passed a normal bowel movement. Pt going for dialysis today.    Review of Systems   Respiratory: Positive for shortness of breath. Negative for cough.    Cardiovascular: Negative for chest pain.   Gastrointestinal: Positive for abdominal pain. Negative for constipation, diarrhea, nausea and vomiting.   Genitourinary: Positive for dysuria. Negative for frequency and urgency.   Neurological: Positive for tremors, sensory change and weakness.        Weakness and pain, worse in legs. Tremor in RUE. Decreased sensation on right side of body.     Temp:  [96.4 °F (35.8 °C)-98 °F (36.7 °C)] 96.4 °F (35.8 °C)  Pulse:  [66-91] 77  Resp:  [17-18] 17  SpO2:  [96 %-99 %] 96 %  BP: (124-132)/(75-98) 128/84     Physical Exam   Constitutional: He appears well-developed and well-nourished.   Cardiovascular: Normal rate, regular rhythm, normal heart sounds and intact distal pulses.    Pulmonary/Chest: Effort normal and breath sounds normal.   Abdominal: Soft. Bowel sounds are normal. There is tenderness. There is guarding.   Musculoskeletal: He exhibits tenderness.   Leg tenderness bilaterally   Neurological: He is alert.   Unable to assess DTRs 2/2 pain/pt unable to relax. Right hand began to shake when I asked him to squeeze. Pt was up walking with walker when I came in. Decreased sensation on right side.   Skin: Skin is warm and dry.     Recent Labs      08/07/17   1628  08/08/17   0443   WBC   --  "  7.28   HGB   --   10.1*   HCT   --   30.2*   PLT   --   279   NA   --   141   K   --   4.4   CL   --   109   CO2   --   21*   BUN   --   39*   CREATININE   --   7.0*   GLU   --   76   CPK  141   --      Imaging  MRI of C/T/L spine yesterday showed minor disc protrusions but no narrowing or impingement of spinal cord.    Assessment  Mr. Hidalgo is a 38yo man with a h/o ESRD being treated for UTI and assessed for neurological symptoms likely due to conversion disorder.     Plan  Neurological symptoms: No physiological basis for intermittent symptoms has been seen as of now. Psychiatry has been consulted to assess for conversion disorder.     Sepsis d/t UTI: Pt completed day 2 of 7 day course of linezolid 600mg bid for complicated VRE UTI. Pt can continue linezolid as outpatient at home. Blood cultures show no growth to date.     ESRD: continue HD, no change in renal status per labs. HD via perma cath has been sluggish, nephrology to work pt up for permanent fistula.     HTN: continue lasix and lisinopril    Date of discharge: Today after HD  Disposition: Home with home health    Irene Grant, MS3  8/8/17

## 2017-08-08 NOTE — ASSESSMENT & PLAN NOTE
Assessment  - The patient is in a deconditioned, debilitated state  - This appears to be the primary  of his generalized weakness  - He has no focal findings of weakness on physical exam  - His electrolytes are WNL with exceptionThese are managed through dialysis as the patient is ESRD  - Continue PT/OT  - Will follow up PT/OT recommendations for therapy upon discharge from hospital kim  - Appreciate Neurology recommendations  - Spine imaging unremarkable   -  psych consult for possible conversion disorder

## 2017-08-08 NOTE — SUBJECTIVE & OBJECTIVE
Interval History: Patient ambulating with aid of walker this AM. C spine MRI unremarkable.  Myalgias improved from previous day.  UE weakness improved. LE weakness and abdominal pain unchanged from yesterday.    Review of Systems   Constitutional: Negative for chills, diaphoresis and fever.   HENT: Negative for congestion and rhinorrhea.    Eyes: Negative for photophobia and pain.   Respiratory: Negative for shortness of breath.    Cardiovascular: Negative for chest pain and palpitations.   Gastrointestinal: Positive for abdominal pain. Negative for abdominal distention, constipation, diarrhea and nausea.   Musculoskeletal: Positive for myalgias. Negative for arthralgias (BLE).   Neurological: Positive for tremors (RUE). Negative for headaches.     Objective:     Vital Signs (Most Recent):  Temp: 96.4 °F (35.8 °C) (08/08/17 1129)  Pulse: 77 (08/08/17 1129)  Resp: 17 (08/08/17 1129)  BP: 128/84 (08/08/17 1129)  SpO2: 96 % (08/08/17 1129) Vital Signs (24h Range):  Temp:  [96.4 °F (35.8 °C)-98 °F (36.7 °C)] 96.4 °F (35.8 °C)  Pulse:  [66-91] 77  Resp:  [17-18] 17  SpO2:  [96 %-99 %] 96 %  BP: (124-132)/(75-98) 128/84     Weight: 93 kg (205 lb)  Body mass index is 29.41 kg/m².    Intake/Output Summary (Last 24 hours) at 08/08/17 1254  Last data filed at 08/08/17 1000   Gross per 24 hour   Intake                0 ml   Output                0 ml   Net                0 ml      Physical Exam   Constitutional: He is oriented to person, place, and time. He appears well-developed.   HENT:   Head: Normocephalic and atraumatic.   Neck: Neck supple. No JVD present.   Cardiovascular: Normal rate and regular rhythm.    No murmur heard.  Pulmonary/Chest: Effort normal and breath sounds normal. No respiratory distress.   Abdominal: Soft. Bowel sounds are normal. He exhibits no distension. There is tenderness (around PD catheter site).   Musculoskeletal:   Pt is tender bilateral lower extremities that is most noticeable below the  knees improved from previous    Neurological: He is alert and oriented to person, place, and time. No cranial nerve deficit.   Motor Strength  RUE 4/5  LUE 4/5  RLE 4/5  LLE 4/5   Skin: Skin is warm.   Psychiatric: Thought content normal.   Flat affect       Significant Labs: All pertinent labs within the past 24 hours have been reviewed.    Significant Imaging: I have reviewed all pertinent imaging results/findings within the past 24 hours.

## 2017-08-09 NOTE — PT/OT/SLP DISCHARGE
Physical Therapy Discharge Summary    Maksim Hidalgo  MRN: [id number]   ESRD (end stage renal disease)   Patient Discharged from acute Physical Therapy on 2017.  Please refer to prior PT noted date on 2017 for functional status.     Assessment:   Patient has not met goals.  GOALS:    Physical Therapy Goals        Problem: Physical Therapy Goal    Goal Priority Disciplines Outcome Goal Variances Interventions   Physical Therapy Goal     PT/OT, PT Ongoing (interventions implemented as appropriate)     Description:  Goals to be met by: 2017     Patient will increase functional independence with mobility by performin. Supine to sit with Set-up Floyd  2. Sit to supine with Set-up Floyd  3. Sit to stand transfer with Supervision  4. Bed to chair transfer with Supervision using Rolling Walker  5. Gait  x 75 feet with Supervision using Rolling Walker.   6. Lower extremity exercise program x15 reps per handout, with supervision                    Reasons for Discontinuation of Therapy Services  Transfer to alternate level of care.      Plan:  Patient Discharged to: Home with Home Health Service.    Avelino Quintana, PT  2017

## 2017-08-10 ENCOUNTER — TELEPHONE (OUTPATIENT)
Dept: INTERNAL MEDICINE | Facility: CLINIC | Age: 37
End: 2017-08-10

## 2017-08-10 DIAGNOSIS — Z91.81 AT RISK FOR FALLING: Primary | ICD-10-CM

## 2017-08-10 NOTE — TELEPHONE ENCOUNTER
----- Message from Sadi Hartman sent at 8/10/2017 10:03 AM CDT -----  Contact: [facility]   Type: Home Health (orders, updates, clarifications, etc.)    Home Health Agency/Nurse: [facility]     Phone number: [phone]    Reason for call: requesting an order for rolling walker sent to [facility] .    Comments:  Please advise , Thanks !

## 2017-08-11 DIAGNOSIS — N18.6 ESRD (END STAGE RENAL DISEASE): ICD-10-CM

## 2017-08-11 RX ORDER — FUROSEMIDE 40 MG/1
TABLET ORAL
Qty: 180 TABLET | Refills: 0 | Status: SHIPPED | OUTPATIENT
Start: 2017-08-11 | End: 2018-09-11

## 2017-08-11 NOTE — TELEPHONE ENCOUNTER
Patient hasn't seen Dr Zuleta since 4/2016, please remind him of upcoming appt 8/24/17. Did see priority clinic 6/2017.   Order for rolling walker placed

## 2017-08-15 ENCOUNTER — OFFICE VISIT (OUTPATIENT)
Dept: SURGERY | Facility: CLINIC | Age: 37
End: 2017-08-15
Payer: MEDICARE

## 2017-08-15 VITALS
HEIGHT: 70 IN | HEART RATE: 86 BPM | TEMPERATURE: 98 F | BODY MASS INDEX: 26.62 KG/M2 | DIASTOLIC BLOOD PRESSURE: 82 MMHG | SYSTOLIC BLOOD PRESSURE: 121 MMHG | WEIGHT: 185.94 LBS

## 2017-08-15 DIAGNOSIS — Z09 POSTOP CHECK: Primary | ICD-10-CM

## 2017-08-15 DIAGNOSIS — R10.30 LOWER ABDOMINAL PAIN: ICD-10-CM

## 2017-08-15 PROBLEM — Z99.2 PERITONEAL DIALYSIS STATUS: Status: RESOLVED | Noted: 2017-06-27 | Resolved: 2017-08-15

## 2017-08-15 PROCEDURE — 99024 POSTOP FOLLOW-UP VISIT: CPT | Mod: NTX,,, | Performed by: SURGERY

## 2017-08-15 PROCEDURE — 99213 OFFICE O/P EST LOW 20 MIN: CPT | Mod: PBBFAC,TXP | Performed by: SURGERY

## 2017-08-15 PROCEDURE — 99999 PR PBB SHADOW E&M-EST. PATIENT-LVL III: CPT | Mod: PBBFAC,TXP,, | Performed by: SURGERY

## 2017-08-15 NOTE — LETTER
[facility] - General Surgery  [address]  [place] [address]  Phone: [phone] August 15, 2017      Saurabh Zuleta MD  [address]  [place] [address]    Patient: Maksim Hidalgo   MR Number: [id number]   YOB: 1980   Date of Visit: 8/15/2017     Dear Dr. Zuleta:    Thank you for referring Maksim iHdalgo to me for evaluation. Below are the relevant portions of my assessment and plan of care.    Patient is status post PD removal with improving neurologic status and pain.    Plan:    -  Follow up for Fistula  -  Continue home health  -  Follow up her PRN  -  Obtain ultrasound and culture    If you have questions, please do not hesitate to call me. I look forward to following Maksim along with you.    Sincerely,      Isai Gillespie MD   Section Head - General, Laparoscopic, Bariatric  Acute Care and Oncologic Surgery   - Surgical Weight Loss Program  [facility]    WSR/allison    CC  Juventino Garcia MD
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
## 2017-08-17 NOTE — PHYSICIAN QUERY
PT Name: Maksim Hidalgo  MR #: [id number]    Physician Query Form - Cause and Effect Relationship Clarification      CDS/: Chen Ortiz               Contact information: [email or web address]      This form is a permanent document in the medical record.     Query Date: August 17, 2017    By submitting this query, we are merely seeking further clarification of documentation. Please utilize your independent clinical judgment when addressing the question(s) below.    The Medical record contains the following:  Supporting Clinical Findings   Location in record      Sepsis Patient had 2/4 SIRS criteria with source being likely urinary tract infection- WBC WNL 8/6   -Blood cultures and intra-operative peritoneal fluid cultures are negative, Cell count of peritoneal fluid not consistent with SBP   - Started pt on abx- vanc and rocephin 8/4, will discontinue vanc   - Only plausible source is from urinary tract which was positive for VRE sensitive to linezolid and macrobid.   -Will start appropriate antibiotics                                                                                               Blood cultures obtained which have been no growth today.  Urine culture demonstrating enterococcus.                                                                                           PN 8/6                                                                                                                                                                                                       Provider, please clarify if there is any correlation between ___sepsis______ and _enteroccus/UTI_________________.           Are the conditions:     [ x ] Due to or associated with each other     [  ] Unrelated to each other     [  ] Other (Please Specify): _________________________     [  ] Clinically Undetermined

## 2017-08-25 ENCOUNTER — TELEPHONE (OUTPATIENT)
Dept: INTERNAL MEDICINE | Facility: CLINIC | Age: 37
End: 2017-08-25

## 2017-08-25 NOTE — TELEPHONE ENCOUNTER
----- Message from Jeana Sloan MA sent at 8/25/2017 10:31 AM CDT -----  Contact: Saloni martinez/[facility] -   [phone]   Orders for Rolator Walker are in Epic but need to be faxed to [facility] at [phone]. Please call. Thanks!

## 2017-09-07 LAB — FUNGUS SPEC CULT: NORMAL

## 2017-10-02 LAB
ACID FAST MOD KINY STN SPEC: NORMAL
MYCOBACTERIUM SPEC QL CULT: NORMAL

## 2017-10-09 ENCOUNTER — TELEPHONE (OUTPATIENT)
Dept: INTERNAL MEDICINE | Facility: CLINIC | Age: 37
End: 2017-10-09

## 2017-10-09 NOTE — TELEPHONE ENCOUNTER
----- Message from Mattie Antony sent at 10/6/2017  4:16 PM CDT -----  Contact: Bri/[facility]/[phone]  Nora called to report that patient was scheduled to be discharge after today, but his grandmother dye. So Nora was unable to reach him therefore he was not discharge. He will have to be discharge as not visit (missed visit). Please call and advise.       Thank you!!!

## 2017-10-13 ENCOUNTER — OFFICE VISIT (OUTPATIENT)
Dept: INTERNAL MEDICINE | Facility: CLINIC | Age: 37
End: 2017-10-13
Payer: MEDICARE

## 2017-10-13 VITALS
SYSTOLIC BLOOD PRESSURE: 132 MMHG | DIASTOLIC BLOOD PRESSURE: 100 MMHG | HEIGHT: 69 IN | WEIGHT: 202.19 LBS | BODY MASS INDEX: 29.95 KG/M2 | HEART RATE: 76 BPM

## 2017-10-13 DIAGNOSIS — I15.0 RENOVASCULAR HYPERTENSION: Primary | ICD-10-CM

## 2017-10-13 DIAGNOSIS — Z99.2 ESRD (END STAGE RENAL DISEASE) ON DIALYSIS: ICD-10-CM

## 2017-10-13 DIAGNOSIS — K21.9 GASTROESOPHAGEAL REFLUX DISEASE, ESOPHAGITIS PRESENCE NOT SPECIFIED: ICD-10-CM

## 2017-10-13 DIAGNOSIS — N18.6 ESRD (END STAGE RENAL DISEASE) ON DIALYSIS: ICD-10-CM

## 2017-10-13 DIAGNOSIS — H35.033 HYPERTENSIVE RETINOPATHY OF BOTH EYES: ICD-10-CM

## 2017-10-13 DIAGNOSIS — R25.1 TREMOR OF RIGHT HAND: ICD-10-CM

## 2017-10-13 DIAGNOSIS — B18.1 CHRONIC HEPATITIS B VIRUS INFECTION: ICD-10-CM

## 2017-10-13 PROBLEM — R65.20 SEVERE SEPSIS: Status: RESOLVED | Noted: 2017-08-04 | Resolved: 2017-10-13

## 2017-10-13 PROBLEM — A41.9 SEVERE SEPSIS: Status: RESOLVED | Noted: 2017-08-04 | Resolved: 2017-10-13

## 2017-10-13 PROCEDURE — 99999 PR PBB SHADOW E&M-EST. PATIENT-LVL III: CPT | Mod: PBBFAC,,, | Performed by: INTERNAL MEDICINE

## 2017-10-13 PROCEDURE — 99213 OFFICE O/P EST LOW 20 MIN: CPT | Mod: PBBFAC | Performed by: INTERNAL MEDICINE

## 2017-10-13 PROCEDURE — 99215 OFFICE O/P EST HI 40 MIN: CPT | Mod: S$PBB,,, | Performed by: INTERNAL MEDICINE

## 2017-10-13 RX ORDER — BENAZEPRIL HYDROCHLORIDE 20 MG/1
20 TABLET ORAL DAILY
Qty: 90 TABLET | Refills: 3 | Status: SHIPPED | OUTPATIENT
Start: 2017-10-13 | End: 2018-12-15 | Stop reason: SDUPTHER

## 2017-10-13 NOTE — PATIENT INSTRUCTIONS
For blood pressure, stop lisinopril and change to benazepril 20 mg once daily. Please check with your Nephrology team if they want to increase the dose of furosemide (Lasix).

## 2017-10-13 NOTE — PROGRESS NOTES
"Subjective:       Patient ID: Maksim Hidalgo is a 37 y.o. male.    Chief Complaint: Follow-up    HPI    Last visit with me 04/2016. Over last year seen by Transplant, Psychiatry, General Surgery, Hepatology, [facility]. Had hospital stay for peritonitis. Hasn't been seen in last year by Optometry.    R hand with "problem." Taking gabapentin which helps with symptoms. Sx of tremor only, no nerve pain. None on left.     Goes to HD 3x/week, no longer doing PD. Due for fistula but hasn't been placed yet, has ongoing follow up with General Surgery. Currently using tunneled catheter for access.    Reports blurry vision sometimes. Notes that certain things are blurry when looking far away. BP for last few days is high, even on HD.    Not making much urine, taking Lasix but only goes once when using it.    occasionally with left side chest pain, only at rest.    Review of Systems    As per HPI    Objective:      Physical Exam   Constitutional: No distress.   -American man whose Body mass index is 29.85 kg/m².    HENT:   Right Ear: Tympanic membrane normal.   Left Ear: Tympanic membrane normal.   Mouth/Throat: Oropharynx is clear and moist. No oropharyngeal exudate.   Eyes: Conjunctivae and EOM are normal. Pupils are equal, round, and reactive to light. Right eye exhibits no discharge. Left eye exhibits no discharge.   Neck: No thyromegaly present.   Cardiovascular: Normal rate and regular rhythm.    Split S1 in RUSB, resolves with inhalation.   Pulmonary/Chest: Effort normal. No stridor. He has no wheezes. He has no rales.   Abdominal: Soft. There is no tenderness. There is no guarding.   Lymphadenopathy:     He has no cervical adenopathy.   Neurological: He is alert. He displays tremor (high frequency, low amplitude tremor of R hand, occasionally seen throughout exam).   Skin: Skin is warm and dry. No rash noted. He is not diaphoretic.   Psychiatric: He has a normal mood and affect. His behavior is " "normal.   Nursing note and vitals reviewed.      Vitals:    10/13/17 1448 10/13/17 1515   BP: (!) 136/98 (!) 132/100   BP Location: Right arm Right arm   Patient Position: Sitting Sitting   BP Method: Large (Manual) Large (Manual)   Pulse: 76    Weight: 91.7 kg (202 lb 2.6 oz)    Height: 5' 9" (1.753 m)      Body mass index is 29.85 kg/m².    I have personally checked the blood pressure and documented my findings.     RESULTS: Reviewed labs from last 12 months. Reviewed stress test 02/2017.    Assessment:       1. Renovascular hypertension    2. Gastroesophageal reflux disease, esophagitis presence not specified    3. Hypertensive retinopathy of both eyes    4. Tremor of right hand    5. ESRD (end stage renal disease) on dialysis    6. Chronic hepatitis B virus infection        Plan:   Maksim was seen today for follow-up.    Diagnoses and all orders for this visit:    Renovascular hypertension:  Not at goal today, elevated DBP, pt reports BPs have been high in HD as well. Not making much urine with Lasix; increase dose of ACEI, check with HD Nephrology if they want to increase Lasix.  -     benazepril (LOTENSIN) 20 MG tablet; Take 1 tablet (20 mg total) by mouth once daily.  -     Lipid panel; Future    Gastroesophageal reflux disease, esophagitis presence not specified:  Some left side chest pain at rest, likely secondary to GERD, continue PPI and use Tums PRN.    Hypertensive retinopathy of both eyes:  Seen by Optometry 1 yr ago, hasn't had recommended follow up, refer back to clinic for evaluation. Reports occasionally having blurry vision.    Tremor of right hand:  Some improvement with gabapentin, symptoms are stable. CT head and MRI C spine were normal over last 2 mo.    ESRD (end stage renal disease) on dialysis:  Supposed to have fistula placement, continuing to follow up with General Surgery. Continue HD 3x/week, will have flu vaccine given there.    Chronic hepatitis B virus infection:  Seen by Hepatology " in past, noted likely carrier state, no hepatic inflammation, no need for antivirals.    Return in about 6 months (around 4/13/2018) for fasting labs 1 week prior.  Saurabh Zuleta MD  Internal Medicine    Portions of this note were completed using [email or web address] dictation software. Please excuse typographical or syntax errors.

## 2017-10-19 ENCOUNTER — INITIAL CONSULT (OUTPATIENT)
Dept: VASCULAR SURGERY | Facility: CLINIC | Age: 37
End: 2017-10-19
Payer: MEDICARE

## 2017-10-19 ENCOUNTER — OFFICE VISIT (OUTPATIENT)
Dept: SURGERY | Facility: CLINIC | Age: 37
End: 2017-10-19
Payer: MEDICARE

## 2017-10-19 VITALS
TEMPERATURE: 98 F | WEIGHT: 201.94 LBS | HEART RATE: 112 BPM | SYSTOLIC BLOOD PRESSURE: 138 MMHG | BODY MASS INDEX: 29.93 KG/M2 | WEIGHT: 202.06 LBS | HEART RATE: 106 BPM | DIASTOLIC BLOOD PRESSURE: 83 MMHG | SYSTOLIC BLOOD PRESSURE: 131 MMHG | BODY MASS INDEX: 29.91 KG/M2 | HEIGHT: 69 IN | DIASTOLIC BLOOD PRESSURE: 88 MMHG | HEIGHT: 69 IN

## 2017-10-19 DIAGNOSIS — Z99.2 ESRD (END STAGE RENAL DISEASE) ON DIALYSIS: Primary | ICD-10-CM

## 2017-10-19 DIAGNOSIS — Z09 POSTOP CHECK: Primary | ICD-10-CM

## 2017-10-19 DIAGNOSIS — I11.9 HYPERTENSIVE LEFT VENTRICULAR HYPERTROPHY, WITHOUT HEART FAILURE: ICD-10-CM

## 2017-10-19 DIAGNOSIS — N18.6 ESRD (END STAGE RENAL DISEASE) ON DIALYSIS: Primary | ICD-10-CM

## 2017-10-19 PROCEDURE — 99211 OFF/OP EST MAY X REQ PHY/QHP: CPT | Mod: S$PBB,NTX,, | Performed by: SURGERY

## 2017-10-19 PROCEDURE — 99999 PR PBB SHADOW E&M-EST. PATIENT-LVL III: CPT | Mod: PBBFAC,TXP,, | Performed by: SURGERY

## 2017-10-19 PROCEDURE — 99214 OFFICE O/P EST MOD 30 MIN: CPT | Mod: S$PBB,NTX,, | Performed by: SURGERY

## 2017-10-19 PROCEDURE — 99213 OFFICE O/P EST LOW 20 MIN: CPT | Mod: PBBFAC,27,TXP | Performed by: SURGERY

## 2017-10-19 PROCEDURE — 99213 OFFICE O/P EST LOW 20 MIN: CPT | Mod: PBBFAC,TXP | Performed by: SURGERY

## 2017-10-19 RX ORDER — LIDOCAINE HYDROCHLORIDE 10 MG/ML
1 INJECTION, SOLUTION EPIDURAL; INFILTRATION; INTRACAUDAL; PERINEURAL ONCE
Status: CANCELLED | OUTPATIENT
Start: 2017-10-19 | End: 2017-10-19

## 2017-10-19 RX ORDER — MUPIROCIN 20 MG/G
OINTMENT TOPICAL
Status: CANCELLED | OUTPATIENT
Start: 2017-10-19

## 2017-10-19 NOTE — PROGRESS NOTES
Patient ID: Maksim Hidalgo is a 37 y.o. male.    I. HISTORY     Chief Complaint: Consult      HPI Maksim Hidalgo is a 37 y.o. male here for evaluation of new dialysis access. Renal failure is due to HTN. Currently dialyzes on M/W/F via a right IJ permcath. Patient is right handed. Has been on dialysis since mid-2016.   He did PD for over a year but has had recurrent catheter complications and has transitioned to HD.    No left sided catheters, pacemaker or AICD. No prior AV access procedures.    [facility] - Jose Saldana on     Review of Systems   Constitution: Negative.   HENT: Positive for congestion.    Eyes: Negative for blurred vision, vision loss in left eye and vision loss in right eye.   Cardiovascular: Negative for chest pain, claudication and dyspnea on exertion.   Respiratory: Negative for cough and sleep disturbances due to breathing.    Endocrine: Negative.    Hematologic/Lymphatic: Negative.    Skin: Negative.    Musculoskeletal: Negative.    Gastrointestinal: Negative for abdominal pain, nausea and vomiting.   Neurological: Negative.        II. PHYSICAL EXAM   Right Arm BP - Sittin/88 (10/19/17 0856)  Left Arm BP - Sittin/87 (10/19/17 0856)  Pulse: 106  Temp: 97.7 °F (36.5 °C)  Body mass index is 29.82 kg/m².      Physical Exam   Constitutional: He is oriented to person, place, and time. He appears well-developed and well-nourished.   HENT:   Head: Normocephalic and atraumatic.   Cardiovascular: Normal rate.    Pulses:       Radial pulses are 2+ on the right side, and 2+ on the left side.   Pulmonary/Chest: Effort normal. No respiratory distress.   Musculoskeletal: Normal range of motion. He exhibits no edema or tenderness.   Neurological: He is alert and oriented to person, place, and time. No cranial nerve deficit.   Skin: Skin is warm. No erythema.   Psychiatric: He has a normal mood and affect.   Vitals reviewed.      III. ASSESSMENT & PLAN (MEDICAL DECISION  MAKING)     1. ESRD (end stage renal disease) on dialysis    2. Hypertensive left ventricular hypertrophy, without heart failure        Imaging Results:    Vein mapping - adequate left cephalic for BC AVF, basilic >4mm       Assessment/Diagnosis and Plan:    Maksim Hidalgo is a 37 y.o. male with ESRD on HD. He has failed PD and has transitioned to HD.  Plan left arm AVF - BC vs BVT. Distal FA vein may dilate with block as well.  Scheduled for Thurs Oct 26.    I have explained the risks, benefits, and alternatives of the procedure in detail.   Risks include but are not limited to arterial steal, inability to use access, failure to mature, permanent injury to nerves or blood vessels, and need for repeat interventions or new access creation.  He voiced understanding, all questions were answered, and agrees to proceed as planned.    Josephine Mitchell MD FACS [facility]  Vascular & Endovascular Surgery

## 2017-10-19 NOTE — LETTER
October 19, 2017      Isai Gillespie MD  [address]  [place] [address]           [facility]  [address]  [facility] [address]  Phone: [phone]  Fax: [phone]          Patient: Maksim Hidalgo   MR Number: [id number]   YOB: 1980   Date of Visit: 10/19/2017       Dear Dr. Isai Gillespie:    Thank you for referring Maksim Hidalgo to me for evaluation. Attached you will find relevant portions of my assessment and plan of care.    If you have questions, please do not hesitate to call me. I look forward to following Maksim Hidalgo along with you.    Sincerely,    Josephine Mitchell MD    Enclosure  CC:  No Recipients    If you would like to receive this communication electronically, please contact [email or web address] or [phone] to request more information on [employer] access.    For providers and/or their staff who would like to refer a patient to [facility], please contact us through our one-stop-shop provider referral line, [facility], at [phone].    If you feel you have received this communication in error or would no longer like to receive these types of communications, please e-mail [email or web address]

## 2017-10-19 NOTE — LETTER
[facility] - General Surgery  [address]  [place] [address]  Phone: [phone] October 19, 2017      Saurabh Zuleta MD  [address]  [place] [address]    Patient: Maksim Hidalgo   MR Number: [id number]   YOB: 1980   Date of Visit: 10/19/2017     Dear Dr. Zuleta:    Thank you for referring Maksim Hidalgo to me for evaluation. Below are the relevant portions of my assessment and plan of care.    ASSESSMENT: Patient is status post removal pd 7/31/17.  He expresses no interest in PD.    PLAN: Will make sure he is set up for Fistula placement.     If you have questions, please do not hesitate to call me. I look forward to following Maksim along with you.    Sincerely,      Isai Gillespie MD   Section Head - General, Laparoscopic, Bariatric  Acute Care and Oncologic Surgery   - Surgical Weight Loss Program  [facility]    WSR/allison    CC  Juventino Garcia MD

## 2017-10-25 ENCOUNTER — TELEPHONE (OUTPATIENT)
Dept: VASCULAR SURGERY | Facility: CLINIC | Age: 37
End: 2017-10-25

## 2017-10-25 ENCOUNTER — ANESTHESIA EVENT (OUTPATIENT)
Dept: SURGERY | Facility: HOSPITAL | Age: 37
End: 2017-10-25
Payer: MEDICARE

## 2017-10-25 NOTE — PRE-PROCEDURE INSTRUCTIONS
Spoke with Patient.  NPO, medication, and pre-op instructions reviewed.  Denies previous problems with Anesthesia.  Goes to Dialysis on Mondays, Wednesdays, and Fridays via a Right IJ Permcath.  Verbalized understanding of instructions.

## 2017-10-26 ENCOUNTER — HOSPITAL ENCOUNTER (OUTPATIENT)
Facility: HOSPITAL | Age: 37
Discharge: HOME | End: 2017-10-26
Attending: SURGERY | Admitting: SURGERY
Payer: MEDICARE

## 2017-10-26 ENCOUNTER — ANESTHESIA (OUTPATIENT)
Dept: SURGERY | Facility: HOSPITAL | Age: 37
End: 2017-10-26
Payer: MEDICARE

## 2017-10-26 ENCOUNTER — SURGERY (OUTPATIENT)
Age: 37
End: 2017-10-26

## 2017-10-26 VITALS
OXYGEN SATURATION: 99 % | HEIGHT: 69 IN | TEMPERATURE: 99 F | HEART RATE: 97 BPM | WEIGHT: 198.44 LBS | DIASTOLIC BLOOD PRESSURE: 90 MMHG | BODY MASS INDEX: 29.39 KG/M2 | RESPIRATION RATE: 14 BRPM | SYSTOLIC BLOOD PRESSURE: 139 MMHG

## 2017-10-26 DIAGNOSIS — N18.6 ESRD (END STAGE RENAL DISEASE) ON DIALYSIS: Primary | ICD-10-CM

## 2017-10-26 DIAGNOSIS — Z99.2 ESRD (END STAGE RENAL DISEASE) ON DIALYSIS: Primary | ICD-10-CM

## 2017-10-26 DIAGNOSIS — I11.9 HYPERTENSIVE LEFT VENTRICULAR HYPERTROPHY, WITHOUT HEART FAILURE: ICD-10-CM

## 2017-10-26 PROCEDURE — 71000045 HC DOSC ROUTINE RECOVERY EA ADD'L HR: Mod: TXP | Performed by: SURGERY

## 2017-10-26 PROCEDURE — 71000015 HC POSTOP RECOV 1ST HR: Mod: NTX | Performed by: SURGERY

## 2017-10-26 PROCEDURE — 36000706: Mod: NTX | Performed by: SURGERY

## 2017-10-26 PROCEDURE — 37000008 HC ANESTHESIA 1ST 15 MINUTES: Mod: TXP | Performed by: SURGERY

## 2017-10-26 PROCEDURE — D9220A PRA ANESTHESIA: Mod: CRNA,NTX,, | Performed by: NURSE ANESTHETIST, CERTIFIED REGISTERED

## 2017-10-26 PROCEDURE — 27200750 HC INSULATED NEEDLE/ STIMUPLEX: Mod: TXP | Performed by: ANESTHESIOLOGY

## 2017-10-26 PROCEDURE — D9220A PRA ANESTHESIA: Mod: ANES,NTX,, | Performed by: ANESTHESIOLOGY

## 2017-10-26 PROCEDURE — 63600175 PHARM REV CODE 636 W HCPCS: Mod: TXP | Performed by: SURGERY

## 2017-10-26 PROCEDURE — 71000044 HC DOSC ROUTINE RECOVERY FIRST HOUR: Mod: NTX | Performed by: SURGERY

## 2017-10-26 PROCEDURE — 36821 AV FUSION DIRECT ANY SITE: CPT | Mod: GC,NTX,, | Performed by: SURGERY

## 2017-10-26 PROCEDURE — 25000003 PHARM REV CODE 250: Mod: TXP | Performed by: SURGERY

## 2017-10-26 PROCEDURE — 36000707: Mod: NTX | Performed by: SURGERY

## 2017-10-26 PROCEDURE — 37000009 HC ANESTHESIA EA ADD 15 MINS: Mod: TXP | Performed by: SURGERY

## 2017-10-26 PROCEDURE — 25000003 PHARM REV CODE 250: Mod: TXP | Performed by: NURSE ANESTHETIST, CERTIFIED REGISTERED

## 2017-10-26 PROCEDURE — 63600175 PHARM REV CODE 636 W HCPCS: Mod: TXP | Performed by: NURSE ANESTHETIST, CERTIFIED REGISTERED

## 2017-10-26 RX ORDER — HEPARIN SODIUM 1000 [USP'U]/ML
INJECTION, SOLUTION INTRAVENOUS; SUBCUTANEOUS
Status: DISCONTINUED | OUTPATIENT
Start: 2017-10-26 | End: 2017-10-26

## 2017-10-26 RX ORDER — HEPARIN SODIUM 1000 [USP'U]/ML
INJECTION, SOLUTION INTRAVENOUS; SUBCUTANEOUS
Status: DISCONTINUED | OUTPATIENT
Start: 2017-10-26 | End: 2017-10-26 | Stop reason: HOSPADM

## 2017-10-26 RX ORDER — FENTANYL CITRATE 50 UG/ML
INJECTION, SOLUTION INTRAMUSCULAR; INTRAVENOUS
Status: DISCONTINUED | OUTPATIENT
Start: 2017-10-26 | End: 2017-10-26

## 2017-10-26 RX ORDER — ONDANSETRON 2 MG/ML
INJECTION INTRAMUSCULAR; INTRAVENOUS
Status: DISCONTINUED | OUTPATIENT
Start: 2017-10-26 | End: 2017-10-26

## 2017-10-26 RX ORDER — ACETAMINOPHEN 325 MG/1
650 TABLET ORAL EVERY 6 HOURS PRN
Status: DISCONTINUED | OUTPATIENT
Start: 2017-10-26 | End: 2017-10-30 | Stop reason: HOSPADM

## 2017-10-26 RX ORDER — MUPIROCIN 20 MG/G
OINTMENT TOPICAL
Status: DISCONTINUED | OUTPATIENT
Start: 2017-10-26 | End: 2017-10-26

## 2017-10-26 RX ORDER — PROPOFOL 10 MG/ML
VIAL (ML) INTRAVENOUS CONTINUOUS PRN
Status: DISCONTINUED | OUTPATIENT
Start: 2017-10-26 | End: 2017-10-26

## 2017-10-26 RX ORDER — OXYCODONE AND ACETAMINOPHEN 5; 325 MG/1; MG/1
1 TABLET ORAL EVERY 4 HOURS PRN
Status: DISCONTINUED | OUTPATIENT
Start: 2017-10-26 | End: 2017-10-30 | Stop reason: HOSPADM

## 2017-10-26 RX ORDER — MIDAZOLAM HYDROCHLORIDE 1 MG/ML
INJECTION, SOLUTION INTRAMUSCULAR; INTRAVENOUS
Status: DISCONTINUED | OUTPATIENT
Start: 2017-10-26 | End: 2017-10-26

## 2017-10-26 RX ORDER — LIDOCAINE HYDROCHLORIDE 10 MG/ML
1 INJECTION, SOLUTION EPIDURAL; INFILTRATION; INTRACAUDAL; PERINEURAL ONCE
Status: COMPLETED | OUTPATIENT
Start: 2017-10-26 | End: 2017-10-26

## 2017-10-26 RX ORDER — SODIUM CHLORIDE 9 MG/ML
INJECTION, SOLUTION INTRAVENOUS CONTINUOUS PRN
Status: DISCONTINUED | OUTPATIENT
Start: 2017-10-26 | End: 2017-10-26

## 2017-10-26 RX ORDER — OXYCODONE AND ACETAMINOPHEN 5; 325 MG/1; MG/1
1 TABLET ORAL EVERY 6 HOURS PRN
Qty: 4 TABLET | Refills: 0 | Status: ON HOLD | OUTPATIENT
Start: 2017-10-26 | End: 2017-12-14 | Stop reason: HOSPADM

## 2017-10-26 RX ADMIN — FENTANYL CITRATE 25 MCG: 50 INJECTION, SOLUTION INTRAMUSCULAR; INTRAVENOUS at 12:10

## 2017-10-26 RX ADMIN — MUPIROCIN: 20 OINTMENT TOPICAL at 10:10

## 2017-10-26 RX ADMIN — HEPARIN SODIUM 3000 UNITS: 1000 INJECTION, SOLUTION INTRAVENOUS; SUBCUTANEOUS at 12:10

## 2017-10-26 RX ADMIN — HEPARIN SODIUM 10000 UNITS: 1000 INJECTION, SOLUTION INTRAVENOUS; SUBCUTANEOUS at 12:10

## 2017-10-26 RX ADMIN — LIDOCAINE HYDROCHLORIDE 0.5 MG: 10 INJECTION, SOLUTION EPIDURAL; INFILTRATION; INTRACAUDAL; PERINEURAL at 10:10

## 2017-10-26 RX ADMIN — FENTANYL CITRATE 50 MCG: 50 INJECTION, SOLUTION INTRAMUSCULAR; INTRAVENOUS at 12:10

## 2017-10-26 RX ADMIN — ONDANSETRON 4 MG: 2 INJECTION INTRAMUSCULAR; INTRAVENOUS at 01:10

## 2017-10-26 RX ADMIN — MIDAZOLAM HYDROCHLORIDE 2 MG: 1 INJECTION, SOLUTION INTRAMUSCULAR; INTRAVENOUS at 12:10

## 2017-10-26 RX ADMIN — PROPOFOL 50 MCG/KG/MIN: 10 INJECTION, EMULSION INTRAVENOUS at 12:10

## 2017-10-26 RX ADMIN — SODIUM CHLORIDE: 0.9 INJECTION, SOLUTION INTRAVENOUS at 12:10

## 2017-10-26 NOTE — OP NOTE
10/26/2017      Indications: Maksim Hidalgo is a 37 y.o. male with end stage renal disease and need for long-term dialysis access.    Pre-operative Diagnosis:  ESRD.    Post-operative Diagnosis: Same    Operation: Left Radio-Cephalic AVF (Lalito)          Surgeon: Josephine Mitchell MD FACS    Assistants:   JANY Akers MD    Anesthesia: IV + Regional    Findings:  Adequate artery and vein for AVF    Estimate Blood Loss:  Less than 10mL            Specimens: None               Complications:  None; patient tolerated the procedure well.           Disposition: PACU, then home.           Attending Attestation: I was present and scrubbed for the entire procedure.      Procedure Details   The patient was seen in the Holding Room. The risks, benefits, complications, treatment options, and expected outcomes were discussed with the patient. The patient concurred with the proposed plan, giving informed consent.  The site of surgery properly noted/marked. The patient was taken to the Operating Room, identified correctly and the procedure verified. A Time Out was held and the above information confirmed.    The arm was prepped and draped in a sterile fashion. An initial skin incision was made on the radial aspect of the wrist and the cephalic vein was explored and appeared adequate for an AVF. The radial artery was then dissected and proximal and distal control was obtained by placing vessel loops around it. After flushing the vessels with heparin the vessel loops were clamped and a end-to-side anastomosis between the cephalic vein and the radial artery was created using 6-0 Prolene sutures in a continuous running manner. After completion of the anastomosis, the vessel loops were released. Hemostasis was secured. Good thrill was noted in the fistula and the incision was then closed in two layers, subcutaneous tissue with 3-0 Monocryl and skin with 4-0 Monocryl. Steri-Strips were applied.

## 2017-10-26 NOTE — ANESTHESIA PREPROCEDURE EVALUATION
Pre-operative evaluation for Procedure(s) (LRB):  [id number] possible graft  (Left)    Maksim Hidalgo is a 37 y.o. male PMH ESRD on HD 2/2 HTN and chronic Hep B who presents for the above procedure.    LDA:   RIJ HD Cath    Prev airway:   Placement Date: 07/07/17; Placement Time: 0928; Method of Intubation: Khan; Inserted by: Anesthesia Resident; Airway Device: Endotracheal Tube; Mask Ventilation:  (Two hand w/o oral airway); Intubated: Postinduction; Blade: Kwasi #3; Airway Device Size: 8.0; Style: Cuffed; Cuff Inflation: Minimal occlusive pressure; Inflation Amount: 7; Placement Verified By: Auscultation, Capnometry, ETT Condensation; Grade: Grade I; Intubation Findings: Positive EtCO2, Bilateral breath sounds, Atraumatic/Condition of teeth unchanged;  Depth of Insertion: 25; Securment: Lips; Complications: None; Breath Sounds: Equal Bilateral; Insertion Attempts: 2; Removal Date: 07/07/17;  Removal Time: 1019    Drips:  none      Patient Active Problem List   Diagnosis    LVH (left ventricular hypertrophy) due to hypertensive disease    Chronic hepatitis B virus infection    Vitamin D deficiency    Secondary hyperparathyroidism    Peritonitis of undetermined cause    Chronic constipation    Hypertensive retinopathy of both eyes    ESRD (end stage renal disease) on dialysis    Organ transplant candidate    Anemia in ESRD (end-stage renal disease)    Renovascular hypertension    Dizziness    Hypotension arterial    Postural dizziness with near syncope    Preop examination    Myalgia    Tremor of right hand    Weakness    Altered mental status    Intractable pain    Neuropathic pain    Adjustment insomnia       Review of patient's allergies indicates:  No Known Allergies     No current facility-administered medications on file prior to encounter.      Current Outpatient Prescriptions on File Prior to Encounter   Medication Sig Dispense Refill    albuterol 90  mcg/actuation inhaler Inhale 2 puffs into the lungs every 4 (four) hours as needed for Wheezing. 1 Inhaler 0    benazepril (LOTENSIN) 20 MG tablet Take 1 tablet (20 mg total) by mouth once daily. (Patient taking differently: Take 20 mg by mouth every morning. ) 90 tablet 3    calcitRIOL (ROCALTROL) 0.5 MCG Cap Take 1 capsule (0.5 mcg total) by mouth once daily. (Patient taking differently: Take 0.5 mcg by mouth every morning. ) 30 capsule 11    furosemide (LASIX) 40 MG tablet TAKE 1 TABLET BY MOUTH TWICE DAILY 180 tablet 0    gabapentin (NEURONTIN) 300 MG capsule Take 1 capsule (300 mg total) by mouth every evening. (Patient taking differently: Take 300 mg by mouth nightly. ) 30 capsule 1    melatonin 5 mg Tab Take 1 tablet (5 mg total) by mouth every evening. (Patient taking differently: Take 1 tablet by mouth nightly. )      pantoprazole (PROTONIX) 40 MG tablet Take 1 tablet (40 mg total) by mouth once daily. (Patient taking differently: Take 40 mg by mouth every morning. ) 30 tablet 11    senna-docusate 8.6-50 mg (PERICOLACE) 8.6-50 mg per tablet Take 2 tablets by mouth every evening. (Patient taking differently: Take 2 tablets by mouth nightly. )      sevelamer carbonate (RENVELA) 800 mg Tab Take 1 tablet (800 mg total) by mouth 3 (three) times daily with meals. 90 tablet 11    oxycodone-acetaminophen (PERCOCET) 5-325 mg per tablet Take 1 tablet by mouth every 4 (four) hours as needed for Pain. 25 tablet 0       Past Surgical History:   Procedure Laterality Date    PERITONEAL CATHETER INSERTION         Social History     Social History    Marital status: Single     Spouse name: N/A    Number of children: N/A    Years of education: N/A     Occupational History          fast food restaurant, stopped working about 1 year ago     Social History Main Topics    Smoking status: Former Smoker     Packs/day: 0.00     Years: 8.00     Types: Cigarettes     Quit date: 06/2016    Smokeless  tobacco: Never Used    Alcohol use No    Drug use: No    Sexual activity: No     Other Topics Concern    Not on file     Social History Narrative    He works in Management.         Vital Signs Range (Last 24H):  Temp:  [36.8 °C (98.2 °F)]   Pulse:  [94]   Resp:  [18]   BP: (130)/(93)   SpO2:  [98 %]       CBC: No results for input(s): WBC, RBC, HGB, HCT, PLT, MCV, MCH, MCHC in the last 72 hours.    CMP: No results for input(s): NA, K, CL, CO2, BUN, CREATININE, GLU, MG, PHOS, CALCIUM, ALBUMIN, PROT, ALKPHOS, ALT, AST, BILITOT in the last 72 hours.    INR  No results for input(s): INR, PROTIME, APTT in the last 72 hours.    Invalid input(s): PT        Diagnostic Studies: none      EK17  Normal sinus rhythm  Normal ECG  When compared with ECG of 2017 16:08,  No significant change was found  Confirmed by Gaby Bonilla MD ([id number]) on 2017 10:14:57 AM    2D Echo: 17  CONCLUSIONS     1 - Normal left ventricular systolic function (EF 60-65%).     2 - Concentric remodeling.     3 - Normal left ventricular diastolic function.     4 - Right atrial enlargement.     5 - Normal right ventricular systolic function .     6 - Trivial tricuspid regurgitation.     7 - Trivial to mild pulmonic regurgitation.     8 - The estimated PA systolic pressure is 14 mmHg.         Anesthesia Evaluation    I have reviewed the Patient Summary Reports.    I have reviewed the Nursing Notes.   I have reviewed the Medications.     Review of Systems  Anesthesia Hx:  No problems with previous Anesthesia  History of prior surgery of interest to airway management or planning: Previous anesthesia: General 17  with general anesthesia.  Airway issues documented on chart review include mask, difficult, easy direct laryngoscopy, GETA , view on direct laryngoscopy Grade I  Denies Family Hx of Anesthesia complications.   Denies Personal Hx of Anesthesia complications.   Social:  Former Smoker, No Alcohol Use    Hematology/Oncology:      Oncology Normal    -- Anemia:   EENT/Dental:EENT/Dental Normal   Cardiovascular:   Hypertension Denies Dysrhythmias.   Denies Angina.     ECG has been reviewed.    Pulmonary:  Pulmonary Normal    Renal/:   Chronic Renal Disease, ESRD, Dialysis, renal hypertension    Hepatic/GI:   Liver Disease, Hepatitis, B    Musculoskeletal:  Musculoskeletal Normal    Neurological:  Neurology Normal    Endocrine:  Endocrine Normal    Psych:  Psychiatric Normal           Physical Exam  General:  Well nourished    Airway/Jaw/Neck:  Airway Findings: Mouth Opening: Normal Tongue: Normal  General Airway Assessment: Adult  Mallampati: II  TM Distance: Normal, at least 6 cm  Jaw/Neck Findings:  Neck ROM: Normal ROM      Dental:  Dental Findings: Periodontal disease, Mild   Chest/Lungs:  Chest/Lungs Clear    Heart/Vascular:  Heart Findings: Normal Heart murmur: negative    Abdomen:  Abdomen Findings: Normal    Musculoskeletal:  Musculoskeletal Findings: Normal   Skin:  Skin Findings: Normal    Mental Status:  Mental Status Findings:  Cooperative         Anesthesia Plan  Type of Anesthesia, risks & benefits discussed:  Anesthesia Type:  general, MAC  Patient's Preference:   Intra-op Monitoring Plan: standard ASA monitors and central line  Intra-op Monitoring Plan Comments:   Post Op Pain Control Plan: per primary service following discharge from PACU, IV/PO Opioids PRN and multimodal analgesia  Post Op Pain Control Plan Comments:   Induction:   IV  Beta Blocker:  Patient is not currently on a Beta-Blocker (No further documentation required).       Informed Consent: Patient understands risks and agrees with Anesthesia plan.  Questions answered. Anesthesia consent signed with patient.  ASA Score: 3     Day of Surgery Review of History & Physical:  There are no significant changes.          Ready For Surgery From Anesthesia Perspective.

## 2017-10-26 NOTE — PROGRESS NOTES
Notified Dr. Riggins that patient has met all criteria for release from Anesthesia's care.  Dr. Riggins stated that he would release patient from Anesthesia's care.

## 2017-10-26 NOTE — DISCHARGE INSTRUCTIONS
Arteriovenous (AV) Fistula for Dialysis  An AV fistula is a connection between an artery and a vein. For this procedure, an AV fistula is surgically created using an artery and a vein in your arm. (Your healthcare provider will let you know if another site is to be used.) When the artery and vein are joined, blood flow increases from the artery into the vein. As a result, the vein gets bigger over time. The enlarged vein provides easier access to the blood for a treatment for kidney failure (dialysis). This sheet explains the procedure and what to expect.     An AV fistula increases blood flow from the artery into the vein. Over time, the vein becomes stronger and enlarged.   Preparing for the procedure  Prepare as you have been told. In addition:  · Tell your healthcare provider about all the medicines you take. This includes all over-the-counter and prescription medicines, and street drugs. It also includes herbs, vitamins, and other supplements. You may need to stop taking some or all of them before the procedure.  · Follow any directions youre given for not eating or drinking before the procedure.  · Do not allow anyone to draw blood from or take blood pressure on the arm that will have the fistula before the procedure.  The day of the procedure  The procedure takes about 1 to 2 hours. Youll likely go home the same day.  Before the procedure begins:  · An IV (intravenous) line is put into a vein in the arm or hand not being used for the procedure. This line supplies fluids and medicines.  · To keep you free of pain during the procedure, youre given general anesthesia. This medicine puts you into a state like a deep sleep through the procedure. Or a nerve block may be used. This medicine numbs the arm. With it, you may also be given medicine that makes you relaxed and drowsy through the procedure.  During the procedure:  · The skin over your arm may be injected with numbing medicine.  · One or more small cuts  (incisions) are then made through the numbed skin. This depends on the size of your arm and the depth of the vein in your arm.  · The vein is attached to the selected artery.  · Any incisions made are then closed with stitches (sutures), staples, surgical glue, or strips of surgical tape.  After the procedure:  · Youll be asked to keep your arm raised (elevated) as often as possible for at least a week after the procedure.  · Youll be given medicines to manage pain as needed.  · Your arm and hand will be checked to make sure blood is flowing through the fistula properly. The feeling of blood rushing through the fistula is called a thrill. It is somewhat similar to the purring of a cat. Youll be taught how to check for this feeling each day to make sure there are no problems with your fistula. Youll also be taught how to care for your fistula at home.  · When its time for you to leave the hospital, have an adult family member or friend ready to drive you home.  Recovering at home  Once at home, follow all of the instructions youve been given. Be sure to:  · Take all medicines as directed.  · Care for your incision as instructed.  · Check for signs of infection at the incision site (see below).  · Avoid heavy lifting and strenuous activities as directed.  · Monitor and care for your fistula as instructed.  · Do your hand and arm exercises as instructed. This usually involves squeezing a ball in your hand for a few minutes each hour.  · Remove dressing in 72 hours.  · Shower once dressing is removed.  · Do not submerge incision underwater.  · Allow surgical tape to fall off on it's own in 7-10 days.  Call your healthcare provider if you have any of the following:  · Fever of 100.4°F (38°C) or higher  · Signs of infection at the incision site, such as increased redness or swelling, warmth, worsening pain, bleeding, or bad-smelling drainage  · You cant feel a thrill (the vibration of blood going through your  arm)  · Pain or numbness in your fingers, hand, or arm  · Bleeding, redness, or warmth around your fistula  · Sudden bulging of the fistula (more than usual; a slight bulge is normal)   Follow-Up  Your healthcare provider will check your fistula within 1 to 2 weeks after the procedure. It will likely take about 6 to 8 weeks for the fistula to enlarge enough to start dialysis. After that, make sure the fistula is checked each time you have dialysis. Your healthcare provider may also suggest checkups every 6 months.  Risks and possible complications include:  · The fistula not working properly  · Long wait before the fistula is ready (up to 6 months)  · Coldness or numbness in the hand (due to blood flowing away from the hand and into the fistula)  · An unsightly bump under the skin (due to enlargement of the fistula)  · Prolonged bleeding from the fistula after dialysis  · Narrowing or weakening of the blood vessels used for the fistula  · Formation of blood clots in the blood vessels used for the fistula  · Risks of anesthesia or any other medicines used during the procedure   Living with an AV Fistula  A problem, such as a narrowing (stricture) of the vein or an infection, can make the fistula unusable. If this happens, you may need other treatments to repair or make a new fistula. To protect your fistula, follow these and any other guidelines youre given:  · Check your fistula as often as your healthcare provider says. If you cant feel your thrill, let your provider know right away.  · Make sure your fistula is checked before each dialysis treatment.  · Dont let anyone draw blood from or take blood pressure on the arm that has the fistula.  · Wash your hands often and keep the area around your fistula clean.  · Dont sleep on the arm that has the fistula.  · Dont wear tight jewelry or a watch on the arm with your fistula.  · Protect your fistula from cuts, scrapes, or blows.  Date Last Reviewed: 1/1/2017  ©  6065-1787 [employer]. [address] [place] [address], [place] [address]. All rights reserved. This information is not intended as a substitute for professional medical care. Always follow your healthcare professional's instructions.

## 2017-10-26 NOTE — PROGRESS NOTES
Notified patient's cousin, Arelis Kingston, that patient will be ready for discharge soon and that she can come to pick patient up from the hospital. Will continue to monitor patient.

## 2017-10-26 NOTE — ANESTHESIA PROCEDURE NOTES
Supraclavicular Brachial Plexus Single Injection Block    Patient location during procedure: OR    Reason for block: primary anesthetic   Diagnosis: renal failure   Start time: 10/26/2017 12:35 PM  Timeout: 10/26/2017 12:35 PM   End time: 10/26/2017 12:40 PM  Staffing  Anesthesiologist: BING PARK  Resident/CRNA: BREE AGUILAR JR.  Performed: resident/CRNA   Preanesthetic Checklist  Completed: patient identified, site marked, surgical consent, pre-op evaluation, timeout performed, IV checked, risks and benefits discussed and monitors and equipment checked  Peripheral Block  Patient position: supine  Prep: ChloraPrep  Patient monitoring: heart rate, cardiac monitor, continuous pulse ox, continuous capnometry and frequent blood pressure checks  Block type: supraclavicular  Laterality: left  Injection technique: single shot  Needle  Needle type: Stimuplex   Needle gauge: 22 G  Needle length: 2 in  Needle localization: anatomical landmarks and ultrasound guidance   -ultrasound image captured on disc.  Assessment  Injection assessment: negative aspiration, negative parasthesia and local visualized surrounding nerve  Paresthesia pain: none  Heart rate change: no  Slow fractionated injection: yes  Medications:  Bolus administered: 30 mL of 2.0 mepivacaine  Epinephrine added: 3.75 mcg/mL (1/300,000)  Additional Notes  Intercostal brachial field block performed with mepivacaine 2% 10ml for additional coverage.    VSS.  See anesthesia record.  Patient tolerated procedure well.

## 2017-10-26 NOTE — INTERVAL H&P NOTE
The patient has been examined and the H&P has been reviewed:    I concur with the findings and no changes have occurred since H&P was written.    Anesthesia/Surgery risks, benefits and alternative options discussed and understood by patient/family.          Active Hospital Problems    Diagnosis  POA    ESRD (end stage renal disease) on dialysis [N18.6, Z99.2]  Not Applicable      Resolved Hospital Problems    Diagnosis Date Resolved POA   No resolved problems to display.

## 2017-10-26 NOTE — ANESTHESIA POSTPROCEDURE EVALUATION
"Anesthesia Post Evaluation    Patient: Maksim Hidalgo    Procedure(s) Performed: Procedure(s) (LRB):  Right radial cephalic (Left)    Final Anesthesia Type: regional  Patient location during evaluation: PACU  Patient participation: Yes- Able to Participate  Level of consciousness: awake and alert  Post-procedure vital signs: reviewed and stable  Pain management: adequate  Airway patency: patent  PONV status at discharge: No PONV  Anesthetic complications: no      Cardiovascular status: blood pressure returned to baseline  Respiratory status: unassisted  Hydration status: euvolemic  Follow-up not needed.        Visit Vitals  BP (!) 135/90 (BP Location: Right arm, Patient Position: Lying)   Pulse 89   Temp 36.8 °C (98.2 °F) (Temporal)   Resp 18   Ht 5' 9" (1.753 m)   Wt 90 kg (198 lb 6.6 oz)   SpO2 97%   BMI 29.30 kg/m²       Pain/Delvin Score: Pain Assessment Performed: Yes (10/26/2017  1:45 PM)  Presence of Pain: non-verbal indicators absent (10/26/2017  1:45 PM)  Delvin Score: 7 (10/26/2017  2:05 PM)      "

## 2017-10-26 NOTE — BRIEF OP NOTE
[facility]  Brief Operative Note     SUMMARY     Surgery Date: 10/26/2017     Surgeon(s) and Role:     * Josephine Mitchell MD - Primary     * Chen Akers MD - Fellow    Assisting Surgeon: None    Pre-op Diagnosis:  ESRD (end stage renal disease) on dialysis [N18.6, Z99.2]  Hypertensive left ventricular hypertrophy, without heart failure [I11.9]    Post-op Diagnosis:  Post-Op Diagnosis Codes:     * ESRD (end stage renal disease) on dialysis [N18.6, Z99.2]     * Hypertensive left ventricular hypertrophy, without heart failure [I11.9]    Procedure:  Left Radio-Cephalic AVF    Anesthesia:   Regional    Description of the findings of the procedure: adequate vein at wrist for AVF    Findings/Key Components: soft thrill    Estimated Blood Loss: <5mL         Specimens:   Specimen (12h ago through future)    None          Discharge Note    SUMMARY     Admit Date: 10/26/2017    Discharge Date and Time:  10/26/2017 1:25 PM    Hospital Course (synopsis of major diagnoses, care, treatment, and services provided during the course of the hospital stay): no complications     Final Diagnosis: Post-Op Diagnosis Codes:     * ESRD (end stage renal disease) on dialysis [N18.6, Z99.2]     * Hypertensive left ventricular hypertrophy, without heart failure [I11.9]    Disposition: Home or Self Care    Follow Up/Patient Instructions: Resume regular diet, follow-up in 2-3 weeks, resume regular activity in 2-3 days.    Resume medications per post-procedure med reconciliation.      Medications:  Reconciled Home Medications:   Current Discharge Medication List      CONTINUE these medications which have NOT CHANGED    Details   albuterol 90 mcg/actuation inhaler Inhale 2 puffs into the lungs every 4 (four) hours as needed for Wheezing.  Qty: 1 Inhaler, Refills: 0    Associated Diagnoses: Acute non-recurrent sinusitis, unspecified location      benazepril (LOTENSIN) 20 MG tablet Take 1 tablet (20 mg total) by mouth  once daily.  Qty: 90 tablet, Refills: 3    Associated Diagnoses: Renovascular hypertension      calcitRIOL (ROCALTROL) 0.5 MCG Cap Take 1 capsule (0.5 mcg total) by mouth once daily.  Qty: 30 capsule, Refills: 11      furosemide (LASIX) 40 MG tablet TAKE 1 TABLET BY MOUTH TWICE DAILY  Qty: 180 tablet, Refills: 0    Associated Diagnoses: ESRD (end stage renal disease)      gabapentin (NEURONTIN) 300 MG capsule Take 1 capsule (300 mg total) by mouth every evening.  Qty: 30 capsule, Refills: 1      melatonin 5 mg Tab Take 1 tablet (5 mg total) by mouth every evening.    Associated Diagnoses: Adjustment insomnia      pantoprazole (PROTONIX) 40 MG tablet Take 1 tablet (40 mg total) by mouth once daily.  Qty: 30 tablet, Refills: 11      senna-docusate 8.6-50 mg (PERICOLACE) 8.6-50 mg per tablet Take 2 tablets by mouth every evening.      sevelamer carbonate (RENVELA) 800 mg Tab Take 1 tablet (800 mg total) by mouth 3 (three) times daily with meals.  Qty: 90 tablet, Refills: 11      oxycodone-acetaminophen (PERCOCET) 5-325 mg per tablet Take 1 tablet by mouth every 4 (four) hours as needed for Pain.  Qty: 25 tablet, Refills: 0           No discharge procedures on file.

## 2017-10-26 NOTE — H&P (VIEW-ONLY)
Patient ID: Maksim Hidalgo is a 37 y.o. male.    I. HISTORY     Chief Complaint: Consult      HPI Maksim Hidalgo is a 37 y.o. male here for evaluation of new dialysis access. Renal failure is due to HTN. Currently dialyzes on M/W/F via a right IJ permcath. Patient is right handed. Has been on dialysis since mid-2016.   He did PD for over a year but has had recurrent catheter complications and has transitioned to HD.    No left sided catheters, pacemaker or AICD. No prior AV access procedures.    [facility] - Jose Saldana on     Review of Systems   Constitution: Negative.   HENT: Positive for congestion.    Eyes: Negative for blurred vision, vision loss in left eye and vision loss in right eye.   Cardiovascular: Negative for chest pain, claudication and dyspnea on exertion.   Respiratory: Negative for cough and sleep disturbances due to breathing.    Endocrine: Negative.    Hematologic/Lymphatic: Negative.    Skin: Negative.    Musculoskeletal: Negative.    Gastrointestinal: Negative for abdominal pain, nausea and vomiting.   Neurological: Negative.        II. PHYSICAL EXAM   Right Arm BP - Sittin/88 (10/19/17 0856)  Left Arm BP - Sittin/87 (10/19/17 0856)  Pulse: 106  Temp: 97.7 °F (36.5 °C)  Body mass index is 29.82 kg/m².      Physical Exam   Constitutional: He is oriented to person, place, and time. He appears well-developed and well-nourished.   HENT:   Head: Normocephalic and atraumatic.   Cardiovascular: Normal rate.    Pulses:       Radial pulses are 2+ on the right side, and 2+ on the left side.   Pulmonary/Chest: Effort normal. No respiratory distress.   Musculoskeletal: Normal range of motion. He exhibits no edema or tenderness.   Neurological: He is alert and oriented to person, place, and time. No cranial nerve deficit.   Skin: Skin is warm. No erythema.   Psychiatric: He has a normal mood and affect.   Vitals reviewed.      III. ASSESSMENT & PLAN (MEDICAL DECISION  MAKING)     1. ESRD (end stage renal disease) on dialysis    2. Hypertensive left ventricular hypertrophy, without heart failure        Imaging Results:    Vein mapping - adequate left cephalic for BC AVF, basilic >4mm       Assessment/Diagnosis and Plan:    Maksim Hidalgo is a 37 y.o. male with ESRD on HD. He has failed PD and has transitioned to HD.  Plan left arm AVF - BC vs BVT. Distal FA vein may dilate with block as well.  Scheduled for Thurs Oct 26.    I have explained the risks, benefits, and alternatives of the procedure in detail.   Risks include but are not limited to arterial steal, inability to use access, failure to mature, permanent injury to nerves or blood vessels, and need for repeat interventions or new access creation.  He voiced understanding, all questions were answered, and agrees to proceed as planned.    Josephine Mitchell MD FACS [facility]  Vascular & Endovascular Surgery

## 2017-10-26 NOTE — ANESTHESIA RELEASE NOTE
"Anesthesia Release from PACU Note    Patient: Maksim Hidalgo    Procedure(s) Performed: Procedure(s) (LRB):  Right radial cephalic (Left)    Anesthesia type: regional    Post pain: Adequate analgesia    Post assessment: no apparent anesthetic complications, tolerated procedure well and no evidence of recall    Last Vitals:   Visit Vitals  BP (!) 135/90 (BP Location: Right arm, Patient Position: Lying)   Pulse 89   Temp 36.8 °C (98.2 °F) (Temporal)   Resp 18   Ht 5' 9" (1.753 m)   Wt 90 kg (198 lb 6.6 oz)   SpO2 97%   BMI 29.30 kg/m²       Post vital signs: stable    Level of consciousness: awake, alert  and oriented    Nausea/Vomiting: no nausea/no vomiting    Complications: none    Airway Patency: patent    Respiratory: unassisted    Cardiovascular: stable and blood pressure at baseline    Hydration: euvolemic  "

## 2017-10-26 NOTE — TRANSFER OF CARE
"Anesthesia Transfer of Care Note    Patient: Maksim Hidalgo    Procedure(s) Performed: Procedure(s) (LRB):  Right radial cephalic (Left)    Patient location: PACU    Anesthesia Type: regional and MAC    Transport from OR: Transported from OR on 6-10 L/min O2 by face mask with adequate spontaneous ventilation    Post pain: adequate analgesia    Post assessment: no apparent anesthetic complications and tolerated procedure well    Post vital signs: stable    Level of consciousness: awake    Nausea/Vomiting: no nausea/vomiting    Complications: none    Transfer of care protocol was followed      Last vitals:   Visit Vitals  BP (!) 123/91 (BP Location: Right arm, Patient Position: Lying)   Pulse 88   Temp 36.8 °C (98.2 °F) (Temporal)   Resp 14   Ht 5' 9" (1.753 m)   Wt 90 kg (198 lb 6.6 oz)   SpO2 97%   BMI 29.30 kg/m²     "

## 2017-10-26 NOTE — PLAN OF CARE
Patient received discharge instructions and prescription.  Patient verbalized understanding of all instructions given and all questions were addressed prior to patient's discharge.  Patient's vital signs are stable and within patient's baseline.  Patient tolerated clear liquids PO.  Patient denies pain.  Patient denies nausea and vomiting at this time.  Patient meets all criteria for discharge at this time.  All required consents present in patient's chart upon patient's discharge.

## 2017-10-26 NOTE — PLAN OF CARE
Patient arrived from OR with AMBREEN Perez, Surgical Resident, and KELLEY Mccullough CRNA.  Patient stable.  Report received at this time.  Assumed care of patient at this time.

## 2017-10-26 NOTE — PROGRESS NOTES
Dr. Riggins at the bedside to assess patient. Dr. Riggins said to call her when patient more alert.

## 2017-10-27 NOTE — ANESTHESIA POSTPROCEDURE EVALUATION
"Anesthesia Post Evaluation    Patient: Maksim Hidalgo    Procedure(s) Performed: Procedure(s) (LRB):  Right radial cephalic (Left)    Final Anesthesia Type: MAC (regional)  Patient location during evaluation: PACU  Patient participation: Yes- Able to Participate  Level of consciousness: awake and alert  Post-procedure vital signs: reviewed and stable  Pain management: adequate  Airway patency: patent  PONV status at discharge: No PONV  Anesthetic complications: no      Cardiovascular status: blood pressure returned to baseline  Respiratory status: spontaneous ventilation and room air  Hydration status: euvolemic  Follow-up not needed.        Visit Vitals  BP (!) 139/90 (BP Location: Right arm, Patient Position: Lying)   Pulse 97   Temp 37.4 °C (99.3 °F) (Temporal)   Resp 14   Ht 5' 9" (1.753 m)   Wt 90 kg (198 lb 6.6 oz)   SpO2 99%   BMI 29.30 kg/m²       Pain/Delvin Score: Pain Assessment Performed: Yes (10/26/2017  5:40 PM)  Presence of Pain: denies (10/26/2017  5:40 PM)  Delvin Score: 9 (10/26/2017  5:40 PM)      "

## 2017-11-07 DIAGNOSIS — Z76.82 ORGAN TRANSPLANT CANDIDATE: Primary | ICD-10-CM

## 2017-11-28 ENCOUNTER — PATIENT MESSAGE (OUTPATIENT)
Dept: INTERNAL MEDICINE | Facility: CLINIC | Age: 37
End: 2017-11-28

## 2017-11-28 ENCOUNTER — HOSPITAL ENCOUNTER (OUTPATIENT)
Dept: RADIOLOGY | Facility: HOSPITAL | Age: 37
Discharge: HOME | End: 2017-11-28
Attending: NURSE PRACTITIONER
Payer: MEDICARE

## 2017-11-28 DIAGNOSIS — Z76.82 ORGAN TRANSPLANT CANDIDATE: ICD-10-CM

## 2017-11-28 PROCEDURE — 93005 ELECTROCARDIOGRAM TRACING: CPT | Mod: NTX

## 2017-11-28 PROCEDURE — 74177 CT ABD & PELVIS W/CONTRAST: CPT | Mod: TC,TXP

## 2017-11-28 PROCEDURE — 74177 CT ABD & PELVIS W/CONTRAST: CPT | Mod: 26,GC,TXP, | Performed by: RADIOLOGY

## 2017-11-28 PROCEDURE — 99284 EMERGENCY DEPT VISIT MOD MDM: CPT | Mod: NTX,,, | Performed by: EMERGENCY MEDICINE

## 2017-11-28 PROCEDURE — 99285 EMERGENCY DEPT VISIT HI MDM: CPT | Mod: 25,NTX

## 2017-11-28 PROCEDURE — 25500020 PHARM REV CODE 255: Mod: TXP | Performed by: NURSE PRACTITIONER

## 2017-11-28 RX ADMIN — IOHEXOL 15 ML: 350 INJECTION, SOLUTION INTRAVENOUS at 07:11

## 2017-11-28 RX ADMIN — IOHEXOL 15 ML: 350 INJECTION, SOLUTION INTRAVENOUS at 08:11

## 2017-11-28 RX ADMIN — IOHEXOL 100 ML: 350 INJECTION, SOLUTION INTRAVENOUS at 08:11

## 2017-11-29 ENCOUNTER — HOSPITAL ENCOUNTER (EMERGENCY)
Facility: HOSPITAL | Age: 37
Discharge: HOME | End: 2017-11-29
Attending: EMERGENCY MEDICINE
Payer: MEDICARE

## 2017-11-29 VITALS
WEIGHT: 202 LBS | HEIGHT: 72 IN | HEART RATE: 81 BPM | SYSTOLIC BLOOD PRESSURE: 105 MMHG | DIASTOLIC BLOOD PRESSURE: 68 MMHG | OXYGEN SATURATION: 100 % | TEMPERATURE: 98 F | BODY MASS INDEX: 27.36 KG/M2 | RESPIRATION RATE: 21 BRPM

## 2017-11-29 DIAGNOSIS — R51.9 HEADACHE: Primary | ICD-10-CM

## 2017-11-29 DIAGNOSIS — R52 BODY ACHES: ICD-10-CM

## 2017-11-29 LAB
ALBUMIN SERPL BCP-MCNC: 3.3 G/DL
ALP SERPL-CCNC: 87 U/L
ALT SERPL W/O P-5'-P-CCNC: 13 U/L
ANION GAP SERPL CALC-SCNC: 12 MMOL/L
AST SERPL-CCNC: 14 U/L
BASOPHILS # BLD AUTO: 0.03 K/UL
BASOPHILS NFR BLD: 0.3 %
BILIRUB SERPL-MCNC: 0.3 MG/DL
BUN SERPL-MCNC: 48 MG/DL
CALCIUM SERPL-MCNC: 8.4 MG/DL
CHLORIDE SERPL-SCNC: 104 MMOL/L
CO2 SERPL-SCNC: 24 MMOL/L
CREAT SERPL-MCNC: 7.6 MG/DL
DEPRECATED S PYO AG THROAT QL EIA: NEGATIVE
DIFFERENTIAL METHOD: ABNORMAL
EOSINOPHIL # BLD AUTO: 0.2 K/UL
EOSINOPHIL NFR BLD: 1.7 %
ERYTHROCYTE [DISTWIDTH] IN BLOOD BY AUTOMATED COUNT: 12.7 %
EST. GFR  (AFRICAN AMERICAN): 9.4 ML/MIN/1.73 M^2
EST. GFR  (NON AFRICAN AMERICAN): 8.1 ML/MIN/1.73 M^2
FLUAV AG SPEC QL IA: NEGATIVE
FLUBV AG SPEC QL IA: NEGATIVE
GLUCOSE SERPL-MCNC: 105 MG/DL
HCT VFR BLD AUTO: 37.5 %
HETEROPH AB SERPL QL IA: NEGATIVE
HGB BLD-MCNC: 12.9 G/DL
IMM GRANULOCYTES # BLD AUTO: 0.02 K/UL
IMM GRANULOCYTES NFR BLD AUTO: 0.2 %
LYMPHOCYTES # BLD AUTO: 2.1 K/UL
LYMPHOCYTES NFR BLD: 22.6 %
MCH RBC QN AUTO: 30.4 PG
MCHC RBC AUTO-ENTMCNC: 34.4 G/DL
MCV RBC AUTO: 88 FL
MONOCYTES # BLD AUTO: 0.8 K/UL
MONOCYTES NFR BLD: 8.4 %
NEUTROPHILS # BLD AUTO: 6.1 K/UL
NEUTROPHILS NFR BLD: 66.8 %
NRBC BLD-RTO: 0 /100 WBC
PLATELET # BLD AUTO: 214 K/UL
PMV BLD AUTO: 10.1 FL
POTASSIUM SERPL-SCNC: 4.2 MMOL/L
PROT SERPL-MCNC: 6.9 G/DL
RBC # BLD AUTO: 4.24 M/UL
SODIUM SERPL-SCNC: 140 MMOL/L
SPECIMEN SOURCE: NORMAL
TROPONIN I SERPL DL<=0.01 NG/ML-MCNC: 0.01 NG/ML
TROPONIN I SERPL DL<=0.01 NG/ML-MCNC: 0.04 NG/ML
WBC # BLD AUTO: 9.16 K/UL

## 2017-11-29 PROCEDURE — 25000003 PHARM REV CODE 250: Mod: NTX | Performed by: PHYSICIAN ASSISTANT

## 2017-11-29 PROCEDURE — 63600175 PHARM REV CODE 636 W HCPCS: Mod: NTX | Performed by: PHYSICIAN ASSISTANT

## 2017-11-29 PROCEDURE — 85025 COMPLETE CBC W/AUTO DIFF WBC: CPT | Mod: NTX

## 2017-11-29 PROCEDURE — 96375 TX/PRO/DX INJ NEW DRUG ADDON: CPT | Mod: NTX

## 2017-11-29 PROCEDURE — 84484 ASSAY OF TROPONIN QUANT: CPT | Mod: 91,NTX

## 2017-11-29 PROCEDURE — 87880 STREP A ASSAY W/OPTIC: CPT | Mod: NTX

## 2017-11-29 PROCEDURE — 87400 INFLUENZA A/B EACH AG IA: CPT | Mod: NTX

## 2017-11-29 PROCEDURE — 96361 HYDRATE IV INFUSION ADD-ON: CPT | Mod: NTX

## 2017-11-29 PROCEDURE — 96374 THER/PROPH/DIAG INJ IV PUSH: CPT | Mod: NTX

## 2017-11-29 PROCEDURE — 80053 COMPREHEN METABOLIC PANEL: CPT | Mod: NTX

## 2017-11-29 PROCEDURE — 63600175 PHARM REV CODE 636 W HCPCS: Mod: NTX

## 2017-11-29 PROCEDURE — 93010 ELECTROCARDIOGRAM REPORT: CPT | Mod: NTX,,, | Performed by: INTERNAL MEDICINE

## 2017-11-29 PROCEDURE — 87081 CULTURE SCREEN ONLY: CPT | Mod: NTX

## 2017-11-29 PROCEDURE — 86308 HETEROPHILE ANTIBODY SCREEN: CPT | Mod: NTX

## 2017-11-29 RX ORDER — METOCLOPRAMIDE HYDROCHLORIDE 5 MG/ML
10 INJECTION INTRAMUSCULAR; INTRAVENOUS
Status: COMPLETED | OUTPATIENT
Start: 2017-11-29 | End: 2017-11-29

## 2017-11-29 RX ORDER — BUTALBITAL, ACETAMINOPHEN AND CAFFEINE 50; 325; 40 MG/1; MG/1; MG/1
1 TABLET ORAL
Status: COMPLETED | OUTPATIENT
Start: 2017-11-29 | End: 2017-11-29

## 2017-11-29 RX ORDER — ONDANSETRON 2 MG/ML
4 INJECTION INTRAMUSCULAR; INTRAVENOUS
Status: COMPLETED | OUTPATIENT
Start: 2017-11-29 | End: 2017-11-29

## 2017-11-29 RX ORDER — ONDANSETRON 2 MG/ML
INJECTION INTRAMUSCULAR; INTRAVENOUS
Status: COMPLETED
Start: 2017-11-29 | End: 2017-11-29

## 2017-11-29 RX ORDER — DIPHENHYDRAMINE HCL 25 MG
25 CAPSULE ORAL
Status: COMPLETED | OUTPATIENT
Start: 2017-11-29 | End: 2017-11-29

## 2017-11-29 RX ADMIN — ONDANSETRON 4 MG: 2 INJECTION INTRAMUSCULAR; INTRAVENOUS at 12:11

## 2017-11-29 RX ADMIN — BUTALBITAL, ACETAMINOPHEN AND CAFFEINE 1 TABLET: 50; 325; 40 TABLET ORAL at 10:11

## 2017-11-29 RX ADMIN — DIPHENHYDRAMINE HYDROCHLORIDE 25 MG: 25 CAPSULE ORAL at 07:11

## 2017-11-29 RX ADMIN — METOCLOPRAMIDE 10 MG: 5 INJECTION, SOLUTION INTRAMUSCULAR; INTRAVENOUS at 07:11

## 2017-11-29 RX ADMIN — SODIUM CHLORIDE 1000 ML: 0.9 INJECTION, SOLUTION INTRAVENOUS at 04:11
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
## 2017-11-29 NOTE — ED NOTES
Patient back to INTAKE 4 from CT. Patient aware that urine specimen is needed, pt. Reports that he is unable to provide urine at this time.

## 2017-11-29 NOTE — ED NOTES
Patient identifiers have been checked and are correct.     LOC: The patient is awake, alert, and aware of environment. The patient is oriented x 3 and speaking appropriately.   APPEARANCE: No acute distress noted.   PSYCHOSOCIAL: Patient is calm and cooperative. Patients insight and judgement are appropriate to situation. No evidence of delusions, hallucinations, or psychosis.  SKIN: The skin is warm, dry, and intact. No bruising/discolorations noted.  RESPIRATORY: Airway is open and patent. Bilateral chest rise and fall. Respirations are spontaneous, even and unlabored. Normal effort and rate noted. No accessory muscle use noted.   CARDIAC: Patient has a normal rate. No peripheral edema noted. Capillary refill <3 seconds.   ABDOMEN: Soft, tender to palpation in all 4 quads. No distention noted. Bowel sounds present in all 4 quadrants.   URINARY: Patient only urinates very small amount, on dialysis.  EXTREMITIES: No redness, heat, swelling, deformity, or pain.  PULSES: 2+ and equal in all extremities.   NEUROLOGIC: Eyes open spontaneously. Speech clear. Tolerating saliva secretions well. Able to follow commands, demonstrating ability to actively and appropriately communicate within context of current conversation. Symmetrical facial muscles. Movement is purposeful. No evidence of impaired sensation. Responds to external stimuli with appropriate reflexes.   MUSCULOSKELETAL: No obvious deformities noted.

## 2017-11-29 NOTE — ED NOTES
Pt resting in bed. No acute distress noted. Respirations even and unlabored. Remains on cardiac monitor. No new complaints voiced. Updated on plan of care. Side rails up x2. Call light within reach. Will continue to monitor.

## 2017-11-29 NOTE — ED TRIAGE NOTES
"Pt. Presents to the ED via EMS c/o headache, body aches and blurred vision that started yesterday. Pt. States, "My entire body is tingling and feels hot. My body stiffens on me" Patient denies any chest pain, SOB, fevers, nausea, vomiting or diarrhea.   "

## 2017-11-29 NOTE — ED NOTES
Pt is AA&O times four Resp full and reg Breath sounds clear rossy to all lung fields  Dialysis access site noted to left forearm with no palpable thrill or audible bruit  Pt states his AV shunt was placed four weeks ago  Pt has Dialysis access site noted to right chest One liter NS infusing at 300 ml per hr per pump per right hand saline lock with 300 ml left TBA

## 2017-11-30 ENCOUNTER — OFFICE VISIT (OUTPATIENT)
Dept: VASCULAR SURGERY | Facility: CLINIC | Age: 37
End: 2017-11-30
Payer: MEDICARE

## 2017-11-30 ENCOUNTER — HOSPITAL ENCOUNTER (OUTPATIENT)
Dept: VASCULAR SURGERY | Facility: CLINIC | Age: 37
Discharge: HOME | End: 2017-11-30
Attending: STUDENT IN AN ORGANIZED HEALTH CARE EDUCATION/TRAINING PROGRAM
Payer: MEDICARE

## 2017-11-30 VITALS
WEIGHT: 203 LBS | HEIGHT: 69 IN | HEART RATE: 84 BPM | DIASTOLIC BLOOD PRESSURE: 86 MMHG | SYSTOLIC BLOOD PRESSURE: 134 MMHG | BODY MASS INDEX: 30.07 KG/M2

## 2017-11-30 DIAGNOSIS — N18.6 ESRD (END STAGE RENAL DISEASE) ON DIALYSIS: Primary | ICD-10-CM

## 2017-11-30 DIAGNOSIS — N18.6 ESRD (END STAGE RENAL DISEASE) ON DIALYSIS: ICD-10-CM

## 2017-11-30 DIAGNOSIS — Z99.2 ESRD (END STAGE RENAL DISEASE) ON DIALYSIS: ICD-10-CM

## 2017-11-30 DIAGNOSIS — T82.868A THROMBOSIS OF ARTERIOVENOUS DIALYSIS FISTULA, INITIAL ENCOUNTER: ICD-10-CM

## 2017-11-30 DIAGNOSIS — Z99.2 ESRD (END STAGE RENAL DISEASE) ON DIALYSIS: Primary | ICD-10-CM

## 2017-11-30 PROCEDURE — 99999 PR PBB SHADOW E&M-EST. PATIENT-LVL III: CPT | Mod: PBBFAC,TXP,, | Performed by: SURGERY

## 2017-11-30 PROCEDURE — 99024 POSTOP FOLLOW-UP VISIT: CPT | Mod: S$PBB,NTX,, | Performed by: SURGERY

## 2017-11-30 PROCEDURE — 93990 DOPPLER FLOW TESTING: CPT | Mod: 26,S$PBB,NTX, | Performed by: SURGERY

## 2017-11-30 PROCEDURE — 99213 OFFICE O/P EST LOW 20 MIN: CPT | Mod: PBBFAC,NTX | Performed by: SURGERY

## 2017-11-30 PROCEDURE — 93990 DOPPLER FLOW TESTING: CPT | Mod: PBBFAC,NTX | Performed by: SURGERY

## 2017-11-30 RX ORDER — MUPIROCIN 20 MG/G
OINTMENT TOPICAL
Status: CANCELLED | OUTPATIENT
Start: 2017-11-30

## 2017-11-30 RX ORDER — LIDOCAINE HYDROCHLORIDE 10 MG/ML
1 INJECTION, SOLUTION EPIDURAL; INFILTRATION; INTRACAUDAL; PERINEURAL ONCE
Status: CANCELLED | OUTPATIENT
Start: 2017-11-30 | End: 2017-11-30

## 2017-11-30 NOTE — PROGRESS NOTES
Patient ID: Maksim Hidalgo is a 37 y.o. male.    I. HISTORY     Chief Complaint: Post-op Evaluation      HPI Maksim Hidalgo is a 37 y.o. male here for evaluation of new dialysis access. Renal failure is due to HTN. Currently dialyzes on M/W/F via a right IJ permcath. Patient is right handed. Has been on dialysis since mid-2016.   He did PD for over a year but has had recurrent catheter complications and has transitioned to HD.    No left sided catheters, pacemaker or AICD. No prior AV access procedures.    [facility] - Jose Saldana on     Review of Systems   Constitution: Negative.   HENT: Positive for congestion.    Eyes: Negative for blurred vision, vision loss in left eye and vision loss in right eye.   Cardiovascular: Negative for chest pain, claudication and dyspnea on exertion.   Respiratory: Negative for cough and sleep disturbances due to breathing.    Endocrine: Negative.    Hematologic/Lymphatic: Negative.    Skin: Negative.    Musculoskeletal: Negative.    Gastrointestinal: Negative for abdominal pain, nausea and vomiting.   Neurological: Negative.        II. PHYSICAL EXAM   Right Arm BP - Sittin/86 (17 0821)  Pulse: 84     Body mass index is 29.98 kg/m².      Physical Exam   Constitutional: He is oriented to person, place, and time. He appears well-developed and well-nourished.   HENT:   Head: Normocephalic and atraumatic.   Cardiovascular: Normal rate.    Pulses:       Radial pulses are 2+ on the right side, and 2+ on the left side.   Pulmonary/Chest: Effort normal. No respiratory distress.   Musculoskeletal: Normal range of motion. He exhibits no edema or tenderness.   Neurological: He is alert and oriented to person, place, and time. No cranial nerve deficit.   Skin: Skin is warm. No erythema.   Psychiatric: He has a normal mood and affect.   Vitals reviewed.      III. ASSESSMENT & PLAN (MEDICAL DECISION MAKING)     1. ESRD (end stage renal disease) on dialysis    2.  Thrombosis of arteriovenous dialysis fistula, initial encounter        Imaging Results:    Vein mapping - adequate left cephalic for BC AVF, basilic >4mm       Assessment/Diagnosis and Plan:    Maksim Hidalgo is a 37 y.o. male with ESRD on HD. He has failed PD and has transitioned to HD.  Plan left arm AVF - BC vs BVT. Distal FA vein may dilate with block as well.  Scheduled for Thurs Oct 26.    I have explained the risks, benefits, and alternatives of the procedure in detail.   Risks include but are not limited to arterial steal, inability to use access, failure to mature, permanent injury to nerves or blood vessels, and need for repeat interventions or new access creation.  He voiced understanding, all questions were answered, and agrees to proceed as planned.    Josephine Mitchell MD FACS [facility]  Vascular & Endovascular Surgery

## 2017-12-01 LAB — BACTERIA THROAT CULT: NORMAL

## 2017-12-12 ENCOUNTER — OFFICE VISIT (OUTPATIENT)
Dept: HEPATOLOGY | Facility: CLINIC | Age: 37
End: 2017-12-12
Payer: MEDICARE

## 2017-12-12 ENCOUNTER — LAB VISIT (OUTPATIENT)
Dept: LAB | Facility: HOSPITAL | Age: 37
End: 2017-12-12
Attending: INTERNAL MEDICINE
Payer: MEDICARE

## 2017-12-12 VITALS
RESPIRATION RATE: 18 BRPM | WEIGHT: 203.94 LBS | TEMPERATURE: 98 F | OXYGEN SATURATION: 98 % | BODY MASS INDEX: 30.21 KG/M2 | DIASTOLIC BLOOD PRESSURE: 75 MMHG | HEART RATE: 113 BPM | SYSTOLIC BLOOD PRESSURE: 138 MMHG | HEIGHT: 69 IN

## 2017-12-12 DIAGNOSIS — B18.1 CHRONIC HEPATITIS B VIRUS INFECTION: Primary | ICD-10-CM

## 2017-12-12 DIAGNOSIS — K76.9 LIVER LESION: ICD-10-CM

## 2017-12-12 DIAGNOSIS — B18.1 CHRONIC HEPATITIS B VIRUS INFECTION: ICD-10-CM

## 2017-12-12 LAB
AFP SERPL-MCNC: 2.6 NG/ML
ALBUMIN SERPL BCP-MCNC: 3.9 G/DL
ALP SERPL-CCNC: 100 U/L
ALT SERPL W/O P-5'-P-CCNC: 16 U/L
ANION GAP SERPL CALC-SCNC: 18 MMOL/L
AST SERPL-CCNC: 18 U/L
BASOPHILS # BLD AUTO: 0.05 K/UL
BASOPHILS NFR BLD: 0.7 %
BILIRUB SERPL-MCNC: 0.3 MG/DL
BUN SERPL-MCNC: 27 MG/DL
CALCIUM SERPL-MCNC: 9.4 MG/DL
CHLORIDE SERPL-SCNC: 101 MMOL/L
CO2 SERPL-SCNC: 25 MMOL/L
CREAT SERPL-MCNC: 5.7 MG/DL
DIFFERENTIAL METHOD: ABNORMAL
EOSINOPHIL # BLD AUTO: 0.2 K/UL
EOSINOPHIL NFR BLD: 3.5 %
ERYTHROCYTE [DISTWIDTH] IN BLOOD BY AUTOMATED COUNT: 12.2 %
EST. GFR  (AFRICAN AMERICAN): 13.3 ML/MIN/1.73 M^2
EST. GFR  (NON AFRICAN AMERICAN): 11.5 ML/MIN/1.73 M^2
GLUCOSE SERPL-MCNC: 78 MG/DL
HCT VFR BLD AUTO: 41.1 %
HGB BLD-MCNC: 13.6 G/DL
IMM GRANULOCYTES # BLD AUTO: 0.01 K/UL
IMM GRANULOCYTES NFR BLD AUTO: 0.1 %
INR PPP: 1
LYMPHOCYTES # BLD AUTO: 2 K/UL
LYMPHOCYTES NFR BLD: 28.9 %
MCH RBC QN AUTO: 29.7 PG
MCHC RBC AUTO-ENTMCNC: 33.1 G/DL
MCV RBC AUTO: 90 FL
MONOCYTES # BLD AUTO: 0.6 K/UL
MONOCYTES NFR BLD: 8.7 %
NEUTROPHILS # BLD AUTO: 4 K/UL
NEUTROPHILS NFR BLD: 58.1 %
NRBC BLD-RTO: 0 /100 WBC
PLATELET # BLD AUTO: 242 K/UL
PMV BLD AUTO: 10.4 FL
POTASSIUM SERPL-SCNC: 3.6 MMOL/L
PROT SERPL-MCNC: 8.3 G/DL
PROTHROMBIN TIME: 10.7 SEC
RBC # BLD AUTO: 4.58 M/UL
SODIUM SERPL-SCNC: 144 MMOL/L
WBC # BLD AUTO: 6.89 K/UL

## 2017-12-12 PROCEDURE — 99214 OFFICE O/P EST MOD 30 MIN: CPT | Mod: S$PBB,TXP,, | Performed by: INTERNAL MEDICINE

## 2017-12-12 PROCEDURE — 87517 HEPATITIS B DNA QUANT: CPT | Mod: TXP

## 2017-12-12 PROCEDURE — 80053 COMPREHEN METABOLIC PANEL: CPT | Mod: TXP

## 2017-12-12 PROCEDURE — 99999 PR PBB SHADOW E&M-EST. PATIENT-LVL IV: CPT | Mod: PBBFAC,TXP,, | Performed by: INTERNAL MEDICINE

## 2017-12-12 PROCEDURE — 99214 OFFICE O/P EST MOD 30 MIN: CPT | Mod: PBBFAC,TXP | Performed by: INTERNAL MEDICINE

## 2017-12-12 PROCEDURE — 85610 PROTHROMBIN TIME: CPT | Mod: TXP

## 2017-12-12 PROCEDURE — 82105 ALPHA-FETOPROTEIN SERUM: CPT | Mod: TXP

## 2017-12-12 PROCEDURE — 36415 COLL VENOUS BLD VENIPUNCTURE: CPT | Mod: TXP

## 2017-12-12 PROCEDURE — 85025 COMPLETE CBC W/AUTO DIFF WBC: CPT | Mod: TXP
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
## 2017-12-12 NOTE — PATIENT INSTRUCTIONS
1. Blood tests for me every 3 months and US in 6 months  2. Start treatment for hepatitis B closer to transplant  3. Return 6 months  4. The spot in your liver is small

## 2017-12-13 ENCOUNTER — ANESTHESIA EVENT (OUTPATIENT)
Dept: SURGERY | Facility: HOSPITAL | Age: 37
End: 2017-12-13
Payer: MEDICARE

## 2017-12-13 ENCOUNTER — TELEPHONE (OUTPATIENT)
Dept: VASCULAR SURGERY | Facility: CLINIC | Age: 37
End: 2017-12-13

## 2017-12-14 ENCOUNTER — SURGERY (OUTPATIENT)
Age: 37
End: 2017-12-14

## 2017-12-14 ENCOUNTER — ANESTHESIA (OUTPATIENT)
Dept: SURGERY | Facility: HOSPITAL | Age: 37
End: 2017-12-14
Payer: MEDICARE

## 2017-12-14 ENCOUNTER — HOSPITAL ENCOUNTER (OUTPATIENT)
Facility: HOSPITAL | Age: 37
Discharge: HOME | End: 2017-12-14
Attending: SURGERY | Admitting: SURGERY
Payer: MEDICARE

## 2017-12-14 VITALS
DIASTOLIC BLOOD PRESSURE: 94 MMHG | BODY MASS INDEX: 29.33 KG/M2 | SYSTOLIC BLOOD PRESSURE: 124 MMHG | OXYGEN SATURATION: 98 % | TEMPERATURE: 98 F | RESPIRATION RATE: 12 BRPM | WEIGHT: 198 LBS | HEART RATE: 73 BPM | HEIGHT: 69 IN

## 2017-12-14 DIAGNOSIS — T82.868A THROMBOSIS OF ARTERIOVENOUS DIALYSIS FISTULA, INITIAL ENCOUNTER: ICD-10-CM

## 2017-12-14 DIAGNOSIS — N18.6 ESRD (END STAGE RENAL DISEASE) ON DIALYSIS: Primary | ICD-10-CM

## 2017-12-14 DIAGNOSIS — Z99.2 ESRD (END STAGE RENAL DISEASE) ON DIALYSIS: Primary | ICD-10-CM

## 2017-12-14 PROCEDURE — 71000033 HC RECOVERY, INTIAL HOUR: Mod: NTX | Performed by: SURGERY

## 2017-12-14 PROCEDURE — C1757 CATH, THROMBECTOMY/EMBOLECT: HCPCS | Mod: TXP | Performed by: SURGERY

## 2017-12-14 PROCEDURE — 36000707: Mod: TXP | Performed by: SURGERY

## 2017-12-14 PROCEDURE — 25000003 PHARM REV CODE 250: Mod: TXP | Performed by: STUDENT IN AN ORGANIZED HEALTH CARE EDUCATION/TRAINING PROGRAM

## 2017-12-14 PROCEDURE — 37000008 HC ANESTHESIA 1ST 15 MINUTES: Mod: TXP | Performed by: SURGERY

## 2017-12-14 PROCEDURE — 71000045 HC DOSC ROUTINE RECOVERY EA ADD'L HR: Mod: TXP | Performed by: SURGERY

## 2017-12-14 PROCEDURE — 36825 ARTERY-VEIN AUTOGRAFT: CPT | Mod: NTX,,, | Performed by: SURGERY

## 2017-12-14 PROCEDURE — 71000015 HC POSTOP RECOV 1ST HR: Mod: TXP | Performed by: SURGERY

## 2017-12-14 PROCEDURE — 36000706: Mod: TXP | Performed by: SURGERY

## 2017-12-14 PROCEDURE — D9220A PRA ANESTHESIA: Mod: NTX,,, | Performed by: ANESTHESIOLOGY

## 2017-12-14 PROCEDURE — 63600175 PHARM REV CODE 636 W HCPCS: Mod: NTX | Performed by: SURGERY

## 2017-12-14 PROCEDURE — 71000044 HC DOSC ROUTINE RECOVERY FIRST HOUR: Mod: TXP | Performed by: SURGERY

## 2017-12-14 PROCEDURE — 63600175 PHARM REV CODE 636 W HCPCS: Mod: TXP | Performed by: STUDENT IN AN ORGANIZED HEALTH CARE EDUCATION/TRAINING PROGRAM

## 2017-12-14 PROCEDURE — 37000009 HC ANESTHESIA EA ADD 15 MINS: Mod: NTX | Performed by: SURGERY

## 2017-12-14 PROCEDURE — 63600175 PHARM REV CODE 636 W HCPCS: Mod: TXP | Performed by: SURGERY

## 2017-12-14 PROCEDURE — 25000003 PHARM REV CODE 250: Mod: TXP | Performed by: SURGERY

## 2017-12-14 RX ORDER — SODIUM CHLORIDE 9 MG/ML
INJECTION, SOLUTION INTRAVENOUS CONTINUOUS PRN
Status: DISCONTINUED | OUTPATIENT
Start: 2017-12-14 | End: 2017-12-14

## 2017-12-14 RX ORDER — MUPIROCIN 20 MG/G
OINTMENT TOPICAL
Status: DISCONTINUED | OUTPATIENT
Start: 2017-12-14 | End: 2017-12-14 | Stop reason: HOSPADM

## 2017-12-14 RX ORDER — LIDOCAINE HYDROCHLORIDE 10 MG/ML
1 INJECTION, SOLUTION EPIDURAL; INFILTRATION; INTRACAUDAL; PERINEURAL ONCE
Status: DISCONTINUED | OUTPATIENT
Start: 2017-12-14 | End: 2017-12-14 | Stop reason: HOSPADM

## 2017-12-14 RX ORDER — MIDAZOLAM HYDROCHLORIDE 1 MG/ML
INJECTION, SOLUTION INTRAMUSCULAR; INTRAVENOUS
Status: DISCONTINUED | OUTPATIENT
Start: 2017-12-14 | End: 2017-12-14

## 2017-12-14 RX ORDER — HEPARIN SODIUM 1000 [USP'U]/ML
INJECTION, SOLUTION INTRAVENOUS; SUBCUTANEOUS
Status: DISCONTINUED | OUTPATIENT
Start: 2017-12-14 | End: 2017-12-14

## 2017-12-14 RX ORDER — HEPARIN SODIUM 1000 [USP'U]/ML
INJECTION, SOLUTION INTRAVENOUS; SUBCUTANEOUS
Status: DISCONTINUED | OUTPATIENT
Start: 2017-12-14 | End: 2017-12-14 | Stop reason: HOSPADM

## 2017-12-14 RX ORDER — ONDANSETRON 2 MG/ML
INJECTION INTRAMUSCULAR; INTRAVENOUS
Status: DISCONTINUED | OUTPATIENT
Start: 2017-12-14 | End: 2017-12-14

## 2017-12-14 RX ORDER — HYDROCODONE BITARTRATE AND ACETAMINOPHEN 5; 325 MG/1; MG/1
1 TABLET ORAL EVERY 4 HOURS PRN
Status: DISCONTINUED | OUTPATIENT
Start: 2017-12-14 | End: 2017-12-14 | Stop reason: HOSPADM

## 2017-12-14 RX ORDER — PROPOFOL 10 MG/ML
VIAL (ML) INTRAVENOUS CONTINUOUS PRN
Status: DISCONTINUED | OUTPATIENT
Start: 2017-12-14 | End: 2017-12-14

## 2017-12-14 RX ORDER — FENTANYL CITRATE 50 UG/ML
INJECTION, SOLUTION INTRAMUSCULAR; INTRAVENOUS
Status: DISCONTINUED | OUTPATIENT
Start: 2017-12-14 | End: 2017-12-14

## 2017-12-14 RX ORDER — HYDROCODONE BITARTRATE AND ACETAMINOPHEN 5; 325 MG/1; MG/1
1 TABLET ORAL EVERY 6 HOURS PRN
Qty: 5 TABLET | Refills: 0 | Status: ON HOLD | OUTPATIENT
Start: 2017-12-14 | End: 2023-05-30 | Stop reason: HOSPADM

## 2017-12-14 RX ORDER — PROPOFOL 10 MG/ML
VIAL (ML) INTRAVENOUS
Status: DISCONTINUED | OUTPATIENT
Start: 2017-12-14 | End: 2017-12-14

## 2017-12-14 RX ADMIN — FENTANYL CITRATE 25 MCG: 50 INJECTION, SOLUTION INTRAMUSCULAR; INTRAVENOUS at 11:12

## 2017-12-14 RX ADMIN — HYDROCODONE BITARTRATE AND ACETAMINOPHEN 1 TABLET: 5; 325 TABLET ORAL at 02:12

## 2017-12-14 RX ADMIN — MUPIROCIN: 20 OINTMENT TOPICAL at 10:12

## 2017-12-14 RX ADMIN — MIDAZOLAM HYDROCHLORIDE 2 MG: 1 INJECTION, SOLUTION INTRAMUSCULAR; INTRAVENOUS at 11:12

## 2017-12-14 RX ADMIN — ONDANSETRON 4 MG: 2 INJECTION INTRAMUSCULAR; INTRAVENOUS at 12:12

## 2017-12-14 RX ADMIN — PROPOFOL 50 MCG/KG/MIN: 10 INJECTION, EMULSION INTRAVENOUS at 11:12

## 2017-12-14 RX ADMIN — SODIUM CHLORIDE: 0.9 INJECTION, SOLUTION INTRAVENOUS at 11:12

## 2017-12-14 RX ADMIN — HEPARIN SODIUM 10000 UNITS: 1000 INJECTION, SOLUTION INTRAVENOUS; SUBCUTANEOUS at 12:12

## 2017-12-14 RX ADMIN — HEPARIN SODIUM 3000 UNITS: 1000 INJECTION, SOLUTION INTRAVENOUS; SUBCUTANEOUS at 12:12

## 2017-12-14 RX ADMIN — PROPOFOL 30 MG: 10 INJECTION, EMULSION INTRAVENOUS at 12:12

## 2017-12-14 RX ADMIN — Medication 2 G: at 11:12

## 2017-12-14 NOTE — ANESTHESIA POSTPROCEDURE EVALUATION
"Anesthesia Post Evaluation    Patient: Maksim Hidalgo    Procedure(s) Performed: Procedure(s) (LRB):  [id number] possible graft  (Left)    Final Anesthesia Type: regional  Patient location during evaluation: PACU  Patient participation: Yes- Able to Participate  Level of consciousness: awake and alert and oriented  Post-procedure vital signs: reviewed and stable  Pain management: adequate  Airway patency: patent  PONV status at discharge: No PONV  Anesthetic complications: no      Cardiovascular status: blood pressure returned to baseline  Respiratory status: unassisted and room air  Hydration status: euvolemic  Follow-up not needed.        Visit Vitals  /79 (BP Location: Right arm, Patient Position: Lying)   Pulse 71   Temp 36.4 °C (97.5 °F) (Temporal)   Resp 14   Ht 5' 9" (1.753 m)   Wt 89.8 kg (198 lb)   SpO2 100%   BMI 29.24 kg/m²       Pain/Delvin Score: Pain Assessment Performed: Yes (12/14/2017  1:07 PM)  Presence of Pain: non-verbal indicators absent (12/14/2017  1:07 PM)  Delvin Score: 5 (12/14/2017  1:07 PM)      "

## 2017-12-14 NOTE — DISCHARGE INSTRUCTIONS
Recovery After Procedural Sedation (Adult)  You have been given medicine by vein to make you sleep during your surgery. This may have included both a pain medicine and sleeping medicine. Most of the effects have worn off. But you may still have some drowsiness for the next 6 to 8 hours.  Home care  Follow these guidelines when you get home:  · For the next 8 hours, you should be watched by a responsible adult. This person should make sure your condition is not getting worse.  · Don't drink any alcohol for the next 24 hours.  · Don't drive, operate dangerous machinery, or make important business or personal decisions during the next 24 hours.  Note: Your healthcare provider may tell you not to take any medicine by mouth for pain or sleep in the next 4 hours. These medicines may react with the medicines you were given in the hospital. This could cause a much stronger response than usual.  Follow-up care  Follow up with your healthcare provider if you are not alert and back to your usual level of activity within 12 hours.  When to seek medical advice  Call your healthcare provider right away if any of these occur:  · Drowsiness gets worse  · Weakness or dizziness gets worse  · Repeated vomiting  · You can't be awakened   Date Last Reviewed: 10/18/2016  © 4810-4914 [employer]. [address] [place] [address], [place] [address]. All rights reserved. This information is not intended as a substitute for professional medical care. Always follow your healthcare professional's instructions.

## 2017-12-14 NOTE — H&P (VIEW-ONLY)
Patient ID: Maksim Hidalgo is a 37 y.o. male.    I. HISTORY     Chief Complaint: Post-op Evaluation      HPI Maksmi Hidalgo is a 37 y.o. male here for evaluation of new dialysis access. Renal failure is due to HTN. Currently dialyzes on M/W/F via a right IJ permcath. Patient is right handed. Has been on dialysis since mid-2016.   He did PD for over a year but has had recurrent catheter complications and has transitioned to HD.    No left sided catheters, pacemaker or AICD. No prior AV access procedures.    [facility] - Jose Saldana on     Review of Systems   Constitution: Negative.   HENT: Positive for congestion.    Eyes: Negative for blurred vision, vision loss in left eye and vision loss in right eye.   Cardiovascular: Negative for chest pain, claudication and dyspnea on exertion.   Respiratory: Negative for cough and sleep disturbances due to breathing.    Endocrine: Negative.    Hematologic/Lymphatic: Negative.    Skin: Negative.    Musculoskeletal: Negative.    Gastrointestinal: Negative for abdominal pain, nausea and vomiting.   Neurological: Negative.        II. PHYSICAL EXAM   Right Arm BP - Sittin/86 (17 0821)  Pulse: 84     Body mass index is 29.98 kg/m².      Physical Exam   Constitutional: He is oriented to person, place, and time. He appears well-developed and well-nourished.   HENT:   Head: Normocephalic and atraumatic.   Cardiovascular: Normal rate.    Pulses:       Radial pulses are 2+ on the right side, and 2+ on the left side.   Pulmonary/Chest: Effort normal. No respiratory distress.   Musculoskeletal: Normal range of motion. He exhibits no edema or tenderness.   Neurological: He is alert and oriented to person, place, and time. No cranial nerve deficit.   Skin: Skin is warm. No erythema.   Psychiatric: He has a normal mood and affect.   Vitals reviewed.      III. ASSESSMENT & PLAN (MEDICAL DECISION MAKING)     1. ESRD (end stage renal disease) on dialysis    2.  Thrombosis of arteriovenous dialysis fistula, initial encounter        Imaging Results:    Vein mapping - adequate left cephalic for BC AVF, basilic >4mm       Assessment/Diagnosis and Plan:    Maksim Hidalgo is a 37 y.o. male with ESRD on HD. He has failed PD and has transitioned to HD.  Plan left arm AVF - BC vs BVT. Distal FA vein may dilate with block as well.  Scheduled for Thurs Oct 26.    I have explained the risks, benefits, and alternatives of the procedure in detail.   Risks include but are not limited to arterial steal, inability to use access, failure to mature, permanent injury to nerves or blood vessels, and need for repeat interventions or new access creation.  He voiced understanding, all questions were answered, and agrees to proceed as planned.    Josephine Mitchell MD FACS [facility]  Vascular & Endovascular Surgery

## 2017-12-14 NOTE — TRANSFER OF CARE
"Anesthesia Transfer of Care Note    Patient: Maksim Hidalgo    Procedure(s) Performed: Procedure(s) (LRB):  [id number] possible graft  (Left)    Patient location: [facility]    Anesthesia Type: general and MAC    Transport from OR: Transported from OR on 6-10 L/min O2 by face mask with adequate spontaneous ventilation    Post pain: adequate analgesia    Post assessment: no apparent anesthetic complications    Post vital signs: stable    Level of consciousness: sedated    Nausea/Vomiting: no nausea/vomiting    Complications: none    Transfer of care protocol was followed      Last vitals:   Visit Vitals  /79 (BP Location: Right arm, Patient Position: Lying)   Pulse 83   Temp 36.8 °C (98.2 °F) (Oral)   Resp 16   Ht 5' 9" (1.753 m)   Wt 89.8 kg (198 lb)   SpO2 100%   BMI 29.24 kg/m²     "

## 2017-12-14 NOTE — ANESTHESIA PROCEDURE NOTES
L Supraclavicular Brachial Plexus Single Injection Block    Patient location during procedure: OR    Reason for block: primary anesthetic   Diagnosis: L arm pain   Start time: 12/14/2017 11:49 AM  Timeout: 12/14/2017 11:48 AM   End time: 12/14/2017 12:00 PM  Staffing  Anesthesiologist: JENNY BO  Resident/CRNA: KIKE HUNT  Performed: resident/CRNA   Preanesthetic Checklist  Completed: patient identified, site marked, surgical consent, pre-op evaluation, timeout performed, IV checked, risks and benefits discussed and monitors and equipment checked  Peripheral Block  Patient position: supine  Prep: ChloraPrep  Patient monitoring: heart rate, cardiac monitor, continuous pulse ox, continuous capnometry and frequent blood pressure checks  Block type: supraclavicular  Laterality: left  Injection technique: single shot  Needle  Needle type: Stimuplex   Needle gauge: 22 G  Needle length: 2 in  Needle localization: anatomical landmarks and ultrasound guidance   -ultrasound image captured on disc.  Assessment  Injection assessment: negative aspiration, negative parasthesia and local visualized surrounding nerve  Paresthesia pain: none  Heart rate change: no  Slow fractionated injection: yes  Medications:  Bolus administered: 30 mL of 1.5 mepivacaine  Epinephrine added: 3.75 mcg/mL (1/300,000)  Additional Notes  Intercostal brachial field block performed with mepivacaine 1.5% 10ml for additional coverage.    VSS.  See anesthesia record.  Patient tolerated procedure well.

## 2017-12-14 NOTE — BRIEF OP NOTE
[facility]  Brief Operative Note     SUMMARY     Surgery Date: 12/14/2017     Surgeon(s) and Role:     * Josephine Mitchell MD - Primary         Assisting Surgeon:  KELLEY Umanzor MD - Resident    Pre-op Diagnosis:  ESRD (end stage renal disease) on dialysis [N18.6, Z99.2]  Thrombosis of arteriovenous dialysis fistula, initial encounter [T82.086H]    Post-op Diagnosis:  Post-Op Diagnosis Codes:     * ESRD (end stage renal disease) on dialysis [N18.6, Z99.2]     * Thrombosis of arteriovenous dialysis fistula, initial encounter [T82.317P]    Procedure(s) (LRB):  AYEYVEJXHBNA-NAPHDWH-VJ possible graft  (Left)    Anesthesia: Regional    Description of the findings of the procedure: Construction of AV fistua forearm    Findings/Key Components: Construction of AV fistua forearm.  See operative note for more detail    Estimated Blood Loss: minimal         Specimens:   Specimen (12h ago through future)    None          Discharge Note    SUMMARY     Admit Date: 12/14/2017    Discharge Date and Time:  12/14/2017 1:14 PM    Hospital Course (synopsis of major diagnoses, care, treatment, and services provided during the course of the hospital stay): Pt underwent L AVF creation on 12/14/17. See operative note for procedure details. Tolerated procedure well. Post-operative course was uncomplicated. Patient was instructed and educated on discharge instructions. At time of discharge, pt will be tolerating oral intake, ambulating well, and pain was controlled with oral pain medications. Will see patient in vascular surgery clinic in 4 weeks for follow up with ultrasound of left HD access.       Final Diagnosis: Post-Op Diagnosis Codes:     * ESRD (end stage renal disease) on dialysis [N18.6, Z99.2]     * Thrombosis of arteriovenous dialysis fistula, initial encounter [T82.028U]    Disposition: Home or Self Care     Medications:  Reconciled Home Medications:   Current Discharge Medication List      START taking  these medications    Details   hydrocodone-acetaminophen 5-325mg (NORCO) 5-325 mg per tablet Take 1 tablet by mouth every 6 (six) hours as needed for Pain.  Qty: 5 tablet, Refills: 0         CONTINUE these medications which have NOT CHANGED    Details   albuterol 90 mcg/actuation inhaler Inhale 2 puffs into the lungs every 4 (four) hours as needed for Wheezing.  Qty: 1 Inhaler, Refills: 0    Associated Diagnoses: Acute non-recurrent sinusitis, unspecified location      benazepril (LOTENSIN) 20 MG tablet Take 1 tablet (20 mg total) by mouth once daily.  Qty: 90 tablet, Refills: 3    Associated Diagnoses: Renovascular hypertension      calcitRIOL (ROCALTROL) 0.5 MCG Cap Take 1 capsule (0.5 mcg total) by mouth once daily.  Qty: 30 capsule, Refills: 11      furosemide (LASIX) 40 MG tablet TAKE 1 TABLET BY MOUTH TWICE DAILY  Qty: 180 tablet, Refills: 0    Associated Diagnoses: ESRD (end stage renal disease)      gabapentin (NEURONTIN) 300 MG capsule Take 1 capsule (300 mg total) by mouth every evening.  Qty: 30 capsule, Refills: 1      melatonin 5 mg Tab Take 1 tablet (5 mg total) by mouth every evening.    Associated Diagnoses: Adjustment insomnia      oxycodone-acetaminophen (PERCOCET) 5-325 mg per tablet Take 1 tablet by mouth every 4 (four) hours as needed for Pain.  Qty: 25 tablet, Refills: 0      pantoprazole (PROTONIX) 40 MG tablet Take 1 tablet (40 mg total) by mouth once daily.  Qty: 30 tablet, Refills: 11      senna-docusate 8.6-50 mg (PERICOLACE) 8.6-50 mg per tablet Take 2 tablets by mouth every evening.      sevelamer carbonate (RENVELA) 800 mg Tab Take 1 tablet (800 mg total) by mouth 3 (three) times daily with meals.  Qty: 90 tablet, Refills: 11             Discharge Procedure Orders  [facility] Hemodialysis Access   Standing Status: Future  Standing Exp. Date: 12/14/18   Order Comments: Patient needs imaging prior to fu appt in 4 weeks     Diet general     Activity as tolerated     Shower on day dressing  removed (No bath)   Order Comments: Do not soak/submerge incision in water for 4 weeks after surgery (aka no bathing, swimming, etc)     Call MD for:  extreme fatigue     Call MD for:  persistent dizziness or light-headedness     Call MD for:  hives     Call MD for:  redness, tenderness, or signs of infection (pain, swelling, redness, odor or green/yellow discharge around incision site)     Call MD for:  difficulty breathing, headache or visual disturbances     Call MD for:  severe uncontrolled pain     Call MD for:  temperature >100.4     Call MD for:  persistent nausea and vomiting     Remove dressing in 48 hours   Order Comments: Once dressing is removed you will see small paper steri-stripes covering incision. These strips will fall off in 7-10 days- they are okay to get wet in shower.       Follow-up Information     Josephine Mitchell MD In 4 weeks.    Specialties:  Vascular Surgery, General Surgery  Why:  follow up visit with ultrasound  Contact information:  [address]  [facility] [address]  [phone]

## 2017-12-14 NOTE — ANESTHESIA RELEASE NOTE
"Anesthesia Release from PACU Note    Patient: Maksim Hidalgo    Procedure(s) Performed: Procedure(s) (LRB):  NYSVUWHOLYPG-[id number] possible graft  (Left)    Anesthesia type: general    Post pain: Adequate analgesia    Post assessment: no apparent anesthetic complications and tolerated procedure well    Last Vitals:   Visit Vitals  /74 (BP Location: Right arm, Patient Position: Lying)   Pulse 69   Temp 36.4 °C (97.5 °F) (Temporal)   Resp 14   Ht 5' 9" (1.753 m)   Wt 89.8 kg (198 lb)   SpO2 100%   BMI 29.24 kg/m²       Post vital signs: stable    Level of consciousness: awake and alert     Nausea/Vomiting: no nausea/no vomiting    Complications: none    Airway Patency: patent    Respiratory: unassisted, spontaneous ventilation, room air    Cardiovascular: stable and blood pressure at baseline    Hydration: euvolemic  "

## 2017-12-14 NOTE — ANESTHESIA PREPROCEDURE EVALUATION
Pre-operative evaluation for Procedure(s) (LRB):  HEAFXDYRSLDZ-IMAQAJL-YI possible graft  (left Arm)    Maksim Hidalgo is a 37 y.o. male PMH ESRD on HD 2/2 HTN and chronic Hep B who presents for the above procedure.    LDA:   RIJ HD Cath    Prev airway:   Placement Date: 07/07/17; Placement Time: 0928; Method of Intubation: Khan; Inserted by: Anesthesia Resident; Airway Device: Endotracheal Tube; Mask Ventilation:  (Two hand w/o oral airway); Intubated: Postinduction; Blade: Kwasi #3; Airway Device Size: 8.0; Style: Cuffed; Cuff Inflation: Minimal occlusive pressure; Inflation Amount: 7; Placement Verified By: Auscultation, Capnometry, ETT Condensation; Grade: Grade I; Intubation Findings: Positive EtCO2, Bilateral breath sounds, Atraumatic/Condition of teeth unchanged;  Depth of Insertion: 25; Securment: Lips; Complications: None; Breath Sounds: Equal Bilateral; Insertion Attempts: 2; Removal Date: 07/07/17;  Removal Time: 1019    Drips: none      Patient Active Problem List   Diagnosis    LVH (left ventricular hypertrophy) due to hypertensive disease    Chronic hepatitis B virus infection    Vitamin D deficiency    Secondary hyperparathyroidism    Peritonitis of undetermined cause    Chronic constipation    Hypertensive retinopathy of both eyes    ESRD (end stage renal disease) on dialysis    Organ transplant candidate    Anemia in ESRD (end-stage renal disease)    Renovascular hypertension    Dizziness    Hypotension arterial    Postural dizziness with near syncope    Preop examination    Myalgia    Tremor of right hand    Weakness    Altered mental status    Intractable pain    Neuropathic pain    Adjustment insomnia       Review of patient's allergies indicates:  No Known Allergies     No current facility-administered medications on file prior to encounter.      Current Outpatient Prescriptions on File Prior to Encounter   Medication Sig Dispense Refill    albuterol 90  mcg/actuation inhaler Inhale 2 puffs into the lungs every 4 (four) hours as needed for Wheezing. 1 Inhaler 0    benazepril (LOTENSIN) 20 MG tablet Take 1 tablet (20 mg total) by mouth once daily. (Patient taking differently: Take 20 mg by mouth every morning. ) 90 tablet 3    calcitRIOL (ROCALTROL) 0.5 MCG Cap Take 1 capsule (0.5 mcg total) by mouth once daily. (Patient taking differently: Take 0.5 mcg by mouth every morning. ) 30 capsule 11    furosemide (LASIX) 40 MG tablet TAKE 1 TABLET BY MOUTH TWICE DAILY 180 tablet 0    gabapentin (NEURONTIN) 300 MG capsule Take 1 capsule (300 mg total) by mouth every evening. (Patient taking differently: Take 300 mg by mouth nightly. ) 30 capsule 1    melatonin 5 mg Tab Take 1 tablet (5 mg total) by mouth every evening. (Patient taking differently: Take 1 tablet by mouth nightly. )      oxycodone-acetaminophen (PERCOCET) 5-325 mg per tablet Take 1 tablet by mouth every 4 (four) hours as needed for Pain. 25 tablet 0    oxyCODONE-acetaminophen (PERCOCET) 5-325 mg per tablet Take 1 tablet by mouth every 6 (six) hours as needed for Pain. 4 tablet 0    pantoprazole (PROTONIX) 40 MG tablet Take 1 tablet (40 mg total) by mouth once daily. (Patient taking differently: Take 40 mg by mouth every morning. ) 30 tablet 11    senna-docusate 8.6-50 mg (PERICOLACE) 8.6-50 mg per tablet Take 2 tablets by mouth every evening. (Patient taking differently: Take 2 tablets by mouth nightly. )      sevelamer carbonate (RENVELA) 800 mg Tab Take 1 tablet (800 mg total) by mouth 3 (three) times daily with meals. 90 tablet 11       Past Surgical History:   Procedure Laterality Date    PERITONEAL CATHETER INSERTION         Social History     Social History    Marital status: Single     Spouse name: N/A    Number of children: N/A    Years of education: N/A     Occupational History          fast food restaurant, stopped working about 1 year ago     Social History Main Topics     Smoking status: Former Smoker     Packs/day: 0.00     Years: 8.00     Types: Cigarettes     Quit date: 2016    Smokeless tobacco: Never Used    Alcohol use No    Drug use: No    Sexual activity: No     Other Topics Concern    Not on file     Social History Narrative    He works in Management.         Vital Signs Range (Last 24H):         CBC:   Recent Labs      17   1449   WBC  6.89   RBC  4.58*   HGB  13.6*   HCT  41.1   PLT  242   MCV  90   MCH  29.7   MCHC  33.1       CMP:   Recent Labs      17   1449   NA  144   K  3.6   CL  101   CO2  25   BUN  27*   CREATININE  5.7*   GLU  78   CALCIUM  9.4   ALBUMIN  3.9   PROT  8.3   ALKPHOS  100   ALT  16   AST  18   BILITOT  0.3       INR  Recent Labs      17   1449   INR  1.0           Diagnostic Studies: none      EK17  Normal sinus rhythm  Normal ECG  When compared with ECG of 2017 16:08,  No significant change was found  Confirmed by Irene DELGADO, Gaby ([id number]) on 2017 10:14:57 AM    2D Echo: 17  CONCLUSIONS     1 - Normal left ventricular systolic function (EF 60-65%).     2 - Concentric remodeling.     3 - Normal left ventricular diastolic function.     4 - Right atrial enlargement.     5 - Normal right ventricular systolic function .     6 - Trivial tricuspid regurgitation.     7 - Trivial to mild pulmonic regurgitation.     8 - The estimated PA systolic pressure is 14 mmHg.         Pre-op Assessment    I have reviewed the Patient Summary Reports.     I have reviewed the Nursing Notes.   I have reviewed the Medications.     Review of Systems  Anesthesia Hx:  No problems with previous Anesthesia  History of prior surgery of interest to airway management or planning: Previous anesthesia: General 17  with general anesthesia.  Airway issues documented on chart review include mask, difficult, easy direct laryngoscopy, GETA , view on direct laryngoscopy Grade I  Denies Family Hx of Anesthesia complications.   Denies  Personal Hx of Anesthesia complications.   Social:  Former Smoker, No Alcohol Use    Hematology/Oncology:     Oncology Normal    -- Anemia:   EENT/Dental:EENT/Dental Normal   Cardiovascular:   Hypertension Denies Dysrhythmias.   Denies Angina.     ECG has been reviewed.    Pulmonary:  Pulmonary Normal    Renal/:   Chronic Renal Disease, ESRD, Dialysis, renal hypertension    Hepatic/GI:   Liver Disease, Hepatitis, B    Musculoskeletal:  Musculoskeletal Normal    Neurological:  Neurology Normal    Endocrine:  Endocrine Normal    Psych:  Psychiatric Normal           Physical Exam  General:  Well nourished    Airway/Jaw/Neck:  Airway Findings: Mouth Opening: Normal Tongue: Normal  General Airway Assessment: Adult  Mallampati: II  TM Distance: Normal, at least 6 cm  Jaw/Neck Findings:  Neck ROM: Normal ROM      Dental:  Dental Findings: Periodontal disease, Mild   Chest/Lungs:  Chest/Lungs Clear    Heart/Vascular:  Heart Findings: Normal Heart murmur: negative    Abdomen:  Abdomen Findings: Normal    Musculoskeletal:  Musculoskeletal Findings: Normal   Skin:  Skin Findings: Normal    Mental Status:  Mental Status Findings:  Cooperative         Anesthesia Plan  Type of Anesthesia, risks & benefits discussed:  Anesthesia Type:  general, MAC, regional  Patient's Preference:   Intra-op Monitoring Plan: standard ASA monitors and central line  Intra-op Monitoring Plan Comments:   Post Op Pain Control Plan: peripheral nerve block and per primary service following discharge from PACU  Post Op Pain Control Plan Comments:   Induction:    Beta Blocker:  Patient is not currently on a Beta-Blocker (No further documentation required).       Informed Consent: Patient understands risks and agrees with Anesthesia plan.  Questions answered. Anesthesia consent signed with patient.  ASA Score: 3     Day of Surgery Review of History & Physical:    H&P update referred to the surgeon.         Ready For Surgery From Anesthesia Perspective.

## 2017-12-14 NOTE — OP NOTE
12/14/2017      Indications: Maksim Hidalgo is a 37 y.o. male  with end stage renal disease and need for long-term dialysis access.    Pre-operative Diagnosis:  ESRD.    Post-operative Diagnosis: Same    Operation: Left Radio-Cephalic AVF, mid-Forearm          Surgeon: Josephine Mitchell MD     Assistants:   Leonor Umanzor MD    Anesthesia: IV + Regional    Findings:  Adequate artery and vein for AVF    Estimate Blood Loss:  Less than 10mL            Specimens: None               Complications:  None; patient tolerated the procedure well.           Disposition: PACU, then home.           Attending Attestation: I was present and scrubbed for the entire procedure.      Procedure Details   The patient was seen in the Holding Room. The risks, benefits, complications, treatment options, and expected outcomes were discussed with the patient. The patient concurred with the proposed plan, giving informed consent.  The site of surgery properly noted/marked. The patient was taken to the Operating Room, identified correctly and the procedure verified. A Time Out was held and the above information confirmed.    The arm was prepped and draped in a sterile fashion. An initial skin incision was made on the mid-forearm and the cephalic vein was explored and appeared adequate for an AVF. The radial artery was then dissected and proximal and distal control was obtained by placing vessel loops around it. After flushing the vessels with heparin the vessel loops were clamped and a end-to-side anastomosis between the cephalic vein and the radial artery was created using 6-0 Prolene sutures in a continuous running manner. After completion of the anastomosis, the vessel loops were released. Hemostasis was secured. Good thrill was noted in the fistula and the incision was then closed in two layers, subcutaneous tissue with 3-0 Monocryl and skin with 4-0 Monocryl. Steri-Strips were applied.

## 2017-12-18 LAB
HBV DNA SERPL NAA+PROBE-ACNC: NORMAL LOGIU/ML
HBV DNA SERPL NAA+PROBE-LOG IU: NORMAL IU/ML

## 2017-12-20 PROBLEM — K76.9 LIVER LESION: Status: ACTIVE | Noted: 2017-12-20

## 2018-01-04 DIAGNOSIS — N18.6 ESRD (END STAGE RENAL DISEASE): ICD-10-CM

## 2018-01-05 RX ORDER — FUROSEMIDE 40 MG/1
TABLET ORAL
Qty: 180 TABLET | Refills: 0 | OUTPATIENT
Start: 2018-01-05

## 2018-01-10 ENCOUNTER — TELEPHONE (OUTPATIENT)
Dept: TRANSPLANT | Facility: CLINIC | Age: 38
End: 2018-01-10

## 2018-01-10 NOTE — TELEPHONE ENCOUNTER
"According to dialysis unit medical record, in the last three months pt has, 0 AMAs, 6  No Shows and denies issues with labs. "He is a pretty good pt but he is going through a lot right now. His grandmother and best friend in the world  and he is dealing with a lot of grief at this time. He rarely shortens treatments but has been missing treatments about twice per month. [place], but family really tries to work it out. I'm not saying he is a bad candidate but transplant would be too much for him right now. Maybe if we could wait 6 months before listing and I know he will have it together by then". MARIBEL provided acknowledgement and expressed concern. MARIBEL also verbalized agreement and inquired if 4 months would be reasonable. Dialysis SW agreed. SW to notify coordinator.       Confirmed by dialysis unit-MARIBEL Siddiqui        "

## 2018-01-11 ENCOUNTER — HOSPITAL ENCOUNTER (OUTPATIENT)
Dept: VASCULAR SURGERY | Facility: CLINIC | Age: 38
Discharge: HOME | End: 2018-01-11
Attending: STUDENT IN AN ORGANIZED HEALTH CARE EDUCATION/TRAINING PROGRAM
Payer: MEDICARE

## 2018-01-11 ENCOUNTER — OFFICE VISIT (OUTPATIENT)
Dept: VASCULAR SURGERY | Facility: CLINIC | Age: 38
End: 2018-01-11
Payer: MEDICARE

## 2018-01-11 VITALS
WEIGHT: 203 LBS | HEART RATE: 93 BPM | SYSTOLIC BLOOD PRESSURE: 124 MMHG | DIASTOLIC BLOOD PRESSURE: 79 MMHG | BODY MASS INDEX: 29.06 KG/M2 | TEMPERATURE: 98 F | HEIGHT: 70 IN

## 2018-01-11 DIAGNOSIS — Z99.2 ESRD (END STAGE RENAL DISEASE) ON DIALYSIS: Primary | ICD-10-CM

## 2018-01-11 DIAGNOSIS — N18.6 ESRD (END STAGE RENAL DISEASE) ON DIALYSIS: Primary | ICD-10-CM

## 2018-01-11 DIAGNOSIS — I87.1 VEIN STENOSIS: ICD-10-CM

## 2018-01-11 DIAGNOSIS — N18.6 ESRD (END STAGE RENAL DISEASE) ON DIALYSIS: ICD-10-CM

## 2018-01-11 DIAGNOSIS — Z99.2 ESRD (END STAGE RENAL DISEASE) ON DIALYSIS: ICD-10-CM

## 2018-01-11 PROCEDURE — 93990 DOPPLER FLOW TESTING: CPT | Mod: 26,S$PBB,NTX, | Performed by: SURGERY

## 2018-01-11 PROCEDURE — 99024 POSTOP FOLLOW-UP VISIT: CPT | Mod: NTX,,, | Performed by: SURGERY

## 2018-01-11 PROCEDURE — 99213 OFFICE O/P EST LOW 20 MIN: CPT | Mod: PBBFAC,25,NTX | Performed by: SURGERY

## 2018-01-11 PROCEDURE — 93990 DOPPLER FLOW TESTING: CPT | Mod: PBBFAC,NTX | Performed by: SURGERY

## 2018-01-11 PROCEDURE — 99999 PR PBB SHADOW E&M-EST. PATIENT-LVL III: CPT | Mod: PBBFAC,TXP,, | Performed by: SURGERY

## 2018-01-11 NOTE — PROGRESS NOTES
3 weeks s/p left RC AVF  No complaints.    Good thrill in vein.    Duplex - stenosis in vein just off anastomosis, PSV 850s, flow vol 932.    Repeat duplex in 3 weeks.   If stenosis persist then will need fistulagram    Josephine Mitchell MD FACS [facility]  Vascular & Endovascular Surgery

## 2018-01-12 ENCOUNTER — COMMITTEE REVIEW (OUTPATIENT)
Dept: TRANSPLANT | Facility: CLINIC | Age: 38
End: 2018-01-12

## 2018-01-12 ENCOUNTER — TELEPHONE (OUTPATIENT)
Dept: TRANSPLANT | Facility: CLINIC | Age: 38
End: 2018-01-12

## 2018-01-12 NOTE — LETTER
January 12, 2018  Maksim Hidalgo  [address] [place] [address]  Ines JENSEN [id number]    Dear Maksim Hidalgo:  MRN: [id number]    Your transplant evaluation was reviewed at the [facility] Selection [facility] meeting on 1/12/2018.  It is with regret I inform you that your workup is incomplete and we are unable to determine your transplant candidacy at this time due some intermitent  non-compliance with dialysis. You will need to demonstrate 3 months of compliance with dialysis.  You  also will require psychiatry clearance for depression now effecting compliance. You will be scheduled for your updated yearly testing for transplant.   You will be re-presented to the selection committee once pending studies are completed.  You will be notified of the committees decision once we review the new results.    The [facility] Selection Committee carefully considers each patients transplant candidacy using established selection criteria to determine if it is safe to proceed with transplantation for each and every person evaluated.  Although the selection committee believes your workup is incomplete and we are unable to determine your transplant candidacy, you have the right to be evaluated at other transplant centers.  You may request your [facility] records be sent to any center of your choice by contacting our Medical Records Department at [phone].    Attached is a letter from the [employer] Organ [employer]aring (UNOS).  It describes the services and information offered to patients by UNOS and the Organ Procurement and Transplant Network.    Sincerely,    Stephanie Spence MD  Medical Director, Kidney & Kidney/Pancreas Transplantation  Encl: UNOS Letter          OPTN/UNOS: Your Resource for Organ Transplant Information        If you have a question regarding your own medical care, you always should call your transplant center first. However, for general organ transplant-related information, you can call the  [employer] Organ [employer]aring (UNOS) toll-free patient services line at [phone].    Anyone, including potential transplant candidates, recipients, family members/friends, living donors, and/or donor family members can call this number to:    · talk about organ donation, living donation, how transplant and donation work, the donation process, transplant policies, and transplant/donor information;  · get a free patient information kit with helpful booklets, waiting list and transplant information, and a list of all transplant centers;  · ask questions about the Organ Procurement and Transplantation Network (OPTN) web site (www.optn.transplant.hrsa.gov); the UNOS Web site (www.unos.org); or the UNOS web site for living donors and transplant recipients (www.transplantliving.org);  · learn how UNOS and the OPTN can help you;  · talk about any concerns that you may have with a transplant center and how they perform    UN is a not-for-profit organization that provides all of the administrative services for the national OPTN under federal contract to the Health Resources and Services Administration (HRSA), an agency under the U.S. Department of Health and Human Services (HHS).     UNOS and OPTN responsibilities include:    · writing educational material for patients, the public and professionals;  · helping to make people aware of the need for donated organs and tissue;  · writing organ transplant policy with help from doctors, nurses, transplant patients/candidates, donor families, living donors, and the public;  · coordinating the organ matching and placement process;  · collecting information about every organ transplant and donation that occurs in the [place].    Remember, you should contact your transplant center directly if you have questions or concerns about your own medical care including medical records, work-up progress and test reports. [facility] is not your transplant center, and staff at [facility] will  not be able to transfer you to your transplant center, so keep your transplant centers phone number handy. But, while you research your transplant needs and learn as much as you can about transplantation and donation, we welcome your call to our toll-free patient services line at [phone].

## 2018-01-12 NOTE — PROGRESS NOTES
Attempted to contact patient to review committee selection decision. Phone number went to a fast busy signal. Letter to be sent to patient with decision.

## 2018-01-12 NOTE — COMMITTEE REVIEW
Native Organ Dx: Hypertensive Nephrosclerosis      Unable to determine transplant candidacy at this time due to some intermitent  non-compliance with dialysis. Patient will need to demonstrate 3 months of compliance with dialysis. Will need also require psychiatry clearance for depression now effecting compliance, updated yearly testing, and documented clarification from hepatology regarding Hepatitis B treatment prior to transplant.      Note written by Kathy Rothman RN    ===============================================    I was present at the meeting and attest to the decision of the committee.    Paige Cox MD    I was present at the meeting and attest to the decision of the committee.

## 2018-01-13 ENCOUNTER — HOSPITAL ENCOUNTER (OUTPATIENT)
Dept: RADIOLOGY | Facility: HOSPITAL | Age: 38
Discharge: HOME | End: 2018-01-13
Attending: INTERNAL MEDICINE
Payer: MEDICARE

## 2018-01-13 DIAGNOSIS — B18.1 CHRONIC HEPATITIS B VIRUS INFECTION: ICD-10-CM

## 2018-01-13 PROCEDURE — 76700 US EXAM ABDOM COMPLETE: CPT | Mod: TC,TXP

## 2018-01-13 PROCEDURE — 76700 US EXAM ABDOM COMPLETE: CPT | Mod: 26,GC,TXP, | Performed by: RADIOLOGY

## 2018-01-16 ENCOUNTER — TELEPHONE (OUTPATIENT)
Dept: TRANSPLANT | Facility: CLINIC | Age: 38
End: 2018-01-16

## 2018-01-16 DIAGNOSIS — F32.89 OTHER DEPRESSION: ICD-10-CM

## 2018-01-16 DIAGNOSIS — Z76.82 KIDNEY TRANSPLANT CANDIDATE: Primary | ICD-10-CM

## 2018-01-16 NOTE — TELEPHONE ENCOUNTER
----- Message from Mary Braxton sent at 1/16/2018  9:00 AM CST -----  Can you put in a referral for psych: depression, now affecting compliance with dialysis?  Mary

## 2018-01-17 ENCOUNTER — TELEPHONE (OUTPATIENT)
Dept: TRANSPLANT | Facility: CLINIC | Age: 38
End: 2018-01-17

## 2018-01-17 NOTE — TELEPHONE ENCOUNTER
----- Message from Betty Mckeon MD sent at 1/16/2018 10:33 PM CST -----  When you believe he is within 6 months of kidney tx should start HBV antiviral therapy  ----- Message -----  From: Mayr Braxton  Sent: 1/16/2018   9:06 AM  To: MD Dr. Mike Musa,  Please see attached kidney transplant committee note.   Can you please clarify treatment for Hep B prior to kidney transplant? This patient DOES NOT have a living donor.     Mary

## 2018-03-01 ENCOUNTER — OFFICE VISIT (OUTPATIENT)
Dept: VASCULAR SURGERY | Facility: CLINIC | Age: 38
End: 2018-03-01
Attending: SURGERY
Payer: MEDICARE

## 2018-03-01 ENCOUNTER — HOSPITAL ENCOUNTER (OUTPATIENT)
Dept: VASCULAR SURGERY | Facility: CLINIC | Age: 38
Discharge: HOME | End: 2018-03-01
Attending: SURGERY
Payer: MEDICARE

## 2018-03-01 VITALS
HEIGHT: 69 IN | TEMPERATURE: 98 F | BODY MASS INDEX: 29.71 KG/M2 | HEART RATE: 85 BPM | WEIGHT: 200.63 LBS | SYSTOLIC BLOOD PRESSURE: 126 MMHG | DIASTOLIC BLOOD PRESSURE: 86 MMHG

## 2018-03-01 DIAGNOSIS — N18.6 ESRD (END STAGE RENAL DISEASE) ON DIALYSIS: Primary | ICD-10-CM

## 2018-03-01 DIAGNOSIS — N18.6 ESRD (END STAGE RENAL DISEASE) ON DIALYSIS: ICD-10-CM

## 2018-03-01 DIAGNOSIS — Z99.2 ESRD (END STAGE RENAL DISEASE) ON DIALYSIS: Primary | ICD-10-CM

## 2018-03-01 DIAGNOSIS — Z99.2 ESRD (END STAGE RENAL DISEASE) ON DIALYSIS: ICD-10-CM

## 2018-03-01 PROCEDURE — 99213 OFFICE O/P EST LOW 20 MIN: CPT | Mod: PBBFAC,TXP | Performed by: SURGERY

## 2018-03-01 PROCEDURE — 99999 PR PBB SHADOW E&M-EST. PATIENT-LVL III: CPT | Mod: PBBFAC,TXP,, | Performed by: SURGERY

## 2018-03-01 PROCEDURE — 93990 DOPPLER FLOW TESTING: CPT | Mod: 26,S$PBB,NTX, | Performed by: SURGERY

## 2018-03-01 PROCEDURE — 93990 DOPPLER FLOW TESTING: CPT | Mod: PBBFAC,NTX | Performed by: SURGERY

## 2018-03-01 PROCEDURE — 99024 POSTOP FOLLOW-UP VISIT: CPT | Mod: S$PBB,NTX,, | Performed by: SURGERY

## 2018-03-01 NOTE — PROGRESS NOTES
Patient ID: Maksim Hidalgo is a 37 y.o. male.    I. HISTORY     Chief Complaint: Post-op Evaluation      HPI Maksim Hidalgo is a 37 y.o. male here for evaluation of new dialysis access. Renal failure is due to HTN. Currently dialyzes on M/W/F via a right IJ permcath. Patient is right handed. Has been on dialysis since mid-2016.   He did PD for over a year but has had recurrent catheter complications and has transitioned to HD.  No left sided catheters, pacemaker or AICD.     Left RC AVF is mature and ready for use.    Ngoc Saldana on     Review of Systems   Constitution: Negative.   HENT: Positive for congestion.    Eyes: Negative for blurred vision, vision loss in left eye and vision loss in right eye.   Cardiovascular: Negative for chest pain, claudication and dyspnea on exertion.   Respiratory: Negative for cough and sleep disturbances due to breathing.    Endocrine: Negative.    Hematologic/Lymphatic: Negative.    Skin: Negative.    Musculoskeletal: Negative.    Gastrointestinal: Negative for abdominal pain, nausea and vomiting.   Neurological: Negative.        II. PHYSICAL EXAM   Right Arm BP - Sittin/86 (18 0846)  Pulse: 85  Temp: 98.2 °F (36.8 °C)  Body mass index is 29.63 kg/m².      Physical Exam   Constitutional: He is oriented to person, place, and time. He appears well-developed and well-nourished.   HENT:   Head: Normocephalic and atraumatic.   Cardiovascular: Normal rate.    Pulses:       Radial pulses are 2+ on the right side, and 2+ on the left side.   Pulmonary/Chest: Effort normal. No respiratory distress.   Musculoskeletal: Normal range of motion. He exhibits no edema or tenderness.   Neurological: He is alert and oriented to person, place, and time. No cranial nerve deficit.   Skin: Skin is warm. No erythema.   Psychiatric: He has a normal mood and affect.   Vitals reviewed.      III. ASSESSMENT & PLAN (MEDICAL DECISION MAKING)     1. ESRD (end stage  renal disease) on dialysis        Imaging Results:    Duplex - flow vol 1100's      Assessment/Diagnosis and Plan:    Maksim Hidalgo is a 37 y.o. male with ESRD on HD. He has failed PD and has transitioned to HD.  Left RC AVF placed Dec 2017 is ready for access.    Return to clinic in 3 weeks for catheter removal.    Josephine Mitchell MD FACS [facility]  Vascular & Endovascular Surgery

## 2018-03-22 ENCOUNTER — OFFICE VISIT (OUTPATIENT)
Dept: VASCULAR SURGERY | Facility: CLINIC | Age: 38
End: 2018-03-22
Payer: MEDICARE

## 2018-03-22 VITALS
BODY MASS INDEX: 29.39 KG/M2 | WEIGHT: 198.44 LBS | SYSTOLIC BLOOD PRESSURE: 143 MMHG | HEIGHT: 69 IN | DIASTOLIC BLOOD PRESSURE: 87 MMHG | HEART RATE: 89 BPM | TEMPERATURE: 99 F

## 2018-03-22 DIAGNOSIS — N18.6 ESRD (END STAGE RENAL DISEASE) ON DIALYSIS: ICD-10-CM

## 2018-03-22 DIAGNOSIS — Z99.2 ESRD (END STAGE RENAL DISEASE) ON DIALYSIS: ICD-10-CM

## 2018-03-22 DIAGNOSIS — I87.1 VEIN STENOSIS: Primary | ICD-10-CM

## 2018-03-22 PROCEDURE — 99213 OFFICE O/P EST LOW 20 MIN: CPT | Mod: PBBFAC,NTX | Performed by: SURGERY

## 2018-03-22 PROCEDURE — 99213 OFFICE O/P EST LOW 20 MIN: CPT | Mod: S$PBB,NTX,, | Performed by: SURGERY

## 2018-03-22 PROCEDURE — 99999 PR PBB SHADOW E&M-EST. PATIENT-LVL III: CPT | Mod: PBBFAC,TXP,, | Performed by: SURGERY

## 2018-03-22 NOTE — PROGRESS NOTES
Patient ID: Makism Hidalgo is a 38 y.o. male.    I. HISTORY     Chief Complaint: Follow-up      HPI Maksim Hidalgo is a 38 y.o. male here for evaluation of new dialysis access. Renal failure is due to HTN. Currently dialyzes on M/W/F via a right IJ permcath. Patient is right handed. Has been on dialysis since mid-2016.   He did PD for over a year but has had recurrent catheter complications and has transitioned to HD.  No left sided catheters, pacemaker or AICD.     Left RC AVF is mature and ready for use. Returns for catheter removal but AVF has not been used in 2 weeks due to episode of infiltration.  He states that the unit notified our office but we have no record of that conversation.    Ngoc Saldana on     Review of Systems   Constitution: Negative.   HENT: Positive for congestion.    Eyes: Negative for blurred vision, vision loss in left eye and vision loss in right eye.   Cardiovascular: Negative for chest pain, claudication and dyspnea on exertion.   Respiratory: Negative for cough and sleep disturbances due to breathing.    Endocrine: Negative.    Hematologic/Lymphatic: Negative.    Skin: Negative.    Musculoskeletal: Negative.    Gastrointestinal: Negative for abdominal pain, nausea and vomiting.   Neurological: Negative.        II. PHYSICAL EXAM   Right Arm BP - Sittin/87 (18 0929)  Pulse: 89  Temp: 98.5 °F (36.9 °C)  Body mass index is 29.3 kg/m².      Physical Exam   Constitutional: He is oriented to person, place, and time. He appears well-developed and well-nourished.   HENT:   Head: Normocephalic and atraumatic.   Cardiovascular: Normal rate.    Pulses:       Radial pulses are 2+ on the right side, and 2+ on the left side.   Pulmonary/Chest: Effort normal. No respiratory distress.   Musculoskeletal: Normal range of motion. He exhibits no edema or tenderness.   Neurological: He is alert and oriented to person, place, and time. No cranial nerve deficit.    Skin: Skin is warm. No erythema.   Psychiatric: He has a normal mood and affect.   Vitals reviewed.      III. ASSESSMENT & PLAN (MEDICAL DECISION MAKING)     1. Vein stenosis    2. ESRD (end stage renal disease) on dialysis        Imaging Results:    Duplex - flow vol 1100's, no stenosis, diameter >6mm, depth 2-3mm      Assessment/Diagnosis and Plan:    Maksim Hidalgo is a 38 y.o. male with ESRD on HD. He has failed PD and has transitioned to HD.  Left RC AVF placed Dec 2017 is ready for access.    Instruction given to patient and sent to unit to resume access in AVF  Return in 2 weeks for catheter removal    Josephine Mitchell MD FACS [facility]  Vascular & Endovascular Surgery

## 2018-04-05 ENCOUNTER — OFFICE VISIT (OUTPATIENT)
Dept: VASCULAR SURGERY | Facility: CLINIC | Age: 38
End: 2018-04-05
Payer: MEDICARE

## 2018-04-05 VITALS
HEIGHT: 69 IN | HEART RATE: 80 BPM | DIASTOLIC BLOOD PRESSURE: 85 MMHG | TEMPERATURE: 98 F | SYSTOLIC BLOOD PRESSURE: 126 MMHG | WEIGHT: 200.63 LBS | BODY MASS INDEX: 29.71 KG/M2

## 2018-04-05 DIAGNOSIS — N18.6 ANEMIA IN ESRD (END-STAGE RENAL DISEASE): Chronic | ICD-10-CM

## 2018-04-05 DIAGNOSIS — D63.1 ANEMIA IN ESRD (END-STAGE RENAL DISEASE): Chronic | ICD-10-CM

## 2018-04-05 PROCEDURE — 36589 REMOVAL TUNNELED CV CATH: CPT | Mod: PBBFAC,NTX | Performed by: SURGERY

## 2018-04-05 PROCEDURE — 36589 REMOVAL TUNNELED CV CATH: CPT | Mod: S$PBB,NTX,, | Performed by: SURGERY

## 2018-04-05 PROCEDURE — 99499 UNLISTED E&M SERVICE: CPT | Mod: S$PBB,NTX,, | Performed by: SURGERY

## 2018-04-05 PROCEDURE — 99213 OFFICE O/P EST LOW 20 MIN: CPT | Mod: PBBFAC,TXP | Performed by: SURGERY

## 2018-04-05 PROCEDURE — 99999 PR PBB SHADOW E&M-EST. PATIENT-LVL III: CPT | Mod: PBBFAC,TXP,, | Performed by: SURGERY

## 2018-04-05 NOTE — PROGRESS NOTES
No problems with left RC AVF  Plan HD cath removal today.    Josephine Mitchell MD FACS [facility]  Vascular & Endovascular Surgery

## 2018-04-05 NOTE — PROGRESS NOTES
04/05/2018    PROCEDURE:  Removal of right internal jugular vein tunneled, cuffed, catheter.    PREOPERATIVE DIAGNOSIS:  End-stage renal disease.    POSTOPERATIVE DIAGNOSIS:  End-stage renal disease.    SURGEON:  Josephine Mitchell M.D.    ASSISTANT: HARDIK Ortega MD.    ANESTHESIA:  5 mL of 1% plain lidocaine.    ESTIMATED BLOOD LOSS:  Less than 5 mL.    COMPLICATIONS:  None.    SPECIMEN:  None.    DISPOSITION:  Return home.    HISTORY:  Maksim Hidalgo is a 38 y.o. male with end-stage renal disease.  His access is functioning well, and comes in today for removal of the catheter.  The risks and benefits were explained, and the patient agreed to proceed with the minor procedure.    DESCRIPTION OF PROCEDURE:  After prepping and draping the catheter in the standard fashion, the area around the insertion site on the right chest wall was infiltrated with 1% lidocaine.  A small skin nick was created with a #11 scalpel blade in order to expose the underlying cuff.  Blunt dissection was then used to separate the cuff from the surrounding soft tissues.  Gentle pressure was then used to remove the catheter in its entirety.  Pressure was held over the insertion site at the jugular vein as well as on the chest wall. Sterile dressing was applied.  Hemostasis was achieved.  The patient was returned home in good stable condition.

## 2018-04-11 DIAGNOSIS — Z99.2 ESRD (END STAGE RENAL DISEASE) ON DIALYSIS: Primary | ICD-10-CM

## 2018-04-11 DIAGNOSIS — N18.6 ESRD (END STAGE RENAL DISEASE) ON DIALYSIS: Primary | ICD-10-CM

## 2018-05-22 ENCOUNTER — OFFICE VISIT (OUTPATIENT)
Dept: PSYCHIATRY | Facility: CLINIC | Age: 38
End: 2018-05-22
Payer: MEDICARE

## 2018-05-22 VITALS
SYSTOLIC BLOOD PRESSURE: 145 MMHG | BODY MASS INDEX: 29.91 KG/M2 | HEIGHT: 69 IN | WEIGHT: 201.94 LBS | DIASTOLIC BLOOD PRESSURE: 85 MMHG | HEART RATE: 95 BPM

## 2018-05-22 DIAGNOSIS — F33.0 MAJOR DEPRESSIVE DISORDER, RECURRENT EPISODE, MILD DEGREE: Primary | ICD-10-CM

## 2018-05-22 PROCEDURE — 99999 PR PBB SHADOW E&M-EST. PATIENT-LVL II: CPT | Mod: PBBFAC,TXP,, | Performed by: PSYCHIATRY & NEUROLOGY

## 2018-05-22 PROCEDURE — 99214 OFFICE O/P EST MOD 30 MIN: CPT | Mod: S$PBB,NTX,, | Performed by: PSYCHIATRY & NEUROLOGY

## 2018-05-22 PROCEDURE — 99212 OFFICE O/P EST SF 10 MIN: CPT | Mod: PBBFAC,NTX | Performed by: PSYCHIATRY & NEUROLOGY

## 2018-05-22 RX ORDER — ESCITALOPRAM OXALATE 10 MG/1
10 TABLET ORAL DAILY
Qty: 30 TABLET | Refills: 2 | Status: SHIPPED | OUTPATIENT
Start: 2018-05-22 | End: 2018-06-20 | Stop reason: SDUPTHER

## 2018-05-22 NOTE — PROGRESS NOTES
Outpatient Psychiatry Follow-Up Visit (MD/NP)    5/22/2018    Clinical Status of Patient:  Outpatient (Ambulatory)    Chief Complaint:  Maksim Hidalgo is a 38 y.o. male who presents today for follow-up of depression.  Met with patient.      Interval History and Content of Current Session:  Interim Events/Subjective Report/Content of Current Session: Pt returns after a year as part of his transplant evaluation.  He reports several episodes of depression which have lasted for several weeks in which he is withdrawn, has por appetite and poor sleep.  He indicated that the depressions have not interfered with his compliance with his dialysis appointments, taking his meds as prescribed etc.  He denied suicidal ideation.  No histoty of depression before this year.      Psychotherapy:  · Target symptoms: depression  · Why chosen therapy is appropriate versus another modality: relevant to diagnosis  · Outcome monitoring methods: self-report, observation  · Therapeutic intervention type: supportive psychotherapy  · Topics discussed/themes: stress related to medical comorbidities, building skills sets for symptom management, symptom recognition  · The patient's response to the intervention is accepting. The patient's progress toward treatment goals is fair.   · Duration of intervention: 10 minutes.    Review of Systems   · PSYCHIATRIC: Pertinant items are noted in the narrative.    Past Medical, Family and Social History: The patient's past medical, family and social history have been reviewed and updated as appropriate within the electronic medical record - see encounter notes.    Compliance: yes    Side effects: None    Risk Parameters:  Patient reports no suicidal ideation  Patient reports no homicidal ideation  Patient reports no self-injurious behavior  Patient reports no violent behavior    Exam (detailed: at least 9 elements; comprehensive: all 15 elements)   Constitutional  Vitals:  Most recent vital signs,  "dated less than 90 days prior to this appointment, were reviewed.   Vitals:    05/22/18 1010   BP: (!) 145/85   Pulse: 95   Weight: 91.6 kg (201 lb 15.1 oz)   Height: 5' 9" (1.753 m)        General:  unremarkable, age appropriate     Musculoskeletal  Muscle Strength/Tone:  no tremor   Gait & Station:  non-ataxic     Psychiatric  Speech:  no latency; no press   Mood & Affect:  depressed  congruent and appropriate   Thought Process:  normal and logical   Associations:  intact   Thought Content:  normal, no suicidality, no homicidality, delusions, or paranoia   Insight:  intact   Judgement: behavior is adequate to circumstances   Orientation:  grossly intact   Memory: intact for content of interview   Language: grossly intact   Attention Span & Concentration:  able to focus   Fund of Knowledge:  intact and appropriate to age and level of education     Assessment and Diagnosis   Status/Progress: Based on the examination today, the patient's problem(s) is/are inadequately controlled.  New problems have been presented today.   Co-morbidities are complicating management of the primary condition.  There are no active rule-out diagnoses for this patient at this time.     General Impression: Depression.  Still an acceptable transplant candidate from Psych perspective.      ICD-10-CM ICD-9-CM   1. Major depressive disorder, recurrent episode, mild degree F33.0 296.31       Intervention/Counseling/Treatment Plan   · Medication Management: Continue current medications. The risks and benefits of medication were discussed with the patient. and add Lexapro 10 mg in AM      Return to Clinic: 1 month  "

## 2018-05-23 ENCOUNTER — DOCUMENTATION ONLY (OUTPATIENT)
Dept: NEPHROLOGY | Facility: CLINIC | Age: 38
End: 2018-05-23

## 2018-05-23 NOTE — PROGRESS NOTES
Subjective:   Patient ID: Maksim Hidalgo is a 38 y.o. Black or  male who presents for management of ESRD and home dialysis.       HPI   was seen in clinic today in home dialysis clinic while he was in training for home hemodialysis. He was followed in our clinic for PD in the past but due to failure of PD he had to be transitioned to in center hemodialysis. He interim decided to do home hemodialysis. He reports he has a care partner who can assist him with his training and home dialysis. Patient has been active on transplant list.    He denies any symptoms of uremia. He reports he has been doing fine. He has hypertension, hep B carrier state. Chronic, untreated hypertension was felt to be the reason for kidney failure.     Renal Function:  Lab Results   Component Value Date    GLU 78 12/12/2017     11/29/2017     12/12/2017     11/29/2017    K 3.6 12/12/2017    K 4.2 11/29/2017     12/12/2017     11/29/2017    CO2 25 12/12/2017    CO2 24 11/29/2017    BUN 27 (H) 12/12/2017    BUN 48 (H) 11/29/2017    CALCIUM 9.4 12/12/2017    CALCIUM 8.4 (L) 11/29/2017    CREATININE 5.7 (H) 12/12/2017    CREATININE 7.6 (H) 11/29/2017    ALBUMIN 3.9 12/12/2017    ALBUMIN 3.3 (L) 11/29/2017    PHOS 3.5 08/05/2017    PHOS 4.4 08/04/2017    ESTGFRAFRICA 13.3 (A) 12/12/2017    ESTGFRAFRICA 9.4 (A) 11/29/2017    EGFRNONAA 11.5 (A) 12/12/2017    EGFRNONAA 8.1 (A) 11/29/2017       Urinalysis:  Lab Results   Component Value Date    APPEARANCEUA Hazy (A) 08/15/2017    PHUR >8.0 (A) 08/15/2017    SPECGRAV 1.015 08/15/2017    PROTEINUA 2+ (A) 08/15/2017    GLUCUA Negative 08/15/2017    OCCULTUA Negative 08/15/2017    NITRITE Negative 08/15/2017    LEUKOCYTESUR Negative 08/15/2017       Protein/Creatinine Ratio:  Lab Results   Component Value Date    PROTEINURINE 50 (H) 12/15/2016    CREATRANDUR 104.0 12/15/2016    UTPCR 0.48 (H) 12/15/2016       CBC:  Lab Results   Component Value  Date    WBC 6.89 12/12/2017    HGB 13.6 (L) 12/12/2017    HCT 41.1 12/12/2017       PTH:  Lab Results   Component Value Date    .0 (H) 08/03/2017       Review of Systems  Doing ok  No nausea/ vomiting  No SOB/ CP  No dizziness/ muscle cramps    Objective:   Physical Exam    Vitals noted  Well developed  NAD  Alert and oriented x 3  Respiration unlabored   Access left forearm  LE no edema    Assessment:     ESRD  Hypertension  Secondary hyperparathyroidism  Hep B carrier state    Plan:     Labs from dialysis clinic from 5/18 noted  Hb 13  BUN 34  Creatinine 6.29  Na 145  K 4.4  Bicarb 23  Albumin 4.3  Corrected Ca 9.5   Phos 3.0      - will follow his progress during training for home hemo  - will be able to liberalize his diet if phos continues to remain in low 3 range, has not been on binders  - continue current antihypertensive regimen including lisinopril and lasix   - follow labs for adequacy  - follow home BP readings  - trend H/H, phos, CC, PTH, albumin  - pt is very excited about coming back to home dialysis program. RN working on his buttonhole and cannulation technique during training. Pt reminded to bring his care partner for training. He expressed understanding.

## 2018-06-05 ENCOUNTER — TELEPHONE (OUTPATIENT)
Dept: TRANSPLANT | Facility: CLINIC | Age: 38
End: 2018-06-05

## 2018-06-05 NOTE — TELEPHONE ENCOUNTER
Dialysis Adherence:    [facility] RN reports that pt has been on home hemo for 2 weeks and is doing well. Stefanie RN at pt's dialysis center reports over last three months that the patient has had no AMAs, not had any no-shows, and no issues with caregivers, transportation, or any other psychosocial concerns.    -- Message from Mary Braxton sent at 6/5/2018 10:31 AM CDT -----  Can someone please do a 3 month compliance with dialysis check? If compliance check is ok, patient will need an up to date evaluation.

## 2018-06-12 ENCOUNTER — LAB VISIT (OUTPATIENT)
Dept: LAB | Facility: HOSPITAL | Age: 38
End: 2018-06-12
Attending: INTERNAL MEDICINE
Payer: MEDICARE

## 2018-06-12 DIAGNOSIS — B18.1 CHRONIC HEPATITIS B VIRUS INFECTION: ICD-10-CM

## 2018-06-12 LAB
AFP SERPL-MCNC: 2.5 NG/ML
ALBUMIN SERPL BCP-MCNC: 4.1 G/DL
ALP SERPL-CCNC: 139 U/L
ALT SERPL W/O P-5'-P-CCNC: 18 U/L
ANION GAP SERPL CALC-SCNC: 14 MMOL/L
AST SERPL-CCNC: 25 U/L
BASOPHILS # BLD AUTO: 0.03 K/UL
BASOPHILS NFR BLD: 0.4 %
BILIRUB SERPL-MCNC: 0.3 MG/DL
BUN SERPL-MCNC: 28 MG/DL
CALCIUM SERPL-MCNC: 9.7 MG/DL
CHLORIDE SERPL-SCNC: 100 MMOL/L
CO2 SERPL-SCNC: 29 MMOL/L
CREAT SERPL-MCNC: 4.3 MG/DL
DIFFERENTIAL METHOD: ABNORMAL
EOSINOPHIL # BLD AUTO: 0.3 K/UL
EOSINOPHIL NFR BLD: 3.8 %
ERYTHROCYTE [DISTWIDTH] IN BLOOD BY AUTOMATED COUNT: 12.4 %
EST. GFR  (AFRICAN AMERICAN): 18.6 ML/MIN/1.73 M^2
EST. GFR  (NON AFRICAN AMERICAN): 16.1 ML/MIN/1.73 M^2
GLUCOSE SERPL-MCNC: 110 MG/DL
HCT VFR BLD AUTO: 40 %
HGB BLD-MCNC: 13.2 G/DL
IMM GRANULOCYTES # BLD AUTO: 0.02 K/UL
IMM GRANULOCYTES NFR BLD AUTO: 0.3 %
INR PPP: 1.1
LYMPHOCYTES # BLD AUTO: 2.8 K/UL
LYMPHOCYTES NFR BLD: 35.8 %
MCH RBC QN AUTO: 29.7 PG
MCHC RBC AUTO-ENTMCNC: 33 G/DL
MCV RBC AUTO: 90 FL
MONOCYTES # BLD AUTO: 0.5 K/UL
MONOCYTES NFR BLD: 6.2 %
NEUTROPHILS # BLD AUTO: 4.2 K/UL
NEUTROPHILS NFR BLD: 53.5 %
NRBC BLD-RTO: 0 /100 WBC
PLATELET # BLD AUTO: 210 K/UL
PMV BLD AUTO: 10.3 FL
POTASSIUM SERPL-SCNC: 3 MMOL/L
PROT SERPL-MCNC: 8.4 G/DL
PROTHROMBIN TIME: 11.6 SEC
RBC # BLD AUTO: 4.44 M/UL
SODIUM SERPL-SCNC: 143 MMOL/L
WBC # BLD AUTO: 7.87 K/UL

## 2018-06-12 PROCEDURE — 85025 COMPLETE CBC W/AUTO DIFF WBC: CPT | Mod: TXP

## 2018-06-12 PROCEDURE — 85610 PROTHROMBIN TIME: CPT | Mod: TXP

## 2018-06-12 PROCEDURE — 82105 ALPHA-FETOPROTEIN SERUM: CPT | Mod: TXP

## 2018-06-12 PROCEDURE — 80053 COMPREHEN METABOLIC PANEL: CPT | Mod: TXP

## 2018-06-12 PROCEDURE — 87517 HEPATITIS B DNA QUANT: CPT | Mod: TXP

## 2018-06-12 PROCEDURE — 36415 COLL VENOUS BLD VENIPUNCTURE: CPT | Mod: TXP

## 2018-06-15 LAB
HBV DNA SERPL NAA+PROBE-ACNC: NORMAL LOGIU/ML
HBV DNA SERPL NAA+PROBE-LOG IU: NORMAL IU/ML

## 2018-06-17 ENCOUNTER — DOCUMENTATION ONLY (OUTPATIENT)
Dept: NEPHROLOGY | Facility: CLINIC | Age: 38
End: 2018-06-17

## 2018-06-18 NOTE — PROGRESS NOTES
Subjective:   Patient ID: Maksim Hidalgo is a 38 y.o. Black or  male who presents for management of ESRD and home dialysis.     Date of service 6/7/18    Pt was seen during training for home hemodialysis     HPI   was seen in clinic today in home dialysis clinic while he was in training for home hemodialysis. He was followed in our clinic for PD in the past but due to failure of PD he had to be transitioned to in center hemodialysis. He interim decided to do home hemodialysis. Patient has been active on transplant list.    He denies any symptoms of uremia. He reports he has been doing fine. He has hypertension, hep B carrier state. Chronic, untreated hypertension was felt to be the reason for kidney failure.     Pt has expressed his plans to relocate to [place] as soon as he completes his home hemodialysis training. At the time of this visit, his mother had not arrived locally but she was planning to spend a week with him to learn about [facility] and act as his care partner. Per clinic staff arrangements were undertaken to facilitate his transfer directly to home dialysis clinic locally and it is expected that he will successfully complete his home dialysis training and then transition to home dialysis within next few weeks. Patient has agreed to complete his training locally before he relocates to [place] with his Mother.     He is overall doing fine. He does not have any symptoms of under dialysis.     Review of Systems  Doing ok  No nausea/ vomiting  No SOB/ CP  No dizziness/ muscle cramps    Objective:   Physical Exam    Vitals noted  Well developed  NAD  Alert and oriented x 3  Respiration unlabored   Access left forearm  LE no edema    Assessment:     ESRD  Hypertension  Secondary hyperparathyroidism  Hep B carrier state    Plan:     Labs from dialysis clinic from 6/18 noted  Hb 13.2  Hct 40  Wbc 6.09  Platelet 219    T sat 19  Ferritin 431    BUN 34  Creatinine 6.29  Na 139  K  3.7  Bicarb 24  Albumin 4.4  Corrected Ca 9.0  Phos 3.4      - will follow his progress during training for home hemo  - will be able to liberalize his diet if phos continues to remain in low 3 range, has not been on binders  - continue current antihypertensive regimen including lisinopril and lasix   - follow labs for adequacy  - follow home BP readings  - trend H/H, phos, CC, PTH, albumin  - assist him in transitioning his dialysis care, especially home dialysis to [place]

## 2018-06-20 ENCOUNTER — OFFICE VISIT (OUTPATIENT)
Dept: PSYCHIATRY | Facility: CLINIC | Age: 38
End: 2018-06-20
Payer: MEDICARE

## 2018-06-20 VITALS
SYSTOLIC BLOOD PRESSURE: 117 MMHG | WEIGHT: 202.19 LBS | DIASTOLIC BLOOD PRESSURE: 71 MMHG | BODY MASS INDEX: 29.85 KG/M2 | HEART RATE: 82 BPM

## 2018-06-20 DIAGNOSIS — F33.0 MAJOR DEPRESSIVE DISORDER, RECURRENT EPISODE, MILD DEGREE: Primary | ICD-10-CM

## 2018-06-20 PROCEDURE — 99212 OFFICE O/P EST SF 10 MIN: CPT | Mod: PBBFAC,TXP | Performed by: PSYCHIATRY & NEUROLOGY

## 2018-06-20 PROCEDURE — 99999 PR PBB SHADOW E&M-EST. PATIENT-LVL II: CPT | Mod: PBBFAC,TXP,, | Performed by: PSYCHIATRY & NEUROLOGY

## 2018-06-20 PROCEDURE — 99214 OFFICE O/P EST MOD 30 MIN: CPT | Mod: S$PBB,NTX,, | Performed by: PSYCHIATRY & NEUROLOGY

## 2018-06-20 RX ORDER — ESCITALOPRAM OXALATE 20 MG/1
20 TABLET ORAL DAILY
Qty: 30 TABLET | Refills: 2 | Status: SHIPPED | OUTPATIENT
Start: 2018-06-20 | End: 2018-09-11

## 2018-06-20 NOTE — PROGRESS NOTES
Outpatient Psychiatry Follow-Up Visit (MD/NP)    6/20/2018    Clinical Status of Patient:  Outpatient (Ambulatory)    Chief Complaint:  Maksim Hidalgo is a 38 y.o. male who presents today for follow-up of depression and anxiety.  Met with patient.      Interval History and Content of Current Session:  Interim Events/Subjective Report/Content of Current Session: He was seen and started on Lexapro 10 mg daily for depression a month ago.  He reports that he is less depressed and less irritable.  He has not noticed any changes in his thinking.  He his being trained to do dialysis in his home.  He is looking forward to this.  No side effects.    Psychotherapy:  · Target symptoms: depression, anxiety   · Why chosen therapy is appropriate versus another modality: relevant to diagnosis  · Outcome monitoring methods: self-report, observation  · Therapeutic intervention type: supportive psychotherapy  · Topics discussed/themes: stress related to medical comorbidities, difficulty managing affect in interpersonal relationships, building skills sets for symptom management  · The patient's response to the intervention is accepting. The patient's progress toward treatment goals is good.   · Duration of intervention: 10 minutes.    Review of Systems   · PSYCHIATRIC: Pertinant items are noted in the narrative.    Past Medical, Family and Social History: The patient's past medical, family and social history have been reviewed and updated as appropriate within the electronic medical record - see encounter notes.    Compliance: yes    Side effects: None    Risk Parameters:  Patient reports no suicidal ideation  Patient reports no homicidal ideation  Patient reports no self-injurious behavior  Patient reports no violent behavior    Exam (detailed: at least 9 elements; comprehensive: all 15 elements)   Constitutional  Vitals:  Most recent vital signs, dated less than 90 days prior to this appointment, were reviewed.   Vitals:     06/20/18 1025   BP: 117/71   Pulse: 82   Weight: 91.7 kg (202 lb 2.6 oz)        General:  unremarkable, age appropriate     Musculoskeletal  Muscle Strength/Tone:  no tremor   Gait & Station:  non-ataxic     Psychiatric  Speech:  no latency; no press   Mood & Affect:  depressed  congruent and appropriate   Thought Process:  normal and logical   Associations:  intact   Thought Content:  normal, no suicidality, no homicidality, delusions, or paranoia   Insight:  intact   Judgement: behavior is adequate to circumstances   Orientation:  grossly intact   Memory: intact for content of interview   Language: grossly intact   Attention Span & Concentration:  able to focus   Fund of Knowledge:  intact and appropriate to age and level of education     Assessment and Diagnosis   Status/Progress: Based on the examination today, the patient's problem(s) is/are improved.  New problems have not been presented today.   Co-morbidities are complicating management of the primary condition.  There are no active rule-out diagnoses for this patient at this time.     General Impression: Doing better      ICD-10-CM ICD-9-CM   1. Major depressive disorder, recurrent episode, mild degree F33.0 296.31       Intervention/Counseling/Treatment Plan   · Medication Management: Continue current medications. The risks and benefits of medication were discussed with the patient. and increase Lexapro to 20 mg daily      Return to Clinic: 2 months

## 2018-06-22 RX ORDER — ESCITALOPRAM OXALATE 10 MG/1
TABLET ORAL
Qty: 90 TABLET | Refills: 2 | Status: SHIPPED | OUTPATIENT
Start: 2018-06-22 | End: 2023-05-28

## 2018-06-25 DIAGNOSIS — Z76.82 ORGAN TRANSPLANT CANDIDATE: Primary | ICD-10-CM

## 2018-06-26 DIAGNOSIS — Z99.2 ESRD (END STAGE RENAL DISEASE) ON DIALYSIS: Primary | ICD-10-CM

## 2018-06-26 DIAGNOSIS — N18.6 ESRD (END STAGE RENAL DISEASE) ON DIALYSIS: Primary | ICD-10-CM

## 2018-07-03 DIAGNOSIS — Z76.82 ORGAN TRANSPLANT CANDIDATE: Primary | ICD-10-CM

## 2018-07-26 ENCOUNTER — HOSPITAL ENCOUNTER (OUTPATIENT)
Dept: RADIOLOGY | Facility: HOSPITAL | Age: 38
Discharge: HOME | End: 2018-07-26
Attending: NURSE PRACTITIONER
Payer: MEDICARE

## 2018-07-26 ENCOUNTER — HOSPITAL ENCOUNTER (OUTPATIENT)
Dept: CARDIOLOGY | Facility: CLINIC | Age: 38
Discharge: HOME | End: 2018-07-26
Attending: NURSE PRACTITIONER
Payer: MEDICARE

## 2018-07-26 DIAGNOSIS — Z76.82 ORGAN TRANSPLANT CANDIDATE: ICD-10-CM

## 2018-07-26 DIAGNOSIS — I36.9 NONRHEUMATIC TRICUSPID VALVE DISORDER: ICD-10-CM

## 2018-07-26 LAB
DIASTOLIC DYSFUNCTION: NO
ESTIMATED PA SYSTOLIC PRESSURE: 27.21
RETIRED EF AND QEF - SEE NOTES: 60 (ref 55–65)
TRICUSPID VALVE REGURGITATION: NORMAL

## 2018-07-26 PROCEDURE — 93325 DOPPLER ECHO COLOR FLOW MAPG: CPT | Mod: PBBFAC,TXP | Performed by: INTERNAL MEDICINE

## 2018-07-26 PROCEDURE — 71046 X-RAY EXAM CHEST 2 VIEWS: CPT | Mod: 26,TXP,, | Performed by: RADIOLOGY

## 2018-07-26 PROCEDURE — 71046 X-RAY EXAM CHEST 2 VIEWS: CPT | Mod: TC,TXP

## 2018-07-26 PROCEDURE — 76770 US EXAM ABDO BACK WALL COMP: CPT | Mod: TC,TXP

## 2018-07-26 PROCEDURE — 93351 STRESS TTE COMPLETE: CPT | Mod: 26,S$PBB,TXP, | Performed by: INTERNAL MEDICINE

## 2018-07-26 PROCEDURE — 76770 US EXAM ABDO BACK WALL COMP: CPT | Mod: 26,TXP,, | Performed by: RADIOLOGY

## 2018-07-26 PROCEDURE — 93320 DOPPLER ECHO COMPLETE: CPT | Mod: 26,S$PBB,TXP, | Performed by: INTERNAL MEDICINE

## 2018-08-09 ENCOUNTER — TELEPHONE (OUTPATIENT)
Dept: HEPATOLOGY | Facility: CLINIC | Age: 38
End: 2018-08-09

## 2018-08-09 NOTE — TELEPHONE ENCOUNTER
Patient scheduled for hepatology follow-up.  Did not present for appointment.  Question of when to initiate antiviral therapy for management of hepatitis B given that if patient undergoes kidney transplant, timing cannot be planned for 6 month prior.    If patient is approved for transplant listing, recommend initiation with lamivudine for prophylaxis.  Patient should have q 3 month hepatitis B serologies.

## 2018-08-20 ENCOUNTER — TELEPHONE (OUTPATIENT)
Dept: HEPATOLOGY | Facility: CLINIC | Age: 38
End: 2018-08-20

## 2018-08-20 NOTE — TELEPHONE ENCOUNTER
Patient called to stated that he is now living in [place], [place] and wanted his transplant information.  Patient informed that we have information while he was in the [facility], last seen 12/12/17.  Your were being listed on the Kidney Tx Evaluation. Call and speak to your coordinator regarding your Kidney Tx information.  The telephone # and Fax # to the [facility] Dept is [phone] and Fax [phone]. Voiced understanding and agreed.

## 2018-08-20 NOTE — TELEPHONE ENCOUNTER
----- Message from Nataliya Meier sent at 8/20/2018  9:16 AM CDT -----  Contact: self  Patient states need to speak with nurse regarding transplant.   Please call pt for more info [phone]

## 2018-08-27 DIAGNOSIS — Z76.82 ORGAN TRANSPLANT CANDIDATE: Primary | ICD-10-CM

## 2018-08-27 NOTE — PROGRESS NOTES
Nurse spoke with patient and was informed that he now resides in [place]. H would like to get on the list in both states. Patient cancelled his appointments assuming he can get his workup done in [place]. Patient informed that he will need to see our transplant team on an annual bases in order to get on the list and stay listed. Patient agrees to return to [place] when need. He would like to continue his evaluation here. Appointments will be rescheduled per his request. All questions answered. Patient verbalized understanding. Order sheet given to  to reschedule appointments.

## 2018-09-05 DIAGNOSIS — B18.1 CHRONIC HEPATITIS B: Primary | ICD-10-CM

## 2018-09-05 DIAGNOSIS — Z76.82 ORGAN TRANSPLANT CANDIDATE: Primary | ICD-10-CM

## 2018-09-11 ENCOUNTER — OFFICE VISIT (OUTPATIENT)
Dept: TRANSPLANT | Facility: CLINIC | Age: 38
End: 2018-09-11
Payer: MEDICARE

## 2018-09-11 VITALS
TEMPERATURE: 98 F | HEART RATE: 106 BPM | HEIGHT: 69 IN | BODY MASS INDEX: 30.85 KG/M2 | OXYGEN SATURATION: 100 % | SYSTOLIC BLOOD PRESSURE: 129 MMHG | DIASTOLIC BLOOD PRESSURE: 82 MMHG | RESPIRATION RATE: 16 BRPM | WEIGHT: 208.31 LBS

## 2018-09-11 DIAGNOSIS — F39 MOOD DISORDER: ICD-10-CM

## 2018-09-11 DIAGNOSIS — Z99.2 ESRD (END STAGE RENAL DISEASE) ON DIALYSIS: Primary | ICD-10-CM

## 2018-09-11 DIAGNOSIS — N18.6 ESRD (END STAGE RENAL DISEASE) ON DIALYSIS: Primary | ICD-10-CM

## 2018-09-11 DIAGNOSIS — Z01.818 PRE-TRANSPLANT EVALUATION FOR CHRONIC KIDNEY DISEASE: ICD-10-CM

## 2018-09-11 DIAGNOSIS — B18.1 CHRONIC HEPATITIS B VIRUS INFECTION: ICD-10-CM

## 2018-09-11 DIAGNOSIS — I12.9 RENAL HYPERTENSION: ICD-10-CM

## 2018-09-11 PROBLEM — R41.82 ALTERED MENTAL STATUS: Status: RESOLVED | Noted: 2017-08-01 | Resolved: 2018-09-11

## 2018-09-11 PROCEDURE — 99213 OFFICE O/P EST LOW 20 MIN: CPT | Mod: PBBFAC,TXP | Performed by: INTERNAL MEDICINE

## 2018-09-11 PROCEDURE — 99215 OFFICE O/P EST HI 40 MIN: CPT | Mod: S$PBB,,, | Performed by: INTERNAL MEDICINE

## 2018-09-11 PROCEDURE — 99999 PR PBB SHADOW E&M-EST. PATIENT-LVL III: CPT | Mod: PBBFAC,TXP,, | Performed by: INTERNAL MEDICINE

## 2018-09-11 RX ORDER — METOPROLOL TARTRATE 25 MG/1
50 TABLET, FILM COATED ORAL 2 TIMES DAILY
COMMUNITY

## 2018-09-11 NOTE — LETTER
September 11, 2018        Juventino Garcia  [address]  [facility] [address]  Phone: [phone]  Fax: [phone]             [facility]  [address]  [facility] [address]  Phone: [phone]   Patient: Maksim Hidalgo   MR Number: [id number]   YOB: 1980   Date of Visit: 9/11/2018       Dear Dr. Juventino Garcia    Thank you for referring Maksim Hidalgo to me for evaluation. Attached you will find relevant portions of my assessment and plan of care.    If you have questions, please do not hesitate to call me. I look forward to following Maksim Hidalgo along with you.    Sincerely,    Stephanie Logan MD    Enclosure    If you would like to receive this communication electronically, please contact [email or web address] or [phone] to request [facility] [employer] access.    [facility] is a tool which provides read-only access to select patient information with whom you have a relationship. Its easy to use and provides real time access to review your patients record including encounter summaries, notes, results, and demographic information.    If you feel you have received this communication in error or would no longer like to receive these types of communications, please e-mail [email or web address]
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
## 2018-09-11 NOTE — PATIENT INSTRUCTIONS
From your doctor:  Thank you for visiting us today and considering kidney transplantation.     Please be sure to follow up with a hepatologist (liver doctor) for your hepatitis B at home. It was recommended by our liver doctor that you take a medicine such as entecavir to treat your liver disease to lower your risk of progressive liver injury, especially after transplant.    Get established with a primary care doctor and psychiatrist/psychologist in [place].    Please feel free to contact us with any questions or concerns.  Regards,  Dr. Nataliya Hartley

## 2018-09-11 NOTE — PROGRESS NOTES
PHARM.D. PRE-TRANSPLANT NOTE:    This patient's medication therapy was evaluated as part of his pre-transplant evaluation.    The following pharmacologic concerns were noted: Patient currently on pain medication due to access point pain.    This patient's medication profile was reviewed for contraindications for DAA Hepatitis C therapy:    [X]  No current inducers of CYP 3A4 or PGP  [X]  No amiodarone on this patient's EMR profile in the last 24 months  [X]  No past or current atrial fibrillation on this patient's EMR profile       Current Outpatient Medications   Medication Sig Dispense Refill    albuterol 90 mcg/actuation inhaler Inhale 2 puffs into the lungs every 4 (four) hours as needed for Wheezing. 1 Inhaler 0    benazepril (LOTENSIN) 20 MG tablet Take 1 tablet (20 mg total) by mouth once daily. (Patient taking differently: Take 20 mg by mouth every morning. ) 90 tablet 3    calcitRIOL (ROCALTROL) 0.5 MCG Cap Take 1 capsule (0.5 mcg total) by mouth once daily. (Patient taking differently: Take 0.5 mcg by mouth every morning. ) 30 capsule 11    escitalopram oxalate (LEXAPRO) 10 MG tablet TAKE 1 TABLET(10 MG) BY MOUTH EVERY DAY 90 tablet 2    gabapentin (NEURONTIN) 300 MG capsule Take 1 capsule (300 mg total) by mouth every evening. (Patient taking differently: Take 300 mg by mouth nightly. ) 30 capsule 1    hydrocodone-acetaminophen 5-325mg (NORCO) 5-325 mg per tablet Take 1 tablet by mouth every 6 (six) hours as needed for Pain. 5 tablet 0    metoprolol tartrate (LOPRESSOR) 25 MG tablet Take 12.5 mg by mouth 2 (two) times daily.      pantoprazole (PROTONIX) 40 MG tablet Take 1 tablet (40 mg total) by mouth once daily. (Patient taking differently: Take 40 mg by mouth every morning. ) 30 tablet 11    senna-docusate 8.6-50 mg (PERICOLACE) 8.6-50 mg per tablet Take 2 tablets by mouth every evening. (Patient taking differently: Take 2 tablets by mouth nightly. )      sevelamer carbonate (RENVELA) 800 mg  Tab Take 1 tablet (800 mg total) by mouth 3 (three) times daily with meals. 90 tablet 11     No current facility-administered medications for this visit.          Currently the patient is responsible for preparing / administering this patient's medications on a daily basis.  I am available for consultation and can be contacted, as needed by the other members of the Kidney Transplant team.

## 2018-09-11 NOTE — PROGRESS NOTES
MARIBEL met with pt today for psychosocial update.  Pt arrived to appointment alone. He stated he drove from [place] at 11 o'clock last night to arrive in [facility] at 6 this morning.  Pt stated he is confused about the nature of today's appointments as he was under the impression he was just coming to have blood drawn.  He said he did not know he needed to bring a caregiver and was only told of this appointment yesterday.  He states his mother could not take off work to come with him at the last minute.  A long discussion ensued in which patient attempted to clarify what his wishes are regarding transplant listing.  After discussing caregiver availability, transportation and financial issues, pt stated he would rather be listed in [place].  He lives in [place] in the [place] [facility].  Pt stated multiple family members live on his street and he lives with his mother.  He states family are supportive, but are not mobile and it would be a hardship for them to travel to [facility] for weeks to provide caregiving after transplant.  Pt states he was informed at his [facility] (he is on home hemo) that he can be listed in [facility] (where he lives) and [place].  He said he feels this is his best option.     MARIBEL supported pt's decision and encouraged him to return any time if he changes his mind.  MARIBEL informed pt that if he decides he wants to remain listed at [facility], he will need to return to clinic with his primary caregiver for teaching and SW.  MARIBEL encouraged pt to establish with a hepatologist and psychiatrist in [place] as well as begin the workup for transplant listing there.  He stated he will come for his psychiatry visit next week and ask for referral to MD in [place].    Update not completed as pt has stated he wants to be removed from list and he did not have primary caregiver (or any caregiver) present.

## 2018-09-14 ENCOUNTER — COMMITTEE REVIEW (OUTPATIENT)
Dept: TRANSPLANT | Facility: CLINIC | Age: 38
End: 2018-09-14

## 2018-09-14 NOTE — COMMITTEE REVIEW
Native Organ Dx: Hypertensive Nephrosclerosis      Not approved for LRD/CAD transplant due to patient's wish to self-defer.  Patient states he will be evaluated by transplant center in [place].    If he returns, caregivers must come to clinic visits for education and see .       Note written by Janey Gillette RN    ===============================================    I was present at the meeting and attest to the decision of the committee

## 2018-09-14 NOTE — LETTER
September 14, 2018    Maksim Hidalgo  [address] [facility] [address]    Dear Maksim Hidalgo:  MRN: [id number]    It is the duty of the [facility] Transplant Selection Committee to determine which patients are candidates for a transplant. For this reason, our committee has the difficult task of evaluating patients to determine which ones have the greatest chance of having a successful transplant. We are aware of the magnitude of this responsibility, and we approach it with reverence and humility.    It is with regret I inform you that you are not approved as a transplant candidate due to patient choice to terminate evaluation.  Your future transplant appointments for kidney transplant have been canceled.  Based on this review, we have determined that at this time, you are not a candidate for a transplant at [facility].      The selection committee carefully considers each patient's transplant candidacy and determines whether it is safe to proceed with transplantation on a case-by-case basis using established selection criteria.  At present, the risk of proceeding with an elective transplant surgery has become too high.                                                                               Although the selection committee believes you are not a suitable transplant candidate, you have the option to be evaluated at other transplant centers who may have different selection criteria.  You may request your [facility] records be sent to any center of your choice by contacting our Medical Records Department at [phone].                                                                               Attached is a letter from the United Network for Organ Sharing (UNOS).  It describes the services and information offered to patients by UNOS and the Organ Procurement and Transplant Network.    The [facility] Kid[facility] Selection Committee sincerely wishes you the best and remains available to answer any questions.  Please  do not hesitate to contact our pre-transplant office if we can assist you in any other way.                                                                               Sincerely,      Stephanie Spence MD  Medical Director, Kidney & Kidney/Pancreas Transplantation    Cc: Dr. Garcia/[facility]/  Encl: UNOS Letter          OPTN/UNOS: Your Resource for Organ Transplant Information        If you have a question regarding your own medical care, you always should call your transplant center first. However, for general organ transplant-related information, you can call the [employer] for Organ Sharing (UNOS) toll-free patient services line at 1-957.907.1844.    Anyone, including potential transplant candidates, recipients, family members/friends, living donors, and/or donor family members can call this number to:    · talk about organ donation, living donation, how transplant and donation work, the donation process, transplant policies, and transplant/donor information;  · get a free patient information kit with helpful booklets, waiting list and transplant information, and a list of all transplant centers;  · ask questions about the Organ Procurement and Transplantation Network (OPTN) web site (www.optn.transplant.hrsa.gov); the UNOS Web site (www.unos.org); or the UNOS web site for living donors and transplant recipients (www.transplantliving.org);  · learn how UNOS and the OPTN can help you;  · talk about any concerns that you may have with a transplant center and how they perform    UNOS is a not-for-profit organization that provides all of the administrative services for the national OPTN under federal contract to the Health Resources and Services Administration (HRSA), an agency under the U.S. Department of Health and Human Services (HHS).     UNOS and OPTN responsibilities include:    · writing educational material for patients, the public and professionals;  · helping to make people  aware of the need for donated organs and tissue;  · writing organ transplant policy with help from doctors, nurses, transplant patients/candidates, donor families, living donors, and the public;  · coordinating the organ matching and placement process;  · collecting information about every organ transplant and donation that occurs in the [place].    Remember, you should contact your transplant center directly if you have questions or concerns about your own medical care including medical records, work-up progress and test reports. [facility] is not your transplant center, and staff at [facility] will not be able to transfer you to your transplant center, so keep your transplant centers phone number handy. But, while you research your transplant needs and learn as much as you can about transplantation and donation, we welcome your call to our toll-free patient services line at [phone].

## 2018-12-15 ENCOUNTER — TELEPHONE (OUTPATIENT)
Dept: INTERNAL MEDICINE | Facility: CLINIC | Age: 38
End: 2018-12-15

## 2018-12-15 DIAGNOSIS — E55.9 VITAMIN D DEFICIENCY: Primary | ICD-10-CM

## 2018-12-15 DIAGNOSIS — I15.0 RENOVASCULAR HYPERTENSION: ICD-10-CM

## 2018-12-18 RX ORDER — BENAZEPRIL HYDROCHLORIDE 20 MG/1
TABLET ORAL
Qty: 90 TABLET | Refills: 0 | Status: SHIPPED | OUTPATIENT
Start: 2018-12-18 | End: 2023-05-28

## 2018-12-18 NOTE — TELEPHONE ENCOUNTER
Patient was last seen in clinic over 1 year ago.  Refilled medication for 90 days, however the patient will need to be seen in Internal Medicine Clinic prior to additional refills.  Please schedule the patient for a visit with me or nurse practitioner Chao, fasting labs 1 week prior.  I have sent the patient a [facility] message.     Please sign and close this encounter once patient has been successfully contacted.

## 2019-02-05 ENCOUNTER — TELEPHONE (OUTPATIENT)
Dept: INTERNAL MEDICINE | Facility: CLINIC | Age: 39
End: 2019-02-05

## 2019-02-05 ENCOUNTER — LAB VISIT (OUTPATIENT)
Dept: LAB | Facility: HOSPITAL | Age: 39
End: 2019-02-05
Attending: INTERNAL MEDICINE
Payer: MEDICARE

## 2019-02-05 DIAGNOSIS — I15.0 RENOVASCULAR HYPERTENSION: ICD-10-CM

## 2019-02-05 LAB
ALBUMIN SERPL BCP-MCNC: 4.2 G/DL
ALP SERPL-CCNC: 131 U/L
ALT SERPL W/O P-5'-P-CCNC: 26 U/L
ANION GAP SERPL CALC-SCNC: 11 MMOL/L
AST SERPL-CCNC: 20 U/L
BILIRUB SERPL-MCNC: 0.3 MG/DL
BUN SERPL-MCNC: 29 MG/DL
CALCIUM SERPL-MCNC: 9.6 MG/DL
CHLORIDE SERPL-SCNC: 103 MMOL/L
CHOLEST SERPL-MCNC: 192 MG/DL
CHOLEST/HDLC SERPL: 3.7 {RATIO}
CO2 SERPL-SCNC: 25 MMOL/L
CREAT SERPL-MCNC: 4.9 MG/DL
ERYTHROCYTE [DISTWIDTH] IN BLOOD BY AUTOMATED COUNT: 13.5 %
EST. GFR  (AFRICAN AMERICAN): 16 ML/MIN/1.73 M^2
EST. GFR  (NON AFRICAN AMERICAN): 14 ML/MIN/1.73 M^2
FERRITIN SERPL-MCNC: 354 NG/ML
GLUCOSE SERPL-MCNC: 117 MG/DL
HCT VFR BLD AUTO: 38.4 %
HDLC SERPL-MCNC: 52 MG/DL
HDLC SERPL: 27.1 %
HGB BLD-MCNC: 12.4 G/DL
IRON SERPL-MCNC: 39 UG/DL
LDLC SERPL CALC-MCNC: 99 MG/DL
MAGNESIUM SERPL-MCNC: 2.2 MG/DL
MCH RBC QN AUTO: 29 PG
MCHC RBC AUTO-ENTMCNC: 32.3 G/DL
MCV RBC AUTO: 90 FL
NONHDLC SERPL-MCNC: 140 MG/DL
PLATELET # BLD AUTO: 226 K/UL
PMV BLD AUTO: 10.3 FL
POTASSIUM SERPL-SCNC: 3.8 MMOL/L
PROT SERPL-MCNC: 8.3 G/DL
RBC # BLD AUTO: 4.27 M/UL
SATURATED IRON: 12 %
SODIUM SERPL-SCNC: 139 MMOL/L
TOTAL IRON BINDING CAPACITY: 332 UG/DL
TRANSFERRIN SERPL-MCNC: 224 MG/DL
TRIGL SERPL-MCNC: 205 MG/DL
TSH SERPL DL<=0.005 MIU/L-ACNC: 0.82 UIU/ML
WBC # BLD AUTO: 7.03 K/UL

## 2019-02-05 PROCEDURE — 83735 ASSAY OF MAGNESIUM: CPT

## 2019-02-05 PROCEDURE — 36415 COLL VENOUS BLD VENIPUNCTURE: CPT

## 2019-02-05 PROCEDURE — 83540 ASSAY OF IRON: CPT

## 2019-02-05 PROCEDURE — 85027 COMPLETE CBC AUTOMATED: CPT

## 2019-02-05 PROCEDURE — 84443 ASSAY THYROID STIM HORMONE: CPT

## 2019-02-05 PROCEDURE — 80053 COMPREHEN METABOLIC PANEL: CPT

## 2019-02-05 PROCEDURE — 80061 LIPID PANEL: CPT

## 2019-02-05 PROCEDURE — 82728 ASSAY OF FERRITIN: CPT

## 2019-02-05 NOTE — TELEPHONE ENCOUNTER
Please call the lab to add on Hemoglobin A1c (if possible). Dx code ICD-10 R73.09.     Please ask also to add on TSH level, ICD 10 E05.90.

## 2019-10-04 NOTE — PROGRESS NOTES
Patient:   JEFF MELO            MRN: [id number]            FIN: [id number]               Age:   63 years     Sex:  MALE     :  49   Associated Diagnoses:   None   Author:   ANTONIO LOPEZ                                                              Date/Time of Surgery:  May 4, 2017    PreOp Diagnosis:  Reducible symptomatic right inguinal hernia    PostOp Diagnosis:  Reducible direct inguinal hernia    Performed By Surgeon: Antonio Reveles M.D.    Assistant:  None    Procedures Performed:  Right inguinal hernia repair with mesh    Findings:  Large direct inguinal hernia    Specimens Removed: None    Estimated Blood Loss:  3 cc    Complications: None    Grafts/Implants:  Moderate weight large pore Bard mesh    Anesthesia Used:  Gen.    Indication for Procedure:  Patient is a pleasant 63-year-old gentleman who I was asked to evaluate for right groin pain.  He was diagnosed with a reducible inguinal hernia and I recommended that he undergo repair.  Discussed other options as well.  Because of his symptoms appear patient like to undergo repair.  Risks of surgery include bleeding, infection, a 1-3% chance of recurrence over his lifetime, damage to structures within the spermatic cord including the vas deferens was lead to decreased sperm count, blood vessel was lead to testicular atrophy or pain as well as nerves which could lead to chronic pain or numbness.  Patient stated that he understood these risks as well as those associated with anesthesia and consent was obtained and placed on the chart.  The preoperative area the side of the hernia was marked by myself and the patient accordingly.    Operative Report:   After informed consent was obtained and placed on the chart, the patient was taken to the operating room placed in supine position the operating room table.  Sequential compression device boots were placed on his lower extremities.  Per the request of anesthesia he underwent general  Chart reviewed for kidney selection. Patient is outstanding for hepatology clearance. Triple phase CT and HBV viral load that was recommended in 06/17. Will send 30 day letter to patient.    anesthesia with intubation.  The right groin was cooperative excess hair was prepped and draped in sterile fashion.  A time out was performed between myself nurses and anesthesia identifying patient, procedure, need for antibiotics and laterality.  I began the procedure by identifying the appropriate landmarks an incision was made with a 15 blade scalpel.  This.  The subcutaneous tissue with the Bovie cautery was used to transect Carol's fascia all the way down to the external oblique.  Self-retaining retractor was placed and the external oblique was opened with a nick of a 15 blade and a pair of Metzenbaum scissors.  The spermatic cord was dissected free from the surrounding tissue and it was obvious that there was a large indirect inguinal hernia.  The hernia sac was imbricated with a #2 Prolene tacking the shelving edge of the inguinal ligament to the conjoined tendon and there was no tension.  Once this hernia was imbricated medium weight, large pore mesh was cut the appropriate size and tacked to the pubic tubercle with a #2 Prolene.  The mesh was then run along the shelving edge of the inguinal ligament.  The mesh was tucked underneath the external oblique and tacked to the conjoined tendon medially in several spots with a 3-0 PDS in a simple fashion.  The mesh was cut to accommodate the internal ring and the internal ring was re-created with the mesh making sure did not strangulated.  The wound was irrigated and the external oblique was then closed after tucking the mesh underneath the oblique laterally.  Once he oblique was closed using a 4-0 Monocryl running fashion and Carol's fascia was reapproximated using a 3-0 Vicryl in a buried interrupted fashion and the skin was closed using a 4-0 Monocryl and in a running subcuticular fashion with Dermabond.  Patient tolerated the procedure well, was extubated in the operative transferred to recovery room in good condition where he will be hopefully released home  when meeting appropriate discourage criteria.  All needle sponge and instrument counts were correct ×2 per nursing.            Electronically Signed On 05/04/2017 16:20  __________________________________________________   ANTONIO LOPEZ

## 2020-03-02 NOTE — PLAN OF CARE
Problem: Patient Care Overview  Goal: Plan of Care Review  Outcome: Ongoing (interventions implemented as appropriate)  Patient AAOX3, VSS. Two side rails up, bed locked, call light within reach. No falls noted as these precautions remain. Patient is free of skin breakdown as patient moves well independently. Pain controlled well with PRN meds. Hourly rounds made and no complaints at this time noted. Will resume with plan of care..       Hemostasis: Drysol
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
## 2021-06-25 NOTE — Clinical Note
Maksim Hidalgo should be admitted Patient is calling to report the she has had a bp reading over 140. She says that Dr. Raman told her that if she has 3 over 140 readings to give him a call and she has had 3 already. Patient says that her ankles and feet are also swollen. Please call patient back at [phone].

## 2023-03-30 NOTE — ASSESSMENT & PLAN NOTE
- d/w Neurology who agrees that there is no physiologic source of this pain  -  (<2/3 ULN, not concerning due to recent surgical procedure)  - ESR 27 (<2/3 ULN, not concerning due to recent surgical procedure)  - Continue to monitor electrolytes (Ca, K, Mg, Phos)  - continue  Percocet 5mg q4h prn  - Continue Gabapentin 300mg qhs     O-T Advancement Flap Text: The defect edges were debeveled with a #15 scalpel blade.  Given the location of the defect, shape of the defect and the proximity to free margins an O-T advancement flap was deemed most appropriate.  Using a sterile surgical marker, an appropriate advancement flap was drawn incorporating the defect and placing the expected incisions within the relaxed skin tension lines where possible.    The area thus outlined was incised deep to adipose tissue with a #15 scalpel blade.  The skin margins were undermined to an appropriate distance in all directions utilizing iris scissors.

## 2023-05-28 ENCOUNTER — HOSPITAL ENCOUNTER (OUTPATIENT)
Facility: HOSPITAL | Age: 43
Discharge: HOME | End: 2023-05-30
Attending: EMERGENCY MEDICINE | Admitting: EMERGENCY MEDICINE
Payer: COMMERCIAL

## 2023-05-28 DIAGNOSIS — E87.6 HYPOKALEMIA: ICD-10-CM

## 2023-05-28 DIAGNOSIS — Z99.2 ESRD ON DIALYSIS: ICD-10-CM

## 2023-05-28 DIAGNOSIS — N18.6 ESRD ON DIALYSIS: ICD-10-CM

## 2023-05-28 DIAGNOSIS — R60.0 PERIPHERAL EDEMA: ICD-10-CM

## 2023-05-28 DIAGNOSIS — R07.9 CHEST PAIN: ICD-10-CM

## 2023-05-28 DIAGNOSIS — M79.661 BILATERAL CALF PAIN: ICD-10-CM

## 2023-05-28 DIAGNOSIS — D89.89: ICD-10-CM

## 2023-05-28 DIAGNOSIS — R60.0 EDEMA OF LEFT UPPER EXTREMITY: Primary | ICD-10-CM

## 2023-05-28 DIAGNOSIS — I16.0 HYPERTENSIVE URGENCY: ICD-10-CM

## 2023-05-28 DIAGNOSIS — M79.662 BILATERAL CALF PAIN: ICD-10-CM

## 2023-05-28 DIAGNOSIS — Z94.0 S/P KIDNEY TRANSPLANT: ICD-10-CM

## 2023-05-28 DIAGNOSIS — R60.1 ANASARCA: ICD-10-CM

## 2023-05-28 DIAGNOSIS — I10 UNCONTROLLED HYPERTENSION: ICD-10-CM

## 2023-05-28 DIAGNOSIS — M79.89 LEG SWELLING: ICD-10-CM

## 2023-05-28 DIAGNOSIS — R06.02 SHORTNESS OF BREATH: ICD-10-CM

## 2023-05-28 PROBLEM — R60.9 EDEMA: Status: ACTIVE | Noted: 2023-05-28

## 2023-05-28 PROBLEM — L03.113 CELLULITIS OF RIGHT UPPER EXTREMITY: Status: ACTIVE | Noted: 2023-05-28

## 2023-05-28 LAB
ALBUMIN SERPL BCP-MCNC: 3.9 G/DL (ref 3.5–5.2)
ALLENS TEST: ABNORMAL
ALP SERPL-CCNC: 97 U/L (ref 55–135)
ALT SERPL W/O P-5'-P-CCNC: 20 U/L (ref 10–44)
ANION GAP SERPL CALC-SCNC: 10 MMOL/L (ref 8–16)
APTT PPP: 26.3 SEC (ref 21–32)
ASCENDING AORTA: 3.36 CM
AST SERPL-CCNC: 19 U/L (ref 10–40)
AV INDEX (PROSTH): 0.82
AV MEAN GRADIENT: 4 MMHG
AV PEAK GRADIENT: 7 MMHG
AV VALVE AREA: 3.22 CM2
AV VELOCITY RATIO: 0.89
BASOPHILS # BLD AUTO: 0.04 K/UL (ref 0–0.2)
BASOPHILS NFR BLD: 0.4 % (ref 0–1.9)
BILIRUB SERPL-MCNC: 0.4 MG/DL (ref 0.1–1)
BNP SERPL-MCNC: 64 PG/ML (ref 0–99)
BSA FOR ECHO PROCEDURE: 2.28 M2
BUN SERPL-MCNC: 16 MG/DL (ref 6–20)
CALCIUM SERPL-MCNC: 9.5 MG/DL (ref 8.7–10.5)
CHLORIDE SERPL-SCNC: 109 MMOL/L (ref 95–110)
CK SERPL-CCNC: 504 U/L (ref 20–200)
CO2 SERPL-SCNC: 23 MMOL/L (ref 23–29)
CREAT SERPL-MCNC: 1.3 MG/DL (ref 0.5–1.4)
CV ECHO LV RWT: 0.41 CM
DIFFERENTIAL METHOD: ABNORMAL
DOP CALC AO PEAK VEL: 1.28 M/S
DOP CALC AO VTI: 25.02 CM
DOP CALC LVOT AREA: 3.9 CM2
DOP CALC LVOT DIAMETER: 2.23 CM
DOP CALC LVOT PEAK VEL: 1.14 M/S
DOP CALC LVOT STROKE VOLUME: 80.46 CM3
DOP CALCLVOT PEAK VEL VTI: 20.61 CM
E WAVE DECELERATION TIME: 148.28 MSEC
E/A RATIO: 1.11
E/E' RATIO: 8.67 M/S
ECHO LV POSTERIOR WALL: 0.98 CM (ref 0.6–1.1)
EJECTION FRACTION: 60 %
EOSINOPHIL # BLD AUTO: 0.3 K/UL (ref 0–0.5)
EOSINOPHIL NFR BLD: 3.6 % (ref 0–8)
ERYTHROCYTE [DISTWIDTH] IN BLOOD BY AUTOMATED COUNT: 14.1 % (ref 11.5–14.5)
EST. GFR  (NO RACE VARIABLE): >60 ML/MIN/1.73 M^2
FRACTIONAL SHORTENING: 39 % (ref 28–44)
GLUCOSE SERPL-MCNC: 84 MG/DL (ref 70–110)
HCO3 UR-SCNC: 24.3 MMOL/L (ref 24–28)
HCT VFR BLD AUTO: 40.2 % (ref 40–54)
HCV AB SERPL QL IA: NORMAL
HGB BLD-MCNC: 12.7 G/DL (ref 14–18)
HIV 1+2 AB+HIV1 P24 AG SERPL QL IA: NORMAL
IMM GRANULOCYTES # BLD AUTO: 0.02 K/UL (ref 0–0.04)
IMM GRANULOCYTES NFR BLD AUTO: 0.2 % (ref 0–0.5)
INR PPP: 1 (ref 0.8–1.2)
INTERVENTRICULAR SEPTUM: 1.02 CM (ref 0.6–1.1)
IVRT: 106.57 MSEC
LA MAJOR: 5.87 CM
LA MINOR: 6.17 CM
LA WIDTH: 4.59 CM
LEFT ATRIUM SIZE: 3.39 CM
LEFT ATRIUM VOLUME INDEX MOD: 33 ML/M2
LEFT ATRIUM VOLUME INDEX: 36 ML/M2
LEFT ATRIUM VOLUME MOD: 72.87 CM3
LEFT ATRIUM VOLUME: 79.57 CM3
LEFT INTERNAL DIMENSION IN SYSTOLE: 2.93 CM (ref 2.1–4)
LEFT VENTRICLE DIASTOLIC VOLUME INDEX: 47.94 ML/M2
LEFT VENTRICLE DIASTOLIC VOLUME: 105.94 ML
LEFT VENTRICLE MASS INDEX: 76 G/M2
LEFT VENTRICLE SYSTOLIC VOLUME INDEX: 14.9 ML/M2
LEFT VENTRICLE SYSTOLIC VOLUME: 32.91 ML
LEFT VENTRICULAR INTERNAL DIMENSION IN DIASTOLE: 4.77 CM (ref 3.5–6)
LEFT VENTRICULAR MASS: 168.46 G
LV LATERAL E/E' RATIO: 8.27 M/S
LV SEPTAL E/E' RATIO: 9.1 M/S
LYMPHOCYTES # BLD AUTO: 1.3 K/UL (ref 1–4.8)
LYMPHOCYTES NFR BLD: 14.8 % (ref 18–48)
MCH RBC QN AUTO: 27.9 PG (ref 27–31)
MCHC RBC AUTO-ENTMCNC: 31.6 G/DL (ref 32–36)
MCV RBC AUTO: 88 FL (ref 82–98)
MONOCYTES # BLD AUTO: 1.1 K/UL (ref 0.3–1)
MONOCYTES NFR BLD: 12.6 % (ref 4–15)
MV PEAK A VEL: 0.82 M/S
MV PEAK E VEL: 0.91 M/S
MV STENOSIS PRESSURE HALF TIME: 43 MS
MV VALVE AREA P 1/2 METHOD: 5.12 CM2
NEUTROPHILS # BLD AUTO: 6.1 K/UL (ref 1.8–7.7)
NEUTROPHILS NFR BLD: 68.4 % (ref 38–73)
NRBC BLD-RTO: 0 /100 WBC
PCO2 BLDA: 43.8 MMHG (ref 35–45)
PH SMN: 7.35 [PH] (ref 7.35–7.45)
PLATELET # BLD AUTO: 235 K/UL (ref 150–450)
PMV BLD AUTO: 10 FL (ref 9.2–12.9)
PO2 BLDA: 34 MMHG (ref 40–60)
POC BE: -1 MMOL/L
POC SATURATED O2: 62 % (ref 95–100)
POC TCO2: 26 MMOL/L (ref 24–29)
POTASSIUM SERPL-SCNC: 3 MMOL/L (ref 3.5–5.1)
PROT SERPL-MCNC: 7.2 G/DL (ref 6–8.4)
PROTHROMBIN TIME: 10.7 SEC (ref 9–12.5)
RA MAJOR: 4.66 CM
RA PRESSURE: 3 MMHG
RA WIDTH: 3.59 CM
RBC # BLD AUTO: 4.56 M/UL (ref 4.6–6.2)
SAMPLE: ABNORMAL
SINUS: 3.55 CM
SIROLIMUS BLD-MCNC: <2 NG/ML (ref 4–20)
SITE: ABNORMAL
SODIUM SERPL-SCNC: 142 MMOL/L (ref 136–145)
STJ: 2.93 CM
TACROLIMUS BLD-MCNC: <2 NG/ML (ref 5–15)
TDI LATERAL: 0.11 M/S
TDI SEPTAL: 0.1 M/S
TDI: 0.11 M/S
TRICUSPID ANNULAR PLANE SYSTOLIC EXCURSION: 2.66 CM
TROPONIN I SERPL DL<=0.01 NG/ML-MCNC: <0.006 NG/ML (ref 0–0.03)
WBC # BLD AUTO: 8.92 K/UL (ref 3.9–12.7)

## 2023-05-28 PROCEDURE — 93010 EKG 12-LEAD: ICD-10-PCS | Mod: ,,, | Performed by: INTERNAL MEDICINE

## 2023-05-28 PROCEDURE — 93005 ELECTROCARDIOGRAM TRACING: CPT

## 2023-05-28 PROCEDURE — G0378 HOSPITAL OBSERVATION PER HR: HCPCS

## 2023-05-28 PROCEDURE — 85730 THROMBOPLASTIN TIME PARTIAL: CPT | Performed by: STUDENT IN AN ORGANIZED HEALTH CARE EDUCATION/TRAINING PROGRAM

## 2023-05-28 PROCEDURE — 85025 COMPLETE CBC W/AUTO DIFF WBC: CPT | Performed by: STUDENT IN AN ORGANIZED HEALTH CARE EDUCATION/TRAINING PROGRAM

## 2023-05-28 PROCEDURE — 99285 PR EMERGENCY DEPT VISIT,LEVEL V: ICD-10-PCS | Mod: GC,,, | Performed by: EMERGENCY MEDICINE

## 2023-05-28 PROCEDURE — 82550 ASSAY OF CK (CPK): CPT | Performed by: PHYSICIAN ASSISTANT

## 2023-05-28 PROCEDURE — 99285 EMERGENCY DEPT VISIT HI MDM: CPT | Mod: GC,,, | Performed by: EMERGENCY MEDICINE

## 2023-05-28 PROCEDURE — 96374 THER/PROPH/DIAG INJ IV PUSH: CPT | Mod: 59

## 2023-05-28 PROCEDURE — 83880 ASSAY OF NATRIURETIC PEPTIDE: CPT | Performed by: STUDENT IN AN ORGANIZED HEALTH CARE EDUCATION/TRAINING PROGRAM

## 2023-05-28 PROCEDURE — 84484 ASSAY OF TROPONIN QUANT: CPT | Performed by: STUDENT IN AN ORGANIZED HEALTH CARE EDUCATION/TRAINING PROGRAM

## 2023-05-28 PROCEDURE — 87389 HIV-1 AG W/HIV-1&-2 AB AG IA: CPT | Performed by: PHYSICIAN ASSISTANT

## 2023-05-28 PROCEDURE — 25000003 PHARM REV CODE 250

## 2023-05-28 PROCEDURE — 99223 1ST HOSP IP/OBS HIGH 75: CPT | Mod: ,,,

## 2023-05-28 PROCEDURE — 80053 COMPREHEN METABOLIC PANEL: CPT | Performed by: STUDENT IN AN ORGANIZED HEALTH CARE EDUCATION/TRAINING PROGRAM

## 2023-05-28 PROCEDURE — 63600175 PHARM REV CODE 636 W HCPCS

## 2023-05-28 PROCEDURE — 93010 ELECTROCARDIOGRAM REPORT: CPT | Mod: ,,, | Performed by: INTERNAL MEDICINE

## 2023-05-28 PROCEDURE — 99900035 HC TECH TIME PER 15 MIN (STAT)

## 2023-05-28 PROCEDURE — 80195 ASSAY OF SIROLIMUS: CPT | Performed by: EMERGENCY MEDICINE

## 2023-05-28 PROCEDURE — 96376 TX/PRO/DX INJ SAME DRUG ADON: CPT

## 2023-05-28 PROCEDURE — 99223 1ST HOSP IP/OBS HIGH 75: CPT | Mod: ,,, | Performed by: STUDENT IN AN ORGANIZED HEALTH CARE EDUCATION/TRAINING PROGRAM

## 2023-05-28 PROCEDURE — 80197 ASSAY OF TACROLIMUS: CPT | Performed by: EMERGENCY MEDICINE

## 2023-05-28 PROCEDURE — 25000003 PHARM REV CODE 250: Performed by: STUDENT IN AN ORGANIZED HEALTH CARE EDUCATION/TRAINING PROGRAM

## 2023-05-28 PROCEDURE — 99223 PR INITIAL HOSPITAL CARE,LEVL III: ICD-10-PCS | Mod: ,,, | Performed by: STUDENT IN AN ORGANIZED HEALTH CARE EDUCATION/TRAINING PROGRAM

## 2023-05-28 PROCEDURE — 85610 PROTHROMBIN TIME: CPT | Performed by: STUDENT IN AN ORGANIZED HEALTH CARE EDUCATION/TRAINING PROGRAM

## 2023-05-28 PROCEDURE — 99223 PR INITIAL HOSPITAL CARE,LEVL III: ICD-10-PCS | Mod: ,,,

## 2023-05-28 PROCEDURE — 86803 HEPATITIS C AB TEST: CPT | Performed by: PHYSICIAN ASSISTANT

## 2023-05-28 PROCEDURE — 99285 EMERGENCY DEPT VISIT HI MDM: CPT | Mod: 25

## 2023-05-28 PROCEDURE — 25000003 PHARM REV CODE 250: Performed by: NURSE PRACTITIONER

## 2023-05-28 RX ORDER — DEXTROSE 40 %
15 GEL (GRAM) ORAL
Status: DISCONTINUED | OUTPATIENT
Start: 2023-05-28 | End: 2023-05-30 | Stop reason: HOSPADM

## 2023-05-28 RX ORDER — HYDRALAZINE HYDROCHLORIDE 25 MG/1
25 TABLET, FILM COATED ORAL EVERY 8 HOURS PRN
Status: DISCONTINUED | OUTPATIENT
Start: 2023-05-28 | End: 2023-05-30 | Stop reason: HOSPADM

## 2023-05-28 RX ORDER — LABETALOL HCL 20 MG/4 ML
20 SYRINGE (ML) INTRAVENOUS
Status: COMPLETED | OUTPATIENT
Start: 2023-05-28 | End: 2023-05-28

## 2023-05-28 RX ORDER — GLUCAGON 1 MG
1 KIT INJECTION
Status: DISCONTINUED | OUTPATIENT
Start: 2023-05-28 | End: 2023-05-30 | Stop reason: HOSPADM

## 2023-05-28 RX ORDER — MYCOPHENOLATE MOFETIL 250 MG/1
500 CAPSULE ORAL 2 TIMES DAILY
Status: DISCONTINUED | OUTPATIENT
Start: 2023-05-28 | End: 2023-05-29

## 2023-05-28 RX ORDER — MECLIZINE HCL 12.5 MG 12.5 MG/1
25 TABLET ORAL ONCE
Status: COMPLETED | OUTPATIENT
Start: 2023-05-28 | End: 2023-05-28

## 2023-05-28 RX ORDER — LABETALOL HCL 20 MG/4 ML
10 SYRINGE (ML) INTRAVENOUS
Status: COMPLETED | OUTPATIENT
Start: 2023-05-28 | End: 2023-05-28

## 2023-05-28 RX ORDER — DEXTROSE 40 %
30 GEL (GRAM) ORAL
Status: DISCONTINUED | OUTPATIENT
Start: 2023-05-28 | End: 2023-05-30 | Stop reason: HOSPADM

## 2023-05-28 RX ORDER — PREDNISONE 5 MG/1
5 TABLET ORAL DAILY
Status: DISCONTINUED | OUTPATIENT
Start: 2023-05-29 | End: 2023-05-30 | Stop reason: HOSPADM

## 2023-05-28 RX ORDER — ACETAMINOPHEN 325 MG/1
650 TABLET ORAL EVERY 4 HOURS PRN
Status: DISCONTINUED | OUTPATIENT
Start: 2023-05-28 | End: 2023-05-30 | Stop reason: HOSPADM

## 2023-05-28 RX ORDER — ONDANSETRON 8 MG/1
8 TABLET, ORALLY DISINTEGRATING ORAL EVERY 8 HOURS PRN
Status: DISCONTINUED | OUTPATIENT
Start: 2023-05-28 | End: 2023-05-30 | Stop reason: HOSPADM

## 2023-05-28 RX ORDER — PREDNISONE 5 MG/1
5 TABLET ORAL DAILY
COMMUNITY
Start: 2023-04-13

## 2023-05-28 RX ORDER — PROMETHAZINE HYDROCHLORIDE 25 MG/1
25 TABLET ORAL EVERY 6 HOURS PRN
Status: DISCONTINUED | OUTPATIENT
Start: 2023-05-28 | End: 2023-05-30 | Stop reason: HOSPADM

## 2023-05-28 RX ORDER — POLYETHYLENE GLYCOL 3350 17 G/17G
17 POWDER, FOR SOLUTION ORAL DAILY PRN
Status: DISCONTINUED | OUTPATIENT
Start: 2023-05-28 | End: 2023-05-30 | Stop reason: HOSPADM

## 2023-05-28 RX ORDER — FAMOTIDINE 20 MG/1
20 TABLET, FILM COATED ORAL DAILY
COMMUNITY
Start: 2023-04-13

## 2023-05-28 RX ORDER — HYDROCODONE BITARTRATE AND ACETAMINOPHEN 5; 325 MG/1; MG/1
1 TABLET ORAL EVERY 6 HOURS PRN
Status: DISCONTINUED | OUTPATIENT
Start: 2023-05-28 | End: 2023-05-30 | Stop reason: HOSPADM

## 2023-05-28 RX ORDER — DOXYCYCLINE HYCLATE 100 MG
100 TABLET ORAL EVERY 12 HOURS
Status: DISCONTINUED | OUTPATIENT
Start: 2023-05-28 | End: 2023-05-30 | Stop reason: HOSPADM

## 2023-05-28 RX ORDER — METOPROLOL TARTRATE 50 MG/1
50 TABLET ORAL 2 TIMES DAILY
Status: DISCONTINUED | OUTPATIENT
Start: 2023-05-28 | End: 2023-05-30 | Stop reason: HOSPADM

## 2023-05-28 RX ORDER — MYCOPHENOLATE MOFETIL 250 MG/1
500 CAPSULE ORAL 2 TIMES DAILY
COMMUNITY
Start: 2023-05-13

## 2023-05-28 RX ORDER — SODIUM CHLORIDE 0.9 % (FLUSH) 0.9 %
10 SYRINGE (ML) INJECTION
Status: DISCONTINUED | OUTPATIENT
Start: 2023-05-28 | End: 2023-05-30 | Stop reason: HOSPADM

## 2023-05-28 RX ORDER — TALC
6 POWDER (GRAM) TOPICAL NIGHTLY PRN
Status: DISCONTINUED | OUTPATIENT
Start: 2023-05-28 | End: 2023-05-30 | Stop reason: HOSPADM

## 2023-05-28 RX ORDER — BISACODYL 10 MG
10 SUPPOSITORY, RECTAL RECTAL DAILY PRN
Status: DISCONTINUED | OUTPATIENT
Start: 2023-05-28 | End: 2023-05-30 | Stop reason: HOSPADM

## 2023-05-28 RX ORDER — TACROLIMUS 1 MG/1
9 CAPSULE ORAL 2 TIMES DAILY
Status: ON HOLD | COMMUNITY
Start: 2023-03-06 | End: 2023-05-30 | Stop reason: SDUPTHER

## 2023-05-28 RX ADMIN — TACROLIMUS 9 MG: 5 CAPSULE ORAL at 06:05

## 2023-05-28 RX ADMIN — LABETALOL HYDROCHLORIDE 20 MG: 5 INJECTION, SOLUTION INTRAVENOUS at 05:05

## 2023-05-28 RX ADMIN — LABETALOL HYDROCHLORIDE 10 MG: 5 INJECTION, SOLUTION INTRAVENOUS at 04:05

## 2023-05-28 RX ADMIN — METOPROLOL TARTRATE 50 MG: 50 TABLET, FILM COATED ORAL at 08:05

## 2023-05-28 RX ADMIN — HYDRALAZINE HYDROCHLORIDE 25 MG: 25 TABLET, FILM COATED ORAL at 06:05

## 2023-05-28 RX ADMIN — MECLIZINE HYDROCHLORIDE 25 MG: 12.5 TABLET ORAL at 10:05

## 2023-05-28 RX ADMIN — DOXYCYCLINE HYCLATE 100 MG: 100 TABLET, COATED ORAL at 08:05

## 2023-05-28 RX ADMIN — MYCOPHENOLATE MOFETIL 500 MG: 250 CAPSULE ORAL at 09:05

## 2023-05-28 RX ADMIN — HYDROCODONE BITARTRATE AND ACETAMINOPHEN 1 TABLET: 5; 325 TABLET ORAL at 04:05

## 2023-05-28 RX ADMIN — POTASSIUM BICARBONATE 60 MEQ: 391 TABLET, EFFERVESCENT ORAL at 04:05

## 2023-05-28 NOTE — ED NOTES
MD at bedside states to attempt to decrease HTN by giving ordered pain PRN, then reassess after 30 min. If HTN persists, admin ordered antihypertensive. Will reassess 30 min after med admin

## 2023-05-28 NOTE — DISCHARGE INSTRUCTIONS
Diagnosis:  Extremity swelling.    Workup showed that you did not have any blood clots in your legs or arms.    Your kidney transplant medications were subtherapeutic, please follow-up with your kidney transplant doctor to address this.      US Lower Extremity Veins Bilateral   Final Result      No evidence of deep venous thrombosis in either lower extremity.         Electronically signed by: Russell Salvador MD   Date:    05/28/2023   Time:    14:48      X-Ray Chest AP Portable   Final Result      Cardiomegaly with central vascular congestion and suspected interstitial edema.         Electronically signed by: Luis Be MD   Date:    05/28/2023   Time:    05:03      US Upper Extremity Veins Left    (Results Pending)           Follow-Up Plan:  - Follow-up with primary care doctor within 3 - 5 days  - Additional testing and/or evaluation as directed by your primary doctor    Return to the Emergency Department for symptoms including but not limited to: worsening symptoms, shortness of breath or chest pain, vomiting with inability to hold down fluids, fevers greater than 100.4°F, passing out/fainting/unconsciousness, or other concerning symptoms.

## 2023-05-28 NOTE — PROVIDER PROGRESS NOTES - EMERGENCY DEPT.
"Encounter Date: 5/28/2023    ED Physician Progress Notes            ED Resident HAND-OFF NOTE:  Maksim Hidalgo is a 43 y.o. male who presented to the ED on 5/28/2023, patient C/O upper and lower extremity swelling. I assumed care of patient from off-going ED physician team patient pending DVT studies.      In brief, patient has a history of renal transplant in 2020.  States that he has had swelling of his bilateral lower extremities and his left upper extremity.  Also reporting mild chest pain, cardiac workup negative.      On my evaluation, Maksim Hidalgo appears well, hemodynamically stable and in NAD. Thus far, Maksim Hidalgo has received:  Medications   labetalol 20 mg/4 mL (5 mg/mL) IV syring (10 mg Intravenous Given 5/28/23 9881)   potassium bicarbonate disintegrating tablet 60 mEq (60 mEq Oral Given 5/28/23 6527)   labetalol 20 mg/4 mL (5 mg/mL) IV syring (20 mg Intravenous Given 5/28/23 2723)       BP (!) 180/100   Pulse 84   Temp 98 °F (36.7 °C) (Oral)   Resp 20   Ht 5' 9" (1.753 m)   SpO2 96%   BMI 30.77 kg/m²         Disposition: I anticipate patient will depend on DVT results.  ______________________  Whitney Cox MD   Emergency Medicine Resident      UPDATE:     DVTs study showing no evidence of DVT in the bilateral lower extremities or the left upper extremity.    Workup showing that patient is subtherapeutic on his kidney transplant medications.  Additionally, chest x-ray showing pulmonary edema.  Conducted shared decision-making with patient since he has to travel to [facility] in to go home.  Patient wishes to follow up with his transplant doctors that he is familiar with in [place] but is unsure if he will be able to tolerate the airplane or car ride back.  Decision was made to come into the hospital for observation/possible diuresis.    Discussed patient's case with Hospital Medicine who agreed to admit patient to their service for further management.  Patient is " hemodynamically stable and appropriate for transfer to the floor.  :  Shortness of breath  Bilateral calf pain  S/P kidney transplant  Peripheral edema  Edema of left upper extremity (Primary)  Uncontrolled hypertension

## 2023-05-28 NOTE — PHARMACY MED REC
"Admission Medication History     The home medication history was taken by Victoria Rosas.    You may go to "Admission" then "Reconcile Home Medications" tabs to review and/or act upon these items.     The home medication list has been updated by the Pharmacy department.   Please read ALL comments highlighted in yellow.   Please address this information as you see fit.    Feel free to contact us if you have any questions or require assistance.      The medications listed below were removed from the home medication list. Please reorder if appropriate:  Patient reports no longer taking the following medication(s):  BENAZEPRIL 20 MG  ESCITALOPRAM 10 MG  GABAPENTIN 300 MG  SENNA-DOCUSATE 8.6-50 MG  SEVELAMER CARBONATE 800 MG    Medications listed below were obtained from: Patient/family  Current Outpatient Medications on File Prior to Encounter   Medication Sig    famotidine (PEPCID) 20 MG tablet Take 20 mg by mouth once daily.    metoprolol tartrate (LOPRESSOR) 25 MG tablet Take 50 mg by mouth 2 (two) times daily.    mycophenolate (CELLCEPT) 250 mg Cap Take 500 mg by mouth 2 (two) times daily.    predniSONE (DELTASONE) 5 MG tablet Take 5 mg by mouth once daily.    tenofovir alafenamide (VEMLIDY) 25 mg Tab Take 1 tablet by mouth once daily.    albuterol 90 mcg/actuation inhaler Inhale 2 puffs into the lungs every 4 (four) hours as needed for Wheezing.    tacrolimus (PROGRAF) 1 MG Cap Take 9 capsules by mouth twice daily Last filled 3-6-23 for 30 day supply. Pt states he has been out of medication for 2 weeks. There was is an insurance issue.        Potential issues to be addressed PRIOR TO DISCHARGE  Prescriptions could not be filled prior to admission: (Tacrolimus 1 mg)    Victoria Rosas  [id number]                  .        "
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
## 2023-05-28 NOTE — HPI
Maksim Hidalgo is a 43 y.o. male with a hx of hep B, s/p kidney transplant 2020, and HTN presents to the ED for edema. Patient notes two days prior he noticed an acute onset of edema to his L arm, bilateral legs, and collarbone area. Felt that swelling continued to worsen and presented here. Endorses having issues with swelling in his right leg specifically and had an appt with a physician this week but decided to present here instead. Endorses associated SOB worse with lying flat. Endorses most of his care has been in [place] but denies any acute complications s/p transplant. Endorses pain to left arm but otherwise denies chest pain, abdominal pain, n/v, urinary symptoms and constipation/ diarrhea.     ED: AF, /138. CBC w/o leukocytosis. K 3.0. BNP 64. Tn neg. EKG in NSR w/o acute ischemic changes. US LUE showed  no thrombus in central veins of the left upper extremity. Patent left upper extremity AV fistula. BLE US showed no evidence of deep venous thrombosis in either lower extremity. CXR showed cardiomegaly with central vascular congestion and suspected interstitial edema. Given labetalol x 2 and potassium bicarb.

## 2023-05-28 NOTE — PROVIDER PROGRESS NOTES - EMERGENCY DEPT.
Encounter Date: 5/28/2023    ED Physician Progress Notes        Physician Note:   Pt signed out to me at 6 AM    Briefly: pt is a 43 year old male with PMHx significant for kidney transplant 2020 and hypertension presents with peripheral edema. Concern for DVT based on physical exam. Pt signed out to me pending bilateral lower extremity ultrasounds and left upper extremity ultrasound. Difficulty obtaining DVT studies based on holiday weekend with reduced number of ultrasound techs. Pt signed out to incoming ED team pending DVT studies. Pt has remained stable and in no acute distress throughout ED stay.      279.9

## 2023-05-28 NOTE — ASSESSMENT & PLAN NOTE
Renovascular hypertension  Edema  - Hypertensive 258/138 on admission, now 180/102  - continue lopressor 50 mg BID, added hydralazine 25 mg PRN  - Imaging reviewed, no DVTs  - CXR showing cardiomegaly with central vascular congestion and suspected interstitial edema  - Echo pending  - multimodal pain control  - monitor vitals q4

## 2023-05-28 NOTE — ASSESSMENT & PLAN NOTE
- s/o transplant 2020  - continue tacro, mycophenolate and prednisone  - daily tacro levels  - Kidney transplant consulted, appreciate assistance

## 2023-05-28 NOTE — ED NOTES
Telemetry Verification   Patient placed on Telemetry Box  Verified by Makayla Mayo rn  Box # [id number]   Monitor Tech will   Rate 80   Rhythm ns
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
## 2023-05-28 NOTE — ED NOTES
Pt c/o swelling to LUE and BLE, tingling to LUE worsening to tips of fingers. Pt c/o pain to LUE and BLE, decreased movement to extremities. Redness noted to RLE, reports pain in calf when standing. Reports symptoms began yesterday.     Pt changed into hospital gown, placed on continuous cardiac monitoring, pulse oximetry and automatic BP cuff. Limb alert placed to LUE s/t fistula. Fall and allergy band placed.

## 2023-05-29 PROBLEM — R60.0 EDEMA OF LEFT UPPER EXTREMITY: Status: ACTIVE | Noted: 2023-05-29

## 2023-05-29 PROBLEM — L03.113 CELLULITIS OF RIGHT UPPER EXTREMITY: Status: RESOLVED | Noted: 2023-05-28 | Resolved: 2023-05-29

## 2023-05-29 PROBLEM — Z99.2 ESRD ON DIALYSIS: Status: ACTIVE | Noted: 2023-05-29

## 2023-05-29 PROBLEM — N18.6 ESRD ON DIALYSIS: Status: ACTIVE | Noted: 2023-05-29

## 2023-05-29 PROBLEM — L03.115 CELLULITIS OF RIGHT LOWER EXTREMITY: Status: ACTIVE | Noted: 2023-05-29

## 2023-05-29 LAB
ALBUMIN SERPL BCP-MCNC: 3.6 G/DL (ref 3.5–5.2)
ALP SERPL-CCNC: 81 U/L (ref 55–135)
ALT SERPL W/O P-5'-P-CCNC: 16 U/L (ref 10–44)
AMPHET+METHAMPHET UR QL: NEGATIVE
ANION GAP SERPL CALC-SCNC: 11 MMOL/L (ref 8–16)
AST SERPL-CCNC: 15 U/L (ref 10–40)
BARBITURATES UR QL SCN>200 NG/ML: NEGATIVE
BASOPHILS # BLD AUTO: 0.05 K/UL (ref 0–0.2)
BASOPHILS NFR BLD: 0.7 % (ref 0–1.9)
BENZODIAZ UR QL SCN>200 NG/ML: NEGATIVE
BILIRUB SERPL-MCNC: 0.5 MG/DL (ref 0.1–1)
BILIRUB UR QL STRIP: NEGATIVE
BUN SERPL-MCNC: 14 MG/DL (ref 6–20)
BZE UR QL SCN: NEGATIVE
CALCIUM SERPL-MCNC: 8.9 MG/DL (ref 8.7–10.5)
CANNABINOIDS UR QL SCN: NEGATIVE
CHLORIDE SERPL-SCNC: 109 MMOL/L (ref 95–110)
CLARITY UR REFRACT.AUTO: CLEAR
CO2 SERPL-SCNC: 21 MMOL/L (ref 23–29)
COLOR UR AUTO: COLORLESS
CREAT SERPL-MCNC: 1.1 MG/DL (ref 0.5–1.4)
CREAT UR-MCNC: 49 MG/DL (ref 23–375)
CREAT UR-MCNC: 49 MG/DL (ref 23–375)
DIFFERENTIAL METHOD: ABNORMAL
EOSINOPHIL # BLD AUTO: 0.5 K/UL (ref 0–0.5)
EOSINOPHIL NFR BLD: 6.9 % (ref 0–8)
ERYTHROCYTE [DISTWIDTH] IN BLOOD BY AUTOMATED COUNT: 14.6 % (ref 11.5–14.5)
EST. GFR  (NO RACE VARIABLE): >60 ML/MIN/1.73 M^2
ETHANOL UR-MCNC: <10 MG/DL
GLUCOSE SERPL-MCNC: 93 MG/DL (ref 70–110)
GLUCOSE UR QL STRIP: NEGATIVE
HCT VFR BLD AUTO: 45.3 % (ref 40–54)
HGB BLD-MCNC: 13.4 G/DL (ref 14–18)
HGB UR QL STRIP: NEGATIVE
IMM GRANULOCYTES # BLD AUTO: 0.02 K/UL (ref 0–0.04)
IMM GRANULOCYTES NFR BLD AUTO: 0.3 % (ref 0–0.5)
KETONES UR QL STRIP: NEGATIVE
LEUKOCYTE ESTERASE UR QL STRIP: NEGATIVE
LYMPHOCYTES # BLD AUTO: 1.1 K/UL (ref 1–4.8)
LYMPHOCYTES NFR BLD: 14.8 % (ref 18–48)
MAGNESIUM SERPL-MCNC: 2 MG/DL (ref 1.6–2.6)
MCH RBC QN AUTO: 27.3 PG (ref 27–31)
MCHC RBC AUTO-ENTMCNC: 29.6 G/DL (ref 32–36)
MCV RBC AUTO: 92 FL (ref 82–98)
METHADONE UR QL SCN>300 NG/ML: NEGATIVE
MONOCYTES # BLD AUTO: 0.9 K/UL (ref 0.3–1)
MONOCYTES NFR BLD: 12.8 % (ref 4–15)
NEUTROPHILS # BLD AUTO: 4.7 K/UL (ref 1.8–7.7)
NEUTROPHILS NFR BLD: 64.5 % (ref 38–73)
NITRITE UR QL STRIP: NEGATIVE
NRBC BLD-RTO: 0 /100 WBC
OPIATES UR QL SCN: NEGATIVE
PCP UR QL SCN>25 NG/ML: NEGATIVE
PH UR STRIP: 7 [PH] (ref 5–8)
PHOSPHATE SERPL-MCNC: 3.1 MG/DL (ref 2.7–4.5)
PLATELET # BLD AUTO: 214 K/UL (ref 150–450)
PMV BLD AUTO: 10.4 FL (ref 9.2–12.9)
POTASSIUM SERPL-SCNC: 3.4 MMOL/L (ref 3.5–5.1)
POTASSIUM UR-SCNC: 11 MMOL/L (ref 15–95)
PROT SERPL-MCNC: 6.9 G/DL (ref 6–8.4)
PROT UR QL STRIP: NEGATIVE
PROT UR-MCNC: <7 MG/DL (ref 0–15)
PROT/CREAT UR: NORMAL MG/G{CREAT} (ref 0–0.2)
RBC # BLD AUTO: 4.9 M/UL (ref 4.6–6.2)
SODIUM SERPL-SCNC: 141 MMOL/L (ref 136–145)
SODIUM UR-SCNC: 96 MMOL/L (ref 20–250)
SP GR UR STRIP: 1.01 (ref 1–1.03)
TACROLIMUS BLD-MCNC: 2.9 NG/ML (ref 5–15)
TOXICOLOGY INFORMATION: NORMAL
URN SPEC COLLECT METH UR: ABNORMAL
WBC # BLD AUTO: 7.21 K/UL (ref 3.9–12.7)

## 2023-05-29 PROCEDURE — 80197 ASSAY OF TACROLIMUS: CPT

## 2023-05-29 PROCEDURE — 80053 COMPREHEN METABOLIC PANEL: CPT

## 2023-05-29 PROCEDURE — 85025 COMPLETE CBC W/AUTO DIFF WBC: CPT

## 2023-05-29 PROCEDURE — 25000003 PHARM REV CODE 250

## 2023-05-29 PROCEDURE — 99233 PR SUBSEQUENT HOSPITAL CARE,LEVL III: ICD-10-PCS | Mod: ,,,

## 2023-05-29 PROCEDURE — 83735 ASSAY OF MAGNESIUM: CPT

## 2023-05-29 PROCEDURE — 94761 N-INVAS EAR/PLS OXIMETRY MLT: CPT

## 2023-05-29 PROCEDURE — 81003 URINALYSIS AUTO W/O SCOPE: CPT | Performed by: STUDENT IN AN ORGANIZED HEALTH CARE EDUCATION/TRAINING PROGRAM

## 2023-05-29 PROCEDURE — 96375 TX/PRO/DX INJ NEW DRUG ADDON: CPT

## 2023-05-29 PROCEDURE — 99233 SBSQ HOSP IP/OBS HIGH 50: CPT | Mod: ,,,

## 2023-05-29 PROCEDURE — 84156 ASSAY OF PROTEIN URINE: CPT | Mod: 59 | Performed by: STUDENT IN AN ORGANIZED HEALTH CARE EDUCATION/TRAINING PROGRAM

## 2023-05-29 PROCEDURE — 99233 SBSQ HOSP IP/OBS HIGH 50: CPT | Mod: GC,,, | Performed by: INTERNAL MEDICINE

## 2023-05-29 PROCEDURE — 84100 ASSAY OF PHOSPHORUS: CPT

## 2023-05-29 PROCEDURE — 80307 DRUG TEST PRSMV CHEM ANLYZR: CPT | Performed by: STUDENT IN AN ORGANIZED HEALTH CARE EDUCATION/TRAINING PROGRAM

## 2023-05-29 PROCEDURE — 36415 COLL VENOUS BLD VENIPUNCTURE: CPT

## 2023-05-29 PROCEDURE — 63600175 PHARM REV CODE 636 W HCPCS

## 2023-05-29 PROCEDURE — 84300 ASSAY OF URINE SODIUM: CPT | Performed by: STUDENT IN AN ORGANIZED HEALTH CARE EDUCATION/TRAINING PROGRAM

## 2023-05-29 PROCEDURE — G0378 HOSPITAL OBSERVATION PER HR: HCPCS

## 2023-05-29 PROCEDURE — 99233 PR SUBSEQUENT HOSPITAL CARE,LEVL III: ICD-10-PCS | Mod: GC,,, | Performed by: INTERNAL MEDICINE

## 2023-05-29 PROCEDURE — 93990 DOPPLER FLOW TESTING: CPT | Performed by: SURGERY

## 2023-05-29 PROCEDURE — 84133 ASSAY OF URINE POTASSIUM: CPT | Performed by: STUDENT IN AN ORGANIZED HEALTH CARE EDUCATION/TRAINING PROGRAM

## 2023-05-29 RX ORDER — FUROSEMIDE 10 MG/ML
40 INJECTION INTRAMUSCULAR; INTRAVENOUS ONCE
Status: COMPLETED | OUTPATIENT
Start: 2023-05-29 | End: 2023-05-29

## 2023-05-29 RX ORDER — METHOCARBAMOL 500 MG/1
500 TABLET, FILM COATED ORAL 3 TIMES DAILY
Status: DISCONTINUED | OUTPATIENT
Start: 2023-05-29 | End: 2023-05-30 | Stop reason: HOSPADM

## 2023-05-29 RX ORDER — POTASSIUM CHLORIDE 20 MEQ/1
40 TABLET, EXTENDED RELEASE ORAL ONCE
Status: COMPLETED | OUTPATIENT
Start: 2023-05-29 | End: 2023-05-29

## 2023-05-29 RX ORDER — NIFEDIPINE 30 MG/1
30 TABLET, EXTENDED RELEASE ORAL DAILY
Status: DISCONTINUED | OUTPATIENT
Start: 2023-05-29 | End: 2023-05-30 | Stop reason: HOSPADM

## 2023-05-29 RX ADMIN — TACROLIMUS 9 MG: 5 CAPSULE ORAL at 08:05

## 2023-05-29 RX ADMIN — NIFEDIPINE 30 MG: 30 TABLET, FILM COATED, EXTENDED RELEASE ORAL at 11:05

## 2023-05-29 RX ADMIN — DOXYCYCLINE HYCLATE 100 MG: 100 TABLET, COATED ORAL at 08:05

## 2023-05-29 RX ADMIN — TACROLIMUS 9 MG: 5 CAPSULE ORAL at 05:05

## 2023-05-29 RX ADMIN — METHOCARBAMOL 500 MG: 500 TABLET ORAL at 03:05

## 2023-05-29 RX ADMIN — FUROSEMIDE 40 MG: 10 INJECTION, SOLUTION INTRAMUSCULAR; INTRAVENOUS at 11:05

## 2023-05-29 RX ADMIN — POTASSIUM CHLORIDE 40 MEQ: 1500 TABLET, EXTENDED RELEASE ORAL at 03:05

## 2023-05-29 RX ADMIN — PREDNISONE 5 MG: 5 TABLET ORAL at 08:05

## 2023-05-29 RX ADMIN — HYDRALAZINE HYDROCHLORIDE 25 MG: 25 TABLET, FILM COATED ORAL at 09:05

## 2023-05-29 RX ADMIN — METOPROLOL TARTRATE 50 MG: 50 TABLET, FILM COATED ORAL at 08:05

## 2023-05-29 RX ADMIN — METHOCARBAMOL 500 MG: 500 TABLET ORAL at 08:05

## 2023-05-29 NOTE — CONSULTS
[facility]  Kidney Transplant  Consult Note    Inpatient consult to Kidney/Pancreas Transplant Medicine  Consult performed by: Yan Mccall MD  Consult ordered by: Aidee Choi PA-C          Subjective:   History of Present Illness:  43 year old man with a history of kidney transplant in 2020 in [place] (declined from [facility]), hep B, HTN admitted to [facility] for right leg swelling and left upper arm swelling for the last several days. He states that he has been of his usual health and was going to get his right leg checked for his chronic swelling. He reports that after his kidney transplant in [place], his right leg has had on and off swelling. An appointment was scheduled for evaluation, but he noticed some left arm swelling and has an inability to close his left hand and then presented to [facility] for evaluation. He has not had any changes in his medications nor had any changes in his lifestyle. Denies any fevers/chills/nausea/vomiting/chest pain or recent travel    He had his kidney transplant in [place] for longstanding and uncontrolled hypertension. Previous workup in 2016 negative for secondary hypertension.     On admission to [facility] he presented with a BP of 258/138.         Past Medical, Surgical, Family, and Social History:   Unchanged from H&P.    Scheduled Meds:   doxycycline  100 mg Oral Q12H    metoprolol tartrate  50 mg Oral BID    mycophenolate  500 mg Oral BID    predniSONE  5 mg Oral Daily    tacrolimus  9 mg Oral BID     Continuous Infusions:  PRN Meds:acetaminophen, bisacodyL, dextrose 10%, dextrose 10%, dextrose, dextrose, glucagon (human recombinant), hydrALAZINE, HYDROcodone-acetaminophen, melatonin, ondansetron, polyethylene glycol, promethazine, sodium chloride 0.9%    Intake/Output - Last 3 Shifts       None             Review of Systems   Cardiovascular:  Positive for leg swelling.   Musculoskeletal:         Left arm swelling    Objective:     Vital Signs (Most  "Recent):  Temp: 97.9 °F (36.6 °C) (05/29/23 0755)  Pulse: 81 (05/29/23 0938)  Resp: 18 (05/29/23 0755)  BP: (!) 204/115 (05/29/23 0938)  SpO2: 97 % (05/29/23 0755) Vital Signs (24h Range):  Temp:  [97.4 °F (36.3 °C)-98 °F (36.7 °C)] 97.9 °F (36.6 °C)  Pulse:  [77-96] 81  Resp:  [18-20] 18  SpO2:  [94 %-100 %] 97 %  BP: (164-204)/() 204/115     Weight: 119 kg (262 lb 5.6 oz)  Height: 5' 9" (175.3 cm)  Body mass index is 38.74 kg/m².     Physical Exam  Constitutional:       General: He is not in acute distress.     Appearance: He is not ill-appearing or toxic-appearing.      Comments: Muscular   HENT:      Nose: Nose normal. No congestion.   Cardiovascular:      Rate and Rhythm: Normal rate.   Pulmonary:      Effort: Pulmonary effort is normal. No respiratory distress.   Abdominal:      General: Abdomen is flat. There is no distension.      Tenderness: There is no abdominal tenderness.   Musculoskeletal:      Right lower leg: Edema (2+) present.      Left lower leg: No edema.      Comments: Left upper edema 1+   Skin:     General: Skin is warm.   Neurological:      Mental Status: He is alert.        Laboratory:  Labs within the past 24 hours have been reviewed.    Diagnostic Results:  None    Assessment/Plan:     Cardiac/Vascular  Renovascular hypertension  On lopressor 50 bid for outpatient regiment  On admission hypertensive to 250s  Previous work up in 2015 negative for secondary cause, now with kidney transplant 2020    Would recommend scheduled antihypertensives - currently on lopressor 50 bid, can change to coreg vs nifedipine for better control  Will rule out renal artery stenosis with US transplant doppler        Renal/  ESRD on dialysis  SP kidney transplant      Kidney transplant recipient  Transplant 2020  Home regiment - tacrolimus 9-9,  bid and pred 5    Continue pred 5  Continue tacro 9-9  Daily tacrolimus level  Hold MMF         Other  Edema  Edema of right leg is chronic - previous " imaging showing stenosis of right illiac; this admission with negative DVT studies   -consider US study to evaluate the illiacs   -if no other cause of edema is found outside of stenosis - treatment will be compression stockings    Edema of left arm - likely from AV stenosis, is on doxycycline for possible cellulitis per primary. Pending US AV evaluation          Discharge Planning:  Per primary      Yan Mccall MD  Kidney Transplant  [facility]

## 2023-05-29 NOTE — ASSESSMENT & PLAN NOTE
- LUE with edema and pain  - US of L brachiobasilic AV fistula patent, but highly tortuous and aneurysmal, also with multiple focal hemodynamically significant stenoses  - Vascular surgery consulted; appreciate recs

## 2023-05-29 NOTE — NURSING
"Earlier in the shift after the administration of scheduled lopressor, patient began having complaint's of dizziness, and "feeling like I'm about to pass out.".  Patient's blood pressure was 172/94, with a heart rate of 79.  FAIZA Eckert as well as charge nurse AMBREEN Campuzano notified of patient complaint.  A one time dose of meclizine 25 mg ordered and administered with improvement of symptoms.  With midnight vitals, patient's blood pressure was found to be 175/102.  FAIZA Eckert notified by charge nurse of patient's pressure.  NP satisfied with current pressure, and won't treat aggressively due to patient's dizziness.  Will continue to monitor.  "

## 2023-05-29 NOTE — ASSESSMENT & PLAN NOTE
Would treat generalized edema, fluid overload, before any intervention to the arm  No surgical intervention at this time  Hypertensive urgency in the setting of renal txp, we agree with investigation of renovascular htn.  Agree with US of kidney

## 2023-05-29 NOTE — ASSESSMENT & PLAN NOTE
- erythema and mild edema noted to R leg  - began doxycycline   Spoke with patient. Patient requested orders for mammogram to be placed. Pt had a diagnostic mammogram done on 6/2/2017. Spoke with Page (mammogram) states patients last diagnostic mammogram was negative therefore she only needs a screening order placed. Please place order for mammogram.//ddw

## 2023-05-29 NOTE — ASSESSMENT & PLAN NOTE
- BLE edema noted (R>L)  - DVT US negative  - given one dose IV lasix 40 mg   KTM consulted:   -US study to evaluate the illiacs   -if no other cause of edema is found outside of stenosis then treatment will be compression stockings

## 2023-05-29 NOTE — ASSESSMENT & PLAN NOTE
Transplant 2020  Home regiment - tacrolimus 9-9,  bid and pred 5    Continue pred 5  Continue tacro 9-9  Daily tacrolimus level  Hold MMF

## 2023-05-29 NOTE — ASSESSMENT & PLAN NOTE
Edema of right leg is chronic - previous imaging showing stenosis of right illiac; this admission with negative DVT studies   -consider US study to evaluate the illiacs   -if no other cause of edema is found outside of stenosis - treatment will be compression stockings    Edema of left arm - likely from AV stenosis, is on doxycycline for possible cellulitis per primary. Pending US AV evaluation
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
## 2023-05-29 NOTE — ASSESSMENT & PLAN NOTE
Renovascular hypertension  - Hypertensive 258/138 on admission, now 180/102  - continue lopressor 50 mg BID, added hydralazine 25 mg PRN  - started nifedipine 30 mg daily for better BP control  - Imaging reviewed, no DVTs  - CXR showing cardiomegaly with central vascular congestion and suspected interstitial edema  - Echo below   - KTM recommending US transplant doppler to r/o renal artery stenosis- pending  - multimodal pain control  - monitor vitals q4     Results for orders placed during the hospital encounter of 05/28/23    Echo    Interpretation Summary  · Mild left atrial enlargement.  · The left ventricle is normal in size with normal systolic function.  · The estimated ejection fraction is 60%.  · Normal left ventricular diastolic function.  · Normal right ventricular size with normal right ventricular systolic function.  · Normal central venous pressure (3 mmHg).  · There is no significant tricuspid regurgitation and therefore the pulmonary artery systolic pressure cannot be reported.

## 2023-05-29 NOTE — SUBJECTIVE & OBJECTIVE
Subjective:   History of Present Illness:  43 year old man with a history of kidney transplant in 2020 in [place] (declined from [facility]), hep B, HTN [place] [facility] [place] and left upper arm swelling for the last several days. He states that he has been of his usual health and was going to get his right leg checked for his chronic swelling. He reports that after his kidney transplant in [place], his right leg has had on and off swelling. An appointment was scheduled for evaluation, but he noticed some left arm swelling and has an inability to close his left hand and then presented [place] [facility] [place]. He has not had any changes in his medications nor had any changes in his lifestyle. Denies any fevers/chills/nausea/vomiting/chest pain or recent travel    He had his kidney transplant in [place] for longstanding and uncontrolled hypertension. Previous workup in 2016 negative for secondary hypertension.     On [place] [facility] [place] presented with a BP of 258/138.         Past Medical, Surgical, Family, and Social History:   Unchanged from H&P.    Scheduled Meds:   doxycycline  100 mg Oral Q12H    metoprolol tartrate  50 mg Oral BID    mycophenolate  500 mg Oral BID    predniSONE  5 mg Oral Daily    tacrolimus  9 mg Oral BID     Continuous Infusions:  PRN Meds:acetaminophen, bisacodyL, dextrose 10%, dextrose 10%, dextrose, dextrose, glucagon (human recombinant), hydrALAZINE, HYDROcodone-acetaminophen, melatonin, ondansetron, polyethylene glycol, promethazine, sodium chloride 0.9%    Intake/Output - Last 3 Shifts       None             Review of Systems   Cardiovascular:  Positive for leg swelling.   Musculoskeletal:         Left arm swelling    Objective:     Vital Signs (Most Recent):  Temp: 97.9 °F (36.6 °C) (05/29/23 0755)  Pulse: 81 (05/29/23 0938)  Resp: 18 (05/29/23 0755)  BP: (!) 204/115 (05/29/23 0938)  SpO2: 97 % (05/29/23 0755) Vital Signs (24h Range):  Temp:  [97.4 °F (36.3 °C)-98 °F (36.7  "°C)] 97.9 °F (36.6 °C)  Pulse:  [77-96] 81  Resp:  [18-20] 18  SpO2:  [94 %-100 %] 97 %  BP: (164-204)/() 204/115     Weight: 119 kg (262 lb 5.6 oz)  Height: 5' 9" (175.3 cm)  Body mass index is 38.74 kg/m².     Physical Exam  Constitutional:       General: He is not in acute distress.     Appearance: He is not ill-appearing or toxic-appearing.      Comments: Muscular   HENT:      Nose: Nose normal. No congestion.   Cardiovascular:      Rate and Rhythm: Normal rate.   Pulmonary:      Effort: Pulmonary effort is normal. No respiratory distress.   Abdominal:      General: Abdomen is flat. There is no distension.      Tenderness: There is no abdominal tenderness.   Musculoskeletal:      Right lower leg: Edema (2+) present.      Left lower leg: No edema.      Comments: Left upper edema 1+   Skin:     General: Skin is warm.   Neurological:      Mental Status: He is alert.        Laboratory:  Labs within the past 24 hours have been reviewed.    Diagnostic Results:  None  "

## 2023-05-29 NOTE — ASSESSMENT & PLAN NOTE
On lopressor 50 bid for outpatient regiment  On admission hypertensive to 250s  Previous work up in 2015 negative for secondary cause, now with kidney transplant 2020    Would recommend scheduled antihypertensives - currently on lopressor 50 bid, can change to coreg vs nifedipine for better control  Will rule out renal artery stenosis with US transplant doppler

## 2023-05-29 NOTE — HPI
43 year old man with a history of kidney transplant in 2020 in [place] (declined from [facility]), hep B, HTN [place] [facility] for right leg swelling and left upper arm swelling for the last several days. He states that he has been of his usual health and was going to get his right leg checked for his chronic swelling. He reports that after his kidney transplant in [place], his right leg has had on and off swelling. An appointment was scheduled for evaluation, but he noticed some left arm swelling and has an inability to close his left hand and then presented to [facility] for [place]. He has not had any changes in his medications nor had any changes in his lifestyle. Denies any fevers/chills/nausea/vomiting/chest pain or recent travel    He had his kidney transplant in [place] for longstanding and uncontrolled hypertension. Previous workup in 2016 negative for secondary hypertension.     On admission to [facility] he presented with a BP of 258/138.

## 2023-05-29 NOTE — HPI
Mr Hidalgo is a 43 year old male with a pmh of htn, obesity, hx of hep B, s/p kidney transplant 2020, and S/P left BB AVF presents to the ED for edema and hypertensive urgency. He states that he receives most his care in [place], [place]. He admits to three days of edema in his right leg and left upper arm and generalized swelling as well. He has not received HD since 2020. He denies fevers, chills, diaphoresis.

## 2023-05-29 NOTE — SUBJECTIVE & OBJECTIVE
Interval History: NAEON. BP elevated this morning to 204/115. Added nifedipine 30 mg daily. US HD access with patent AV fistula, but with significant stenosis. Consult placed to vascular surgery. Patient seen and evaluated by me today. C/o 7/10 pain to LUE and pain to BLE. Swelling noted to LUE and BLE. Reports SOB only with laying down intermittently. Denies any other complaints. Started robaxin to help with pain and given one time dose of IV lasix 40 mg. Evaluated by KTM.     Review of Systems   Constitutional:  Negative for chills and fever.   Respiratory:  Positive for shortness of breath. Negative for chest tightness.    Cardiovascular:  Positive for leg swelling (R>L). Negative for chest pain.   Gastrointestinal:  Negative for abdominal pain and nausea.   Musculoskeletal:  Positive for myalgias.        + LUE swelling   Neurological:  Negative for dizziness and weakness.   Objective:     Vital Signs (Most Recent):  Temp: 98.3 °F (36.8 °C) (05/29/23 1202)  Pulse: 82 (05/29/23 1202)  Resp: 18 (05/29/23 1202)  BP: (!) 140/84 (05/29/23 1202)  SpO2: 96 % (05/29/23 1202) Vital Signs (24h Range):  Temp:  [97.4 °F (36.3 °C)-98.3 °F (36.8 °C)] 98.3 °F (36.8 °C)  Pulse:  [77-96] 82  Resp:  [18-20] 18  SpO2:  [94 %-100 %] 96 %  BP: (140-204)/() 140/84     Weight: 119 kg (262 lb 5.6 oz)  Body mass index is 38.74 kg/m².    Intake/Output Summary (Last 24 hours) at 5/29/2023 1424  Last data filed at 5/29/2023 1246  Gross per 24 hour   Intake --   Output 600 ml   Net -600 ml         Physical Exam  Vitals and nursing note reviewed.   Constitutional:       Appearance: He is well-developed. He is obese.   Eyes:      Pupils: Pupils are equal, round, and reactive to light.   Cardiovascular:      Rate and Rhythm: Normal rate and regular rhythm.   Pulmonary:      Effort: Pulmonary effort is normal.      Breath sounds: Normal breath sounds.   Abdominal:      Palpations: Abdomen is soft.      Tenderness: There is no abdominal  tenderness.   Musculoskeletal:         General: Swelling (LUE) present. No tenderness.      Right lower leg: Edema (2+) present.      Left lower leg: Edema (nonpitting) present.   Skin:     General: Skin is warm and dry.      Findings: Erythema (RLE) present.   Neurological:      Mental Status: He is alert and oriented to person, place, and time.   Psychiatric:         Behavior: Behavior normal.           Significant Labs: All pertinent labs within the past 24 hours have been reviewed.  CBC:   Recent Labs   Lab 05/28/23  0344 05/29/23  0421   WBC 8.92 7.21   HGB 12.7* 13.4*   HCT 40.2 45.3    214     CMP:   Recent Labs   Lab 05/28/23  0344 05/29/23  0421    141   K 3.0* 3.4*    109   CO2 23 21*   GLU 84 93   BUN 16 14   CREATININE 1.3 1.1   CALCIUM 9.5 8.9   PROT 7.2 6.9   ALBUMIN 3.9 3.6   BILITOT 0.4 0.5   ALKPHOS 97 81   AST 19 15   ALT 20 16   ANIONGAP 10 11       Significant Imaging: I have reviewed all pertinent imaging results/findings within the past 24 hours.

## 2023-05-29 NOTE — PLAN OF CARE
[facility]  Discharge Assessment    Primary Care Provider: Primary Doctor No     Discharge Assessment (most recent)       BRIEF DISCHARGE ASSESSMENT - 05/29/23 1412          Discharge Planning    Assessment Type Discharge Planning Brief Assessment     Resource/Environmental Concerns none     Support Systems Family members     Equipment Currently Used at Home none     Patient/Family Anticipates Transition to home     Patient/Family Anticipated Services at Transition none     DME Needed Upon Discharge  none     Discharge Plan A Home     Discharge Plan B Home                     Pt is a 43 y.o. male admitted with Hypertensive urgency and has a PMH of s/p Kidney Transplant 2020. He recently moved to [place] but has family here in [place]. He will be able to get a ride to his family before he goes back home. He is independent with all of his ADLs and IADLs. [facility] Discharge Packet given to patient and/or family with understanding verbalized.   name and number and estimated discharge date written on white board in patient's room with request to call for any questions or concerns.  Will continue to follow for needs.  Prem Rangel RN,BSN

## 2023-05-29 NOTE — HOSPITAL COURSE
Maksim Hidalgo is a 44 yo M with hx of kidney transplant (2020) who was placed in observation for further management of hypertensive urgency. Continuing patient's home lopressor and started nifedipine 30 mg daily with good control. US transplant doppler negative for renal artery stenosis. Patient also with BLE and left arm edema, given dose of lasix IV in ED. Echo with EF 60%, normal LV diastolic function, normal RV function, CVP 3 mm Hg. 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 Erythema also noted to RLE; started doxycycline for cellulitis. US of LUE AV fistula showing multiple focal hemodynamically significant stenosis. Placed consult for vascular surgery who did not recommend any surgical intervention. KTM consulted; recommending holding MMF. Also recommending iliac US to further evaluate LE swelling which was unremarkable. Treat LE swelling with compression stockings. LUE edema likely 2/2 to AV stenosis, will need to follow up with vascular surgery in [place] where patient lives. Patient discharged on nifedipine and doxycycline. Patient instructed to follow up with KTM and vascular surgery in [place]. Patient verbalized understanding. All questions answered.

## 2023-05-29 NOTE — ASSESSMENT & PLAN NOTE
- s/p transplant 2020  - home meds include tacro, mycophenolate and prednisone  - Kidney transplant consulted:  - Continue pred 5  - Continue tacro 9-9  - Daily tacrolimus level  - Hold MMF

## 2023-05-29 NOTE — PROGRESS NOTES
[facility] [address] [place] [address] Medicine  Progress Note    Patient Name: Maksim Hidalgo  MRN: [id number]  Patient Class: OP- Observation   Admission Date: 5/28/2023  Length of Stay: 0 days  Attending Physician: Dayna Killian MD  Primary Care Provider: Primary Doctor No        Subjective:     Principal Problem:Hypertensive urgency        HPI:  Maksim Hidalgo is a 43 y.o. male with a hx of hep B, s/p kidney transplant 2020, and HTN presents to the ED for edema. Patient notes two days prior he noticed an acute onset of edema to his L arm, bilateral legs, and collarbone area. Felt that swelling continued to worsen and presented here. Endorses having issues with swelling in his right leg specifically and had an appt with a physician this week but decided to present here instead. Endorses associated SOB worse with lying flat. Endorses most of his care has been in [place] but denies any acute complications s/p transplant. Endorses pain to left arm but otherwise denies chest pain, abdominal pain, n/v, urinary symptoms and constipation/ diarrhea.     ED: AF, /138. CBC w/o leukocytosis. K 3.0. BNP 64. Tn neg. EKG in NSR w/o acute ischemic changes. US LUE showed  no thrombus in central veins of the left upper extremity. Patent left upper extremity AV fistula. BLE US showed no evidence of deep venous thrombosis in either lower extremity. CXR showed cardiomegaly with central vascular congestion and suspected interstitial edema. Given labetalol x 2 and potassium bicarb.       Overview/Hospital Course:  Maksim Hidalgo is a 44 yo M with hx of kidney transplant (2020) who was placed in observation for further management of hypertensive urgency. Continuing patient's home lopressor and started nifedipine 30 mg daily. Patient also with BLE and L arm edema. Echo with EF 60%, normal LV diastolic function, normal RV function, CVP 3 mm Hg. Erythema noted to RLE; started doxycycline for cellulitis. US of LUE AV  fistula showing multiple focal hemodynamically significant stenosis. Placed consult for vascular surgery. KTM consulted; recommending holding MMF. Also recommending US transplant doppler to r/o renal artery stenosis and iliac US to further evaluate LE swelling. Plan to discharge home when medically ready.       Interval History: NAEON. BP elevated this morning to 204/115. Added nifedipine 30 mg daily. US HD access with patent AV fistula, but with significant stenosis. Consult placed to vascular surgery. Patient seen and evaluated by me today. C/o 7/10 pain to LUE and pain to BLE. Swelling noted to LUE and BLE. Reports SOB only with laying down intermittently. Denies any other complaints. Started robaxin to help with pain and given one time dose of IV lasix 40 mg. Evaluated by KTM.     Review of Systems   Constitutional:  Negative for chills and fever.   Respiratory:  Positive for shortness of breath. Negative for chest tightness.    Cardiovascular:  Positive for leg swelling (R>L). Negative for chest pain.   Gastrointestinal:  Negative for abdominal pain and nausea.   Musculoskeletal:  Positive for myalgias.        + LUE swelling   Neurological:  Negative for dizziness and weakness.   Objective:     Vital Signs (Most Recent):  Temp: 98.3 °F (36.8 °C) (05/29/23 1202)  Pulse: 82 (05/29/23 1202)  Resp: 18 (05/29/23 1202)  BP: (!) 140/84 (05/29/23 1202)  SpO2: 96 % (05/29/23 1202) Vital Signs (24h Range):  Temp:  [97.4 °F (36.3 °C)-98.3 °F (36.8 °C)] 98.3 °F (36.8 °C)  Pulse:  [77-96] 82  Resp:  [18-20] 18  SpO2:  [94 %-100 %] 96 %  BP: (140-204)/() 140/84     Weight: 119 kg (262 lb 5.6 oz)  Body mass index is 38.74 kg/m².    Intake/Output Summary (Last 24 hours) at 5/29/2023 1424  Last data filed at 5/29/2023 1246  Gross per 24 hour   Intake --   Output 600 ml   Net -600 ml         Physical Exam  Vitals and nursing note reviewed.   Constitutional:       Appearance: He is well-developed. He is obese.   Eyes:       Pupils: Pupils are equal, round, and reactive to light.   Cardiovascular:      Rate and Rhythm: Normal rate and regular rhythm.   Pulmonary:      Effort: Pulmonary effort is normal.      Breath sounds: Normal breath sounds.   Abdominal:      Palpations: Abdomen is soft.      Tenderness: There is no abdominal tenderness.   Musculoskeletal:         General: Swelling (LUE) present. No tenderness.      Right lower leg: Edema (2+) present.      Left lower leg: Edema (nonpitting) present.   Skin:     General: Skin is warm and dry.      Findings: Erythema (RLE) present.   Neurological:      Mental Status: He is alert and oriented to person, place, and time.   Psychiatric:         Behavior: Behavior normal.           Significant Labs: All pertinent labs within the past 24 hours have been reviewed.  CBC:   Recent Labs   Lab 05/28/23  0344 05/29/23  0421   WBC 8.92 7.21   HGB 12.7* 13.4*   HCT 40.2 45.3    214     CMP:   Recent Labs   Lab 05/28/23  0344 05/29/23  0421    141   K 3.0* 3.4*    109   CO2 23 21*   GLU 84 93   BUN 16 14   CREATININE 1.3 1.1   CALCIUM 9.5 8.9   PROT 7.2 6.9   ALBUMIN 3.9 3.6   BILITOT 0.4 0.5   ALKPHOS 97 81   AST 19 15   ALT 20 16   ANIONGAP 10 11       Significant Imaging: I have reviewed all pertinent imaging results/findings within the past 24 hours.      Assessment/Plan:      * Hypertensive urgency  Renovascular hypertension  - Hypertensive 258/138 on admission, now 180/102  - continue lopressor 50 mg BID, added hydralazine 25 mg PRN  - started nifedipine 30 mg daily for better BP control  - Imaging reviewed, no DVTs  - CXR showing cardiomegaly with central vascular congestion and suspected interstitial edema  - Echo below   - KTM recommending US transplant doppler to r/o renal artery stenosis- pending  - multimodal pain control  - monitor vitals q4     Results for orders placed during the hospital encounter of 05/28/23    Echo    Interpretation Summary  · Mild left atrial  enlargement.  · The left ventricle is normal in size with normal systolic function.  · The estimated ejection fraction is 60%.  · Normal left ventricular diastolic function.  · Normal right ventricular size with normal right ventricular systolic function.  · Normal central venous pressure (3 mmHg).  · There is no significant tricuspid regurgitation and therefore the pulmonary artery systolic pressure cannot be reported.    Edema of left upper extremity  - LUE with edema and pain  - US of L brachiobasilic AV fistula patent, but highly tortuous and aneurysmal, also with multiple focal hemodynamically significant stenoses  - Vascular surgery consulted; appreciate recs    Cellulitis of right lower extremity  - erythema and mild edema noted to R leg  - began doxycycline    Edema  - BLE edema noted (R>L)  - DVT US negative  - given one dose IV lasix 40 mg   KTM consulted:   -US study to evaluate the illiacs   -if no other cause of edema is found outside of stenosis then treatment will be compression stockings    Kidney transplant recipient  - s/p transplant 2020  - home meds include tacro, mycophenolate and prednisone  - Kidney transplant consulted:  - Continue pred 5  - Continue tacro 9-9  - Daily tacrolimus level  - Hold MMF     Hypokalemia  K 3.0  - replaced     Hepatitis B carrier  - vemlidy not on formulary  - will need patient to bring medication from home      VTE Risk Mitigation (From admission, onward)         Ordered     IP VTE LOW RISK PATIENT  Once         05/28/23 1552     Place sequential compression device  Until discontinued         05/28/23 1552                Discharge Planning   KORI: 5/30/2023     Code Status: Full Code   Is the patient medically ready for discharge?: No    Reason for patient still in hospital (select all that apply): Patient new problem, Patient trending condition, Treatment, Imaging and Consult recommendations  Discharge Plan A: Home                  Anupama Brooke PA-C  Department  of [facility] Medicine   [facility] 11[facility]

## 2023-05-30 VITALS
HEIGHT: 69 IN | BODY MASS INDEX: 38.86 KG/M2 | RESPIRATION RATE: 19 BRPM | DIASTOLIC BLOOD PRESSURE: 88 MMHG | SYSTOLIC BLOOD PRESSURE: 153 MMHG | WEIGHT: 262.38 LBS | TEMPERATURE: 98 F | OXYGEN SATURATION: 97 % | HEART RATE: 70 BPM

## 2023-05-30 LAB
ALBUMIN SERPL BCP-MCNC: 3.5 G/DL (ref 3.5–5.2)
ALP SERPL-CCNC: 94 U/L (ref 55–135)
ALT SERPL W/O P-5'-P-CCNC: 16 U/L (ref 10–44)
ANION GAP SERPL CALC-SCNC: 10 MMOL/L (ref 8–16)
AST SERPL-CCNC: 14 U/L (ref 10–40)
BASOPHILS # BLD AUTO: 0.03 K/UL (ref 0–0.2)
BASOPHILS NFR BLD: 0.4 % (ref 0–1.9)
BILIRUB SERPL-MCNC: 0.3 MG/DL (ref 0.1–1)
BUN SERPL-MCNC: 15 MG/DL (ref 6–20)
CALCIUM SERPL-MCNC: 9.1 MG/DL (ref 8.7–10.5)
CHLORIDE SERPL-SCNC: 109 MMOL/L (ref 95–110)
CO2 SERPL-SCNC: 22 MMOL/L (ref 23–29)
CREAT SERPL-MCNC: 1 MG/DL (ref 0.5–1.4)
DIFFERENTIAL METHOD: ABNORMAL
EOSINOPHIL # BLD AUTO: 0.6 K/UL (ref 0–0.5)
EOSINOPHIL NFR BLD: 6.8 % (ref 0–8)
ERYTHROCYTE [DISTWIDTH] IN BLOOD BY AUTOMATED COUNT: 14.4 % (ref 11.5–14.5)
EST. GFR  (NO RACE VARIABLE): >60 ML/MIN/1.73 M^2
GLUCOSE SERPL-MCNC: 105 MG/DL (ref 70–110)
HCT VFR BLD AUTO: 43.5 % (ref 40–54)
HGB BLD-MCNC: 13.6 G/DL (ref 14–18)
IMM GRANULOCYTES # BLD AUTO: 0.02 K/UL (ref 0–0.04)
IMM GRANULOCYTES NFR BLD AUTO: 0.2 % (ref 0–0.5)
LYMPHOCYTES # BLD AUTO: 1.6 K/UL (ref 1–4.8)
LYMPHOCYTES NFR BLD: 19.2 % (ref 18–48)
MAGNESIUM SERPL-MCNC: 1.8 MG/DL (ref 1.6–2.6)
MCH RBC QN AUTO: 27.5 PG (ref 27–31)
MCHC RBC AUTO-ENTMCNC: 31.3 G/DL (ref 32–36)
MCV RBC AUTO: 88 FL (ref 82–98)
MONOCYTES # BLD AUTO: 1 K/UL (ref 0.3–1)
MONOCYTES NFR BLD: 11.9 % (ref 4–15)
NEUTROPHILS # BLD AUTO: 5.1 K/UL (ref 1.8–7.7)
NEUTROPHILS NFR BLD: 61.5 % (ref 38–73)
NRBC BLD-RTO: 0 /100 WBC
PHOSPHATE SERPL-MCNC: 2.5 MG/DL (ref 2.7–4.5)
PLATELET # BLD AUTO: 249 K/UL (ref 150–450)
PMV BLD AUTO: 10.5 FL (ref 9.2–12.9)
POTASSIUM SERPL-SCNC: 3.5 MMOL/L (ref 3.5–5.1)
PROT SERPL-MCNC: 7 G/DL (ref 6–8.4)
RBC # BLD AUTO: 4.94 M/UL (ref 4.6–6.2)
SODIUM SERPL-SCNC: 141 MMOL/L (ref 136–145)
T4 FREE SERPL-MCNC: 0.94 NG/DL (ref 0.71–1.51)
TACROLIMUS BLD-MCNC: 9.2 NG/ML (ref 5–15)
TSH SERPL DL<=0.005 MIU/L-ACNC: 0.37 UIU/ML (ref 0.4–4)
WBC # BLD AUTO: 8.23 K/UL (ref 3.9–12.7)

## 2023-05-30 PROCEDURE — 63600175 PHARM REV CODE 636 W HCPCS

## 2023-05-30 PROCEDURE — 99223 PR INITIAL HOSPITAL CARE,LEVL III: ICD-10-PCS | Mod: ,,, | Performed by: SURGERY

## 2023-05-30 PROCEDURE — 84100 ASSAY OF PHOSPHORUS: CPT

## 2023-05-30 PROCEDURE — 83735 ASSAY OF MAGNESIUM: CPT

## 2023-05-30 PROCEDURE — 99233 PR SUBSEQUENT HOSPITAL CARE,LEVL III: ICD-10-PCS | Mod: GC,,, | Performed by: INTERNAL MEDICINE

## 2023-05-30 PROCEDURE — 84443 ASSAY THYROID STIM HORMONE: CPT | Performed by: STUDENT IN AN ORGANIZED HEALTH CARE EDUCATION/TRAINING PROGRAM

## 2023-05-30 PROCEDURE — 99233 SBSQ HOSP IP/OBS HIGH 50: CPT | Mod: GC,,, | Performed by: INTERNAL MEDICINE

## 2023-05-30 PROCEDURE — 25000003 PHARM REV CODE 250

## 2023-05-30 PROCEDURE — 99223 1ST HOSP IP/OBS HIGH 75: CPT | Mod: ,,, | Performed by: SURGERY

## 2023-05-30 PROCEDURE — 84439 ASSAY OF FREE THYROXINE: CPT | Performed by: STUDENT IN AN ORGANIZED HEALTH CARE EDUCATION/TRAINING PROGRAM

## 2023-05-30 PROCEDURE — 85025 COMPLETE CBC W/AUTO DIFF WBC: CPT

## 2023-05-30 PROCEDURE — 80053 COMPREHEN METABOLIC PANEL: CPT

## 2023-05-30 PROCEDURE — G0378 HOSPITAL OBSERVATION PER HR: HCPCS

## 2023-05-30 PROCEDURE — 80197 ASSAY OF TACROLIMUS: CPT

## 2023-05-30 PROCEDURE — 99239 HOSP IP/OBS DSCHRG MGMT >30: CPT | Mod: ,,, | Performed by: PHYSICIAN ASSISTANT

## 2023-05-30 PROCEDURE — 36415 COLL VENOUS BLD VENIPUNCTURE: CPT

## 2023-05-30 PROCEDURE — 99239 PR HOSPITAL DISCHARGE DAY,>30 MIN: ICD-10-PCS | Mod: ,,, | Performed by: PHYSICIAN ASSISTANT

## 2023-05-30 RX ORDER — DOXYCYCLINE HYCLATE 100 MG
100 TABLET ORAL EVERY 12 HOURS
Qty: 10 TABLET | Refills: 0 | Status: SHIPPED | OUTPATIENT
Start: 2023-05-30 | End: 2023-06-04

## 2023-05-30 RX ORDER — NIFEDIPINE 30 MG/1
30 TABLET, EXTENDED RELEASE ORAL DAILY
Qty: 30 TABLET | Refills: 11 | Status: SHIPPED | OUTPATIENT
Start: 2023-05-31 | End: 2024-05-30

## 2023-05-30 RX ORDER — TACROLIMUS 1 MG/1
9 CAPSULE ORAL EVERY 12 HOURS
Qty: 540 CAPSULE | Refills: 0 | Status: SHIPPED | OUTPATIENT
Start: 2023-05-30 | End: 2023-05-30 | Stop reason: SDUPTHER

## 2023-05-30 RX ORDER — TACROLIMUS 1 MG/1
9 CAPSULE ORAL EVERY 12 HOURS
Start: 2023-05-30

## 2023-05-30 RX ADMIN — NIFEDIPINE 30 MG: 30 TABLET, FILM COATED, EXTENDED RELEASE ORAL at 08:05

## 2023-05-30 RX ADMIN — METHOCARBAMOL 500 MG: 500 TABLET ORAL at 08:05

## 2023-05-30 RX ADMIN — DOXYCYCLINE HYCLATE 100 MG: 100 TABLET, COATED ORAL at 08:05

## 2023-05-30 RX ADMIN — PREDNISONE 5 MG: 5 TABLET ORAL at 09:05

## 2023-05-30 RX ADMIN — TACROLIMUS 9 MG: 5 CAPSULE ORAL at 08:05

## 2023-05-30 RX ADMIN — METOPROLOL TARTRATE 50 MG: 50 TABLET, FILM COATED ORAL at 08:05

## 2023-05-30 NOTE — ASSESSMENT & PLAN NOTE
Renovascular hypertension  - Hypertensive 258/138 on admission, now 180/102  - continue lopressor 50 mg BID, added hydralazine 25 mg PRN  - started nifedipine 30 mg daily for better BP control with good results  - Imaging reviewed, no DVTs  - CXR showing cardiomegaly with central vascular congestion and suspected interstitial edema  - Echo below   - KTM recommending US transplant doppler to r/o renal artery stenosis- no renal artery stenosis  - multimodal pain control  - monitor vitals q4     Results for orders placed during the hospital encounter of 05/28/23    Echo    Interpretation Summary  · Mild left atrial enlargement.  · The left ventricle is normal in size with normal systolic function.  · The estimated ejection fraction is 60%.  · Normal left ventricular diastolic function.  · Normal right ventricular size with normal right ventricular systolic function.  · Normal central venous pressure (3 mmHg).  · There is no significant tricuspid regurgitation and therefore the pulmonary artery systolic pressure cannot be reported.

## 2023-05-30 NOTE — ASSESSMENT & PLAN NOTE
- erythema and mild edema noted to R leg  - began doxycycline  - discharged on doxycycline X 5 days

## 2023-05-30 NOTE — ASSESSMENT & PLAN NOTE
- LUE with edema and pain  - US of L brachiobasilic AV fistula patent, but highly tortuous and aneurysmal, also with multiple focal hemodynamically significant stenoses  - Vascular surgery consulted; appreciate recs: no surgical intervention  - edema likely 2/2 to stenosis, will need to follow up with outpatient vascular surgery for intervention in [place]

## 2023-05-30 NOTE — ASSESSMENT & PLAN NOTE
Transplant 2020  Home regiment - tacrolimus 9-9,  bid and pred 5    Tacro 9.2 @ 0200    Continue pred 5  Continue tacro 9-9  Daily tacrolimus level  Hold MMF

## 2023-05-30 NOTE — SUBJECTIVE & OBJECTIVE
Subjective:   History of Present Illness:  43 year old man with a history of kidney transplant in 2020 in [place] (declined from [facility]), hep B, HTN admitted to [facility] for right leg swelling and left upper arm swelling for the last several days. He states that he has been of his usual health and was going to get his right leg checked for his chronic swelling. He reports that after his kidney transplant in [place], his right leg has had on and off swelling. An appointment was scheduled for evaluation, but he noticed some left arm swelling and has an inability to close his left hand and then presented to [facility] for evaluation. He has not had any changes in his medications nor had any changes in his lifestyle. Denies any fevers/chills/nausea/vomiting/chest pain or recent travel    He had his kidney transplant in [place] for longstanding and uncontrolled hypertension. Previous workup in 2016 negative for secondary hypertension.     On admission to [facility] he presented with a BP of 258/138.         Hospital Course:  No notes on file    Interval History:  No acute events overnight; TSH elevated but free T4 nl, UA clean. Started on nifedipine. US studies pending    Past Medical, Surgical, Family, and Social History:   Unchanged from H&P.    Scheduled Meds:   doxycycline  100 mg Oral Q12H    methocarbamoL  500 mg Oral TID    metoprolol tartrate  50 mg Oral BID    NIFEdipine  30 mg Oral Daily    predniSONE  5 mg Oral Daily    tacrolimus  9 mg Oral BID     Continuous Infusions:  PRN Meds:acetaminophen, bisacodyL, dextrose 10%, dextrose 10%, dextrose, dextrose, glucagon (human recombinant), hydrALAZINE, HYDROcodone-acetaminophen, melatonin, ondansetron, polyethylene glycol, promethazine, sodium chloride 0.9%    Intake/Output - Last 3 Shifts         05/28 0700 05/29 0659 05/29 0700 05/30 0659 05/30 0700 05/31 0659    Urine (mL/kg/hr)  600 (0.2)     Total Output  600     Net  -600                     Review of Systems  "  Cardiovascular:  Positive for leg swelling.   Musculoskeletal:         Left arm swelling    Objective:     Vital Signs (Most Recent):  Temp: 97.5 °F (36.4 °C) (05/30/23 0739)  Pulse: 73 (05/30/23 0739)  Resp: 18 (05/30/23 0739)  BP: (!) 144/82 (05/30/23 0739)  SpO2: 95 % (05/30/23 0739) Vital Signs (24h Range):  Temp:  [97.4 °F (36.3 °C)-98.3 °F (36.8 °C)] 97.5 °F (36.4 °C)  Pulse:  [73-89] 73  Resp:  [18-19] 18  SpO2:  [94 %-97 %] 95 %  BP: (140-204)/() 144/82     Weight: 119 kg (262 lb 5.6 oz)  Height: 5' 9" (175.3 cm)  Body mass index is 38.74 kg/m².     Physical Exam  Constitutional:       General: He is not in acute distress.     Appearance: He is not ill-appearing or toxic-appearing.      Comments: Muscular   HENT:      Nose: Nose normal. No congestion.   Cardiovascular:      Rate and Rhythm: Normal rate.   Pulmonary:      Effort: Pulmonary effort is normal. No respiratory distress.   Abdominal:      General: Abdomen is flat. There is no distension.      Tenderness: There is no abdominal tenderness.   Musculoskeletal:      Right lower leg: Edema (2+) present.      Left lower leg: No edema.      Comments: Left upper edema 1+   Skin:     General: Skin is warm.   Neurological:      Mental Status: He is alert.        Laboratory:  CMP:   Recent Labs   Lab 05/28/23  0344 05/29/23  0421 05/30/23  0209   GLU 84 93 105   CALCIUM 9.5 8.9 9.1   ALBUMIN 3.9 3.6 3.5   PROT 7.2 6.9 7.0    141 141   K 3.0* 3.4* 3.5   CO2 23 21* 22*    109 109   BUN 16 14 15   CREATININE 1.3 1.1 1.0   ALKPHOS 97 81 94   ALT 20 16 16   AST 19 15 14       Diagnostic Results:  None  "

## 2023-05-30 NOTE — PROGRESS NOTES
[facility] 11[facility]  Kidney Transplant  Progress Note      Reason for Follow-up: Reassessment of Kidney Transplant Referral - 8/5/2016 recipient and management of immunosuppression.        Subjective:   History of Present Illness:  43 year old man with a history of kidney transplant in 2020 in [place] (declined from [facility]), hep B, HTN admitted to [facility] for right leg swelling and left upper arm swelling for the last several days. He states that he has been of his usual health and was going to get his right leg checked for his chronic swelling. He reports that after his kidney transplant in [place], his right leg has had on and off swelling. An appointment was scheduled for evaluation, but he noticed some left arm swelling and has an inability to close his left hand and then presented to [facility] for evaluation. He has not had any changes in his medications nor had any changes in his lifestyle. Denies any fevers/chills/nausea/vomiting/chest pain or recent travel    He had his kidney transplant in [place] for longstanding and uncontrolled hypertension. Previous workup in 2016 negative for secondary hypertension.     On admission to [facility] he presented with a BP of 258/138.         Hospital Course:  No notes on file    Interval History:  No acute events overnight; TSH elevated but free T4 nl, UA clean. Started on nifedipine. US studies pending    Past Medical, Surgical, Family, and Social History:   Unchanged from H&P.    Scheduled Meds:   doxycycline  100 mg Oral Q12H    methocarbamoL  500 mg Oral TID    metoprolol tartrate  50 mg Oral BID    NIFEdipine  30 mg Oral Daily    predniSONE  5 mg Oral Daily    tacrolimus  9 mg Oral BID     Continuous Infusions:  PRN Meds:acetaminophen, bisacodyL, dextrose 10%, dextrose 10%, dextrose, dextrose, glucagon (human recombinant), hydrALAZINE, HYDROcodone-acetaminophen, melatonin, ondansetron, polyethylene glycol, promethazine, sodium chloride 0.9%    Intake/Output - Last 3 Shifts   "       05/28 0700 05/29 0659 05/29 0700 05/30 0659 05/30 0700 05/31 0659    Urine (mL/kg/hr)  600 (0.2)     Total Output  600     Net  -600                     Review of Systems   Cardiovascular:  Positive for leg swelling.   Musculoskeletal:         Left arm swelling    Objective:     Vital Signs (Most Recent):  Temp: 97.5 °F (36.4 °C) (05/30/23 0739)  Pulse: 73 (05/30/23 0739)  Resp: 18 (05/30/23 0739)  BP: (!) 144/82 (05/30/23 0739)  SpO2: 95 % (05/30/23 0739) Vital Signs (24h Range):  Temp:  [97.4 °F (36.3 °C)-98.3 °F (36.8 °C)] 97.5 °F (36.4 °C)  Pulse:  [73-89] 73  Resp:  [18-19] 18  SpO2:  [94 %-97 %] 95 %  BP: (140-204)/() 144/82     Weight: 119 kg (262 lb 5.6 oz)  Height: 5' 9" (175.3 cm)  Body mass index is 38.74 kg/m².     Physical Exam  Constitutional:       General: He is not in acute distress.     Appearance: He is not ill-appearing or toxic-appearing.      Comments: Muscular   HENT:      Nose: Nose normal. No congestion.   Cardiovascular:      Rate and Rhythm: Normal rate.   Pulmonary:      Effort: Pulmonary effort is normal. No respiratory distress.   Abdominal:      General: Abdomen is flat. There is no distension.      Tenderness: There is no abdominal tenderness.   Musculoskeletal:      Right lower leg: Edema (2+) present.      Left lower leg: No edema.      Comments: Left upper edema 1+   Skin:     General: Skin is warm.   Neurological:      Mental Status: He is alert.        Laboratory:  CMP:   Recent Labs   Lab 05/28/23  0344 05/29/23  0421 05/30/23  0209   GLU 84 93 105   CALCIUM 9.5 8.9 9.1   ALBUMIN 3.9 3.6 3.5   PROT 7.2 6.9 7.0    141 141   K 3.0* 3.4* 3.5   CO2 23 21* 22*    109 109   BUN 16 14 15   CREATININE 1.3 1.1 1.0   ALKPHOS 97 81 94   ALT 20 16 16   AST 19 15 14       Diagnostic Results:  None    Assessment/Plan:     ESRD on dialysis  SP kidney transplant      Kidney transplant recipient  Transplant 2020  Home regiment - tacrolimus 9-9,  bid and pred " 5    Tacro 9.2 @ 0200    Continue pred 5  Continue tacro 9-9  Daily tacrolimus level  Hold MMF         Edema  Edema of right leg is chronic - previous imaging showing stenosis of right illiac; this admission with negative DVT studies   -consider US study to evaluate the illiacs   -if no other cause of edema is found outside of stenosis - treatment will be compression stockings    Edema of left arm - likely from AV stenosis, is on doxycycline for possible cellulitis per primary. AV US has stenosis      Renovascular hypertension  On lopressor 50 bid for outpatient regiment  On admission hypertensive to 250s  Previous work up in 2015 negative for secondary cause, now with kidney transplant 2020  Started on nifedipine 5/29    Renal transplant US without findings to suggest renal artery stenosis.   Consider GEOFFREY treatment outpatient          Discharge Planning:  Per primary    Yan Mccall MD  Kidney Transplant  [facility]

## 2023-05-30 NOTE — PLAN OF CARE
[facility]  Discharge Assessment    Primary Care Provider: Primary Doctor No     Pt to follow up with his providers in [place], [place].   Pt discharged home with no services.  Prem Rangel, RN,BSN        Discharge Assessment (most recent)       BRIEF DISCHARGE ASSESSMENT - 05/29/23 1412          Discharge Planning    Assessment Type Discharge Planning Brief Assessment     Resource/Environmental Concerns none     Support Systems Family members     Equipment Currently Used at Home none     Patient/Family Anticipates Transition to home     Patient/Family Anticipated Services at Transition none     DME Needed Upon Discharge  none     Discharge Plan A Home     Discharge Plan B Home

## 2023-05-30 NOTE — DISCHARGE SUMMARY
[facility] Observation [address] [place] [address] Medicine  Discharge Summary      Patient Name: Maksim Hidalgo  MRN: [id number]  MYNOR: [id number]  Patient Class: OP- Observation  Admission Date: 5/28/2023  Hospital Length of Stay: 0 days  Discharge Date and Time:  05/30/2023 2:04 PM  Attending Physician: Dayna Killian MD   Discharging Provider: Erika Grewal PA-C  Primary Care Provider: [facility] Medicine Team: [facility] MED E Erika Grewal PA-C  Primary Care Team: [facility] MED E    HPI:   Maksim Hidalgo is a 43 y.o. male with a hx of hep B, s/p kidney transplant 2020, and HTN presents to the ED for edema. Patient notes two days prior he noticed an acute onset of edema to his L arm, bilateral legs, and collarbone area. Felt that swelling continued to worsen and presented here. Endorses having issues with swelling in his right leg specifically and had an appt with a physician this week but decided to present here instead. Endorses associated SOB worse with lying flat. Endorses most of his care has been in [place] but denies any acute complications s/p transplant. Endorses pain to left arm but otherwise denies chest pain, abdominal pain, n/v, urinary symptoms and constipation/ diarrhea.     ED: AF, /138. CBC w/o leukocytosis. K 3.0. BNP 64. Tn neg. EKG in NSR w/o acute ischemic changes. US LUE showed  no thrombus in central veins of the left upper extremity. Patent left upper extremity AV fistula. BLE US showed no evidence of deep venous thrombosis in either lower extremity. CXR showed cardiomegaly with central vascular congestion and suspected interstitial edema. Given labetalol x 2 and potassium bicarb.       * No surgery found *      Hospital Course:   Maksim Hidalgo is a 44 yo M with hx of kidney transplant (2020) who was placed in observation for further management of hypertensive urgency. Continuing patient's home lopressor and started nifedipine 30 mg daily with good  control. US transplant doppler negative for renal artery stenosis. Patient also with BLE and left arm edema, given dose of lasix IV in ED. Echo with EF 60%, normal LV diastolic function, normal RV function, CVP 3 mm Hg. 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 Erythema also noted to RLE; started doxycycline for cellulitis. US of LUE AV fistula showing multiple focal hemodynamically significant stenosis. Placed consult for vascular surgery who did not recommend any surgical intervention. KTM consulted; recommending holding MMF. Also recommending iliac US to further evaluate LE swelling which was unremarkable. Treat LE swelling with compression stockings. LUE edema likely 2/2 to AV stenosis, will need to follow up with vascular surgery in [place] where patient lives. Patient discharged on nifedipine and doxycycline. Patient instructed to follow up with KTM and vascular surgery in [place]. Patient verbalized understanding. All questions answered.        Goals of Care Treatment Preferences:  Code Status: Full Code      Consults:   Consults (From admission, onward)        Status Ordering Provider     Inpatient consult to Vascular Surgery  Once        Provider:  (Not yet assigned)    Completed JAMISON CAO     Inpatient consult to Kidney/Pancreas Transplant Medicine  Once        Provider:  (Not yet assigned)    Completed ANAHI DAMON          Cardiac/Vascular  * Hypertensive urgency  Renovascular hypertension  - Hypertensive 258/138 on admission, now 180/102  - continue lopressor 50 mg BID, added hydralazine 25 mg PRN  - started nifedipine 30 mg daily for better BP control with good results  - Imaging reviewed, no DVTs  - CXR showing cardiomegaly with central vascular congestion and suspected interstitial edema  - Echo below   - KTM recommending US transplant doppler to r/o renal artery stenosis- no renal artery stenosis  - multimodal pain control  - monitor vitals q4     Results  for orders placed during the hospital encounter of 05/28/23    Echo    Interpretation Summary  · Mild left atrial enlargement.  · The left ventricle is normal in size with normal systolic function.  · The estimated ejection fraction is 60%.  · Normal left ventricular diastolic function.  · Normal right ventricular size with normal right ventricular systolic function.  · Normal central venous pressure (3 mmHg).  · There is no significant tricuspid regurgitation and therefore the pulmonary artery systolic pressure cannot be reported.    Renal/  Kidney transplant recipient  - s/p transplant 2020  - home meds include tacro, mycophenolate and prednisone  - Kidney transplant consulted:  - Continue pred 5  - Continue tacro 9-9  - Daily tacrolimus level  - Hold MMF     ID  Cellulitis of right lower extremity  - erythema and mild edema noted to R leg  - began doxycycline  - discharged on doxycycline X 5 days    Other  Edema of left upper extremity  - LUE with edema and pain  - US of L brachiobasilic AV fistula patent, but highly tortuous and aneurysmal, also with multiple focal hemodynamically significant stenoses  - Vascular surgery consulted; appreciate recs: no surgical intervention  - edema likely 2/2 to stenosis, will need to follow up with outpatient vascular surgery for intervention in [place]      Final Active Diagnoses:    Diagnosis Date Noted POA    PRINCIPAL PROBLEM:  Hypertensive urgency [I16.0] 11/28/2015 Yes    Edema of left upper extremity [R60.0] 05/29/2023 Yes    ESRD on dialysis [N18.6, Z99.2] 05/29/2023 Not Applicable    Cellulitis of right lower extremity [L03.115] 05/29/2023 Yes    Edema [R60.9] 05/28/2023 Yes    Kidney transplant recipient [Z94.0] 05/28/2023 Not Applicable    Hypokalemia [E87.6] 05/28/2023 Yes    Renovascular hypertension [I15.0] 06/27/2017 Yes     Chronic    Hepatitis B carrier [B18.1] 12/03/2015 Not Applicable     Chronic      Problems Resolved During this Admission:     Diagnosis Date Noted Date Resolved POA    Cellulitis of right upper extremity [L03.113] 05/28/2023 05/29/2023 Yes       Discharged Condition: good    Disposition: Home or Self Care    Follow Up:   Follow-up Information     PCP In 1 week.           [facility] - Emergency Dept .    Specialty: Emergency Medicine  Why: As needed  Contact information:  [address] [facility]  [facility] [address] [phone]                     Patient Instructions:      Diet renal     Diet Cardiac     Notify your health care provider if you experience any of the following:  temperature >100.4     Notify your health care provider if you experience any of the following:  severe uncontrolled pain     Notify your health care provider if you experience any of the following:  difficulty breathing or increased cough     Notify your health care provider if you experience any of the following:  redness, tenderness, or signs of infection (pain, swelling, redness, odor or green/yellow discharge around incision site)     Activity as tolerated       Significant Diagnostic Studies: Labs:   CMP   Recent Labs   Lab 05/29/23  0421 05/30/23  0209    141   K 3.4* 3.5    109   CO2 21* 22*   GLU 93 105   BUN 14 15   CREATININE 1.1 1.0   CALCIUM 8.9 9.1   PROT 6.9 7.0   ALBUMIN 3.6 3.5   BILITOT 0.5 0.3   ALKPHOS 81 94   AST 15 14   ALT 16 16   ANIONGAP 11 10    and CBC   Recent Labs   Lab 05/29/23  0421 05/30/23  0209   WBC 7.21 8.23   HGB 13.4* 13.6*   HCT 45.3 43.5    249     Cardiac Graphics: Echocardiogram:   Transthoracic echo (TTE) complete (Cupid Only):   Results for orders placed or performed during the hospital encounter of 05/28/23   Echo   Result Value Ref Range    Ascending aorta 3.36 cm    STJ 2.93 cm    AV mean gradient 4 mmHg    Ao peak chetan 1.28 m/s    Ao VTI 25.02 cm    IVRT 106.57 msec    IVS 1.02 0.6 - 1.1 cm    LA size 3.39 cm    Left Atrium Major Axis 5.87 cm    Left Atrium Minor Axis 6.17 cm    LVIDd 4.77 3.5  - 6.0 cm    LVIDs 2.93 2.1 - 4.0 cm    LVOT diameter 2.23 cm    LVOT peak VTI 20.61 cm    Posterior Wall 0.98 0.6 - 1.1 cm    MV Peak A Benton 0.82 m/s    E wave deceleration time 148.28 msec    MV Peak E [place] 0.91 m/s    RA Major Axis 4.66 cm    RA Width 3.59 cm    Sinus 3.55 cm    TAPSE 2.66 cm    LA WIDTH 4.59 cm    MV stenosis pressure 1/2 time 43.00 ms    LV Diastolic Volume 105.94 mL    LV Systolic Volume 32.91 mL    LVOT peak benton 1.14 m/s    TDI LATERAL 0.11 m/s    TDI SEPTAL 0.10 m/s    LA volume (mod) 72.87 cm3    LV LATERAL E/E' RATIO 8.27 m/s    LV SEPTAL E/E' RATIO 9.10 m/s    FS 39 %    LA volume 79.57 cm3    LV mass 168.46 g    Left Ventricle Relative Wall Thickness 0.41 cm    AV valve area 3.22 cm2    AV Velocity Ratio 0.89     AV index (prosthetic) 0.82     MV valve area p 1/2 method 5.12 cm2    E/A ratio 1.11     Mean e' 0.11 m/s    LVOT area 3.9 cm2    LVOT stroke volume 80.46 cm3    AV peak gradient 7 mmHg    E/E' ratio 8.67 m/s    LV Systolic Volume Index 14.9 mL/m2    LV Diastolic Volume Index 47.94 mL/m2    LA Volume Index 36.0 mL/m2    LV Mass Index 76 g/m2    LA Volume Index (Mod) 33.0 mL/m2    BSA 2.28 m2    Right Atrial Pressure (from IVC) 3 mmHg    EF 60 %    Narrative    · Mild left atrial enlargement.  · The left ventricle is normal in size with normal systolic function.  · The estimated ejection fraction is 60%.  · Normal left ventricular diastolic function.  · Normal right ventricular size with normal right ventricular systolic   function.  · Normal central venous pressure (3 mmHg).  · There is no significant tricuspid regurgitation and therefore the   pulmonary artery systolic pressure cannot be reported.          Pending Diagnostic Studies:     None         Medications:  Reconciled Home Medications:      Medication List      START taking these medications    doxycycline 100 MG tablet  Commonly known as: VIBRA-TABS  Take 1 tablet (100 mg total) by mouth every 12 (twelve) hours. for 5  days     NIFEdipine 30 MG (OSM) 24 hr tablet  Commonly known as: PROCARDIA-XL  Take 1 tablet (30 mg total) by mouth once daily.  Start taking on: May 31, 2023        CHANGE how you take these medications    tacrolimus 1 MG Cap  Commonly known as: PROGRAF  Take 9 capsules (9 mg total) by mouth every 12 (twelve) hours.  What changed: when to take this        CONTINUE taking these medications    albuterol 90 mcg/actuation inhaler  Commonly known as: PROVENTIL/VENTOLIN HFA  Inhale 2 puffs into the lungs every 4 (four) hours as needed for Wheezing.     famotidine 20 MG tablet  Commonly known as: PEPCID  Take 20 mg by mouth once daily.     metoprolol tartrate 25 MG tablet  Commonly known as: LOPRESSOR  Take 50 mg by mouth 2 (two) times daily.     mycophenolate 250 mg Cap  Commonly known as: CELLCEPT  Take 500 mg by mouth 2 (two) times daily.     predniSONE 5 MG tablet  Commonly known as: DELTASONE  Take 5 mg by mouth once daily.     tenofovir alafenamide 25 mg Tab  Commonly known as: VEMLIDY  Take 1 tablet by mouth once daily.        STOP taking these medications    HYDROcodone-acetaminophen 5-325 mg per tablet  Commonly known as: NORCO            Indwelling Lines/Drains at time of discharge:   Lines/Drains/Airways     Central Venous Catheter Line  Duration                Hemodialysis Catheter right internal jugular -- days          Drain  Duration                Hemodialysis AV Fistula 10/26/17 1325 Left forearm;Other 2042 days          Epidural Line  Duration                Perineural Analgesia/Anesthesia Assessment (using dermatomes) Epidural 12/14/17 1149 1993 days                Time spent on the discharge of patient: 36 minutes         Erika Grewal PA-C  Department of Hospital Medicine  [facility] Observation 11H

## 2023-10-11 NOTE — SUBJECTIVE & OBJECTIVE
Interval History: No acute events overnight. Reports some improvement in dizziness and fatigue overnight but has not ambulated much. Denies headaches, fevers, chills.     Review of Systems   Constitutional: Positive for fatigue. Negative for chills, diaphoresis and fever.   Respiratory: Negative for cough, shortness of breath and wheezing.    Cardiovascular: Negative for chest pain, palpitations and leg swelling.   Gastrointestinal: Negative for abdominal pain, diarrhea, nausea and vomiting.   Genitourinary: Positive for decreased urine volume. Negative for dysuria.   Musculoskeletal: Negative for back pain and gait problem.   Neurological: Positive for dizziness (improving) and weakness. Negative for facial asymmetry and speech difficulty.   Psychiatric/Behavioral: Negative for agitation, confusion and hallucinations.     Objective:     Vital Signs (Most Recent):  Temp: 97.7 °F (36.5 °C) (07/21/17 0737)  Pulse: 84 (07/21/17 0737)  Resp: 17 (07/21/17 0737)  BP: (!) 98/56 (07/21/17 0737)  SpO2: 95 % (07/21/17 0737) Vital Signs (24h Range):  Temp:  [97.7 °F (36.5 °C)-98.7 °F (37.1 °C)] 97.7 °F (36.5 °C)  Pulse:  [76-90] 84  Resp:  [12-26] 17  SpO2:  [92 %-100 %] 95 %  BP: ()/(46-73) 98/56     Weight: 94.6 kg (208 lb 9 oz)  Body mass index is 29.93 kg/m².  No intake or output data in the 24 hours ending 07/21/17 1107   Physical Exam   Constitutional: He is oriented to person, place, and time. He appears well-developed and well-nourished. No distress.   Cardiovascular: Normal rate, regular rhythm, normal heart sounds and intact distal pulses.    No murmur heard.  Pulmonary/Chest: Effort normal and breath sounds normal. No respiratory distress. He has no wheezes.   Tunneled HD catheter to right chest wall, no visible erythema or fluctuance.   Abdominal: Soft. Bowel sounds are normal. He exhibits no distension. There is tenderness (mild LLQ).   PD catheter in place over LLQ   Musculoskeletal: Normal range of motion.  He exhibits no edema or deformity.   Neurological: He is alert and oriented to person, place, and time. No cranial nerve deficit.   Psychiatric: He has a normal mood and affect. His behavior is normal. Judgment and thought content normal.       Significant Labs:   CBC:   Recent Labs  Lab 07/20/17 1648 07/21/17  0443   WBC 7.44 7.91   HGB 12.0* 11.0*   HCT 36.2* 33.9*    253     CMP:   Recent Labs  Lab 07/20/17 1648 07/21/17  0443    140   K 3.8 3.4*   CL 94* 96   CO2 34* 30*   GLU 87 80   BUN 34* 37*   CREATININE 7.8* 8.1*   CALCIUM 9.1 8.9   PROT 7.6 6.9   ALBUMIN 3.3* 3.2*   BILITOT 0.4 0.3   ALKPHOS 94 81   AST 17 18   ALT 15 16   ANIONGAP 13 14   EGFRNONAA 7.9* 7.5*     All pertinent labs within the past 24 hours have been reviewed.    Significant Imaging: I have reviewed all pertinent imaging results/findings within the past 24 hours.    used

## 2024-01-10 NOTE — NURSING
Spoke with IM-G provider re: pt's second episode of emesis this shift. 400 mL clear fluid with undigested food.  Per provider gave PRN IV ondansetron.  Provider will order STAT abdominal xray.   functional reaching. []Met  [x]Partially met  []Not met   Short Term Goal 2: Patient will demonstrate and maintain functional grasp on writing utensil for greater than 15 seconds with minimal assistance in 2 sessions. Goal not directly addressed.  []Met  []Partially met  [x]Not met   Short Term Goal 3: Patient will complete bilateral coordination task with moderate assistance. Pt completed >10 reps rolling ball from chair level with CGA from PT, moderate assistance and tactile cueing to promote use of BUE during task.  []Met  [x]Partially met  []Not met   Short Term Goal 4: Patient will tolerate 5 minutes or greater of BUE weight bearing or strengthening without splints to improve shoulder stability and independence with tasks, with minimal assistance. Pt tolerated between 4 minutes of BUE weight bearing while prone on peanut ball with BUE supported by therapy child-sized bench this date with [place] for BUE placement.   Followed by 1 minute of BUE placement on table during long kneeling with PT.  []Met  [x]Partially met  []Not met   Short Term Goal 5: Initiate caregiver education/HEP. Continue with current HEP. [x]Met  []Partially met  []Not met   OBJECTIVE  Overall good tolerance during therapist-directed tasks this date. Frequent verbal, visual, and tactile prompts were given for maintaining attention to tasks.       EDUCATION  Education provided to patient/family/caregiver: Discussed contents and purpose of session intermittently during session.     Method of Education:     [x]Discussion     []Demonstration    []Written     []Other  Evaluation of Patient’s Response to Education:        [x]Patient and or Caregiver verbalized understanding  []Patient and or Caregiver Demonstrated without assistance   []Patient and or Caregiver Demonstrated with assistance  []Needs additional instruction to demonstrate understanding of education    ASSESSMENT  Patient tolerated today’s treatment session:    [x]Good   []Fair

## 2024-01-27 NOTE — HPI
"37 year old male ESRD on dialysis for one year due to uncontrolled HTN presents to ED with dizziness. He was on PD for last one year until recently when PD catheter stopped working. He underwent PD catheter  revision with change in position due to adhesions on 07/08/17 by Dr. Gillespie. Plan to remove catheter with diagnostic laparoscopy in 3 months to see if it can be replaced. Patient currently having HD ( MWF ) thru right chest walled tunneled catheter that was placed two weeks ago. He had unremarkable clinic visit with Dr. Gillespie earlier today.      Reports he started feeling tingling in his legs associated with dizziness to the point that he felt like "he would pass out" soon after returning home from clinic. He had full session of HD yesterday without any issue. He was just recently converted from PD to HD two weeks ago and not sure if they took out too much fluid at HD yesterday.  He still make little urine twice a day. Denies fever, chills, chest pain, cough, SOH, N/V/D, abdominal pain.      Upon arrival to ER his had low SBP of 77 with dizziness. He received  cc bolus x 1 with minimal improvement. Labs unremarkable except mild elevated lactate of 2.3.     During my interview he still reports feeling dizzy after 500 cc NS bolus with SBP 91. Lung CTA and clinically appears hypovolemic. I ordered another 500 cc NS bolus with improvement of  -110.        States he has clinic visit tomorrow morning at vascular surgery for vein mapping for permanent  HD access.   " As discussed, do not get sutures wet for at least 24 hours.  After 24 hours may shower.  No prolonged exposure to water: No tub baths or swimming.  Apply antibiotic ointment 2-3 times a day.  Leave covered while in large group settings.  Leave open to air while at home.    Tylenol and Motrin as needed for discomfort.  You may apply ice to help reduce swelling.  Monitor for signs symptoms of infection: Pain, fever, drainage, discharge.    Follow-up in 3 to 5 days for suture removal.  As discussed, you may follow-up with your child's pediatrician to remove sutures or return to immediate care.

## 2024-03-02 NOTE — PLAN OF CARE
8/9/17 0935: CM called and spoke with patient about home health recommendations. Patient is in agreement and preference is [facility]. Betsey with [facility] notified and can accept referral, understands that patient is in HD MWF. They will call patient to schedule an admit visit. Face sheet sent via .   Universal Safety Interventions

## (undated) DEVICE — SEE MEDLINE ITEM 153688

## (undated) DEVICE — DRESSING SPONGE 16PLY 4X4 NS

## (undated) DEVICE — SOL NS 1000CC

## (undated) DEVICE — DRESSING TEGADERM IV 3.5 X 4.5

## (undated) DEVICE — STOCKINET 4INX48

## (undated) DEVICE — SEE MEDLINE ITEM 157173

## (undated) DEVICE — DRESSING TELFA STRL 4X3 LF

## (undated) DEVICE — TRAY MINOR GEN SURG

## (undated) DEVICE — CLOSURE SKIN STERI STRIP 1/2X4

## (undated) DEVICE — SEE MEDLINE ITEM 152622

## (undated) DEVICE — SET DECANTER MEDICHOICE

## (undated) DEVICE — BOOT SUTURE AID

## (undated) DEVICE — DRESSING LEUKOPLAST FLEX 1X3IN

## (undated) DEVICE — SYR ONLY LUER LOCK 20CC

## (undated) DEVICE — DRESSING TRANS 8X12 TEGADERM

## (undated) DEVICE — DRAPE PLASTIC U 60X72

## (undated) DEVICE — SUT PROLENE 2-0 FS

## (undated) DEVICE — CLIP MED TICALL

## (undated) DEVICE — SCISSOR 5MMX35CM DIRECT DRIVE

## (undated) DEVICE — WARMER DRAPE STERILE LF

## (undated) DEVICE — SUT 3-0 12-18IN SILK

## (undated) DEVICE — NDL HYPO REG 25G X 1 1/2

## (undated) DEVICE — COVER LIGHT HANDLE 80/CA

## (undated) DEVICE — SUT LIGACLIP SMALL XTRA

## (undated) DEVICE — SUT 0 VICRYL / UR6 (J603)

## (undated) DEVICE — Device

## (undated) DEVICE — SUT COATED VICRYL 4/0 27IN

## (undated) DEVICE — CUP MEDICINE STERILE 2OZ

## (undated) DEVICE — CATH EMBOLECTOMY 2F 60CM

## (undated) DEVICE — SUT GUT PL. 4-0 27 FS-2

## (undated) DEVICE — DRESSING TRANS 6X8 TEGADERM

## (undated) DEVICE — ELECTRODE REM PLYHSV RETURN 9

## (undated) DEVICE — GOWN SURGICAL X-LARGE

## (undated) DEVICE — SET TRANSFER CAPD MINICAP 15CM

## (undated) DEVICE — BLADE SURG CARBON STEEL SZ11

## (undated) DEVICE — DRESSING TRANS 4X4 TEGADERM

## (undated) DEVICE — APPLICATOR CHLORAPREP ORN 26ML

## (undated) DEVICE — ADHESIVE MASTISOL VIAL 48/BX

## (undated) DEVICE — GAUZE FLUFF XXLG 36X36 2 PLY

## (undated) DEVICE — SOL MINICAP W/IODINE

## (undated) DEVICE — SUT 2-0 12-18IN SILK

## (undated) DEVICE — TUBING HF INSUFFLATION W/ FLTR

## (undated) DEVICE — SEE MEDLINE ITEM 157148

## (undated) DEVICE — GAUZE SPONGE 4X4 12PLY

## (undated) DEVICE — COVERS PROBE NR-48 STERILE

## (undated) DEVICE — SURGIWHIP

## (undated) DEVICE — SET IV ADMIN 15DROP 3 CARESITE

## (undated) DEVICE — TROCAR ENDOPATH XCEL 5MM 7.5CM

## (undated) DEVICE — PACK DISP Y TEC